# Patient Record
Sex: MALE | Race: WHITE | Employment: UNEMPLOYED | URBAN - METROPOLITAN AREA
[De-identification: names, ages, dates, MRNs, and addresses within clinical notes are randomized per-mention and may not be internally consistent; named-entity substitution may affect disease eponyms.]

---

## 2017-01-16 DIAGNOSIS — K22.70 BARRETT'S ESOPHAGUS WITHOUT DYSPLASIA: Primary | ICD-10-CM

## 2017-01-16 NOTE — TELEPHONE ENCOUNTER
Esomeprazole      Last Written Prescription Date: 12.7.15  Last Fill Quantity: 90,  # refills: 3   Last Office Visit with [facility], P or [facility] prescribing provider: 8.4.16    Deja Hood PharmD, [facility]  Pharmacist Manager   [facility]  [phone]

## 2017-01-17 RX ORDER — ESOMEPRAZOLE MAGNESIUM 40 MG/1
40 CAPSULE, DELAYED RELEASE ORAL
Qty: 90 CAPSULE | Refills: 2 | Status: SHIPPED | OUTPATIENT
Start: 2017-01-17 | End: 2017-05-02 | Stop reason: DRUGHIGH

## 2017-01-17 NOTE — TELEPHONE ENCOUNTER
Prescription approved per [facility] Refill Protocol.    Larissa Kraus, PharmD  [facility]  On behalf of [facility]

## 2017-08-09 ENCOUNTER — TELEPHONE (OUTPATIENT)
Dept: FAMILY MEDICINE | Facility: CLINIC | Age: 57
End: 2017-08-09

## 2017-08-09 NOTE — TELEPHONE ENCOUNTER
Note requires a quantity limit override. Max 120 per 365 days.    Please do not close this encounter until this has been addressed.  (prior auth approved/denied, prescriber refusal to complete prior auth or medication changed/discontinued)    Prior Authorization needed on: Esomeprazole 20mg CPDR   Drug NDC: [address]-99     Insurance: [employer] ([facility])  BIN: [id number]  Member ID: [id number]   PCN: (none)  Insurance phone #: [phone]    Pharmacy NPI: [id number]  Pharmacy Phone #: [phone]  Pharmacy Fax #: [phone]    Please let us know if the PA gets approved or denied or if medication is changed.     Deja Hood PharmD, [facility]  Pharmacist Manager   [facility]  [phone]    Joselyn Marvin  PharmD [facility] P4 Candidate 2018  [facility]  [phone]

## 2017-08-11 NOTE — TELEPHONE ENCOUNTER
Started PA over the phone with [employer]. Will take 4-5 callander days for PA determination.    Will postpone encounter for 5 days.    Nena Wright CMA ([facility])

## 2017-08-21 NOTE — TELEPHONE ENCOUNTER
PA approved.  Effective date: 8/14/2017 through 8/14/2018  PA reference #: [id number]  Pharmacy notified.    Nena Wright CMA ([facility])

## 2017-08-31 ENCOUNTER — TELEPHONE (OUTPATIENT)
Dept: GENERAL RADIOLOGY | Facility: CLINIC | Age: 57
End: 2017-08-31

## 2017-08-31 NOTE — TELEPHONE ENCOUNTER
[place].  He will be in [place] for a few weeks in September.  He says his back problems are escalating.  He has an appt with Dr. Tanner , but he wants her to know that if she orders a MRI before an injection or whatever she recommends, that he only has a few weeks here.   He would like her to call him to discuss situation.  [phone]

## 2017-09-01 ENCOUNTER — MYC MEDICAL ADVICE (OUTPATIENT)
Dept: FAMILY MEDICINE | Facility: CLINIC | Age: 57
End: 2017-09-01

## 2017-09-01 ENCOUNTER — MYC MEDICAL ADVICE (OUTPATIENT)
Dept: ORTHOPEDICS | Facility: CLINIC | Age: 57
End: 2017-09-01

## 2017-09-01 NOTE — TELEPHONE ENCOUNTER
Neither contact number is working.  I will try a [employer] message.    If patient calls back, please get a valid number.    Lauren Connelly, ATC

## 2017-09-01 NOTE — TELEPHONE ENCOUNTER
Please call patient.    Thanks for the information.  As it has been ~ 2 years since his last evaluation, it's difficult to determine what work up and treatment will be recommended until we see him for an evaluation.    Stacey Tanner MD

## 2017-09-22 ENCOUNTER — OFFICE VISIT (OUTPATIENT)
Dept: OPTOMETRY | Facility: CLINIC | Age: 57
End: 2017-09-22
Payer: COMMERCIAL

## 2017-09-22 DIAGNOSIS — H52.4 MYOPIA WITH PRESBYOPIA, BILATERAL: Primary | ICD-10-CM

## 2017-09-22 DIAGNOSIS — H57.819 BROW PTOSIS: ICD-10-CM

## 2017-09-22 DIAGNOSIS — H02.833 DERMATOCHALASIS OF BOTH EYELIDS: ICD-10-CM

## 2017-09-22 DIAGNOSIS — H02.836 DERMATOCHALASIS OF BOTH EYELIDS: ICD-10-CM

## 2017-09-22 DIAGNOSIS — H52.13 MYOPIA WITH PRESBYOPIA, BILATERAL: Primary | ICD-10-CM

## 2017-09-22 PROCEDURE — 92014 COMPRE OPH EXAM EST PT 1/>: CPT | Performed by: OPTOMETRIST

## 2017-09-22 PROCEDURE — 92015 DETERMINE REFRACTIVE STATE: CPT | Performed by: OPTOMETRIST

## 2017-09-22 ASSESSMENT — TONOMETRY
IOP_METHOD: APPLANATION
OS_IOP_MMHG: 12
OD_IOP_MMHG: 9

## 2017-09-22 ASSESSMENT — REFRACTION_MANIFEST
OS_SPHERE: -1.00
OS_ADD: +2.00
OD_ADD: +2.00
METHOD_AUTOREFRACTION: 1
OD_SPHERE: -0.75
OD_SPHERE: -0.75
OS_SPHERE: -0.75

## 2017-09-22 ASSESSMENT — VISUAL ACUITY
OS_SC: 20/60
OD_CC: 20/25
METHOD: SNELLEN - LINEAR
OD_CC+: -1
OS_CC: 20/20
OD_SC: 20/40
OS_SC: 20/40
OD_SC: 20/30

## 2017-09-22 ASSESSMENT — CONF VISUAL FIELD
OD_NORMAL: 1
METHOD: COUNTING FINGERS
OS_NORMAL: 1

## 2017-09-22 ASSESSMENT — REFRACTION_WEARINGRX
SPECS_TYPE: SVL
OD_SPHERE: -0.75
OS_SPHERE: -1.00

## 2017-09-22 ASSESSMENT — EXTERNAL EXAM - LEFT EYE: OS_EXAM: NORMAL

## 2017-09-22 ASSESSMENT — SLIT LAMP EXAM - LIDS
COMMENTS: 1+ DERMATOCHALASIS - UPPER LID
COMMENTS: 1+ DERMATOCHALASIS - UPPER LID

## 2017-09-22 ASSESSMENT — CUP TO DISC RATIO
OD_RATIO: 0.4
OS_RATIO: 0.4

## 2017-09-22 ASSESSMENT — EXTERNAL EXAM - RIGHT EYE: OD_EXAM: NORMAL

## 2017-09-22 NOTE — Clinical Note
Adan Mascorro Dr is coming to see you on 9/27 for possible BULB. He is a  and his eyelids have been bothering him for years. He also has sleep issues and I was wondering if he might have floppy eyelid syndrome? I also noticed very fine tremor or nystagmus which I could only appreciate during BIO. Thank you for seeing him. He mentioned that he would only be in town for the next few weeks and he seems anxious to have surgery soon if possible. Thank you! Brenda

## 2017-09-22 NOTE — MR AVS SNAPSHOT
After Visit Summary   9/22/2017    Adan Oliver    MRN: [id number]           Patient Information     Date Of Birth          1960        Visit Information        Provider Department      9/22/2017 2:40 PM Brenda Perez OD [facility]        Today's Diagnoses     Myopia with presbyopia, bilateral    -  1    Dermatochalasis of both eyelids        Brow ptosis          Care Instructions    Referral for eyelid evaluation with Dr. Saenz.    Prescription given for glasses.    Your eyes may be blurry at near and sensitive to light for several hours from the dilating drops.    Yearly eye exams recommended.    Thank you!          Follow-ups after your visit        Additional Services     OPHTHALMOLOGY ADULT REFERRAL       Your provider has referred you to:  [facility]: [facility] [phone]   [email or web address]      Please be aware that coverage of these services is subject to the terms and limitations of your health insurance plan.  Call member services at your health plan with any benefit or coverage questions.      Please bring the following to your appointment:  >>   Any x-rays, CTs or MRIs which have been performed.  Contact the facility where they were done to arrange for  prior to your scheduled appointment.  Any new CT, MRI or other procedures ordered by your specialist must be performed at a [facility] [place] facility or coordinated by your clinic's referral office.    >>   List of current medications   >>   This referral request   >>   Any documents/labs given to you for this referral                  Your next 10 appointments already scheduled     Sep 25, 2017  1:00 PM CDT   New Visit with Stacey Tanner MD   [facility] [place] ([facility] [place])    [address]  [place] [address]   [phone]            Sep 26, 2017 12:20 PM CDT    Office Visit with Charly Castillo MD   [facility]    [address] [address] [phone]           Bring a current list of meds and any records pertaining to this visit. For Physicals, please bring immunization records and any forms needing to be filled out. Please arrive 10 minutes early to complete paperwork.            Sep 27, 2017 11:40 AM CDT   Return Sleep Patient with Abilio Bojorquez MD   [facility] ([facility])    [address] [id number] [phone]            Sep 27, 2017  1:30 PM CDT   New Visit with [facility]   [facility]    [address] [place] [address] [phone]              Who to contact     If you have questions or need follow up information about today's clinic visit or your schedule please contact [facility] directly at [phone].  Normal or non-critical lab and imaging results will be communicated to you by MyChart, letter or phone within 4 business days after the clinic has received the results. If you do not hear from us within 7 days, please contact the clinic through [employer] or phone. If you have a critical or abnormal lab result, we will notify you by phone as soon as possible.  Submit refill requests through [employer] or call your pharmacy and they will forward the refill request to us. Please allow 3 business days for your refill to be completed.          Additional Information About Your Visit        MyChart Information     [employer] gives you secure access to your electronic health record. If you see a primary care provider, you can also send messages to your care team and make appointments. If you have questions, please call your primary care clinic.  If you do not have a primary care provider, please call [phone]  and they will assist you.        Care EveryWhere ID     This is your Care EveryWhere ID. This could be used by other organizations to access your [place] medical records  [id number]         Blood Pressure from Last 3 Encounters:   09/26/16 120/77   08/04/16 116/60   05/17/16 128/70    Weight from Last 3 Encounters:   09/26/16 90.3 kg (199 lb)   08/04/16 92.5 kg (204 lb)   05/17/16 95.3 kg (210 lb)              We Performed the Following     EYE EXAM (SIMPLE-NONBILLABLE)     OPHTHALMOLOGY ADULT REFERRAL     REFRACTION        Primary Care Provider Office Phone # Fax #    Charly Castillo [phone]       [address] [address]        Equal Access to Services     FORTINO SHAY : Hadii delio angela hadasho Soomaali, waaxda luqadaha, qaybta kaalmada adeegyada, lisa clarosin hayhalie marcus . So [facility] [phone].    ATENCIÓN: Si habla español, tiene a henderson disposición servicios gratuitos de asistencia lingüística. Llame al [phone].    We comply with applicable federal civil rights laws and [place] laws. We do not discriminate on the basis of race, color, national origin, age, disability sex, sexual orientation or gender identity.            Thank you!     Thank you for choosing [facility]  for your care. Our goal is always to provide you with excellent care. Hearing back from our patients is one way we can continue to improve our services. Please take a few minutes to complete the written survey that you may receive in the mail after your visit with us. Thank you!             Your Updated Medication List - Protect others around you: Learn how to safely use, store and throw away your medicines at www.disposemymeds.org.          This list is accurate as of: 9/22/17  4:35 PM.  Always use your most recent med list.                   Brand Name Dispense Instructions for use Diagnosis    esomeprazole 20 MG CR capsule    nexIUM    90 capsule    Take 1  capsule (20 mg) by mouth every morning (before breakfast) Take 30-60 minutes before eating.    Reid's esophagus without dysplasia       ibuprofen 200 MG capsule      Take 200 mg by mouth every 4 hours as needed for fever        rOPINIRole 0.25 MG tablet    REQUIP    90 tablet    Take 1 tablet (0.25 mg) by mouth At Bedtime Ok to increase to 2 tablets in 1 week if not improved    Restless legs syndrome (RLS)

## 2017-09-22 NOTE — PROGRESS NOTES
Chief Complaint   Patient presents with     COMPREHENSIVE EYE EXAM         Pt is frustrated with saggy skin around eyes.  Pt also thinks his eyes are not working well together.  Pt is finding he is spashing water on his eyes which helps make a little clearer    Last Eye Exam: 10/15/2014  Dilated Previously: Yes    What are you currently using to see?  glasses       Distance Vision Acuity: Noticed gradual change in both eyes    Near Vision Acuity: Not satisfied     Eye Comfort: dry - feel sticky - eyes feel heavy alot  Do you use eye drops? : No - Pt currently does not.  Occupation or Hobbies:  and     Jeannette Perera  [employer]            Medical, surgical and family histories reviewed and updated 9/22/2017.       OBJECTIVE: See Ophthalmology exam    ASSESSMENT:    ICD-10-CM    1. Myopia with presbyopia, bilateral H52.13 EYE EXAM (SIMPLE-NONBILLABLE)    H52.4 REFRACTION   2. Dermatochalasis of both eyelids H02.833 EYE EXAM (SIMPLE-NONBILLABLE)    H02.836 OPHTHALMOLOGY ADULT REFERRAL   3. Brow ptosis L90.8 EYE EXAM (SIMPLE-NONBILLABLE)     OPHTHALMOLOGY ADULT REFERRAL      PLAN:     Patient Instructions   Referral for eyelid evaluation with Dr. Saenz.    Prescription given for glasses.    Your eyes may be blurry at near and sensitive to light for several hours from the dilating drops.    Yearly eye exams recommended.    Thank you!

## 2017-09-22 NOTE — PATIENT INSTRUCTIONS
Referral for eyelid evaluation with Dr. Saenz.    Prescription given for glasses.    Your eyes may be blurry at near and sensitive to light for several hours from the dilating drops.    Yearly eye exams recommended.    Thank you!

## 2017-09-25 ENCOUNTER — OFFICE VISIT (OUTPATIENT)
Dept: ORTHOPEDICS | Facility: CLINIC | Age: 57
End: 2017-09-25
Payer: COMMERCIAL

## 2017-09-25 ENCOUNTER — PRE VISIT (OUTPATIENT)
Dept: OPHTHALMOLOGY | Facility: CLINIC | Age: 57
End: 2017-09-25

## 2017-09-25 VITALS
SYSTOLIC BLOOD PRESSURE: 133 MMHG | HEIGHT: 68 IN | WEIGHT: 197.3 LBS | HEART RATE: 73 BPM | DIASTOLIC BLOOD PRESSURE: 87 MMHG | BODY MASS INDEX: 29.9 KG/M2

## 2017-09-25 DIAGNOSIS — M54.16 LUMBAR RADICULOPATHY: Primary | ICD-10-CM

## 2017-09-25 PROCEDURE — 99214 OFFICE O/P EST MOD 30 MIN: CPT | Performed by: PEDIATRICS

## 2017-09-25 NOTE — MR AVS SNAPSHOT
After Visit Summary   2017    Adan Oliver    MRN: [id number]           Patient Information     Date Of Birth          1960        Visit Information        Provider Department      2017 1:00 PM Stacey Tanner MD [facility] [place]        Today's Diagnoses     Lumbar radiculopathy    -  1      Care Instructions    Plan:  - Today's Plan of Care:  MRI of the low back  Rehab: Physical Therapy: [facility] - [phone]    -We also discussed other future treatment options:  Referral to surgeon or injection    Advanced imaging is done by appointment in the imaging center.  Depending on your availability you can usually schedule within the next 1-2 days. Some insurance require a prior authorization to be completed which may delay the time until you are able to schedule your appointment.  Please call [facility] [phone] to schedule your MRI.     The clinic will call you with results, if you have not heard from the clinic within 3-4 days following your MRI please contact us at the number listed below.     If you have any further questions for your physician or physician s care team you can call [phone] and use option 3 to leave a voice message. Calls received during business hours will be returned same day.            Follow-ups after your visit        Additional Services     [facility] PT, HAND, AND CHIROPRACTIC REFERRAL       **This order will print in the [facility] Scheduling Office**    Physical Therapy, Hand Therapy and Chiropractic Care are available through:    *[facility]  *[facility]  *[facility]    Call one number to schedule at any of the above locations: [phone].    Your provider has referred you to: Physical Therapy at [facility] or [facility]    Indication/Reason for Referral: Low Back Pain  Onset of Illness:   Therapy Orders: Evaluate and Treat  Special Programs:  None  Special Request: None    Brodie Calloway      Additional Comments for the Therapist or Chiropractor:     Please be aware that coverage of these services is subject to the terms and limitations of your health insurance plan.  Call member services at your health plan with any benefit or coverage questions.      Please bring the following to your appointment:    *Your personal calendar for scheduling future appointments  *Comfortable clothing                  Your next 10 appointments already scheduled     Sep 26, 2017 12:20 PM CDT   Office Visit with Charly Castillo MD   [facility]    [address] [address] [phone]           Bring a current list of meds and any records pertaining to this visit. For Physicals, please bring immunization records and any forms needing to be filled out. Please arrive 10 minutes early to complete paperwork.            Sep 27, 2017 11:40 AM CDT   Return Sleep Patient with Abilio Bojorquez MD   [facility] ([facility])    [address] [id number] [phone]            Sep 27, 2017  1:30 PM CDT   New Visit with Dany Berrios MD   [facility]    [address] [place] [address] [phone]              Future tests that were ordered for you today     Open Future Orders        Priority Expected Expires Ordered    MR Lumbar Spine w/o Contrast Routine  9/25/2018 9/25/2017            Who to contact     If you have questions or need follow up information about today's clinic visit or your schedule please contact [facility] directly at [phone].  Normal or non-critical lab and imaging results will be communicated to you by MyChart, letter or phone within 4 business days after the clinic has received the results. If you do  "not hear from us within 7 days, please contact the clinic through [employer] or phone. If you have a critical or abnormal lab result, we will notify you by phone as soon as possible.  Submit refill requests through [employer] or call your pharmacy and they will forward the refill request to us. Please allow 3 business days for your refill to be completed.          Additional Information About Your Visit        [employer] Information     [employer] gives you secure access to your electronic health record. If you see a primary care provider, you can also send messages to your care team and make appointments. If you have questions, please call your primary care clinic.  If you do not have a primary care provider, please call [phone] and they will assist you.        Care EveryWhere ID     This is your Care EveryWhere ID. This could be used by other organizations to access your [place] medical records  [id number]        Your Vitals Were     Pulse Height BMI (Body Mass Index)             73 5' 7.5\" (1.715 m) 30.45 kg/m2          Blood Pressure from Last 3 Encounters:   09/25/17 133/87   09/26/16 120/77   08/04/16 116/60    Weight from Last 3 Encounters:   09/25/17 197 lb 4.8 oz (89.5 kg)   09/26/16 199 lb (90.3 kg)   08/04/16 204 lb (92.5 kg)              We Performed the Following     [facility] PT, HAND, AND CHIROPRACTIC REFERRAL        Primary Care Provider Office Phone # Fax #    Charly Arben Castillo [phone]       [facility] [facility] [address]        Equal Access to Services     FORTINO SHAY : Hadii aad ku hadasho Soomaali, waaxda luqadaha, qaybta kaalmada adeegyada, lisa carpio. So [facility] [phone].    ATENCIÓN: Si habla español, tiene a henderson disposición servicios gratuitos de asistencia lingüística. Llame al [phone].    We comply with applicable federal civil rights laws and [place] laws. We do not discriminate on the basis of race, color, national origin, " age, disability sex, sexual orientation or gender identity.            Thank you!     Thank you for choosing [facility]  for your care. Our goal is always to provide you with excellent care. Hearing back from our patients is one way we can continue to improve our services. Please take a few minutes to complete the written survey that you may receive in the mail after your visit with us. Thank you!             Your Updated Medication List - Protect others around you: Learn how to safely use, store and throw away your medicines at www.disposemymeds.org.          This list is accurate as of: 9/25/17  2:00 PM.  Always use your most recent med list.                   Brand Name Dispense Instructions for use Diagnosis    esomeprazole 20 MG CR capsule    nexIUM    90 capsule    Take 1 capsule (20 mg) by mouth every morning (before breakfast) Take 30-60 minutes before eating.    Reid's esophagus without dysplasia       ibuprofen 200 MG capsule      Take 200 mg by mouth every 4 hours as needed for fever        rOPINIRole 0.25 MG tablet    REQUIP    90 tablet    Take 1 tablet (0.25 mg) by mouth At Bedtime Ok to increase to 2 tablets in 1 week if not improved    Restless legs syndrome (RLS)

## 2017-09-25 NOTE — PATIENT INSTRUCTIONS
Plan:  - Today's Plan of Care:  MRI of the low back  Rehab: Physical Therapy: [facility] - [phone]    -We also discussed other future treatment options:  Referral to surgeon or injection    Advanced imaging is done by appointment in the imaging center.  Depending on your availability you can usually schedule within the next 1-2 days. Some insurance require a prior authorization to be completed which may delay the time until you are able to schedule your appointment.  Please call [facility] Central [phone] to schedule your MRI.     The clinic will call you with results, if you have not heard from the clinic within 3-4 days following your MRI please contact us at the number listed below.     If you have any further questions for your physician or physician s care team you can call [phone] and use option 3 to leave a voice message. Calls received during business hours will be returned same day.

## 2017-09-25 NOTE — PROGRESS NOTES
Sports Medicine Clinic Visit    PCP: Charly Castillo    Adan Oliver is a 57 year old male who is seen  in follow-up presenting with low back and bilateral leg pain, L>R    Injury: Patient reports on an off low back pain over the past few years, worsening recently over the last 3 months.  Was previously seen for similar symptoms in 2015/2016 by myself and Dr. Patricio.  At that time he discussed PT and injection, however, didn't complete either.  He states he aggravated it in July doing physical work including walking on rocks.  This pain is much worse.  Will have pain into legs mostly in the thighs, left leg currently worse with pain down to his toe.  Numbness on the top of his left toe.  No bowel or bladder symptoms.    Adan was asked to complete the Oswestry Low Back Disability Index and Brodie Start Back screening tool.  today in the office.  Disability score: 34%.     Location of Pain: bilateral legs, low back  Duration of Pain: 3 month(s) on chronic  Rating of Pain at worst: 10/10  Rating of Pain Currently: 3/10  Symptoms are better with: chiropractor, tumeric  Symptoms are worse with: being on feet, moving around doing work, stairs, inclines  Additional Features:   Positive: numbness and burning   Negative: swelling, bruising, popping, grinding, catching, locking, instability, paresthesias, weakness, pain in other joints and systemic symptoms  Other evaluation and/or treatments so far consists of: chiropractor-provided relief  Prior History of related problems: chronic    Social History:     Review of Systems  Skin: no bruising, no swelling  Musculoskeletal: as above  Neurologic: yes numbness, paresthesias  Remainder of review of systems is negative including constitutional, CV, pulmonary, GI, except as noted in HPI or medical history.    Patient's current problem list, past medical and surgical history, and family history were reviewed.    Patient Active Problem List   Diagnosis     Esophageal  "reflux     Hyperlipidemia with target LDL less than 130     Reid esophagus     Head ache     Memory changes     Insomnia, psychophysiological     Family history of Alzheimer's disease     Anxiety     Major depressive disorder, single episode, mild (H)     Lumbar degenerative disc disease     Protrusion of lumbar intervertebral disc     Lumbar spinal stenosis     Alcohol use     Past Medical History:   Diagnosis Date     History of shingles- 2012 10/1/2014     Nephrolithiasis-2009 10/1/2014     Past Surgical History:   Procedure Laterality Date     APPENDECTOMY  2000s     CARPAL TUNNEL RELEASE RT/LT  1980s     TONSILLECTOMY  1980s     Family History   Problem Relation Age of Onset     Hypertension Mother      DIABETES Brother      Glaucoma No family hx of      Macular Degeneration No family hx of      Coronary Artery Disease No family hx of      Colon Cancer No family hx of      Retinal detachment No family hx of        Objective  /87  Pulse 73  Ht 5' 7.5\" (1.715 m)  Wt 197 lb 4.8 oz (89.5 kg)  BMI 30.45 kg/m2    GENERAL APPEARANCE: healthy, alert and no distress   GAIT: NORMAL  SKIN: no suspicious lesions or rashes  HEENT: Sclera clear, anicteric  CV: good peripheral pulses  RESP: Breathing not labored  NEURO: Normal strength and tone, mentation intact and speech normal  PSYCH:  mentation appears normal and affect normal/bright    Low back exam:    Inspection:     no visible deformity in the low back       normal skin       normal vascular       normal lymphatic    Posture:      rounded shoulders and upper back       lumbar lordosis diminished    Tender:     midline       paraspinal muscles    Non Tender:     remainder of lumbar spine    ROM:      limited flexion due to pain       limited extension due to pain    Strength:     hip flexion 5/5 bilateral       knee extension 5/5 bilateral       ankle dorsiflexion 5/5 bilateral       ankle plantarflexion 5/5 bilateral       dorsiflexion of the great toe " 4/5 left       able to heel and toe walk    Reflexes:     patellar (L3, L4) symmetric normal       achilles tendons (S1) symmetric normal    Sensation:    grossly intact throughout lower extremities    Special tests:      straight leg raise left positive        straight leg raise right negative       neg (-) ROSEMARIE  bilateral       neg (-) FADIR  bilateral    Radiology  I visualized and reviewed these images with the patient  MRI LUMBAR SPINE WITHOUT CONTRAST 7/27/2015, 1:22 PM      HISTORY: Sciatica. Low back and leg pain.     TECHNIQUE:  Sagittal T1 and STIR. Sagittal T2 and axial dual-echo T2.      FINDINGS:  Five lumbar vertebrae are assumed. Some degree of disc  dehydration throughout. The patient is noted to have a somewhat  congenitally small lumbar spinal canal.     Findings by specific level:     L2-L3: No disc herniation or stenosis.     L3-L4: Minimal left ligamentum flavum thickening. Disc bulging.  Borderline central stenosis. Mild to mild-moderate bilateral foraminal  stenosis.     L4-L5: Mild ligamentum flavum thickening. Medium sized central and  right parasagittal disc protrusion. The larger component is right  parasagittal, with mass effect on the right L5 nerve root. There is  moderate central stenosis. Mild to mild-moderate bilateral foraminal  stenosis.     L5-S1: Disc bulging without central stenosis. Mild to mild-moderate  bilateral foraminal stenosis.     IMPRESSION  IMPRESSION:  1. Multilevel degenerative disc disease.  2. L3-L4: Borderline central stenosis.  3. L4-L5: Medium-sized right asymmetric disc protrusion with mass  effect on the L5 nerve root. Moderate central stenosis.       Assessment:  1. Lumbar radiculopathy      Lumbar radiculopathy with prior disc herniation.  Given worsening of symptoms and mild weakness, will update MRI.  We discussed the following treatment options: symptom treatment, activity modification/rest, rehab and referral. Following discussion, plan: will update  MRI and consider injection (will call with MRI results).  Recommended close follow up given weakness.  Patient is only in town 2 weeks so will plan to see prior to his departure.  If needed can help arrange follow up care in [place].    Plan:  - Today's Plan of Care:  MRI of the low back (we will call with results)  Rehab: Physical Therapy: [facility] - [phone]    -We also discussed other future treatment options:  Referral to surgeon or injection    Concerning signs and symptoms were reviewed.  The patient expressed understanding of this management plan and all questions were answered at this time.    Stacey Tanner MD CAQ  Primary Care Sports Medicine  [facility]

## 2017-09-25 NOTE — NURSING NOTE
"Chief Complaint   Patient presents with     Lumbar/SI       Initial /87  Pulse 73  Ht 5' 7.5\" (1.715 m)  Wt 197 lb 4.8 oz (89.5 kg)  BMI 30.45 kg/m2 Estimated body mass index is 30.45 kg/(m^2) as calculated from the following:    Height as of this encounter: 5' 7.5\" (1.715 m).    Weight as of this encounter: 197 lb 4.8 oz (89.5 kg).  Medication Reconciliation: complete    "

## 2017-09-25 NOTE — TELEPHONE ENCOUNTER
Consult Referred by Dr. Ana Wilkins Dewey      For the evaluation of   Chief Complaint   Patient presents with     Previsit     appt w/ Dr Berrios     Dermatochalasis Evaluation     referred by Dr Perez       When was your last eye exam w/ [facility]? 1 week(s)  Do you wear glasses? Yes Please bring them with you to your appointment.  Do you wear contacts? No  How many hours per day to you wear your lenses? not applicable  Please bring the boxes with you to your appointment.      HPI:  Location:   OU     Symptoms:   POSITIVE for: and lid problem droopy  Pertinent Negatives:   Negative for vision changes Severity:   moderate  Duration:   years    Timing:   gradual onset  Other:     POH:  1- Myopia  2- Dermatochalasis  3- Brow Ptosis      Do you use any eye drops? no  For what condition(s)?    Ocular Meds:         ROS:    Review Of Systems  Eyes: as above  Endocrine:  negative    Allergies:  No Known Allergies    Systemic Medications:   Current Outpatient Prescriptions   Medication     esomeprazole (NEXIUM) 20 MG CR capsule     rOPINIRole (REQUIP) 0.25 MG tablet     ibuprofen 200 MG capsule     No current facility-administered medications for this visit.        Family History   Problem Relation Age of Onset     Hypertension Mother      DIABETES Brother      Glaucoma No family hx of      Macular Degeneration No family hx of      Coronary Artery Disease No family hx of      Colon Cancer No family hx of      Retinal detachment No family hx of        Social History   Substance Use Topics     Smoking status: Never Smoker     Smokeless tobacco: Never Used     Alcohol use 8.4 - 12.6 oz/week     14 - 21 Standard drinks or equivalent per week      Comment: varies, a couple drinks a day       Saskia SIMMONS [facility]

## 2017-09-26 ENCOUNTER — OFFICE VISIT (OUTPATIENT)
Dept: FAMILY MEDICINE | Facility: CLINIC | Age: 57
End: 2017-09-26
Payer: COMMERCIAL

## 2017-09-26 VITALS
TEMPERATURE: 98.5 F | WEIGHT: 202 LBS | BODY MASS INDEX: 30.62 KG/M2 | HEIGHT: 68 IN | HEART RATE: 80 BPM | DIASTOLIC BLOOD PRESSURE: 92 MMHG | SYSTOLIC BLOOD PRESSURE: 134 MMHG

## 2017-09-26 DIAGNOSIS — Z23 NEED FOR PROPHYLACTIC VACCINATION AND INOCULATION AGAINST INFLUENZA: ICD-10-CM

## 2017-09-26 DIAGNOSIS — R19.7 DIARRHEA, UNSPECIFIED TYPE: Primary | ICD-10-CM

## 2017-09-26 PROBLEM — E66.09 NON MORBID OBESITY DUE TO EXCESS CALORIES: Chronic | Status: ACTIVE | Noted: 2017-09-26

## 2017-09-26 LAB
BASOPHILS # BLD AUTO: 0 10E9/L (ref 0–0.2)
BASOPHILS NFR BLD AUTO: 0.4 %
DIFFERENTIAL METHOD BLD: NORMAL
EOSINOPHIL # BLD AUTO: 0.2 10E9/L (ref 0–0.7)
EOSINOPHIL NFR BLD AUTO: 2.4 %
ERYTHROCYTE [DISTWIDTH] IN BLOOD BY AUTOMATED COUNT: 14 % (ref 10–15)
HCT VFR BLD AUTO: 44.5 % (ref 40–53)
HGB BLD-MCNC: 14.9 G/DL (ref 13.3–17.7)
LYMPHOCYTES # BLD AUTO: 2.4 10E9/L (ref 0.8–5.3)
LYMPHOCYTES NFR BLD AUTO: 25 %
MCH RBC QN AUTO: 31 PG (ref 26.5–33)
MCHC RBC AUTO-ENTMCNC: 33.5 G/DL (ref 31.5–36.5)
MCV RBC AUTO: 93 FL (ref 78–100)
MONOCYTES # BLD AUTO: 1.1 10E9/L (ref 0–1.3)
MONOCYTES NFR BLD AUTO: 11.1 %
NEUTROPHILS # BLD AUTO: 5.9 10E9/L (ref 1.6–8.3)
NEUTROPHILS NFR BLD AUTO: 61.1 %
PLATELET # BLD AUTO: 425 10E9/L (ref 150–450)
RBC # BLD AUTO: 4.8 10E12/L (ref 4.4–5.9)
WBC # BLD AUTO: 9.7 10E9/L (ref 4–11)

## 2017-09-26 PROCEDURE — 90686 IIV4 VACC NO PRSV 0.5 ML IM: CPT | Performed by: FAMILY MEDICINE

## 2017-09-26 PROCEDURE — 99213 OFFICE O/P EST LOW 20 MIN: CPT | Mod: 25 | Performed by: FAMILY MEDICINE

## 2017-09-26 PROCEDURE — 90471 IMMUNIZATION ADMIN: CPT | Performed by: FAMILY MEDICINE

## 2017-09-26 PROCEDURE — 80053 COMPREHEN METABOLIC PANEL: CPT | Performed by: FAMILY MEDICINE

## 2017-09-26 PROCEDURE — 36415 COLL VENOUS BLD VENIPUNCTURE: CPT | Performed by: FAMILY MEDICINE

## 2017-09-26 PROCEDURE — 85025 COMPLETE CBC W/AUTO DIFF WBC: CPT | Performed by: FAMILY MEDICINE

## 2017-09-26 NOTE — PROGRESS NOTES
Injectable Influenza Immunization Documentation    1.  Is the person to be vaccinated sick today?   No    2. Does the person to be vaccinated have an allergy to a component   of the vaccine?   No    3. Has the person to be vaccinated ever had a serious reaction   to influenza vaccine in the past?   No    4. Has the person to be vaccinated ever had Guillain-Barré syndrome?   No    Form completed by Stacey Lock, Certified Medical Assistant (AAMA)

## 2017-09-26 NOTE — PROGRESS NOTES
SUBJECTIVE:   Adan Oliver is a 57 year old male who presents to clinic today for the following health issues:      Diarrhea  Onset: ~6 months    Description:   Consistency of stool: loose and sticky, acrid smelling  Blood in stool: no   Number of loose stools in past 24 hours: 2-3    Progression of Symptoms:  same    Accompanying Signs & Symptoms:  Fever: no   Nausea or vomiting; no   Abdominal pain: no   Episodes of constipation: no   Weight loss: no     History:   Ill contacts: no   Recent use of antibiotics: no      Recent travels: YES- [place]          Recent medication-new or changes(Rx or OTC): no     Precipitating factors:   none    Alleviating factors:   none    Therapies Tried and outcome:  Diet changes; Outcome: no relief    He doesn't feel like he's having cathartic BM's. Often times notes urgency but no loss of bowel control. No melanic bowel movements. No close contacts with people with similar symptoms. He does note that he travels to [place] on a yearly interval.     Inquires about use of ibuprofen in the setting of Reid's esophagitis.He had an EGD in 2014 which showed Reid's esophagitis. Takes Nexium in the morning and then two ibuprofen. He has been taking ibuprofen regularly for several months now for pain management. Denies abdominal pain, vomiting, nausea, or upset stomach. Had tried limiting diary intake but it didn't make a difference.        Problem list and histories reviewed & adjusted, as indicated.  Additional history: as documented    Patient Active Problem List   Diagnosis     Esophageal reflux     Hyperlipidemia with target LDL less than 130     Reid esophagus     Head ache     Memory changes     Insomnia, psychophysiological     Family history of Alzheimer's disease     Anxiety     Major depressive disorder, single episode, mild (H)     Lumbar degenerative disc disease     Protrusion of lumbar intervertebral disc     Lumbar spinal stenosis     Alcohol use     Past  Surgical History:   Procedure Laterality Date     APPENDECTOMY  2000s     CARPAL TUNNEL RELEASE RT/LT  1980s     TONSILLECTOMY  1980s       Social History   Substance Use Topics     Smoking status: Never Smoker     Smokeless tobacco: Never Used     Alcohol use 8.4 - 12.6 oz/week     14 - 21 Standard drinks or equivalent per week      Comment: varies, a couple drinks a day     Family History   Problem Relation Age of Onset     Hypertension Mother      DIABETES Brother      Glaucoma No family hx of      Macular Degeneration No family hx of      Coronary Artery Disease No family hx of      Colon Cancer No family hx of      Retinal detachment No family hx of          Current Outpatient Prescriptions   Medication Sig Dispense Refill     esomeprazole (NEXIUM) 20 MG CR capsule Take 1 capsule (20 mg) by mouth every morning (before breakfast) Take 30-60 minutes before eating. 90 capsule 3     ibuprofen 200 MG capsule Take 200 mg by mouth every 4 hours as needed for fever       No Known Allergies  Recent Labs   Lab Test  08/04/16   1523  07/09/15   1557  11/24/14   1203  11/06/14   1522  10/01/14   1533  09/25/14   1305   A1C   --    --    --    --   5.1   --    LDL   --   131*   --   Cannot estimate LDL when triglyceride exceeds 400 mg/dL  193*   --   191*   HDL   --   47   --   37*   --   54   TRIG   --   284*   --   511*   --   314*   ALT  29   --   37   --    --    --    CR  1.15   --   1.20   --    --   1.16   GFRESTIMATED  65   --   63   --    --   65   GFRESTBLACK  79   --   76   --    --   79   POTASSIUM  4.5   --   4.4   --    --   4.0   TSH  0.52   --    --    --    --    --       BP Readings from Last 3 Encounters:   09/26/17 (!) 134/92   09/25/17 133/87   09/26/16 120/77    Wt Readings from Last 3 Encounters:   09/26/17 91.6 kg (202 lb)   09/25/17 89.5 kg (197 lb 4.8 oz)   09/26/16 90.3 kg (199 lb)                  Labs reviewed in EPIC          Reviewed and updated as needed this visit by clinical staffTobaccmaci   "Allergies  Med Hx  Surg Hx  Fam Hx  Soc Hx      Reviewed and updated as needed this visit by Provider         ROS:  Constitutional, HEENT, cardiovascular, pulmonary, GI, , musculoskeletal, neuro, skin, endocrine and psych systems are negative, except as otherwise noted.    This document serves as a record of the services and decisions personally performed and made by Adam Castillo MD. It was created on their behalf by Orlando Saldivar, a trained medical scribe. The creation of this document is based the provider's statements to the medical scribe.  Orlando Saldivar September 26, 2017 12:32 PM         OBJECTIVE:   BP (!) 134/92 (Cuff Size: Adult Large)  Pulse 80  Temp 98.5  F (36.9  C) (Oral)  Ht 1.715 m (5' 7.5\")  Wt 91.6 kg (202 lb)  BMI 31.17 kg/m2  Body mass index is 31.17 kg/(m^2).  GENERAL: healthy, alert and no distress  HENT: ear canals and TM's normal, nose and mouth without ulcers or lesions  NECK: no adenopathy, no asymmetry, masses, or scars and thyroid normal to palpation  RESP: lungs clear to auscultation - no rales, rhonchi or wheezes  CV: regular rate and rhythm, normal S1 S2, no S3 or S4, no murmur, click or rub  ABDOMEN: soft, nontender, no hepatosplenomegaly, no masses and bowel sounds normal  MS: no gross musculoskeletal defects noted, no edema -- DROM of low back  SKIN: no suspicious lesions or rashes  PSYCH: mentation appears normal, affect normal/bright    Diagnostic Test Results:  none     ASSESSMENT/PLAN:   (R19.7) Diarrhea, unspecified type  (primary encounter diagnosis)  Comment: chronic diarrhea/loose stools with unclear etiology. Narrative concerning for giardia vs other parasitic infection considering travel history.   Plan: Enteric Bacteria and Virus Panel by ANGELICA Stool,         Giardia antigen, Comprehensive metabolic panel,        Ova and Parasite Exam Routine, CBC with         platelets and differential        -follow up, pending results    (Z23) Need for prophylactic vaccination and " inoculation against influenza  Comment:   Plan: FLU VAC, SPLIT VIRUS IM > 3 YO (QUADRIVALENT)         [00173], Vaccine Administration, Initial         [95209]          Patient Instructions   Try taking metamucil 20-25 mg a day.         The information in this document, created by the medical scribe for me, accurately reflects the services I personally performed and the decisions made by me. I have reviewed and approved this document for accuracy prior to leaving the patient care area.    Charly Castillo MD, MD  [facility]

## 2017-09-26 NOTE — NURSING NOTE
"Chief Complaint   Patient presents with     Diarrhea     Fatigue     Flu Shot     done       Initial BP (!) 134/92 (Cuff Size: Adult Large)  Pulse 80  Temp 98.5  F (36.9  C) (Oral)  Ht 5' 7.5\" (1.715 m)  Wt 202 lb (91.6 kg)  BMI 31.17 kg/m2 Estimated body mass index is 31.17 kg/(m^2) as calculated from the following:    Height as of this encounter: 5' 7.5\" (1.715 m).    Weight as of this encounter: 202 lb (91.6 kg).  Medication Reconciliation: complete   Stacey Lock, Certified Medical Assistant (AAMA)     "

## 2017-09-26 NOTE — MR AVS SNAPSHOT
After Visit Summary   9/26/2017    Adan Oliver    MRN: [id number]           Patient Information     Date Of Birth          1960        Visit Information        Provider Department      9/26/2017 12:20 PM Charly Castillo MD [facility]        Today's Diagnoses     Diarrhea, unspecified type    -  1    Need for prophylactic vaccination and inoculation against influenza          Care Instructions    Try taking metamucil 20-25 mg a day.           Follow-ups after your visit        Your next 10 appointments already scheduled     Sep 27, 2017 11:40 AM CDT   Return Sleep Patient with Abilio Bojorquez MD   [facility] ([facility]    [address] [address] [id number] [phone]            Sep 27, 2017  1:30 PM CDT   New Visit with Dany Berrios MD   [facility]    [address] [address] [place] [id number] [phone]            Sep 27, 2017  7:15 PM CDT   MR LUMBAR SPINE W/O CONTRAST with BEMR1   [facility] ([facility])    [address] [facility] [id number] [phone]           Take your medicines as usual, unless your doctor tells you not to. Bring a list of your current medicines to your exam (including vitamins, minerals and over-the-counter drugs). Also bring the results of similar scans you may have had.  Please remove any body piercings and hair extensions before you arrive.  Follow your doctor s orders. If you do not, we may have to postpone your exam.  You will not have contrast for this exam. You do not need to do anything special to prepare.  The MRI machine uses a strong magnet. Please wear clothes without metal (snaps, zippers). A sweatsuit works well, or we may give you a hospital gown.   **IMPORTANT** THE INSTRUCTIONS BELOW ARE ONLY FOR THOSE PATIENTS WHO HAVE BEEN TOLD THEY WILL RECEIVE  SEDATION OR GENERAL ANESTHESIA DURING THEIR MRI PROCEDURE:  IF YOU WILL RECEIVE SEDATION (take medicine to help you relax during your exam):   You must get the medicine from your doctor before you arrive. Bring the medicine to the exam. Do not take it at home.   Arrive one hour early. Bring someone who can take you home after the test. Your medicine will make you sleepy. After the exam, you may not drive, take a bus or take a taxi by yourself.   No eating 8 hours before your exam. You may have clear liquids up until 4 hours before your exam. (Clear liquids include water, clear tea, black coffee and fruit juice without pulp.)  IF YOU WILL RECEIVE ANESTHESIA (be asleep for your exam):   Arrive 1 1/2 hours early. Bring someone who can take you home after the test. You may not drive, take a bus or take a taxi by yourself.   No eating 8 hours before your exam. You may have clear liquids up until 4 hours before your exam. (Clear liquids include water, clear tea, black coffee and fruit juice without pulp.)   You will spend four to five hours in the recovery room.  Please call the Imaging Department at your exam site with any questions.              Future tests that were ordered for you today     Open Future Orders        Priority Expected Expires Ordered    Enteric Bacteria and Virus Panel by ANGELICA Stool Routine  9/26/2018 9/26/2017    Giardia antigen Routine  9/26/2018 9/26/2017    Ova and Parasite Exam Routine Routine  9/26/2018 9/26/2017    MR Lumbar Spine w/o Contrast Routine  9/25/2018 9/25/2017            Who to contact     If you have questions or need follow up information about today's clinic visit or your schedule please contact [facility] directly at [phone].  Normal or non-critical lab and imaging results will be communicated to you by MyChart, letter or phone within 4 business days after the clinic has received the results. If you do not hear from us within 7 days, please contact the  "clinic through [facility] or phone. If you have a critical or abnormal lab result, we will notify you by phone as soon as possible.  Submit refill requests through SNAPP' or call your pharmacy and they will forward the refill request to us. Please allow 3 business days for your refill to be completed.          Additional Information About Your Visit        [facility] Information     SNAPP' gives you secure access to your electronic health record. If you see a primary care provider, you can also send messages to your care team and make appointments. If you have questions, please call your primary care clinic.  If you do not have a primary care provider, please call [phone] and they will assist you.        Care EveryWhere ID     This is your Care EveryWhere ID. This could be used by other organizations to access your [facility] medical records  [id number]        Your Vitals Were     Pulse Temperature Height BMI (Body Mass Index)          80 98.5  F (36.9  C) (Oral) 5' 7.5\" (1.715 m) 31.17 kg/m2         Blood Pressure from Last 3 Encounters:   09/26/17 (!) 134/92   09/25/17 133/87   09/26/16 120/77    Weight from Last 3 Encounters:   09/26/17 202 lb (91.6 kg)   09/25/17 197 lb 4.8 oz (89.5 kg)   09/26/16 199 lb (90.3 kg)              We Performed the Following     CBC with platelets and differential     Comprehensive metabolic panel     FLU VAC, SPLIT VIRUS IM > 3 YO (QUADRIVALENT) [97452]     Vaccine Administration, Initial [80700]        Primary Care Provider Office Phone # Fax #    Charly Arben Castillo [phone]       [address] [facility] [address]        Equal Access to Services     [facility] Center[facility] : Hadii delio hancock Sopatrizia, waaxda luqadaha, qaybta kaalmada faith, lisa marcus . So [facility] [phone].    ATENCIÓN: Si habla español, tiene a henderson disposición servicios gratuitos de asistencia lingüística. Llame al [phone].    We comply with " applicable federal civil rights laws and [place] laws. We do not discriminate on the basis of race, color, national origin, age, disability sex, sexual orientation or gender identity.            Thank you!     Thank you for choosing [facility]  for your care. Our goal is always to provide you with excellent care. Hearing back from our patients is one way we can continue to improve our services. Please take a few minutes to complete the written survey that you may receive in the mail after your visit with us. Thank you!             Your Updated Medication List - Protect others around you: Learn how to safely use, store and throw away your medicines at www.disposemymeds.org.          This list is accurate as of: 9/26/17 12:55 PM.  Always use your most recent med list.                   Brand Name Dispense Instructions for use Diagnosis    esomeprazole 20 MG CR capsule    nexIUM    90 capsule    Take 1 capsule (20 mg) by mouth every morning (before breakfast) Take 30-60 minutes before eating.    Reid's esophagus without dysplasia       ibuprofen 200 MG capsule      Take 200 mg by mouth every 4 hours as needed for fever

## 2017-09-27 ENCOUNTER — OFFICE VISIT (OUTPATIENT)
Dept: OPHTHALMOLOGY | Facility: CLINIC | Age: 57
End: 2017-09-27
Payer: COMMERCIAL

## 2017-09-27 ENCOUNTER — RADIANT APPOINTMENT (OUTPATIENT)
Dept: MRI IMAGING | Facility: CLINIC | Age: 57
End: 2017-09-27
Attending: PEDIATRICS
Payer: COMMERCIAL

## 2017-09-27 ENCOUNTER — OFFICE VISIT (OUTPATIENT)
Dept: SLEEP MEDICINE | Facility: CLINIC | Age: 57
End: 2017-09-27
Payer: COMMERCIAL

## 2017-09-27 VITALS
HEIGHT: 68 IN | BODY MASS INDEX: 30.4 KG/M2 | SYSTOLIC BLOOD PRESSURE: 132 MMHG | OXYGEN SATURATION: 98 % | WEIGHT: 200.6 LBS | DIASTOLIC BLOOD PRESSURE: 88 MMHG | HEART RATE: 84 BPM

## 2017-09-27 DIAGNOSIS — R06.00 DYSPNEA AND RESPIRATORY ABNORMALITY: ICD-10-CM

## 2017-09-27 DIAGNOSIS — R06.89 DYSPNEA AND RESPIRATORY ABNORMALITY: ICD-10-CM

## 2017-09-27 DIAGNOSIS — F51.04 INSOMNIA, PSYCHOPHYSIOLOGICAL: Primary | ICD-10-CM

## 2017-09-27 DIAGNOSIS — H02.833 DERMATOCHALASIS OF EYELIDS OF BOTH EYES: Primary | ICD-10-CM

## 2017-09-27 DIAGNOSIS — E66.09 NON MORBID OBESITY DUE TO EXCESS CALORIES: ICD-10-CM

## 2017-09-27 DIAGNOSIS — M54.16 LUMBAR RADICULOPATHY: ICD-10-CM

## 2017-09-27 DIAGNOSIS — G47.9 DISTURBANCE IN SLEEP BEHAVIOR: ICD-10-CM

## 2017-09-27 DIAGNOSIS — H02.403 ACQUIRED PTOSIS OF EYELID, BILATERAL: ICD-10-CM

## 2017-09-27 DIAGNOSIS — H57.819 BROW PTOSIS: ICD-10-CM

## 2017-09-27 DIAGNOSIS — R53.81 MALAISE AND FATIGUE: ICD-10-CM

## 2017-09-27 DIAGNOSIS — H02.836 DERMATOCHALASIS OF EYELIDS OF BOTH EYES: Primary | ICD-10-CM

## 2017-09-27 DIAGNOSIS — R53.83 MALAISE AND FATIGUE: ICD-10-CM

## 2017-09-27 LAB
ALBUMIN SERPL-MCNC: 3.8 G/DL (ref 3.4–5)
ALP SERPL-CCNC: 93 U/L (ref 40–150)
ALT SERPL W P-5'-P-CCNC: 42 U/L (ref 0–70)
ANION GAP SERPL CALCULATED.3IONS-SCNC: 10 MMOL/L (ref 3–14)
AST SERPL W P-5'-P-CCNC: 22 U/L (ref 0–45)
BILIRUB SERPL-MCNC: 0.6 MG/DL (ref 0.2–1.3)
BUN SERPL-MCNC: 11 MG/DL (ref 7–30)
CALCIUM SERPL-MCNC: 8.9 MG/DL (ref 8.5–10.1)
CHLORIDE SERPL-SCNC: 108 MMOL/L (ref 94–109)
CO2 SERPL-SCNC: 23 MMOL/L (ref 20–32)
CREAT SERPL-MCNC: 1.06 MG/DL (ref 0.66–1.25)
GFR SERPL CREATININE-BSD FRML MDRD: 72 ML/MIN/1.7M2
GLUCOSE SERPL-MCNC: 88 MG/DL (ref 70–99)
POTASSIUM SERPL-SCNC: 4.4 MMOL/L (ref 3.4–5.3)
PROT SERPL-MCNC: 7.1 G/DL (ref 6.8–8.8)
SODIUM SERPL-SCNC: 141 MMOL/L (ref 133–144)

## 2017-09-27 PROCEDURE — 99243 OFF/OP CNSLTJ NEW/EST LOW 30: CPT | Performed by: OPHTHALMOLOGY

## 2017-09-27 PROCEDURE — 92081 LIMITED VISUAL FIELD XM: CPT | Performed by: OPHTHALMOLOGY

## 2017-09-27 PROCEDURE — 99215 OFFICE O/P EST HI 40 MIN: CPT | Performed by: INTERNAL MEDICINE

## 2017-09-27 PROCEDURE — 72148 MRI LUMBAR SPINE W/O DYE: CPT | Mod: TC

## 2017-09-27 PROCEDURE — 92285 EXTERNAL OCULAR PHOTOGRAPHY: CPT | Performed by: OPHTHALMOLOGY

## 2017-09-27 RX ORDER — ZOLPIDEM TARTRATE 5 MG/1
TABLET ORAL
Qty: 1 TABLET | Refills: 0 | Status: SHIPPED | OUTPATIENT
Start: 2017-09-27 | End: 2018-08-24

## 2017-09-27 ASSESSMENT — EXTERNAL EXAM - RIGHT EYE: OD_EXAM: BROW PTOSIS, WITH FRONTALIS RELAXED, BROW IS BELOW SUPERIOR ORBITAL RIM AND LATERALLY INLINE WITH UPPER LASHES

## 2017-09-27 ASSESSMENT — SLIT LAMP EXAM - LIDS
COMMENTS: HEAVY DERMATOCHALASIS RESTING ON LASHES, TRUE PTOSIS
COMMENTS: HEAVY DERMATOCHALASIS RESTING ON LASHES, TRUE PTOSIS

## 2017-09-27 ASSESSMENT — VISUAL ACUITY
OS_CC: 20/20
METHOD: SNELLEN - LINEAR
CORRECTION_TYPE: GLASSES
OD_CC: 20/20

## 2017-09-27 ASSESSMENT — EXTERNAL EXAM - LEFT EYE: OS_EXAM: BROW PTOSIS, WITH FRONTALIS RELAXED, BROW IS BELOW SUPERIOR ORBITAL RIM AND LATERALLY INLINE WITH UPPER LASHES

## 2017-09-27 ASSESSMENT — MARGIN REFLEX DISTANCE
OS_MRD1: 1
OD_MRD1: 1

## 2017-09-27 NOTE — PATIENT INSTRUCTIONS
Read the book Say Good Night To Insomnia      Your BMI is Body mass index is 30.5 kg/(m^2).  Weight management is a personal decision.  If you are interested in exploring weight loss strategies, the following discussion covers the approaches that may be successful. Body mass index (BMI) is one way to tell whether you are at a healthy weight, overweight, or obese. It measures your weight in relation to your height.  A BMI of 18.5 to 24.9 is in the healthy range. A person with a BMI of 25 to 29.9 is considered overweight, and someone with a BMI of 30 or greater is considered obese. More than two-thirds of American adults are considered overweight or obese.  Being overweight or obese increases the risk for further weight gain. Excess weight may lead to heart disease and diabetes.  Creating and following plans for healthy eating and physical activity may help you improve your health.  Weight control is part of healthy lifestyle and includes exercise, emotional health, and healthy eating habits. Careful eating habits lifelong are the mainstay of weight control. Though there are significant health benefits from weight loss, long-term weight loss with diet alone may be very difficult to achieve- studies show long-term success with dietary management in less than 10% of people. Attaining a healthy weight may be especially difficult to achieve in those with severe obesity. In some cases, medications, devices and surgical management might be considered.  What can you do?  If you are overweight or obese and are interested in methods for weight loss, you should discuss this with your provider.     Consider reducing daily calorie intake by 500 calories.     Keep a food journal.     Avoiding skipping meals, consider cutting portions instead.    Diet combined with exercise helps maintain muscle while optimizing fat loss. Strength training is particularly important for building and maintaining muscle mass. Exercise helps reduce  stress, increase energy, and improves fitness. Increasing exercise without diet control, however, may not burn enough calories to loose weight.       Start walking three days a week 10-20 minutes at a time    Work towards walking thirty minutes five days a week     Eventually, increase the speed of your walking for 1-2 minutes at time    In addition, we recommend that you review healthy lifestyles and methods for weight loss available through the National Institutes of Health patient information sites:  http://win.niddk.nih.gov/publications/index.htm    And look into health and wellness programs that may be available through your health insurance provider, employer, local community center, or jordon club.    Weight management plan: Patient was referred to their PCP to discuss a diet and exercise plan.

## 2017-09-27 NOTE — NURSING NOTE
"Chief Complaint   Patient presents with     RECHECK     previous sleeping recommendations not helping       Initial /88  Pulse 84  Ht 1.727 m (5' 8\")  Wt 91 kg (200 lb 9.6 oz)  SpO2 98%  BMI 30.5 kg/m2 Estimated body mass index is 30.5 kg/(m^2) as calculated from the following:    Height as of this encounter: 1.727 m (5' 8\").    Weight as of this encounter: 91 kg (200 lb 9.6 oz).  Medication Reconciliation: complete   Neck Cir (cm): 43 cm (9/27/2017 11:30 AM)     ESS 3    Skye Ventura CMA      "

## 2017-09-27 NOTE — PROGRESS NOTES
Oculoplastic Clinic New Patient    Patient: Adan Oliver MRN# [id number]   YOB: 1960 Age: 57 year old   Date of Visit: Sep 27, 2017    CC: Droopy eyelids obstructing vision.  Chief Complaints and History of Present Illnesses   Patient presents with     Dermatochalasis Evaluation     Referred by Dr. Perez                 HPI:     Adan Oliver is a 57 year old male who has noted gradual onset of droopy eyelids over the past years. The droopy eyelid is interfering with activities of daily living including driving, and reading. The patient denies double vision, variability of the eyelid position. He does have some dry eye symptoms. He is a  and  and has found this to be make photography quite difficult. He uses his fingers to lift his eyelids when he is at a computer.     He is leaving for [place] next week to care for his mother who has Alzheimer's. He does not know when he will return, but is motivated to address this issue as well.     EXAM:     MRD1: 1 both eyes   Dermatochalasis with excess skin touching eyelashes after his lid is raised with phenylephrine  Brow ptosis with brow resting below superior orbital rim     VISUAL FIELD:  Right eye untaped:10 degrees Right eye taped:60 degrees  Left eye untaped:22 degrees Left eye taped:60 degrees    Assessment & Plan     Adan Oliver is a 57 year old male with the following diagnoses:   1. Dermatochalasis of eyelids of both eyes    2. Acquired ptosis of eyelid, bilateral    3. Brow ptosis       Both upper eyelid blepharoplasty , Bilateral ptosis repair MMCR 10 and Internal browpexy both.   Obtain prior auth [id number], [id number]    Due to significant brow ptosis, blepharoplasty alone would be unsuccesfull in addressing the lateral hooding which is obstructing vision. Blepharoplasty alone would result in sewing very thin eyelid skin to thick sub-brow skin, and even with that, fail to address lateral hooding which is obstructing  vision.  ANTICOAGULATION:  Ibuprofen for back issues, can hold prior to surgery          PHOTOS DEMONSTRATE:    Significant dermatochalasis with lids resting on eyelashes and obstructing visual axis  Myogenic blepharoptosis  Brow ptosis with thicker brow skin and hairs below the lateral superior orbital rim      Complete documentation of historical and exam elements from today's encounter can be found in the full encounter summary report (not reduplicated in this progress note). I personally obtained the chief complaint(s) and history of present illness.  I confirmed and edited as necessary the review of systems, past medical/surgical history, family history, social history, and examination findings as documented by others; and I examined the patient myself. I personally reviewed the relevant tests, images, and reports as documented above. I formulated and edited as necessary the assessment and plan and discussed the findings and management plan with the patient and family. - Dany Berrios MD      Today with Adan Oliver, I reviewed the indications, risks, benefits, and alternatives of the proposed surgical procedure including, but not limited to, failure obtain the desired result  and need for additional surgery, bleeding, infection, loss of vision, loss of the eye, and the remote possibility of permanent damage to any organ system or death with the use of anesthesia.  I provided multiple opportunities for the questions, answered all questions to the best of my ability, and confirmed that my answers and my discussion were understood.

## 2017-09-27 NOTE — MR AVS SNAPSHOT
After Visit Summary   9/27/2017    Adan Oliver    MRN: [id number]           Patient Information     Date Of Birth          1960        Visit Information        Provider Department      9/27/2017 1:30 PM Dany Berrios MD [facility]        Today's Diagnoses     Dermatochalasis of eyelids of both eyes    -  1    Acquired ptosis of eyelid, bilateral        Brow ptosis           Follow-ups after your visit        Your next 10 appointments already scheduled     Sep 27, 2017  7:15 PM CDT   MR LUMBAR SPINE W/O CONTRAST with BEMR1   [facility] ([facility])    [address] [facility] [address] [facility] [id number] [phone]           Take your medicines as usual, unless your doctor tells you not to. Bring a list of your current medicines to your exam (including vitamins, minerals and over-the-counter drugs). Also bring the results of similar scans you may have had.  Please remove any body piercings and hair extensions before you arrive.  Follow your doctor s orders. If you do not, we may have to postpone your exam.  You will not have contrast for this exam. You do not need to do anything special to prepare.  The MRI machine uses a strong magnet. Please wear clothes without metal (snaps, zippers). A sweatsuit works well, or we may give you a hospital gown.   **IMPORTANT** THE INSTRUCTIONS BELOW ARE ONLY FOR THOSE PATIENTS WHO HAVE BEEN TOLD THEY WILL RECEIVE SEDATION OR GENERAL ANESTHESIA DURING THEIR MRI PROCEDURE:  IF YOU WILL RECEIVE SEDATION (take medicine to help you relax during your exam):   You must get the medicine from your doctor before you arrive. Bring the medicine to the exam. Do not take it at home.   Arrive one hour early. Bring someone who can take you home after the test. Your medicine will make you sleepy. After the exam, you may not drive, take a bus or take a taxi by yourself.   No eating 8 hours before your exam. You may have clear  liquids up until 4 hours before your exam. (Clear liquids include water, clear tea, black coffee and fruit juice without pulp.)  IF YOU WILL RECEIVE ANESTHESIA (be asleep for your exam):   Arrive 1 1/2 hours early. Bring someone who can take you home after the test. You may not drive, take a bus or take a taxi by yourself.   No eating 8 hours before your exam. You may have clear liquids up until 4 hours before your exam. (Clear liquids include water, clear tea, black coffee and fruit juice without pulp.)   You will spend four to five hours in the recovery room.  Please call the Imaging Department at your exam site with any questions.              Future tests that were ordered for you today     Open Future Orders        Priority Expected Expires Ordered    Comprehensive Sleep Study Routine  3/26/2018 9/27/2017    Enteric Bacteria and Virus Panel by ANGELICA Stool Routine  9/26/2018 9/26/2017    Giardia antigen Routine  9/26/2018 9/26/2017    Ova and Parasite Exam Routine Routine  9/26/2018 9/26/2017            Who to contact     If you have questions or need follow up information about today's clinic visit or your schedule please contact [facility] directly at [phone].  Normal or non-critical lab and imaging results will be communicated to you by MyChart, letter or phone within 4 business days after the clinic has received the results. If you do not hear from us within 7 days, please contact the clinic through [employer] or phone. If you have a critical or abnormal lab result, we will notify you by phone as soon as possible.  Submit refill requests through [employer] or call your pharmacy and they will forward the refill request to us. Please allow 3 business days for your refill to be completed.          Additional Information About Your Visit        [employer] Information     [employer] gives you secure access to your electronic health record. If you see a primary care provider, you can also send messages to  your care team and make appointments. If you have questions, please call your primary care clinic.  If you do not have a primary care provider, please call [phone] and they will assist you.      [employer] is an electronic gateway that provides easy, online access to your medical records. With [employer], you can request a clinic appointment, read your test results, renew a prescription or communicate with your care team.     To access your existing account, please contact your [facility] or call [phone] for assistance.        Care EveryWhere ID     This is your Care EveryWhere ID. This could be used by other organizations to access your [facility] medical records  [id number]         Blood Pressure from Last 3 Encounters:   09/27/17 132/88   09/26/17 (!) 134/92   09/25/17 133/87    Weight from Last 3 Encounters:   09/27/17 91 kg (200 lb 9.6 oz)   09/26/17 91.6 kg (202 lb)   09/25/17 89.5 kg (197 lb 4.8 oz)              We Performed the Following     External Photos OU (both eyes)     Kinetic Ptosis OU     Geraldine-Operative Worksheet (Plastics)          Today's Medication Changes          These changes are accurate as of: 9/27/17  2:40 PM.  If you have any questions, ask your nurse or doctor.               Start taking these medicines.        Dose/Directions    zolpidem 5 MG tablet   Commonly known as:  AMBIEN   Used for:  Insomnia, psychophysiological, Non morbid obesity due to excess calories, Disturbance in sleep behavior, Dyspnea and respiratory abnormality, Malaise and fatigue   Started by:  Abilio Bojorquez MD        Take tablet by mouth 15 minutes prior to sleep, for Sleep Study   Quantity:  1 tablet   Refills:  0            Where to get your medicines      Some of these will need a paper prescription and others can be bought over the counter.  Ask your nurse if you have questions.     Bring a paper prescription for each of these medications     zolpidem 5 MG tablet                 Primary Care Provider Office Phone # Fax #    Charly Castillo [phone] [address] [facility] [address]        Equal Access to Services     FORTINO SHAY : Hadii aad ku hadricharmaci Sopatrizia, waaxda luqadaha, qaybta kaalmada faith, lisa olivares amrit carpio. So [facility] [phone].    ATENCIÓN: Si habla español, tiene a henderson disposición servicios gratuitos de asistencia lingüística. Llame al [phone].    We comply with applicable federal civil rights laws and [place] laws. We do not discriminate on the basis of race, color, national origin, age, disability sex, sexual orientation or gender identity.            Thank you!     Thank you for choosing [facility]  for your care. Our goal is always to provide you with excellent care. Hearing back from our patients is one way we can continue to improve our services. Please take a few minutes to complete the written survey that you may receive in the mail after your visit with us. Thank you!             Your Updated Medication List - Protect others around you: Learn how to safely use, store and throw away your medicines at www.disposemymeds.org.          This list is accurate as of: 9/27/17  2:40 PM.  Always use your most recent med list.                   Brand Name Dispense Instructions for use Diagnosis    esomeprazole 20 MG CR capsule    nexIUM    90 capsule    Take 1 capsule (20 mg) by mouth every morning (before breakfast) Take 30-60 minutes before eating.    Reid's esophagus without dysplasia       ibuprofen 200 MG capsule      Take 200 mg by mouth every 4 hours as needed for fever        zolpidem 5 MG tablet    AMBIEN    1 tablet    Take tablet by mouth 15 minutes prior to sleep, for Sleep Study    Insomnia, psychophysiological, Non morbid obesity due to excess calories, Disturbance in sleep behavior, Dyspnea and respiratory abnormality, Malaise and fatigue

## 2017-09-27 NOTE — NURSING NOTE
Patient presents with:  Dermatochalasis Evaluation: Referred by Dr. Perez      Referring Provider:  [place]  [address] [place] [address]    HPI    Affected eye(s):  Both   Location:  Upper   Symptoms:     Blurred vision   Decreased vision   Distorted vision   Difficulty watching television      Frequency:  Constant       Do you have eye pain now?:  No

## 2017-09-27 NOTE — PROGRESS NOTES
"Chief Complaint   Patient presents with     RECHECK     previous sleeping recommendations not helping     He has previously been seen in 2014 and had a negative sleep study.      Sleep study 7/6/2015 at the [facility] [place] for snoring insomnia, poor quality sleep. Light sleeper. Weight 198#.   -Sleep latency 16.5 minutes without Ambien. REM latency 189.5 minutes.  Sleep efficiency 64.4%. Total sleep time 266.5 minutes.  -Sleep architecture: Stage 1, 11.8% (5%), stage 2, 63.0% (45-55%), stage 3, 11.4% (15-20%), stage REM, 13.7% (20-25%).    -Supine AHI was 6.3 (9.5 minutes), REM AHI was 16.4, total AHI was 4.1, lowest oxygen saturation was 85.7%.  RDI 15.8.  Periodic Limb Movement Index 71.4/hour. The PLM arousal index was 10.4 per hour.       He was seen again in 9/2016 after his PCP put him on robinoprole. He tried requip 1 pill for periodic limb movement without change in symptoms. He had sleep onset> sleep maintenance difficulties, suspect primary problem psychophysiologic insomnia, colpicated by delayed sleep phase, and periodic limb movements. It was suggested:    1) Increasie robinoprole to 2 tablets and if needed 3 tablets to see if it has any effect- He does not recall if he did this. He may have tried 2 pills without effect  2) Read the book Say Good Night To Insomnia-  He did not do this.         He usually gets out of bed at 9 AM, bedtime is usually 12 AM-2AM. He has been getting up at regular times    Lately he states his difficulty falling asleep is doing better. He still perceives he 'does not sleep at all' and awakens frequently.     He is flying out to [place] next week  To take care of his mother for a few months    Total score - [place]: 3 (9/27/2017 11:00 AM)       Medical, social, surgical, and family histories reviewed.      OBJECTIVE:  /88  Pulse 84  Ht 1.727 m (5' 8\")  Wt 91 kg (200 lb 9.6 oz)  SpO2 98%  BMI 30.5 kg/m2  EXAM:  GENERAL APPEARANCE ADULT: Alert, " no acute distress    ASSESSMENT/PLAN:    Possible  sleep disordered breathing. Patient is strongly convinced he has obstructive sleep apnea. I believe there is a good chance he has sleep disordered breathing implicated in some of his symptoms.  Last sleep study was notable for supine and REM-related events, but total time in REM was less than normal and supine sleep was minimal. Recommend repeat Polysomnogram, AMbien if needed, needs later start (after 12 am)and end sleep times (9 AM) for REM sleep. May need to consider using 3% rules. Will have 2 week telephone follow-up.     Insomnia. I think psychophysiologic insomnia is still the primary problem. Clear evidence of hyperarousal surrounding sleep discussions. Read the book Say Good Night To Insomnia . Referral for cognitive behavioral training. We briefly discussed stimulus control.     Periodic limb movements, may be playing a role. Will defer on a trial of treatment for now.     I spent >45 minutes with patient. More than 1/2 this time spent counseling and coordinating care

## 2017-09-27 NOTE — LETTER
2017         RE:  :  MRN: Adan Oliver  1960  [id number]     Dear Dr. Perez,    Thank you for asking me to see your patient, Adan Oliver, for an oculoplastic   consultation. My assessment and plan are below. He is going to [place] next week to care for his mother who has Alzheimer's. He will call when he returns to schedule surgery.              HPI:     Adan Oliver is a 57 year old male who has noted gradual onset of droopy eyelids over the past years. The droopy eyelid is interfering with activities of daily living including driving, and reading. The patient denies double vision, variability of the eyelid position. He does have some dry eye symptoms. He is a  and  and has found this to be make photography quite difficult. He uses his fingers to lift his eyelids when he is at a computer.     He is leaving for [place] next week to care for his mother who has Alzheimer's. He does not know when he will return, but is motivated to address this issue as well.     EXAM:     MRD1: 1 both eyes   Dermatochalasis with excess skin touching eyelashes after his lid is raised with phenylephrine  Brow ptosis with brow resting below superior orbital rim     VISUAL FIELD:  Right eye untaped:10 degrees Right eye taped:60 degrees  Left eye untaped:22 degrees Left eye taped:60 degrees    Assessment & Plan     Adan Oliver is a 57 year old male with the following diagnoses:   1. Dermatochalasis of eyelids of both eyes    2. Acquired ptosis of eyelid, bilateral    3. Brow ptosis       Both upper eyelid blepharoplasty , Bilateral ptosis repair MMCR 10 and Internal browpexy both.   Obtain prior auth [id number], [id number]    Due to significant brow ptosis, blepharoplasty alone would be unsuccesfull in addressing the lateral hooding which is obstructing vision. Blepharoplasty alone would result in sewing very thin eyelid skin to thick sub-brow skin, and even with that, fail to address lateral hooding  which is obstructing vision.  ANTICOAGULATION:  Ibuprofen for back issues, can hold prior to surgery         Again, thank you for allowing me to participate in the care of your patient.      Sincerely,    Dany Berrios MD  Department of Ophthalmology and Visual Neurosciences  [facility]    [place]  [address]  [facility] [address]  VIA In Basket

## 2017-09-27 NOTE — MR AVS SNAPSHOT
After Visit Summary   9/27/2017    Adan Oliver    MRN: [id number]           Patient Information     Date Of Birth          1960        Visit Information        Provider Department      9/27/2017 11:40 AM Abilio Bojorquez MD [facility]        Today's Diagnoses     Insomnia, psychophysiological    -  1    Non morbid obesity due to excess calories        Disturbance in sleep behavior        Dyspnea and respiratory abnormality        Malaise and fatigue          Care Instructions    Read the book Say Good Night To Insomnia      Your BMI is Body mass index is 30.5 kg/(m^2).  Weight management is a personal decision.  If you are interested in exploring weight loss strategies, the following discussion covers the approaches that may be successful. Body mass index (BMI) is one way to tell whether you are at a healthy weight, overweight, or obese. It measures your weight in relation to your height.  A BMI of 18.5 to 24.9 is in the healthy range. A person with a BMI of 25 to 29.9 is considered overweight, and someone with a BMI of 30 or greater is considered obese. More than two-thirds of American adults are considered overweight or obese.  Being overweight or obese increases the risk for further weight gain. Excess weight may lead to heart disease and diabetes.  Creating and following plans for healthy eating and physical activity may help you improve your health.  Weight control is part of healthy lifestyle and includes exercise, emotional health, and healthy eating habits. Careful eating habits lifelong are the mainstay of weight control. Though there are significant health benefits from weight loss, long-term weight loss with diet alone may be very difficult to achieve- studies show long-term success with dietary management in less than 10% of people. Attaining a healthy weight may be especially difficult to achieve in those with severe obesity. In some cases, medications, devices and  surgical management might be considered.  What can you do?  If you are overweight or obese and are interested in methods for weight loss, you should discuss this with your provider.     Consider reducing daily calorie intake by 500 calories.     Keep a food journal.     Avoiding skipping meals, consider cutting portions instead.    Diet combined with exercise helps maintain muscle while optimizing fat loss. Strength training is particularly important for building and maintaining muscle mass. Exercise helps reduce stress, increase energy, and improves fitness. Increasing exercise without diet control, however, may not burn enough calories to loose weight.       Start walking three days a week 10-20 minutes at a time    Work towards walking thirty minutes five days a week     Eventually, increase the speed of your walking for 1-2 minutes at time    In addition, we recommend that you review healthy lifestyles and methods for weight loss available through the National Institutes of Health patient information sites:  http://win.niddk.nih.gov/publications/index.htm    And look into health and wellness programs that may be available through your health insurance provider, employer, local community center, or jordon club.    Weight management plan: Patient was referred to their PCP to discuss a diet and exercise plan.              Follow-ups after your visit        Additional Services     SLEEP PSYCHOLOGY REFERRAL       Referral Urgency: 24 - 48 hours    Dr. Shaun So is at the following clinics on the following days:  [facility] - [address] [place], [place]        Thursday and Friday (PLEASE CALL [phone] to schedule an appointment).  [facility] [address] [place]       Wednesdays   (PLEASE CALL [phone] to schedule an appointment).     Please be aware that coverage of these services is subject to the terms and limitations of your health insurance plan. Call member  services at your health plan with any benefit or coverage questions.     Please bring the following to your appointment:  >> List of current medications   >> This referral request   >> Any documents/labs given to you for this referral                  Follow-up notes from your care team     Return in about 2 weeks (around 10/11/2017) for telephone visit.      Your next 10 appointments already scheduled     Sep 27, 2017  1:30 PM CDT   New Visit with Dany Berrios MD   [facility] ([facility])    [id number] [place] [address] [phone]            Sep 27, 2017  7:15 PM CDT   MR LUMBAR SPINE W/O CONTRAST with BE1   [facility] ([facility])    [address] [facility] [address] [phone]           Take your medicines as usual, unless your doctor tells you not to. Bring a list of your current medicines to your exam (including vitamins, minerals and over-the-counter drugs). Also bring the results of similar scans you may have had.  Please remove any body piercings and hair extensions before you arrive.  Follow your doctor s orders. If you do not, we may have to postpone your exam.  You will not have contrast for this exam. You do not need to do anything special to prepare.  The MRI machine uses a strong magnet. Please wear clothes without metal (snaps, zippers). A sweatsuit works well, or we may give you a hospital gown.   **IMPORTANT** THE INSTRUCTIONS BELOW ARE ONLY FOR THOSE PATIENTS WHO HAVE BEEN TOLD THEY WILL RECEIVE SEDATION OR GENERAL ANESTHESIA DURING THEIR MRI PROCEDURE:  IF YOU WILL RECEIVE SEDATION (take medicine to help you relax during your exam):   You must get the medicine from your doctor before you arrive. Bring the medicine to the exam. Do not take it at home.   Arrive one hour early. Bring someone who can take you home after the test. Your medicine will make you sleepy. After the exam, you may not  drive, take a bus or take a taxi by yourself.   No eating 8 hours before your exam. You may have clear liquids up until 4 hours before your exam. (Clear liquids include water, clear tea, black coffee and fruit juice without pulp.)  IF YOU WILL RECEIVE ANESTHESIA (be asleep for your exam):   Arrive 1 1/2 hours early. Bring someone who can take you home after the test. You may not drive, take a bus or take a taxi by yourself.   No eating 8 hours before your exam. You may have clear liquids up until 4 hours before your exam. (Clear liquids include water, clear tea, black coffee and fruit juice without pulp.)   You will spend four to five hours in the recovery room.  Please call the Imaging Department at your exam site with any questions.              Future tests that were ordered for you today     Open Future Orders        Priority Expected Expires Ordered    Comprehensive Sleep Study Routine  3/26/2018 9/27/2017    Enteric Bacteria and Virus Panel by ANGELICA Stool Routine  9/26/2018 9/26/2017    Giardia antigen Routine  9/26/2018 9/26/2017    Ova and Parasite Exam Routine Routine  9/26/2018 9/26/2017            Who to contact     If you have questions or need follow up information about today's clinic visit or your schedule please contact [facility] directly at [phone].  Normal or non-critical lab and imaging results will be communicated to you by MyChart, letter or phone within 4 business days after the clinic has received the results. If you do not hear from us within 7 days, please contact the clinic through [employer] or phone. If you have a critical or abnormal lab result, we will notify you by phone as soon as possible.  Submit refill requests through [employer] or call your pharmacy and they will forward the refill request to us. Please allow 3 business days for your refill to be completed.          Additional Information About Your Visit        [employer] Information     [employer] gives you secure  "access to your electronic health record. If you see a primary care provider, you can also send messages to your care team and make appointments. If you have questions, please call your primary care clinic.  If you do not have a primary care provider, please call [phone] and they will assist you.        Care EveryWhere ID     This is your Care EveryWhere ID. This could be used by other organizations to access your [place] medical records  [id number]        Your Vitals Were     Pulse Height Pulse Oximetry BMI (Body Mass Index)          84 1.727 m (5' 8\") 98% 30.5 kg/m2         Blood Pressure from Last 3 Encounters:   09/27/17 132/88   09/26/17 (!) 134/92   09/25/17 133/87    Weight from Last 3 Encounters:   09/27/17 91 kg (200 lb 9.6 oz)   09/26/17 91.6 kg (202 lb)   09/25/17 89.5 kg (197 lb 4.8 oz)              We Performed the Following     SLEEP PSYCHOLOGY REFERRAL          Today's Medication Changes          These changes are accurate as of: 9/27/17 12:29 PM.  If you have any questions, ask your nurse or doctor.               Start taking these medicines.        Dose/Directions    zolpidem 5 MG tablet   Commonly known as:  AMBIEN   Used for:  Insomnia, psychophysiological, Non morbid obesity due to excess calories, Disturbance in sleep behavior, Dyspnea and respiratory abnormality, Malaise and fatigue   Started by:  Abilio Bojorquez MD        Take tablet by mouth 15 minutes prior to sleep, for Sleep Study   Quantity:  1 tablet   Refills:  0            Where to get your medicines      Some of these will need a paper prescription and others can be bought over the counter.  Ask your nurse if you have questions.     Bring a paper prescription for each of these medications     zolpidem 5 MG tablet                Primary Care Provider Office Phone # Fax #    Charly Castillo [phone]       [facility] [facility] [address]        Equal Access to Services     FORTINO SHAY AH: Hadii " delio Dunham, wasergda marloadaha, qaybta kaalmada faith, waxelva ra dixonkevsincere bautistaDixiehalie shirin. So [facility] [phone].    ATENCIÓN: Si habla español, tiene a henderson disposición servicios gratuitos de asistencia lingüística. Vijayame al [phone].    We comply with applicable federal civil rights laws and [place] laws. We do not discriminate on the basis of race, color, national origin, age, disability sex, sexual orientation or gender identity.            Thank you!     Thank you for choosing [facility]  for your care. Our goal is always to provide you with excellent care. Hearing back from our patients is one way we can continue to improve our services. Please take a few minutes to complete the written survey that you may receive in the mail after your visit with us. Thank you!             Your Updated Medication List - Protect others around you: Learn how to safely use, store and throw away your medicines at www.disposemymeds.org.          This list is accurate as of: 9/27/17 12:29 PM.  Always use your most recent med list.                   Brand Name Dispense Instructions for use Diagnosis    esomeprazole 20 MG CR capsule    nexIUM    90 capsule    Take 1 capsule (20 mg) by mouth every morning (before breakfast) Take 30-60 minutes before eating.    Reid's esophagus without dysplasia       ibuprofen 200 MG capsule      Take 200 mg by mouth every 4 hours as needed for fever        zolpidem 5 MG tablet    AMBIEN    1 tablet    Take tablet by mouth 15 minutes prior to sleep, for Sleep Study    Insomnia, psychophysiological, Non morbid obesity due to excess calories, Disturbance in sleep behavior, Dyspnea and respiratory abnormality, Malaise and fatigue

## 2017-09-28 ENCOUNTER — TELEPHONE (OUTPATIENT)
Dept: ORTHOPEDICS | Facility: CLINIC | Age: 57
End: 2017-09-28

## 2017-09-28 DIAGNOSIS — M54.16 LUMBAR RADICULOPATHY: Primary | ICD-10-CM

## 2017-09-29 DIAGNOSIS — R19.7 DIARRHEA, UNSPECIFIED TYPE: ICD-10-CM

## 2017-09-29 PROCEDURE — 87329 GIARDIA AG IA: CPT | Performed by: FAMILY MEDICINE

## 2017-09-29 PROCEDURE — 87209 SMEAR COMPLEX STAIN: CPT | Performed by: FAMILY MEDICINE

## 2017-09-29 PROCEDURE — 87177 OVA AND PARASITES SMEARS: CPT | Performed by: FAMILY MEDICINE

## 2017-09-29 PROCEDURE — 87506 IADNA-DNA/RNA PROBE TQ 6-11: CPT | Performed by: FAMILY MEDICINE

## 2017-09-29 NOTE — TELEPHONE ENCOUNTER
Please call patient with MRI results:    MRI LUMBAR SPINE WITHOUT CONTRAST September 27, 2017 7:32 PM      HISTORY: Several years of increasing low back and bilateral leg pain.     COMPARISON: An MRI on 7/25/2015.     TECHNIQUE: Sagittal T1, T2, and STIR, and transverse proton density  and T2-weighted images were obtained through the lumbar spine.     FINDINGS: Numbering of the levels is again based on what appear to be  five lumbar type vertebral bodies. Vertebral body alignment is normal.  No fracture is seen. No pars interarticularis defect is demonstrated.  No osseous lesion is seen. No abnormal marrow signal intensity is  identified. The conus medullaris terminates at the level of the L1  vertebral body. No intrathecal abnormality is seen. The adjacent soft  tissues are unremarkable.     Findings by specific level:     T12-L1: The disc and facet joints are normal. No stenosis is seen.     L1-L2: The disc and facet joints are normal. No stenosis is seen.     L2-L3: The disc and facet joints are normal. No stenosis is seen.     L3-L4: Again seen is mild disc dehydration. The disc height is  well-preserved. There is a mild disc bulge with no focal herniation  seen. There are mild degenerative changes in the facet joints. Again  seen is a mild degree of central canal stenosis and mild to moderate  bilateral neural foraminal stenosis. This level is unchanged.     L4-L5: There is disc dehydration. The disc height is well-preserved.  Previously there is a moderate size right central disc protrusion.  This has significantly increased in size and now represents a large  broad-based left central disc protrusion. There has been progression  of what is now severe central canal stenosis with compression of the  left L5 nerve root. Mild to moderate bilateral neural foraminal  stenosis is unchanged. There are mild degenerative changes in the  facet joints.     L5-S1: Again seen is disc dehydration and mild disc height loss.  There  is a mild disc bulge with no focal herniation seen. There are mild  degenerative changes in the facet joints. No central canal stenosis is  present. Again seen is mild to moderate bilateral neural foraminal  stenosis. This level is unchanged.         IMPRESSION:  1.  At L4-L5 there has been progression/development of a large left  central disc protrusion. There is now severe central canal stenosis  with compression of the left L5 nerve root. Mild to moderate bilateral  neural foraminal stenosis is unchanged.  2. Degenerative changes elsewhere with no other disc herniation seen.  There is mild to moderate foraminal stenosis bilaterally at L3-L4 and  L5-S1 with mild central canal stenosis at L3-L4.    In Summary:  - Progression of L4-L5 large disc protrusion with severe stenosis (narrowing) and compression of left L5 nerve root    I Recommend:  - We discussed trial of epidural steroid injection which is reasonable, however, would want close follow up post-injection to assess response and follow exam.  (Please order lumbar HARPAL if patient desires - may want to try CDI)  - Given large disc herniation that has worsened and patient's weakness on exam, I also recommend spine surgery consultation/office visit (please order)    If patient has questions I can try to reach him on Saturday or early next week.    Stacey Tanner MD

## 2017-09-29 NOTE — TELEPHONE ENCOUNTER
Discussed results with patient.     He has agreed to proceed with HARPAL and surgery consult.    Orders placed.

## 2017-09-30 ENCOUNTER — MYC MEDICAL ADVICE (OUTPATIENT)
Dept: ORTHOPEDICS | Facility: CLINIC | Age: 57
End: 2017-09-30

## 2017-09-30 NOTE — TELEPHONE ENCOUNTER
Called patient to review plan.  He was in agreement.  Will try to obtain Spine Surgery consult, otherwise will follow up with me on Thursday to review.    Stacey Tanner MD

## 2017-10-02 ENCOUNTER — THERAPY VISIT (OUTPATIENT)
Dept: SLEEP MEDICINE | Facility: CLINIC | Age: 57
End: 2017-10-02
Payer: COMMERCIAL

## 2017-10-02 DIAGNOSIS — E66.09 NON MORBID OBESITY DUE TO EXCESS CALORIES: ICD-10-CM

## 2017-10-02 DIAGNOSIS — G47.33 OSA (OBSTRUCTIVE SLEEP APNEA): Chronic | ICD-10-CM

## 2017-10-02 DIAGNOSIS — R53.81 MALAISE AND FATIGUE: ICD-10-CM

## 2017-10-02 DIAGNOSIS — R53.83 MALAISE AND FATIGUE: ICD-10-CM

## 2017-10-02 DIAGNOSIS — F51.04 INSOMNIA, PSYCHOPHYSIOLOGICAL: ICD-10-CM

## 2017-10-02 DIAGNOSIS — R06.00 DYSPNEA AND RESPIRATORY ABNORMALITY: ICD-10-CM

## 2017-10-02 DIAGNOSIS — R06.89 DYSPNEA AND RESPIRATORY ABNORMALITY: ICD-10-CM

## 2017-10-02 DIAGNOSIS — G47.9 DISTURBANCE IN SLEEP BEHAVIOR: ICD-10-CM

## 2017-10-02 LAB
G LAMBLIA AG STL QL IA: NORMAL
O+P STL MICRO: NORMAL
O+P STL MICRO: NORMAL
SPECIMEN SOURCE: NORMAL
SPECIMEN SOURCE: NORMAL

## 2017-10-02 PROCEDURE — 95810 POLYSOM 6/> YRS 4/> PARAM: CPT | Performed by: INTERNAL MEDICINE

## 2017-10-02 NOTE — MR AVS SNAPSHOT
After Visit Summary   10/2/2017    Adan Oliver    MRN: [id number]           Patient Information     Date Of Birth          1960        Visit Information        Provider Department      10/2/2017 8:00 PM [facility]        Today's Diagnoses     Insomnia, psychophysiological        Non morbid obesity due to excess calories        Disturbance in sleep behavior        Dyspnea and respiratory abnormality        Malaise and fatigue           Follow-ups after your visit        Who to contact     If you have questions or need follow up information about today's clinic visit or your schedule please contact [facility] directly at [phone].  Normal or non-critical lab and imaging results will be communicated to you by [employer], letter or phone within 4 business days after the clinic has received the results. If you do not hear from us within 7 days, please contact the clinic through [employer] or phone. If you have a critical or abnormal lab result, we will notify you by phone as soon as possible.  Submit refill requests through [employer] or call your pharmacy and they will forward the refill request to us. Please allow 3 business days for your refill to be completed.          Additional Information About Your Visit        MyChart Information     [email or web address] gives you secure access to your electronic health record. If you see a primary care provider, you can also send messages to your care team and make appointments. If you have questions, please call your primary care clinic.  If you do not have a primary care provider, please call [phone] and they will assist you.        Care EveryWhere ID     This is your Care EveryWhere ID. This could be used by other organizations to access your [place] medical records  [id number]         Blood Pressure from Last 3 Encounters:   09/27/17 132/88   09/26/17 (!) 134/92   09/25/17 133/87    Weight from Last 3 Encounters:    09/27/17 91 kg (200 lb 9.6 oz)   09/26/17 91.6 kg (202 lb)   09/25/17 89.5 kg (197 lb 4.8 oz)              We Performed the Following     Comprehensive Sleep Study        Primary Care Provider Office Phone # Fax #    Charly Castillo [phone]       [address] [facility] [address]        Equal Access to Services     FORTINO SHAY : Hadii aad ku hadasho Soomaali, waaxda luqadaha, qaybta kaalmada adeegyada, waxay idiin hayaan adeeg khkevsh laflo . So [facility] [phone].    ATENCIÓN: Si habla español, tiene a henderson disposición servicios gratuitos de asistencia lingüística. Llame al [phone].    We comply with applicable federal civil rights laws and [place] laws. We do not discriminate on the basis of race, color, national origin, age, disability, sex, sexual orientation, or gender identity.            Thank you!     Thank you for choosing [facility]  for your care. Our goal is always to provide you with excellent care. Hearing back from our patients is one way we can continue to improve our services. Please take a few minutes to complete the written survey that you may receive in the mail after your visit with us. Thank you!             Your Updated Medication List - Protect others around you: Learn how to safely use, store and throw away your medicines at www.disposemymeds.org.          This list is accurate as of: 10/2/17 11:59 PM.  Always use your most recent med list.                   Brand Name Dispense Instructions for use Diagnosis    esomeprazole 20 MG CR capsule    nexIUM    90 capsule    Take 1 capsule (20 mg) by mouth every morning (before breakfast) Take 30-60 minutes before eating.    Reid's esophagus without dysplasia       ibuprofen 200 MG capsule      Take 200 mg by mouth every 4 hours as needed for fever        zolpidem 5 MG tablet    AMBIEN    1 tablet    Take tablet by mouth 15 minutes prior to sleep, for Sleep Study    Insomnia,  psychophysiological, Non morbid obesity due to excess calories, Disturbance in sleep behavior, Dyspnea and respiratory abnormality, Malaise and fatigue

## 2017-10-03 NOTE — TELEPHONE ENCOUNTER
Spoke with patient.  He was calling about his injection.  He had his injection today.  Has had some relief.  Not interested in seeing a surgeon, but may discuss prior to leaving for a few months.      Would like  order info sent thru [employer].        Tara Ogden MS ATC

## 2017-10-04 ENCOUNTER — MYC MEDICAL ADVICE (OUTPATIENT)
Dept: FAMILY MEDICINE | Facility: CLINIC | Age: 57
End: 2017-10-04

## 2017-10-04 ENCOUNTER — TELEPHONE (OUTPATIENT)
Dept: SLEEP MEDICINE | Facility: CLINIC | Age: 57
End: 2017-10-04

## 2017-10-04 NOTE — TELEPHONE ENCOUNTER
I would recommend a scheduled telephone visit  Mychart communication will be possible but a poor venue for detailed communication- I will only be able to give the big picture

## 2017-10-04 NOTE — TELEPHONE ENCOUNTER
I called the patient to see if he wanted to get in with Dr So on 10/5/2017, he declined as he is leaving town on Sunday and has to much to do before he leaves.      Once he leaves town he will not have access to his current phone and didn't want to give another number.  He said that he is able to call you for the results if he is given a time to call that you would be available.  He would also be open to getting the results communicated to him via [employer].

## 2017-10-05 ENCOUNTER — TELEPHONE (OUTPATIENT)
Dept: ORTHOPEDICS | Facility: CLINIC | Age: 57
End: 2017-10-05

## 2017-10-05 NOTE — TELEPHONE ENCOUNTER
Patient called. He said that he went to [facility] for a back injection and he is now flared up and in more pain. He would like to talk to Stacey Tanner about what is going on. We can call him back at [phone]    Jolene BOWER (R)

## 2017-10-06 ENCOUNTER — MYC MEDICAL ADVICE (OUTPATIENT)
Dept: SLEEP MEDICINE | Facility: CLINIC | Age: 57
End: 2017-10-06

## 2017-10-06 PROBLEM — G47.33 OSA (OBSTRUCTIVE SLEEP APNEA): Chronic | Status: ACTIVE | Noted: 2017-10-06

## 2017-10-06 NOTE — TELEPHONE ENCOUNTER
Spoke with the patient who doesn't want to do the phone visit, he isn't sure if his insurance covers it and he can only do something his insurance will cover.  He would like to have the results sent to him via [employer] and if he feels he needs more information at that time he will call to schedule a telephone visit.  In the meantime he will check with his insurance on coverage for telephone visit.

## 2017-10-06 NOTE — PROCEDURES
" SLEEP STUDY INTERPRETATION  POLYSOMNOGRAPHY REPORT      Patient: Adan Oliver  YOB: 1960  Study Date: 10/2/2017  MRN: [id number]  Referring Provider: Charly Castillo  Ordering Provider: Abilio Bojorquez MD      Indications for Polysomnography: The patient is a 57 y old Male who is 5' 8\" and weighs 200.0 lbs.  His BMI is 30.7, [place] sleepiness scale 3 and neck size is 43. A diagnostic polysomnogram was performed to evaluate for possible  sleep disordered breathing.    Polysomnogram Data:  A full night polysomnogram recorded the standard physiologic parameters including EEG, EOG, EMG, ECG, nasal and oral airflow.  Respiratory parameters of chest and abdominal movements were recorded with respiratory inductance plethysmography.  Oxygen saturation was recorded by pulse oximetry.      Sleep Architecture:   The total recording time of the polysomnogram was 614.4 minutes.  The total sleep time was 475.0 minutes.  Sleep latency was increased at 28.5 minutes without the use of a sleep aid.  REM latency was 177.0 minutes.  Arousal index was 58.7 arousals per hour.  Sleep efficiency was decreased at 77.3%.  Wake after sleep onset was 109.0 minutes.  The patient spent 18.3% of total sleep time in Stage N1, 52.6% in Stage N2, 5.8% in Stages N3, and 23.3% in REM.  Time in REM supine was 0 minutes.    Respiration: Scored using 3% rules. It was difficult to discern between PLMS and hyponeas    Events - The polysomnogram revealed a presence of 12 obstructive, 21 central, and 8 mixed apneas resulting in an apnea index of 5.2 events per hour.  There were 181 hypopneas resulting in a hypopnea index of 22.9 events per hour.  The combined apnea/hypopnea index was 28.0 events per hour.  The REM AHI was 29.3 events per hour.  The supine AHI was 32.7 events per hour.  RDI was 28.0 events per hour.    Snoring - was reported as moderate to loud.    Respiratory rate and pattern - was notable for normal respiratory rate and " pattern.    Sustained Sleep Associated Hypoventilation - Transcutaneous carbon dioxide monitoring was not used    Sleep Associated Hypoxemia - (Greater than 5 minutes O2 sat below 89%) was not present.  Baseline oxygen saturation was 95.2%. Lowest oxygen saturation was 84.0%.  Time spent less than or equal to 88% was 0.3 minutes.  Time spent less than or equal to 89% was 1.2 minutes.  28 - 28.0     Movement Activity:     Periodic Limb Activity - There were 256 PLMs during the entire study. The PLM index was 32.3 movements per hour.  The PLM Arousal Index was 15.8 per hour.    REM EMG Activity - Excessive transient / sustained muscle activity was not present.    Nocturnal Behavior - Abnormal sleep related behaviors were not noted     Bruxism - None apparent.    Cardiac Summary:   The average pulse rate was 70.5 bpm.  The minimum pulse rate was 50.4 bpm while the maximum pulse rate was 112.4 bpm. The rhythm is normal sinus. Arrhythmias were not noted.        Assessment:     Moderate obstructive sleep apnea in the lateral position scored using 3% rules. It was difficult to discern between PLMS and hyponeas    Frequent periodic limb movements of sleep    Recommendations:    Based on the presence of mild/moderate obstructive sleep apnea, treatment could be empirically initiated with Auto-titrating PAP therapy with a range of 5 - 18 cmH2O. Recommend clinical follow up with sleep management team.    If symptoms persist could consider a trial of treatment for periodic limb movement disorder    Patient may be a candidate for dental appliance through referral to Sleep Dentistry for the treatment of obstructive sleep apnea and/or socially disruptive snoring.    Advise regarding the risks of drowsy driving.    Suggest optimizing sleep schedule and avoiding sleep deprivation.    Weight management (if BMI > 30).        _____________________________________   Abilio Bojorquez MD 10/6/17             Range(%) Time in range (min) Time in  range (%) Time in or below range (min) Time in or below range (%)   0.0 - 89.0 1.2 0.2% 1.2 0.2%   0.0 - 88.0 0.3 0.1% 0.3 0.1%      28.0 32.7 29.3

## 2017-10-06 NOTE — TELEPHONE ENCOUNTER
Left voicemail for patient. Requested that he call back with an update of symptoms and reminded him to continue with spine surgery consult.     Awaiting call back from patient.

## 2017-10-06 NOTE — TELEPHONE ENCOUNTER
Patient called back at 10:56. He said he would like for us to call him back. He missed our call. He wants to talk to us about his continued back pain.  [phone]    oJlene BarnesR)

## 2017-10-06 NOTE — TELEPHONE ENCOUNTER
Please call patient to discuss current symptoms.    Please advise that injection may take ~ 2 weeks to see full effect.  If concerned about reaction to injection (depending on symptoms) would recommend he call [facility] physicians to discuss.    Given his large disc herniation and weakness, I continue to recommend spine surgery consult.  This was ordered on Saturday, has he schedule this appointment?    If he would like to discuss further, please let me know when he's available today before 3:15pm and I will try to call during that time.    Stacey Tanner MD

## 2017-10-06 NOTE — TELEPHONE ENCOUNTER
Patient is very concerned of worsening back pain and requests to speak with DR. CHISHOLM directly.     He began noticing the symptoms Wednesday night after the injection on Tuesday afternoon. Patient complains of a new pain in his back; compared the pain he felt in his legs prior to the injection.     Patient has called [facility] and they were not able to answer his questions.     He refuses the surgical consultation at this time.

## 2017-10-06 NOTE — TELEPHONE ENCOUNTER
Attempted to call patient.  It was a very bad connection and the call dropped multiple times.  Finally connected.    He returned the call and states he felt great for 1-2 days after the injection and then back pain returned yesterday.  Doesn't have leg pain, only back pain.  Back pain is similar to previous, just worse.  - Asked about other signs/symptoms of allergic response (rash, fever, chills, dizziness, local reaction) and he denies these symptoms.  - Asked about concerning signs/symptoms including weakness, numbness, tingling, bowel or bladder problems and he denies these symptoms.    He states that [facility] mentioned an increase in pain could be normal post injection.  This is a possibility given vulnerability and possible overuse while steroid is working.    I discussed that it's hard to determine the cause of patient's pain over the phone without repeat exam.  I did mention that it could take up to 2 weeks to see full effect of steroid injection.    I discussed my recommendations again with the patient including physical therapy and surgical referral, especially given some weakness on exam.  He is also welcome to follow up in clinic with me for repeat exam. Patient stated that he is going out of town and refused.    I discussed concerning signs and symptoms and when to seek more immediate care.    Stacey Tanner MD

## 2017-10-13 ENCOUNTER — MYC MEDICAL ADVICE (OUTPATIENT)
Dept: ORTHOPEDICS | Facility: CLINIC | Age: 57
End: 2017-10-13

## 2017-10-16 NOTE — TELEPHONE ENCOUNTER
Please contact patient.    Given the large disc herniation, it could be irritating nerves on each side causing symptoms into both his legs.  It's hard to determine what to expect from his symptoms.  I would recommend physical therapy to help with muscular pain and mobility throughout his back.  They can help guide appropriate stretches and strengthening exercises.  I would continue to recommend spine surgery referral as well given the large nature of his disc herniation.    Stacey Tanner MD

## 2018-01-29 ENCOUNTER — FCC EXTENDED DOCUMENTATION (OUTPATIENT)
Dept: BEHAVIORAL HEALTH | Facility: CLINIC | Age: 58
End: 2018-01-29

## 2018-01-29 NOTE — PROGRESS NOTES
Discharge Summary  Single Session    Client Name: Adan Oliver MRN#: [id number] YOB: 1960      Intake / Discharge Date: 11/7/16  -  1/29/2018       DSM5 Diagnoses: (Sustained by DSM5 Criteria Listed Above)  DSM5 Diagnoses: (Sustained by DSM5 Criteria Listed Above)  Diagnoses: Adjustment Disorders  309.28 (F43.23) With mixed anxiety and depressed mood      Dysthymia v Recurrent MDD     R/o Generalized Anxiety Disorder     R/o Obsessive Compulsive Disorder     R/o Alcohol use disorder   Psychosocial & Contextual Factors: no job; living with father; mother with chronic health problems  WHODAS 2.0 (12 item)            This questionnaire asks about difficulties due to health conditions. Health conditions  include  disease or illnesses, other health problems that may be short or long lasting,  injuries, mental health or emotional problems, and problems with alcohol or drugs.                     Think back over the past 30 days and answer these questions, thinking about how much  difficulty you had doing the following activities. For each question, please Samish only  one response.    S1 Standing for long periods such as 30 minutes? None =         1   S2 Taking care of household responsibilities? None =         1   S3 Learning a new task, for example, learning how to get to a new place? None =         1   S4 How much of a problem do you have joining community activities (for example, festivals, [employer]ond[employer] or other activities) in the same way as anyone else can? Moderate =   3   S5 How much have you been emotionally affected by your health problems? Extreme / or cannot do = 5     In the past 30 days, how much difficulty did you have in:   S6 Concentrating on doing something for ten minutes? Moderate =   3   S7 Walking a long distance such as a kilometer (or equivalent)? None =         1   S8 Washing your whole body? None =         1   S9 Getting dressed? None =         1   S10 Dealing with  people you do not know? Mild =           2   S11 Maintaining a friendship? Moderate =   3   S12 Your day to day work? Mild =           2             Presenting Concern:  Feel like I am on autompilot      Reason for Discharge:  Client did not return      Disposition at Time of Last Encounter:   Comments:   Na       Risk Management:   Client denies a history of suicidal ideation, suicide attempts, self-injurious behavior, homicidal ideation, homicidal behavior and and other safety concerns  A safety and risk management plan has not been developed at this time, however client was given the after-hours number / 911 should there be a change in any of these risk factors.      Referred To:  PCP.  Client can return to [facility] at any point in the future if desired.         Naty Coker, LICSW   1/29/2018

## 2018-05-30 ENCOUNTER — TELEPHONE (OUTPATIENT)
Dept: FAMILY MEDICINE | Facility: CLINIC | Age: 58
End: 2018-05-30

## 2018-05-30 DIAGNOSIS — K22.70 BARRETT'S ESOPHAGUS WITHOUT DYSPLASIA: ICD-10-CM

## 2018-05-30 NOTE — TELEPHONE ENCOUNTER
"Requested Prescriptions   Pending Prescriptions Disp Refills     esomeprazole (NEXIUM) 20 MG CR capsule [Pharmacy Med Name: ESOMEPRAZOLE MAGNESIUM 20MG CPDR]  Last Written Prescription Date:  5/2/2017  Last Fill Quantity: 90 capsule,  # refills: 3   Last office visit: 9/26/2017 with prescribing provider:  FAREED Castillo   Future Office Visit:     90 capsule 3     Sig: TAKE ONE CAPSULE BY MOUTH EVERY MORNING BEFORE BREAKFAST --TAKE 30 TO 60 MINUTES BEFORE EATING    PPI Protocol Passed    5/30/2018 11:27 AM       Passed - Not on Clopidogrel (unless Pantoprazole ordered)       Passed - No diagnosis of osteoporosis on record       Passed - Recent (12 mo) or future (30 days) visit within the authorizing provider's specialty    Patient had office visit in the last 12 months or has a visit in the next 30 days with authorizing provider or within the authorizing provider's specialty.  See \"Patient Info\" tab in inbasket, or \"Choose Columns\" in Meds & Orders section of the refill encounter.           Passed - Patient is age 18 or older          "

## 2018-05-30 NOTE — LETTER
[facility]  [address] [place] [id number] [phone]          May 31, 2018    Adan Oliver                                                                                                                     [address] [facility] [address]            Dear Adan,    We have tried to contact you, but have not been able to connect with you.    We were calling to let you know that we received a refill request for a medication.    We were able to send in a supply to your pharmacy, but the provider noted that you will need to be seen for a follow up in order to continue the medication.    Please call us at [phone] if you have any questions or to schedule an appointment.    Thank you      Sincerely,       Charly Castillo MD

## 2018-05-30 NOTE — TELEPHONE ENCOUNTER
[place] given x1 today. Patient needs office visit, please schedule. Patient is due for yearly visit.    Thanks!  Shreyas Moralez RN

## 2018-05-31 NOTE — TELEPHONE ENCOUNTER
" says \"we're sorry, the person you are trying to call cannot receive calls at this time\"  Also, the mobile phone is a busy signal. Sending letter. Closing encounter.   "

## 2018-07-09 DIAGNOSIS — K22.70 BARRETT'S ESOPHAGUS WITHOUT DYSPLASIA: ICD-10-CM

## 2018-07-09 NOTE — TELEPHONE ENCOUNTER
"Requested Prescriptions   Pending Prescriptions Disp Refills     esomeprazole (NEXIUM) 20 MG CR capsule [Pharmacy Med Name: ESOMEPRAZOLE MAGNESIUM 20MG CPDR]  Last Written Prescription Date:  5/30/2018  Last Fill Quantity: 30 CAPSULE,  # refills: 0   Last office visit: 9/26/2017 with prescribing provider:  FAREED DAI   Future Office Visit:   Next 5 appointments (look out 90 days)     Aug 29, 2018  2:30 PM CDT   [facility] Visit with Dany Berrios MD   [facility]    [address] [place] [id number] [phone]                  30 capsule 0     Sig: TAKE 1 CAPSULE BY MOUTH 30-60 MINUTES PRIOR TO MORNING MEAL    PPI Protocol Passed    7/9/2018  1:47 PM       Passed - Not on Clopidogrel (unless Pantoprazole ordered)       Passed - No diagnosis of osteoporosis on record       Passed - Recent (12 mo) or future (30 days) visit within the authorizing provider's specialty    Patient had office visit in the last 12 months or has a visit in the next 30 days with authorizing provider or within the authorizing provider's specialty.  See \"Patient Info\" tab in inbasket, or \"Choose Columns\" in Meds & Orders section of the refill encounter.           Passed - Patient is age 18 or older          "

## 2018-07-11 NOTE — TELEPHONE ENCOUNTER
Prescription approved per [facility] Refill Protocol.  Fay Limon,Clinic Rn  [place]

## 2018-08-15 ENCOUNTER — TELEPHONE (OUTPATIENT)
Dept: FAMILY MEDICINE | Facility: CLINIC | Age: 58
End: 2018-08-15

## 2018-08-15 DIAGNOSIS — K22.70 BARRETT'S ESOPHAGUS WITHOUT DYSPLASIA: ICD-10-CM

## 2018-08-15 NOTE — TELEPHONE ENCOUNTER
"Requested Prescriptions   Pending Prescriptions Disp Refills     esomeprazole (NEXIUM) 20 MG CR capsule [Pharmacy Med Name: ESOMEPRAZOLE MAGNESIUM 20MG CPDR]  Last Written Prescription Date:  7/11/2018  Last Fill Quantity: 30 capsule,  # refills: 0   Last office visit: 9/26/2017 with prescribing provider:  FAREED Castillo   Future Office Visit:   Next 5 appointments (look out 90 days)     Aug 29, 2018  2:30 PM CDT   Return Visit with Dany Berrios MD   [facility]    [address] [place] [id number] [phone]                  30 capsule 0     Sig: TAKE 1 CAPSULE BY MOUTH 30-60 MINUTES PRIOR TO MORNING MEAL    PPI Protocol Passed    8/15/2018  1:01 PM       Passed - Not on Clopidogrel (unless Pantoprazole ordered)       Passed - No diagnosis of osteoporosis on record       Passed - Recent (12 mo) or future (30 days) visit within the authorizing provider's specialty    Patient had office visit in the last 12 months or has a visit in the next 30 days with authorizing provider or within the authorizing provider's specialty.  See \"Patient Info\" tab in inbasket, or \"Choose Columns\" in Meds & Orders section of the refill encounter.           Passed - Patient is age 18 or older          "

## 2018-08-24 ENCOUNTER — OFFICE VISIT (OUTPATIENT)
Dept: OPTOMETRY | Facility: CLINIC | Age: 58
End: 2018-08-24
Payer: COMMERCIAL

## 2018-08-24 DIAGNOSIS — H04.123 DRY EYES: Primary | ICD-10-CM

## 2018-08-24 DIAGNOSIS — H02.403 PTOSIS OF BOTH EYELIDS: ICD-10-CM

## 2018-08-24 PROCEDURE — 99212 OFFICE O/P EST SF 10 MIN: CPT | Performed by: OPTOMETRIST

## 2018-08-24 ASSESSMENT — VISUAL ACUITY
METHOD: SNELLEN - LINEAR
OD_CC+: -1
METHOD: SNELLEN - LINEAR
OS_SC+: +2
OD_SC: 20/20
OS_CC: 20/20
OD_CC: 20/20
OS_SC: 20-40
OD_CC: 20/20
OS_CC: 20/20

## 2018-08-24 ASSESSMENT — EXTERNAL EXAM - RIGHT EYE: OD_EXAM: BROW PTOSIS, WITH FRONTALIS RELAXED, BROW IS BELOW SUPERIOR ORBITAL RIM AND LATERALLY INLINE WITH UPPER LASHES

## 2018-08-24 ASSESSMENT — REFRACTION_WEARINGRX
SPECS_TYPE: SV DIST
OS_SPHERE: -1.00
OD_SPHERE: -0.75

## 2018-08-24 ASSESSMENT — EXTERNAL EXAM - LEFT EYE: OS_EXAM: BROW PTOSIS, WITH FRONTALIS RELAXED, BROW IS BELOW SUPERIOR ORBITAL RIM AND LATERALLY INLINE WITH UPPER LASHES

## 2018-08-24 NOTE — PROGRESS NOTES
Chief Complaint   Patient presents with     Eye Problem Both Eyes     dry eyes        Do you wear contact lenses? no    HPI    Affected eye(s):  Both   Symptoms:     Foreign body sensation   Dryness      Frequency:  Constant                  He wants a second opinion on how the eye lid surgery could help him.  Brenda Perez, OD     See Review Of Systems     HPI performed by Optometrist  scribed by Abbey Roth  Vision Services Supervisor    Medical, surgical and family histories reviewed and updated 8/24/2018.         OBJECTIVE: See Ophthalmology exam    ASSESSMENT:    ICD-10-CM    1. Dry eyes H04.123    2. 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    Take fish oil/ omega 3's - 2 capsules per day.    Return for eye exam after eyelid surgery.

## 2018-08-24 NOTE — TELEPHONE ENCOUNTER
Called patient and let him know that he is due for a physical. Patient said he understood and that he would call back and schedule his physical.    Pooja Johns

## 2018-08-24 NOTE — MR AVS SNAPSHOT
After Visit Summary   8/24/2018    Adan Oliver    MRN: [id number]           Patient Information     Date Of Birth          1960        Visit Information        Provider Department      8/24/2018 3:20 PM Brenda Perez OD [facility]        Today's Diagnoses     Dry eyes    -  1    Ptosis of both eyelids          Care Instructions    Use artifical tears 3 times a day in both eyes.    Take fish oil/ omega 3's - 2 capsules per day.    Return for eye exam after eyelid surgery.          Follow-ups after your visit        Your next 10 appointments already scheduled     Aug 28, 2018  2:00 PM CDT   Return Sleep Patient with Bryan Ford MD   [facility] ([facility])    [address] [address] [id number] [phone]            Aug 29, 2018  2:30 PM CDT   [facility] Visit with Dany [facility]rios MD   [facility]    [address] [place] [id number] [phone]              Who to contact     If you have questions or need follow up information about today's clinic visit or your schedule please contact [facility] directly at [phone].  Normal or non-critical lab and imaging results will be communicated to you by MyChart, letter or phone within 4 business days after the clinic has received the results. If you do not hear from us within 7 days, please contact the clinic through MyChart or phone. If you have a critical or abnormal lab result, we will notify you by phone as soon as possible.  Submit refill requests through [employer] or call your pharmacy and they will forward the refill request to us. Please allow 3 business days for your refill to be completed.          Additional Information About Your Visit        [employer] Information     [employer] gives you secure access to your electronic health record. If you see a  primary care provider, you can also send messages to your care team and make appointments. If you have questions, please call your primary care clinic.  If you do not have a primary care provider, please call [phone] and they will assist you.        Care EveryWhere ID     This is your Care EveryWhere ID. This could be used by other organizations to access your [place] medical records  [id number]         Blood Pressure from Last 3 Encounters:   09/27/17 132/88   09/26/17 (!) 134/92   09/25/17 133/87    Weight from Last 3 Encounters:   09/27/17 91 kg (200 lb 9.6 oz)   09/26/17 91.6 kg (202 lb)   09/25/17 89.5 kg (197 lb 4.8 oz)              Today, you had the following     No orders found for display       Primary Care Provider Office Phone # Fax #    Charly Castillo [phone]       [address] [facility] [address]        Equal Access to Services     FORTINO SHAY : Hadii aad ku hadasho Soomaali, waaxda luqadaha, qaybta kaalmada adeegyada, waxay idiin hayaan twineg elise marcus . So [facility] [phone].    ATENCIÓN: Si habla español, tiene a henderson disposición servicios gratuitos de asistencia lingüística. [facility] [phone].    We comply with applicable federal civil rights laws and [place] laws. We do not discriminate on the basis of race, color, national origin, age, disability, sex, sexual orientation, or gender identity.            Thank you!     Thank you for choosing [facility]  for your care. Our goal is always to provide you with excellent care. Hearing back from our patients is one way we can continue to improve our services. Please take a few minutes to complete the written survey that you may receive in the mail after your visit with us. Thank you!             Your Updated Medication List - Protect others around you: Learn how to safely use, store and throw away your medicines at www.disposemymeds.org.          This list is accurate as of  8/24/18  4:04 PM.  Always use your most recent med list.                   Brand Name Dispense Instructions for use Diagnosis    esomeprazole 20 MG CR capsule    nexIUM    30 capsule    TAKE 1 CAPSULE BY MOUTH 30-60 MINUTES PRIOR TO MORNING MEAL    Reid's esophagus without dysplasia       ibuprofen 200 MG capsule      Take 200 mg by mouth every 4 hours as needed for fever

## 2018-08-24 NOTE — PATIENT INSTRUCTIONS
Use artifical tears 3 times a day in both eyes.    Take fish oil/ omega 3's - 2 capsules per day.    Return for eye exam after eyelid surgery.

## 2018-08-24 NOTE — LETTER
8/24/2018         RE: Adan Oliver  [address] [facility] [address]        Dear Colleague,    Thank you for referring your patient, Adan Oliver, to the [facility]. Please see a copy of my visit note below.    Chief Complaint   Patient presents with     Eye Problem Both Eyes     dry eyes        Do you wear contact lenses? no    HPI    Affected eye(s):  Both   Symptoms:     Foreign body sensation   Dryness      Frequency:  Constant                  He wants a second opinion on how the eye lid surgery could help him.  Brenda Perez, NERISSA     See Review Of Systems     HPI performed by Optometrist  scribed by Abbey [facility]  Vision Services Supervisor    Medical, surgical and family histories reviewed and updated 8/24/2018.         OBJECTIVE: See Ophthalmology exam    ASSESSMENT:    ICD-10-CM    1. Dry eyes H04.123    2. 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    Take fish oil/ omega 3's - 2 capsules per day.    Return for eye exam after eyelid surgery.         Again, thank you for allowing me to participate in the care of your patient.        Sincerely,        Brenda Perez, OD

## 2018-08-27 ENCOUNTER — TELEPHONE (OUTPATIENT)
Dept: FAMILY MEDICINE | Facility: CLINIC | Age: 58
End: 2018-08-27

## 2018-08-27 DIAGNOSIS — K22.70 BARRETT'S ESOPHAGUS WITHOUT DYSPLASIA: Primary | Chronic | ICD-10-CM

## 2018-08-27 RX ORDER — ESOMEPRAZOLE MAGNESIUM 40 MG/1
40 CAPSULE, DELAYED RELEASE ORAL
Qty: 90 CAPSULE | Refills: 3 | Status: SHIPPED | OUTPATIENT
Start: 2018-08-27 | End: 2019-09-10

## 2018-08-27 NOTE — TELEPHONE ENCOUNTER
Reason for Call:  Medication or medication refill:    Do you use a [facility]?  Yes    Name of the pharmacy and phone number for the current request:  [facility]    Name of the medication requested: Esomeprazole  20 mg  Other request: Patient states that his insurance does not cover the 20 mg any more because of it being OTC.  He cannot afford buying OTC.  He is wondering if Dr Castillo will switch the dose to 40 mg.  Patient is out of the medication and has scheduled an appointment for 9/24/18 with Dr Castillo.  Can we leave a detailed message on this number? YES    Phone number patient can be reached at: Home number on file [phone] (home)    Best Time:  Any  Call taken on 8/27/2018 at 12:26 PM by Buffy Azul

## 2018-08-27 NOTE — TELEPHONE ENCOUNTER
Orders Placed This Encounter     esomeprazole (NEXIUM) 40 MG CR capsule     Sig: Take 1 capsule (40 mg) by mouth every morning (before breakfast) Take 30-60 minutes before eating.     Dispense:  90 capsule     Refill:  3     Notify patient.

## 2018-08-27 NOTE — TELEPHONE ENCOUNTER
Prior Authorization Retail Medication Request    Medication/Dose: esomeprazole - qty limit - max 120 for 365 days.  ICD code (if different than what is on RX):    Previously Tried and Failed:    Rationale:      Insurance Name:  [facility]  Insurance ID:  [id number]      Pharmacy Information (if different than what is on RX)  Name:  [facility]  Phone:  [phone]    Deja Hood PharmD, [facility]  Pharmacist Manager   [facility]  [phone]

## 2018-08-28 ENCOUNTER — OFFICE VISIT (OUTPATIENT)
Dept: SLEEP MEDICINE | Facility: CLINIC | Age: 58
End: 2018-08-28
Payer: COMMERCIAL

## 2018-08-28 VITALS
HEIGHT: 68 IN | DIASTOLIC BLOOD PRESSURE: 79 MMHG | BODY MASS INDEX: 30.74 KG/M2 | HEART RATE: 82 BPM | WEIGHT: 202.8 LBS | SYSTOLIC BLOOD PRESSURE: 122 MMHG

## 2018-08-28 DIAGNOSIS — G47.33 OSA (OBSTRUCTIVE SLEEP APNEA): ICD-10-CM

## 2018-08-28 DIAGNOSIS — E66.09 NON MORBID OBESITY DUE TO EXCESS CALORIES: ICD-10-CM

## 2018-08-28 DIAGNOSIS — F51.04 INSOMNIA, PSYCHOPHYSIOLOGICAL: ICD-10-CM

## 2018-08-28 DIAGNOSIS — G47.61 PLMD (PERIODIC LIMB MOVEMENT DISORDER): Primary | ICD-10-CM

## 2018-08-28 PROCEDURE — 99214 OFFICE O/P EST MOD 30 MIN: CPT | Performed by: INTERNAL MEDICINE

## 2018-08-28 NOTE — MR AVS SNAPSHOT
After Visit Summary   8/28/2018    Adan Oliver    MRN: [id number]           Patient Information     Date Of Birth          1960        Visit Information        Provider Department      8/28/2018 2:00 PM Bryan Ford MD [facility]        Today's Diagnoses     PLMD (periodic limb movement disorder)    -  1    LUCIAN (obstructive sleep apnea)        Insomnia, psychophysiological        Non morbid obesity due to excess calories          Care Instructions      Your BMI is Body mass index is 30.84 kg/(m^2).  Weight management is a personal decision.  If you are interested in exploring weight loss strategies, the following discussion covers the approaches that may be successful. Body mass index (BMI) is one way to tell whether you are at a healthy weight, overweight, or obese. It measures your weight in relation to your height.  A BMI of 18.5 to 24.9 is in the healthy range. A person with a BMI of 25 to 29.9 is considered overweight, and someone with a BMI of 30 or greater is considered obese. More than two-thirds of American adults are considered overweight or obese.  Being overweight or obese increases the risk for further weight gain. Excess weight may lead to heart disease and diabetes.  Creating and following plans for healthy eating and physical activity may help you improve your health.  Weight control is part of healthy lifestyle and includes exercise, emotional health, and healthy eating habits. Careful eating habits lifelong are the mainstay of weight control. Though there are significant health benefits from weight loss, long-term weight loss with diet alone may be very difficult to achieve- studies show long-term success with dietary management in less than 10% of people. Attaining a healthy weight may be especially difficult to achieve in those with severe obesity. In some cases, medications, devices and surgical management might be considered.  What can you  do?  If you are overweight or obese and are interested in methods for weight loss, you should discuss this with your provider.     Consider reducing daily calorie intake by 500 calories.     Keep a food journal.     Avoiding skipping meals, consider cutting portions instead.    Diet combined with exercise helps maintain muscle while optimizing fat loss. Strength training is particularly important for building and maintaining muscle mass. Exercise helps reduce stress, increase energy, and improves fitness. Increasing exercise without diet control, however, may not burn enough calories to loose weight.       Start walking three days a week 10-20 minutes at a time    Work towards walking thirty minutes five days a week     Eventually, increase the speed of your walking for 1-2 minutes at time    In addition, we recommend that you review healthy lifestyles and methods for weight loss available through the National Institutes of Health patient information sites:  http://win.niddk.nih.gov/publications/index.htm    And look into health and wellness programs that may be available through your health insurance provider, employer, local community center, or jordon club.    Weight management plan: Patient was referred to their PCP to discuss a diet and exercise plan.                  Follow-ups after your visit        Follow-up notes from your care team     Return in about 6 weeks (around 10/9/2018) for PAP Compliance Check.      Your next 10 appointments already scheduled     Aug 29, 2018  2:30 PM CDT   [facility] Visit with Dany [facility]rios MD   [facility]    [address] [place] [id number] [phone]            Sep 24, 2018  1:20 PM CDT   SHORT with Charly Castillo MD   [facility]    [address] [place] [id number] [phone]              Who to contact     If you have questions or  "need follow up information about today's clinic visit or your schedule please contact [facility] directly at [phone].  Normal or non-critical lab and imaging results will be communicated to you by MyChart, letter or phone within 4 business days after the clinic has received the results. If you do not hear from us within 7 days, please contact the clinic through Optaroshart or phone. If you have a critical or abnormal lab result, we will notify you by phone as soon as possible.  Submit refill requests through App[employer]ault or call your pharmacy and they will forward the refill request to us. Please allow 3 business days for your refill to be completed.          Additional Information About Your Visit        OptarosharSnapvine Information     AppVault gives you secure access to your electronic health record. If you see a primary care provider, you can also send messages to your care team and make appointments. If you have questions, please call your primary care clinic.  If you do not have a primary care provider, please call [phone] and they will assist you.        Care EveryWhere ID     This is your Care EveryWhere ID. This could be used by other organizations to access your [place] medical records  [id number]        Your Vitals Were     Pulse Height BMI (Body Mass Index)             82 1.727 m (5' 8\") 30.84 kg/m2          Blood Pressure from Last 3 Encounters:   08/28/18 122/79   09/27/17 132/88   09/26/17 (!) 134/92    Weight from Last 3 Encounters:   08/28/18 92 kg (202 lb 12.8 oz)   09/27/17 91 kg (200 lb 9.6 oz)   09/26/17 91.6 kg (202 lb)              We Performed the Following     Comprehensive DME        Primary Care Provider Office Phone # Fax #    Charly Castillo [phone] [address] [facility] [address]        Equal Access to Services     FORTINO SHAY : Quinn Dunham, leandro burton, qaybta bhanu cuevas, lisa wilson " elise marcus ah. So [facility] [phone].    ATENCIÓN: Si kyala sissy, tiene a henderson disposición servicios gratuitos de asistencia lingüística. Hannah al [phone].    We comply with applicable federal civil rights laws and [place] laws. We do not discriminate on the basis of race, color, national origin, age, disability, sex, sexual orientation, or gender identity.            Thank you!     Thank you for choosing [facility]  for your care. Our goal is always to provide you with excellent care. Hearing back from our patients is one way we can continue to improve our services. Please take a few minutes to complete the written survey that you may receive in the mail after your visit with us. Thank you!             Your Updated Medication List - Protect others around you: Learn how to safely use, store and throw away your medicines at www.disposemymeds.org.          This list is accurate as of 8/28/18  2:36 PM.  Always use your most recent med list.                   Brand Name Dispense Instructions for use Diagnosis    * esomeprazole 20 MG CR capsule    nexIUM    30 capsule    TAKE 1 CAPSULE BY MOUTH 30-60 MINUTES PRIOR TO MORNING MEAL    Reid's esophagus without dysplasia       * esomeprazole 40 MG CR capsule    nexIUM    90 capsule    Take 1 capsule (40 mg) by mouth every morning (before breakfast) Take 30-60 minutes before eating.    Reid's esophagus without dysplasia       ibuprofen 200 MG capsule      Take 200 mg by mouth every 4 hours as needed for fever        * Notice:  This list has 2 medication(s) that are the same as other medications prescribed for you. Read the directions carefully, and ask your doctor or other care provider to review them with you.

## 2018-08-28 NOTE — PROGRESS NOTES
Sleep Follow-Up Visit:    Chief Complaint   Patient presents with     Sleep Problem     wants to discuss getting a CPAP       Adan Oliver comes in today for follow-up of their moderate sleep apnea, managed with CPAP.     He has previously been seen in 2014 and had a negative sleep study.      Sleep study 7/6/2015 at the [facility] for snoring insomnia, poor quality sleep. Light sleeper. Weight 198#.   -Sleep latency 16.5 minutes without Ambien. REM latency 189.5 minutes.  Sleep efficiency 64.4%. Total sleep time 266.5 minutes.  -Sleep architecture: Stage 1, 11.8% (5%), stage 2, 63.0% (45-55%), stage 3, 11.4% (15-20%), stage REM, 13.7% (20-25%).    -Supine AHI was 6.3 (9.5 minutes), REM AHI was 16.4, total AHI was 4.1, lowest oxygen saturation was 85.7%.  RDI 15.8.  Periodic Limb Movement Index 71.4/hour. The PLM arousal index was 10.4 per hour.       He was seen again in 9/2016 after his PCP put him on robinoprole. He tried requip 1 pill for periodic limb movement without change in symptoms.     Self-described night-owl.  Into bed around 1 - 2 AM and tries to sleep in until 9 AM although often wakes up earlier (around 8 AM).      PLMD noted previously and thinks he may have been on Requip    Past medical/surgical history, family history, social history, medications and allergies were reviewed.      Problem List:  Patient Active Problem List    Diagnosis Date Noted     LUCIAN (obstructive sleep apnea)- 'moderate' (AHI 6) 10/06/2017     Priority: Medium     Study Date: 10/2/2017- (200.0 lbs) Scored using 3% rules. It was difficult to discern between PLMS and hyponeas. Apnea/hypopnea index was 28.0 events per hour.  The REM AHI was 29.3 events per hour.  The supine AHI was 32.7 events per hour.  RDI was 28.0 events per hour. Lowest oxygen saturation was 84.0%.  Time spent less than or equal to 88% was 0.3 minutes. PLM index was 32.3 movements per hour.  The PLM Arousal Index was 15.8 per  "hour.       Alcohol use 09/26/2016     Priority: Medium     Lumbar degenerative disc disease 05/17/2016     Priority: Medium     Protrusion of lumbar intervertebral disc 05/17/2016     Priority: Medium     Lumbar spinal stenosis 05/17/2016     Priority: Medium     Anxiety 12/07/2015     Priority: Medium     Major depressive disorder, single episode, mild (H) 12/07/2015     Priority: Medium     Family history of Alzheimer's disease 10/01/2015     Priority: Medium     Insomnia, psychophysiological 08/12/2015     Priority: Medium     Head ache 11/24/2014     Priority: Medium     Memory changes 11/24/2014     Priority: Medium     Reid esophagus 10/30/2014     Priority: Medium     Esophageal reflux 10/01/2014     Priority: Medium     Hyperlipidemia with target LDL less than 130 10/01/2014     Priority: Medium     Non morbid obesity due to excess calories 09/26/2017     Priority: Low     BP (P) 122/79  Pulse (P) 82  Ht (P) 1.727 m (5' 8\")  Wt (P) 92 kg (202 lb 12.8 oz)  BMI (P) 30.84 kg/m2    Impression/Plan:  1. LUCIAN (obstructive sleep apnea)  Moderate Sleep apnea.   I reviewed the diagnosis of obstructive sleep apnea with patient.  We discussed health consequences of untreated sleep apnea and benefits of treatment.  He is interested in starting treatment of LUCIAN with PAP therapy.  Reviewed importance of nightly therapy for effective treatment of LUCIAN, and he voiced understanding and agreement.  We also reviewed importance of using PAP during the entire sleep duration to achieve maximal benefits, but reviewed that a minimum of 4 hours per night was required.  He is confident that he can achieve these goals and is willing to start therapy as soon as possible.    He will be seen back approximately 1 - 3 months after starting PAP therapy to ensure compliance and review treatment outcomes.  However, if he develops any difficulties with treatment he is instructed to contact us or [employer] immediately to troubleshoot " problems.       2. Non morbid obesity due to excess calories  We discussed the link between obesity, sleep apnea, and health outcomes.  Patient was encouraged to decrease caloric intake and increase activity levels to try to move towards a normal weight.  He was encouraged to discuss further strategies with his primary care provider.     3. PLMD (periodic limb movement disorder)  I would like to see if treatment of sleep apnea improves symptoms. IF not, or if marginally improved but still not sleeping well, we could try Mirapex for PLMD.     Adan Oliver will follow up in about 2 month(s).     Twenty-five minutes spent with patient, all of which were spent face-to-face counseling, consulting, coordinating plan of care.      CC:  Charly Castillo,

## 2018-08-28 NOTE — PATIENT INSTRUCTIONS

## 2018-08-28 NOTE — NURSING NOTE
"Chief Complaint   Patient presents with     Sleep Problem     wants to discuss getting a CPAP       Initial /79  Pulse 82  Ht 1.727 m (5' 8\")  Wt 92 kg (202 lb 12.8 oz)  BMI 30.84 kg/m2 Estimated body mass index is 30.84 kg/(m^2) as calculated from the following:    Height as of this encounter: 1.727 m (5' 8\").    Weight as of this encounter: 92 kg (202 lb 12.8 oz).    Medication Reconciliation: complete      "

## 2018-08-29 ENCOUNTER — OFFICE VISIT (OUTPATIENT)
Dept: OPHTHALMOLOGY | Facility: CLINIC | Age: 58
End: 2018-08-29
Payer: COMMERCIAL

## 2018-08-29 DIAGNOSIS — H02.836 DERMATOCHALASIS OF EYELIDS OF BOTH EYES: Primary | ICD-10-CM

## 2018-08-29 DIAGNOSIS — H02.833 DERMATOCHALASIS OF EYELIDS OF BOTH EYES: Primary | ICD-10-CM

## 2018-08-29 DIAGNOSIS — H02.403 ACQUIRED PTOSIS OF EYELID, BILATERAL: ICD-10-CM

## 2018-08-29 DIAGNOSIS — H57.819 BROW PTOSIS: ICD-10-CM

## 2018-08-29 PROCEDURE — 99213 OFFICE O/P EST LOW 20 MIN: CPT | Performed by: OPHTHALMOLOGY

## 2018-08-29 ASSESSMENT — CUP TO DISC RATIO
OS_RATIO: 0.3
OD_RATIO: 0.3

## 2018-08-29 ASSESSMENT — VISUAL ACUITY
CORRECTION_TYPE: GLASSES
METHOD: SNELLEN - LINEAR
OS_CC: 20/20
OD_CC+: -2
OS_CC+: -1
OD_CC: 20/20

## 2018-08-29 ASSESSMENT — EXTERNAL EXAM - RIGHT EYE: OD_EXAM: BROW PTOSIS, WITH FRONTALIS RELAXED, BROW IS BELOW SUPERIOR ORBITAL RIM AND LATERALLY INLINE WITH UPPER LASHES

## 2018-08-29 ASSESSMENT — CONF VISUAL FIELD
OD_SUPERIOR_NASAL_RESTRICTION: 3
OS_SUPERIOR_TEMPORAL_RESTRICTION: 3
OD_SUPERIOR_TEMPORAL_RESTRICTION: 3
OS_SUPERIOR_NASAL_RESTRICTION: 3

## 2018-08-29 ASSESSMENT — EXTERNAL EXAM - LEFT EYE: OS_EXAM: BROW PTOSIS, WITH FRONTALIS RELAXED, BROW IS BELOW SUPERIOR ORBITAL RIM AND LATERALLY INLINE WITH UPPER LASHES

## 2018-08-29 NOTE — TELEPHONE ENCOUNTER
Central Prior Authorization Team   Phone: [phone]    PA Initiation    Medication: esomeprazole - qty limit - max 120 for 365 days.  Insurance Company: [employer] [place] - Phone [phone] Fax [phone]  Pharmacy Filling the Rx: [facility] - [place], [place] - [address] [place] [address] JULIANO.  Filling Pharmacy Phone: [phone]  Filling Pharmacy Fax: [phone]  Start Date: 8/29/2018

## 2018-08-29 NOTE — MR AVS SNAPSHOT
After Visit Summary   8/29/2018    Adan Oliver    MRN: [id number]           Patient Information     Date Of Birth          1960        Visit Information        Provider Department      8/29/2018 2:30 PM Dany Berrios MD [facility]        Today's Diagnoses     Dermatochalasis of eyelids of both eyes    -  1    Acquired ptosis of eyelid, bilateral        Brow ptosis           Follow-ups after your visit        Your next 10 appointments already scheduled     Aug 31, 2018  3:00 PM CDT   PAP SETUP with DME SCHEDULE   [facility]    [address] [place] [id number]   [phone]            Sep 24, 2018  1:20 PM CDT   SHORT with Charly Castillo MD   [facility]    [address] [place] [id number] [phone]              Who to contact     If you have questions or need follow up information about today's clinic visit or your schedule please contact [facility] directly at [phone].  Normal or non-critical lab and imaging results will be communicated to you by MyChart, letter or phone within 4 business days after the clinic has received the results. If you do not hear from us within 7 days, please contact the clinic through Eka Systemshart or phone. If you have a critical or abnormal lab result, we will notify you by phone as soon as possible.  Submit refill requests through [employer] or call your pharmacy and they will forward the refill request to us. Please allow 3 business days for your refill to be completed.          Additional Information About Your Visit        MyChart Information     [employer] gives you secure access to your electronic health record. If you see a primary care provider, you can also send messages to your care team and make appointments. If you have questions, please call your primary  [facility] clinic.  If you do not have a primary care provider, please call [phone] and they will assist you.      TransEngen is an electronic gateway that provides easy, online access to your medical records. With TransEngen, you can request a clinic appointment, read your test results, renew a prescription or communicate with your care team.     To access your existing account, please contact your [facility] or call [phone] for assistance.        Care EveryWhere ID     This is your Care EveryWhere ID. This could be used by other organizations to access your [place] medical records  [id number]         Blood Pressure from Last 3 Encounters:   08/28/18 122/79   09/27/17 132/88   09/26/17 (!) 134/92    Weight from Last 3 Encounters:   08/28/18 92 kg (202 lb 12.8 oz)   09/27/17 91 kg (200 lb 9.6 oz)   09/26/17 91.6 kg (202 lb)              Today, you had the following     No orders found for display       Primary Care Provider Office Phone # Fax #    Charly Castillo [phone]       [address] [facility] [address]        Equal Access to Services     JORGE SHAY : Hadii aad ku hadasho Soomaali, waaxda luqadaha, qaybta kaalmada adeegyada, lisa sullivann steve carpio. So [facility] [phone].    ATENCIÓN: Si habla español, tiene a henderson disposición servicios gratuitos de asistencia lingüística. [facility] [phone].    We comply with applicable federal civil rights laws and [place] laws. We do not discriminate on the basis of race, color, national origin, age, disability, sex, sexual orientation, or gender identity.            Thank you!     Thank you for choosing [facility]  for your care. Our goal is always to provide you with excellent care. Hearing back from our patients is one way we can continue to improve our services. Please take a few minutes to complete the written survey that you may receive in the mail after your  visit with us. Thank you!             Your Updated Medication List - Protect others around you: Learn how to safely use, store and throw away your medicines at www.disposemymeds.org.          This list is accurate as of 8/29/18  3:05 PM.  Always use your most recent med list.                   Brand Name Dispense Instructions for use Diagnosis    * esomeprazole 20 MG CR capsule    nexIUM    30 capsule    TAKE 1 CAPSULE BY MOUTH 30-60 MINUTES PRIOR TO MORNING MEAL    Reid's esophagus without dysplasia       * esomeprazole 40 MG CR capsule    nexIUM    90 capsule    Take 1 capsule (40 mg) by mouth every morning (before breakfast) Take 30-60 minutes before eating.    Reid's esophagus without dysplasia       ibuprofen 200 MG capsule      Take 200 mg by mouth every 4 hours as needed for fever        * Notice:  This list has 2 medication(s) that are the same as other medications prescribed for you. Read the directions carefully, and ask your doctor or other care provider to review them with you.

## 2018-08-29 NOTE — PROGRESS NOTES
CC: F/u dermatochalasis and ptosis    HPI:   Please see full consult note from 9/27/2017  In summary, he was referred to me for heavy upper eyelid interfering with reading and driving. This has interfered with his reading,driving and photography. At that visit, ptosis visual fields were obtained and this showed restricted visual fields, which improved dramatically with manual elevation. He had to delay surgical repair because he was going to [place] to care for an ailing mother with Alzheimers. His situation has not changed much but given it has been 11 months since our last visit, he is returning to discuss the procedure.     Has had some new issues with dry eyes, art tears help some, but has episodes of blurry vision.       Assessment & Plan     Adan Oliver is a 58 year old male with the following diagnoses:   1. Dermatochalasis of eyelids of both eyes    2. Acquired ptosis of eyelid, bilateral    3. Brow ptosis       BULB, KU MUSCLE CONJUNCTIVAL RESECTION  10 both eyes, IBP OU     Attending Physician Attestation:  Complete documentation of historical and exam elements from today's encounter can be found in the full encounter summary report (not reduplicated in this progress note).  I personally obtained the chief complaint(s) and history of present illness.  I confirmed and edited as necessary the review of systems, past medical/surgical history, family history, social history, and examination findings as documented by others; and I examined the patient myself.  I personally reviewed the relevant tests, images, and reports as documented above.  I formulated and edited as necessary the assessment and plan and discussed the findings and management plan with the patient and family. - Dany Berrios MD

## 2018-08-29 NOTE — NURSING NOTE
Patient presents with:  Consult For: upper eyelid drooping      Referring Provider:  No referring provider defined for this encounter.    HPI    Informant(s):  pt   Affected eye(s):  Both   Location:  Upper   Symptoms:     Decreased vision            Comments:  Patient states he is constantly holding his eyelids open to see  Patient would like to learn more about eyelid surgery what to expect, and recovery time  Patient is concerned that his eyes are not working together and notes a decrease in vision.  Patient notes a decrease in Distance vision.  Patient is using art tears prn               Aicha Benedict, COA

## 2018-08-30 NOTE — TELEPHONE ENCOUNTER
Prior Authorization Approval    Authorization Effective Date: 8/27/2018  Authorization Expiration Date: 8/27/2019  Medication: esomeprazole-APPROVED  Approved Dose/Quantity:    Reference #: [id number]   Insurance Company: [employer] [place] - Phone [phone] Fax [phone]  Expected CoPay:       CoPay Card Available:      Foundation Assistance Needed:    Which Pharmacy is filling the prescription (Not needed for infusion/clinic administered): [facility] - [place], [place] - [address] [place] [address].  Pharmacy Notified: Yes  Patient Notified: Yes

## 2018-08-31 ENCOUNTER — DOCUMENTATION ONLY (OUTPATIENT)
Dept: SLEEP MEDICINE | Facility: CLINIC | Age: 58
End: 2018-08-31
Payer: COMMERCIAL

## 2018-08-31 DIAGNOSIS — F51.04 INSOMNIA, PSYCHOPHYSIOLOGICAL: ICD-10-CM

## 2018-08-31 DIAGNOSIS — E66.09 NON MORBID OBESITY DUE TO EXCESS CALORIES: ICD-10-CM

## 2018-08-31 DIAGNOSIS — G47.33 OSA (OBSTRUCTIVE SLEEP APNEA): ICD-10-CM

## 2018-08-31 NOTE — PROGRESS NOTES
[facility]  Patient Adan Olievr was set up at [facility] on August 31, 2018. Patient received a Resmed AirSense 10 Auto. Pressures were set at 6-15 cm H2O.   Patient s ramp is 5 cm H2O for Off and FLEX/EPR is EPR, 2.  Patient received a Dontrell Respironics Mask name: [employer] Frame  Nasal mask Size Large, heated tubing and heated humidifier.  Patient is enrolled in the STM Program and does need to meet compliance. Patient was informed to schedule follow up appointment with Dr. Ford.    Briana Momin

## 2018-09-04 ENCOUNTER — DOCUMENTATION ONLY (OUTPATIENT)
Dept: SLEEP MEDICINE | Facility: CLINIC | Age: 58
End: 2018-09-04

## 2018-09-04 DIAGNOSIS — E66.09 NON MORBID OBESITY DUE TO EXCESS CALORIES: ICD-10-CM

## 2018-09-04 DIAGNOSIS — F51.04 INSOMNIA, PSYCHOPHYSIOLOGICAL: ICD-10-CM

## 2018-09-04 DIAGNOSIS — G47.33 OSA (OBSTRUCTIVE SLEEP APNEA): ICD-10-CM

## 2018-09-04 NOTE — PROGRESS NOTES
3 DAY STM VISIT    Diagnostic AHI: 28.0 PSG    Patient contacted for 3 day STM visit  Subjective measures:  Pt is leaving on trip tomorrow and will not be bringing device with him. Pt is having issues with nasal congestion and excess work to breathe out against pressure.  He only had a few minutes to talk and will call back later to discuss his issues when he returns from trip.    Device type: Auto-CPAP  PAP settings from order::  CPAP min 6 cm  H20       CPAP max 15 cm  H20        Mask type:    Nasal Mask     Device settings from machine      Min CPAP 5.0            Max CPAP 15.0             Assessment: Nightly usage, most nights under four hours.  Action plan: Pt to have f/u 14 day STM visit.  Patient has a follow up visit scheduled:   no, but is required by insurance for compliance.

## 2018-09-17 ENCOUNTER — DOCUMENTATION ONLY (OUTPATIENT)
Dept: SLEEP MEDICINE | Facility: CLINIC | Age: 58
End: 2018-09-17

## 2018-09-17 DIAGNOSIS — G47.33 OSA (OBSTRUCTIVE SLEEP APNEA): ICD-10-CM

## 2018-09-17 DIAGNOSIS — F51.04 INSOMNIA, PSYCHOPHYSIOLOGICAL: ICD-10-CM

## 2018-09-17 DIAGNOSIS — E66.09 NON MORBID OBESITY DUE TO EXCESS CALORIES: ICD-10-CM

## 2018-09-17 NOTE — PROGRESS NOTES
14 DAY STM VISIT    Diagnostic AHI: 28.0 PSG    Subjective measures:   Pt states that he was out of town for a week and didn't take it with him.  He was having trouble falling asleep with it when he did use it.  He's going to try using it again tonight.  If he continues to have issues with the mask, he will call.    Assessment: Pt not meeting objective benchmarks for compliance  Patient failing following subjective benchmarks: mask discomfort   Action plan: pt to have 30 day STM visit.    Device type: Auto-CPAP  PAP settings: CPAP min 5.0 cm  H20     CPAP max 15.0 cm  H20    95th% pressure 9.3 cm   Mask type:  Nasal Mask  Objective measures: 14 day rolling measures      Compliance  0 %      Leak  29.6 lpm  last  upload      AHI 6.9   last  upload      Average number of minutes 45     Average hours of usage 0.7          Objective measure goal  Compliance   Goal >70%  Leak   Goal < 24 lpm  AHI  Goal < 5  Usage  Goal >240

## 2018-09-24 ENCOUNTER — OFFICE VISIT (OUTPATIENT)
Dept: FAMILY MEDICINE | Facility: CLINIC | Age: 58
End: 2018-09-24
Payer: COMMERCIAL

## 2018-09-24 VITALS
BODY MASS INDEX: 30.89 KG/M2 | WEIGHT: 203.8 LBS | HEIGHT: 68 IN | HEART RATE: 76 BPM | TEMPERATURE: 98.4 F | DIASTOLIC BLOOD PRESSURE: 80 MMHG | SYSTOLIC BLOOD PRESSURE: 116 MMHG

## 2018-09-24 DIAGNOSIS — Z01.818 PREOP GENERAL PHYSICAL EXAM: Primary | ICD-10-CM

## 2018-09-24 DIAGNOSIS — H02.539 LID LAG: ICD-10-CM

## 2018-09-24 PROCEDURE — 99214 OFFICE O/P EST MOD 30 MIN: CPT | Performed by: FAMILY MEDICINE

## 2018-09-24 NOTE — MR AVS SNAPSHOT
After Visit Summary   9/24/2018    Adan Oliver    MRN: [id number]           Patient Information     Date Of Birth          1960        Visit Information        Provider Department      9/24/2018 1:20 PM Charly Castillo MD [facility]        Today's Diagnoses     Preop general physical exam    -  1    Lid lag          Care Instructions      Before Your Surgery      Call your surgeon if there is any change in your health. This includes signs of a cold or flu (such as a sore throat, runny nose, cough, rash or fever).    Do not smoke, drink alcohol or take over the counter medicine (unless your surgeon or primary care doctor tells you to) for the 24 hours before and after surgery.    If you take prescribed drugs: Follow your doctor s orders about which medicines to take and which to stop until after surgery.    Eating and drinking prior to surgery: follow the instructions from your surgeon    Take a shower or bath the night before surgery. Use the soap your surgeon gave you to gently clean your skin. If you do not have soap from your surgeon, use your regular soap. Do not shave or scrub the surgery site.  Wear clean pajamas and have clean sheets on your bed.     *Avoid Naproxen or ibuprofen. OK to take Tylenol.           Follow-ups after your visit        Your next 10 appointments already scheduled     Oct 03, 2018   Procedure with Dany Berrios MD   [facility] (--)    [address] [place]  [facility] [address] [phone]              Who to contact     If you have questions or need follow up information about today's clinic visit or your schedule please contact [facility] directly at [phone].  Normal or non-critical lab and imaging results will be communicated to you by MyChart, letter or phone within 4 business days after the clinic has received the results. If you do not hear from us within 7 days, please contact the  "clinic through [employer] or phone. If you have a critical or abnormal lab result, we will notify you by phone as soon as possible.  Submit refill requests through [employer] or call your pharmacy and they will forward the refill request to us. Please allow 3 business days for your refill to be completed.          Additional Information About Your Visit        [employer] Information     [employer] gives you secure access to your electronic health record. If you see a primary care provider, you can also send messages to your care team and make appointments. If you have questions, please call your primary care clinic.  If you do not have a primary care provider, please call [phone] and they will assist you.        Care EveryWhere ID     This is your Care EveryWhere ID. This could be used by other organizations to access your [facility] medical records  [id number]        Your Vitals Were     Pulse Temperature Height BMI (Body Mass Index)          76 98.4  F (36.9  C) (Oral) 5' 8\" (1.727 m) 30.99 kg/m2         Blood Pressure from Last 3 Encounters:   09/24/18 116/80   08/28/18 122/79   09/27/17 132/88    Weight from Last 3 Encounters:   09/24/18 203 lb 12.8 oz (92.4 kg)   08/28/18 202 lb 12.8 oz (92 kg)   09/27/17 200 lb 9.6 oz (91 kg)              Today, you had the following     No orders found for display         Today's Medication Changes          These changes are accurate as of 9/24/18  5:43 PM.  If you have any questions, ask your nurse or doctor.               These medicines have changed or have updated prescriptions.        Dose/Directions    esomeprazole 40 MG CR capsule   Commonly known as:  nexIUM   This may have changed:  Another medication with the same name was removed. Continue taking this medication, and follow the directions you see here.   Used for:  Reid's esophagus without dysplasia   Changed by:  Charly Castillo MD        Dose:  40 mg   Take 1 capsule (40 mg) by mouth every morning (before " breakfast) Take 30-60 minutes before eating.   Quantity:  90 capsule   Refills:  3                Primary Care Provider Office Phone # Fax #    Charly Castillo [phone]       [address] [facility] [address]        Equal Access to Services     FORTINO SHAY : Hadii delio angela hadasho Soomaali, waaxda luqadaha, qaybta kaalmada adeegyada, waxelva knapp hayafianubia dixonkevsincere carpio. So [facility] [phone].    ATENCIÓN: Si habla español, tiene a henderson disposición servicios gratuitos de asistencia lingüística. Llame al [phone].    We comply with applicable federal civil rights laws and [place] laws. We do not discriminate on the basis of race, color, national origin, age, disability, sex, sexual orientation, or gender identity.            Thank you!     Thank you for choosing [facility]  for your care. Our goal is always to provide you with excellent care. Hearing back from our patients is one way we can continue to improve our services. Please take a few minutes to complete the written survey that you may receive in the mail after your visit with us. Thank you!             Your Updated Medication List - Protect others around you: Learn how to safely use, store and throw away your medicines at www.disposemymeds.org.          This list is accurate as of 9/24/18  5:43 PM.  Always use your most recent med list.                   Brand Name Dispense Instructions for use Diagnosis    esomeprazole 40 MG CR capsule    nexIUM    90 capsule    Take 1 capsule (40 mg) by mouth every morning (before breakfast) Take 30-60 minutes before eating.    Reid's esophagus without dysplasia       ibuprofen 200 MG capsule      Take 200 mg by mouth every 4 hours as needed for fever        TYLENOL PO      Take 500 mg by mouth

## 2018-09-24 NOTE — PROGRESS NOTES
[address] [id number] [phone]  Dept: [phone]    PRE-OP EVALUATION:  Today's date: 2018    Adan Oliver (: 1960) presents for pre-operative evaluation assessment as requested by Dr. Berrios.  He requires evaluation and anesthesia risk assessment prior to undergoing surgery/procedure for treatment of Ptosis .    Fax number for surgical facility:    Primary Physician: Charly Castillo  Type of Anesthesia Anticipated: Local with MAC    Patient has a Health Care Directive or Living Will:  NO    Preop Questions 2018   Who is doing your surgery? Dr. Berrios   What are you having done? Blepharoplasty Bilateral, repair ptosis brow bilateral, repair ptosis bilateral   Date of Surgery/Procedure: October 3, 2018   Facility or Hospital where procedure/surgery will be performed: [place] [facility]   1.  Do you have a history of Heart attack, stroke, stent, coronary bypass surgery, or other heart surgery? No   2.  Do you ever have any pain or discomfort in your chest? No   3.  Do you have a history of  Heart Failure? No   4.   Are you troubled by shortness of breath when:  walking on a level surface, or up a slight hill, or at night? UNKNOWN - over long distances   5.  Do you currently have a cold, bronchitis or other respiratory infection? No   6.  Do you have a cough, shortness of breath, or wheezing? UNKNOWN - associated with exercise   7.  Do you sometimes get pains in the calves of your legs when you walk? YES - history of lumbar radiculopathy.   8. Do you or anyone in your family have previous history of blood clots? UNKNOWN - history of mother being given blood thinners.    9.  Do you or does anyone in your family have a serious bleeding problem such as prolonged bleeding following surgeries or cuts? No   10. Have you ever had problems with anemia or been told to take iron pills? No   11. Have you had any abnormal blood  loss such as black, tarry or bloody stools? No   12. Have you ever had a blood transfusion? YES - no acute concerns   13. Have you or any of your relatives ever had problems with anesthesia? UNKNOWN - Patient is unsure   14. Do you have sleep apnea, excessive snoring or daytime drowsiness? YES - patient is working with sleep specialists to aid with sleeping. Will not interfere with surgery.   15. Do you have any prosthetic heart valves? No   16. Do you have prosthetic joints? No         HPI:     HPI related to upcoming procedure: Ptosis. Surgery will correct symptoms associated with ptosis.         Nostril pain. Denies bleeding.     Beck pain. Improvement in symptoms. Top of feet experience mild numbess and lateral calf pain.     Problems breathing. Potentially LUCIAN    MEDICAL HISTORY:        Past Medical History:   Diagnosis Date     History of shingles- 2012 2012     Nephrolithiasis-2009 2009     Past Surgical History:   Procedure Laterality Date     APPENDECTOMY  2000s     CARPAL TUNNEL RELEASE RT/LT  1980s     TONSILLECTOMY  1980s     Current Outpatient Prescriptions   Medication Sig Dispense Refill     Acetaminophen (TYLENOL PO) Take 500 mg by mouth       esomeprazole (NEXIUM) 40 MG CR capsule Take 1 capsule (40 mg) by mouth every morning (before breakfast) Take 30-60 minutes before eating. 90 capsule 3     ibuprofen 200 MG capsule Take 200 mg by mouth every 4 hours as needed for fever       OTC products: no recent use of OTC ASA, NSAIDS or Steroids    No Known Allergies   Latex Allergy: NO    Social History   Substance Use Topics     Smoking status: Never Smoker     Smokeless tobacco: Never Used     Alcohol use 8.4 - 12.6 oz/week     14 - 21 Standard drinks or equivalent per week      Comment: varies, a couple drinks a day     History   Drug Use No       This document serves as a record of the services and decisions personally performed and made by Adam Castillo MD. It was created on their behalf by Ishmael  "Omi a trained medical scribe. The creation of this document is based the provider's statements to the medical scribe.  Ishmael Braga September 24, 2018 2:28 PM    REVIEW OF SYSTEMS:   CONSTITUTIONAL: NEGATIVE for fever, chills, change in weight  INTEGUMENTARY/SKIN: NEGATIVE for worrisome rashes, moles or lesions  EYES: NEGATIVE for vision changes or irritation  ENT/MOUTH: NEGATIVE for ear, mouth and throat problems  RESP: NEGATIVE for significant cough or SOB  BREAST: NEGATIVE for masses, tenderness or discharge  CV: NEGATIVE for chest pain, palpitations or peripheral edema  GI: NEGATIVE for nausea, abdominal pain, heartburn, or change in bowel habits  : NEGATIVE for frequency, dysuria, or hematuria  MUSCULOSKELETAL: NEGATIVE for significant arthralgias or myalgia  NEURO: NEGATIVE for weakness, dizziness or paresthesias  ENDOCRINE: NEGATIVE for temperature intolerance, skin/hair changes  HEME: NEGATIVE for bleeding problems  PSYCHIATRIC: NEGATIVE for changes in mood or affect    This document serves as a record of the services and decisions personally performed and made by Adam Castillo MD. It was created on their behalf by Ishmael Braga, a trained medical scribe. The creation of this document is based the provider's statements to the medical scribe.  Ishmael Braga September 24, 2018 2:06 PM      EXAM:   /80 (BP Location: Right arm, Patient Position: Chair, Cuff Size: Adult Large)  Pulse 76  Temp 98.4  F (36.9  C) (Oral)  Ht 5' 8\" (1.727 m)  Wt 203 lb 12.8 oz (92.4 kg)  BMI 30.99 kg/m2  GENERAL APPEARANCE: healthy, alert and no distress  HENT: ear canals and TM's normal and nose and mouth without ulcers or lesions  RESP: lungs clear to auscultation - no rales, rhonchi or wheezes  CV: regular rate and rhythm, normal S1 S2, no S3 or S4 and no murmur, click or rub   ABDOMEN: soft, nontender, no HSM or masses and bowel sounds normal  NEURO: Normal strength and tone, sensory exam grossly normal, mentation " intact and speech normal    DIAGNOSTICS:   No labs or EKG required for low risk surgery (cataract, skin procedure, breast biopsy, etc)    Recent Labs   Lab Test  09/26/17   1257  08/04/16   1523   10/01/14   1533   HGB  14.9  15.8   --    --    PLT  425  406   --    --    NA  141  136   < >   --    POTASSIUM  4.4  4.5   < >   --    CR  1.06  1.15   < >   --    A1C   --    --    --   5.1    < > = values in this interval not displayed.        IMPRESSION:   Reason for surgery/procedure: ptosis  Diagnosis/reason for consult: pre-op ptosis    The proposed surgical procedure is considered LOW risk.    REVISED CARDIAC RISK INDEX  The patient has the following serious cardiovascular risks for perioperative complications such as (MI, PE, VFib and 3  AV Block):  No serious cardiac risks  INTERPRETATION: 0 risks: Class I (very low risk - 0.4% complication rate)    The patient has the following additional risks for perioperative complications:  No identified additional risks      ICD-10-CM    1. Preop general physical exam Z01.818    2. Lid lag H02.539        RECOMMENDATIONS:       Obstructive Sleep Apnea (or suspected sleep apnea)  Patient is working with sleep specialists on potential of having LUCIAN. Has own CPAP. Will not interfere with surgery.      --Patient is to take all scheduled medications on the day of surgery EXCEPT for modifications listed below.    Avoid NSAID medication    APPROVAL GIVEN to proceed with proposed procedure, without further diagnostic evaluation       Signed Electronically by: Charly Castillo MD, MD    Copy of this evaluation report is provided to requesting physician.    Pam Preop Guidelines    Revised Cardiac Risk Index    The information in this document, created by the medical scribe for me, accurately reflects the services I personally performed and the decisions made by me. I have reviewed and approved this document for accuracy prior to leaving the patient care area.

## 2018-09-24 NOTE — PATIENT INSTRUCTIONS
Before Your Surgery      Call your surgeon if there is any change in your health. This includes signs of a cold or flu (such as a sore throat, runny nose, cough, rash or fever).    Do not smoke, drink alcohol or take over the counter medicine (unless your surgeon or primary care doctor tells you to) for the 24 hours before and after surgery.    If you take prescribed drugs: Follow your doctor s orders about which medicines to take and which to stop until after surgery.    Eating and drinking prior to surgery: follow the instructions from your surgeon    Take a shower or bath the night before surgery. Use the soap your surgeon gave you to gently clean your skin. If you do not have soap from your surgeon, use your regular soap. Do not shave or scrub the surgery site.  Wear clean pajamas and have clean sheets on your bed.     *Avoid Naproxen or ibuprofen. OK to take Tylenol.

## 2018-10-01 ENCOUNTER — DOCUMENTATION ONLY (OUTPATIENT)
Dept: SLEEP MEDICINE | Facility: CLINIC | Age: 58
End: 2018-10-01

## 2018-10-01 DIAGNOSIS — E66.09 NON MORBID OBESITY DUE TO EXCESS CALORIES: ICD-10-CM

## 2018-10-01 DIAGNOSIS — G47.33 OSA (OBSTRUCTIVE SLEEP APNEA): ICD-10-CM

## 2018-10-01 DIAGNOSIS — F51.04 INSOMNIA, PSYCHOPHYSIOLOGICAL: ICD-10-CM

## 2018-10-01 NOTE — PROGRESS NOTES
30 DAY STM VISIT    Diagnostic AHI: 28.0 PSG    Subjective measures:   Pt states that he's struggling with it. He has tried two different masks.  He doesn't think that he's going to be able to use it. He scheduled an appointment with Skye to discuss alternative options.     Assessment: Pt not meeting objective benchmarks for compliance  Patient failing following subjective benchmarks: pressure issues and mask discomfort    Action plan: Patient has a follow up visit with DAGOBERTO Araujo on 10/15/18.   Device type: Auto-CPAP  PAP settings: CPAP min 5.0 cm  H20     CPAP max 15.0 cm  H20    95th% pressure 11.8 cm  H20   Mask type:  Nasal Mask  Objective measures: 14 day rolling measures      Compliance  0 %      Leak  13.2 lpm  last  upload      AHI 5.55   last  upload      Average number of minutes 11      Objective measure goal  Compliance   Goal >70%  Leak   Goal < 24 lpm  AHI  Goal < 5  Usage  Goal >240

## 2018-10-02 ENCOUNTER — ANESTHESIA EVENT (OUTPATIENT)
Dept: SURGERY | Facility: AMBULATORY SURGERY CENTER | Age: 58
End: 2018-10-02

## 2018-10-03 ENCOUNTER — SURGERY (OUTPATIENT)
Age: 58
End: 2018-10-03
Payer: COMMERCIAL

## 2018-10-03 ENCOUNTER — ANESTHESIA (OUTPATIENT)
Dept: SURGERY | Facility: AMBULATORY SURGERY CENTER | Age: 58
End: 2018-10-03
Payer: COMMERCIAL

## 2018-10-03 ENCOUNTER — HOSPITAL ENCOUNTER (OUTPATIENT)
Facility: AMBULATORY SURGERY CENTER | Age: 58
Discharge: HOME | End: 2018-10-03
Attending: OPHTHALMOLOGY | Admitting: OPHTHALMOLOGY
Payer: COMMERCIAL

## 2018-10-03 VITALS
RESPIRATION RATE: 16 BRPM | DIASTOLIC BLOOD PRESSURE: 96 MMHG | OXYGEN SATURATION: 97 % | TEMPERATURE: 97.7 F | SYSTOLIC BLOOD PRESSURE: 134 MMHG

## 2018-10-03 DIAGNOSIS — Z98.890 POSTOPERATIVE EYE STATE: Primary | ICD-10-CM

## 2018-10-03 PROCEDURE — 67908 REPAIR EYELID DEFECT: CPT | Mod: 50

## 2018-10-03 PROCEDURE — 15823 BLEPHARP UPR EYELID XCSV SKN: CPT | Mod: 50 | Performed by: OPHTHALMOLOGY

## 2018-10-03 PROCEDURE — G8907 PT DOC NO EVENTS ON DISCHARG: HCPCS

## 2018-10-03 PROCEDURE — 67900 REPAIR BROW DEFECT: CPT | Mod: 50

## 2018-10-03 PROCEDURE — G8918 PT W/O PREOP ORDER IV AB PRO: HCPCS

## 2018-10-03 PROCEDURE — 67900 REPAIR BROW DEFECT: CPT | Mod: 50 | Performed by: OPHTHALMOLOGY

## 2018-10-03 RX ORDER — GABAPENTIN 300 MG/1
300 CAPSULE ORAL ONCE
Status: COMPLETED | OUTPATIENT
Start: 2018-10-03 | End: 2018-10-03

## 2018-10-03 RX ORDER — HYDROMORPHONE HYDROCHLORIDE 1 MG/ML
.3-.5 INJECTION, SOLUTION INTRAMUSCULAR; INTRAVENOUS; SUBCUTANEOUS EVERY 10 MIN PRN
Status: DISCONTINUED | OUTPATIENT
Start: 2018-10-03 | End: 2018-10-04 | Stop reason: HOSPADM

## 2018-10-03 RX ORDER — SODIUM CHLORIDE, SODIUM LACTATE, POTASSIUM CHLORIDE, CALCIUM CHLORIDE 600; 310; 30; 20 MG/100ML; MG/100ML; MG/100ML; MG/100ML
INJECTION, SOLUTION INTRAVENOUS CONTINUOUS
Status: DISCONTINUED | OUTPATIENT
Start: 2018-10-03 | End: 2018-10-04 | Stop reason: HOSPADM

## 2018-10-03 RX ORDER — ERYTHROMYCIN 5 MG/G
OINTMENT OPHTHALMIC
Qty: 3.5 G | Refills: 0 | Status: SHIPPED | OUTPATIENT
Start: 2018-10-03 | End: 2018-10-15

## 2018-10-03 RX ORDER — ONDANSETRON 4 MG/1
4 TABLET, ORALLY DISINTEGRATING ORAL EVERY 30 MIN PRN
Status: DISCONTINUED | OUTPATIENT
Start: 2018-10-03 | End: 2018-10-04 | Stop reason: HOSPADM

## 2018-10-03 RX ORDER — LIDOCAINE 40 MG/G
CREAM TOPICAL
Status: DISCONTINUED | OUTPATIENT
Start: 2018-10-03 | End: 2018-10-04 | Stop reason: HOSPADM

## 2018-10-03 RX ORDER — NEOMYCIN SULFATE, POLYMYXIN B SULFATE AND DEXAMETHASONE 3.5; 10000; 1 MG/ML; [USP'U]/ML; MG/ML
SUSPENSION/ DROPS OPHTHALMIC
Qty: 1 BOTTLE | Refills: 0 | Status: SHIPPED | OUTPATIENT
Start: 2018-10-03 | End: 2018-10-15

## 2018-10-03 RX ORDER — HYDROCODONE BITARTRATE AND ACETAMINOPHEN 5; 325 MG/1; MG/1
1 TABLET ORAL
Status: DISCONTINUED | OUTPATIENT
Start: 2018-10-03 | End: 2018-10-04 | Stop reason: HOSPADM

## 2018-10-03 RX ORDER — PROPOFOL 10 MG/ML
INJECTION, EMULSION INTRAVENOUS CONTINUOUS PRN
Status: DISCONTINUED | OUTPATIENT
Start: 2018-10-03 | End: 2018-10-03

## 2018-10-03 RX ORDER — TETRACAINE HYDROCHLORIDE 5 MG/ML
SOLUTION OPHTHALMIC PRN
Status: DISCONTINUED | OUTPATIENT
Start: 2018-10-03 | End: 2018-10-03 | Stop reason: HOSPADM

## 2018-10-03 RX ORDER — NALOXONE HYDROCHLORIDE 0.4 MG/ML
.1-.4 INJECTION, SOLUTION INTRAMUSCULAR; INTRAVENOUS; SUBCUTANEOUS
Status: DISCONTINUED | OUTPATIENT
Start: 2018-10-03 | End: 2018-10-04 | Stop reason: HOSPADM

## 2018-10-03 RX ORDER — FENTANYL CITRATE 50 UG/ML
INJECTION, SOLUTION INTRAMUSCULAR; INTRAVENOUS PRN
Status: DISCONTINUED | OUTPATIENT
Start: 2018-10-03 | End: 2018-10-03

## 2018-10-03 RX ORDER — PROPOFOL 10 MG/ML
INJECTION, EMULSION INTRAVENOUS PRN
Status: DISCONTINUED | OUTPATIENT
Start: 2018-10-03 | End: 2018-10-03

## 2018-10-03 RX ORDER — ACETAMINOPHEN 325 MG/1
975 TABLET ORAL ONCE
Status: COMPLETED | OUTPATIENT
Start: 2018-10-03 | End: 2018-10-03

## 2018-10-03 RX ORDER — ONDANSETRON 2 MG/ML
INJECTION INTRAMUSCULAR; INTRAVENOUS PRN
Status: DISCONTINUED | OUTPATIENT
Start: 2018-10-03 | End: 2018-10-03

## 2018-10-03 RX ORDER — FENTANYL CITRATE 50 UG/ML
25-50 INJECTION, SOLUTION INTRAMUSCULAR; INTRAVENOUS EVERY 5 MIN PRN
Status: DISCONTINUED | OUTPATIENT
Start: 2018-10-03 | End: 2018-10-04 | Stop reason: HOSPADM

## 2018-10-03 RX ORDER — LIDOCAINE HYDROCHLORIDE 20 MG/ML
INJECTION, SOLUTION INFILTRATION; PERINEURAL PRN
Status: DISCONTINUED | OUTPATIENT
Start: 2018-10-03 | End: 2018-10-03

## 2018-10-03 RX ORDER — ONDANSETRON 2 MG/ML
4 INJECTION INTRAMUSCULAR; INTRAVENOUS EVERY 30 MIN PRN
Status: DISCONTINUED | OUTPATIENT
Start: 2018-10-03 | End: 2018-10-04 | Stop reason: HOSPADM

## 2018-10-03 RX ADMIN — LIDOCAINE HYDROCHLORIDE 40 MG: 20 INJECTION, SOLUTION INFILTRATION; PERINEURAL at 13:24

## 2018-10-03 RX ADMIN — GABAPENTIN 300 MG: 300 CAPSULE ORAL at 13:01

## 2018-10-03 RX ADMIN — ONDANSETRON 4 MG: 2 INJECTION INTRAMUSCULAR; INTRAVENOUS at 14:02

## 2018-10-03 RX ADMIN — PROPOFOL 50 MCG/KG/MIN: 10 INJECTION, EMULSION INTRAVENOUS at 13:24

## 2018-10-03 RX ADMIN — SODIUM CHLORIDE, SODIUM LACTATE, POTASSIUM CHLORIDE, CALCIUM CHLORIDE: 600; 310; 30; 20 INJECTION, SOLUTION INTRAVENOUS at 13:01

## 2018-10-03 RX ADMIN — PROPOFOL 50 MG: 10 INJECTION, EMULSION INTRAVENOUS at 13:24

## 2018-10-03 RX ADMIN — SODIUM CHLORIDE, SODIUM LACTATE, POTASSIUM CHLORIDE, CALCIUM CHLORIDE: 600; 310; 30; 20 INJECTION, SOLUTION INTRAVENOUS at 13:21

## 2018-10-03 RX ADMIN — ACETAMINOPHEN 975 MG: 325 TABLET ORAL at 13:01

## 2018-10-03 RX ADMIN — FENTANYL CITRATE 50 MCG: 50 INJECTION, SOLUTION INTRAMUSCULAR; INTRAVENOUS at 13:23

## 2018-10-03 RX ADMIN — TETRACAINE HYDROCHLORIDE 1 DROP: 5 SOLUTION OPHTHALMIC at 13:38

## 2018-10-03 RX ADMIN — PROPOFOL 50 MG: 10 INJECTION, EMULSION INTRAVENOUS at 13:26

## 2018-10-03 RX ADMIN — TETRACAINE HYDROCHLORIDE 2 DROP: 5 SOLUTION OPHTHALMIC at 13:30

## 2018-10-03 RX ADMIN — Medication 6 ML: at 13:30

## 2018-10-03 NOTE — ANESTHESIA POSTPROCEDURE EVALUATION
Patient: Adan Oliver    Procedure(s):   - Wound Class: I-Clean   - Wound Class: I-Clean   - Wound Class: I-Clean    Diagnosis:Bilateral dermatochalasis, brow ptosis,   Diagnosis Additional Information: No value filed.    Anesthesia Type:  MAC    Note:  Anesthesia Post Evaluation    Patient location during evaluation: PACU  Patient participation: Able to fully participate in evaluation  Level of consciousness: awake  Pain management: adequate  Airway patency: patent  Cardiovascular status: acceptable  Respiratory status: acceptable  Hydration status: balanced  PONV: none     Anesthetic complications: None          Last vitals:  Vitals:    10/03/18 1419 10/03/18 1430 10/03/18 1445   BP: 110/68 131/86 (!) 134/96   Resp: 16 16 16   Temp: 36.5  C (97.7  F)     SpO2: 95% 94% 97%         Electronically Signed By: Tarik Figueroa MD  October 3, 2018  3:58 PM

## 2018-10-03 NOTE — ANESTHESIA PREPROCEDURE EVALUATION
Anesthesia Evaluation     .             ROS/MED HX    ENT/Pulmonary:  - neg pulmonary ROS   (+)sleep apnea, , . .    Neurologic:  - neg neurologic ROS     Cardiovascular:  - neg cardiovascular ROS   (+) Dyslipidemia, ----. : . . . :. .       METS/Exercise Tolerance:     Hematologic:  - neg hematologic  ROS       Musculoskeletal:  - neg musculoskeletal ROS       GI/Hepatic:  - neg GI/hepatic ROS   (+) GERD       Renal/Genitourinary:  - ROS Renal section negative       Endo:  - neg endo ROS   (+) Obesity, .      Psychiatric:  - neg psychiatric ROS       Infectious Disease:  - neg infectious disease ROS       Malignancy:      - no malignancy   Other:    - neg other ROS                 Physical Exam  Normal systems: cardiovascular, pulmonary and dental    Airway   Mallampati: II  TM distance: >3 FB  Neck ROM: full    Dental     Cardiovascular       Pulmonary                     Anesthesia Plan      History & Physical Review  History and physical reviewed and following examination; no interval change.    ASA Status:  2 .    NPO Status:  > 8 hours    Plan for MAC with Intravenous induction. Maintenance will be TIVA.  Reason for MAC:  Procedure to face, neck, head or breast  PONV prophylaxis:  Ondansetron (or other 5HT-3) and Dexamethasone or Solumedrol       Postoperative Care  Postoperative pain management:  IV analgesics, Oral pain medications and Multi-modal analgesia.      Consents  Anesthetic plan, risks, benefits and alternatives discussed with:  Patient..                          .

## 2018-10-03 NOTE — IP AVS SNAPSHOT
[facility]    [address] [place] [address]    Phone:  [phone]                                       After Visit Summary   10/3/2018    Adan Oliver    MRN: [id number]           After Visit Summary Signature Page     I have received my discharge instructions, and my questions have been answered. I have discussed any challenges I see with this plan with the nurse or doctor.    ..........................................................................................................................................  Patient/Patient Representative Signature      ..........................................................................................................................................  Patient Representative Print Name and Relationship to Patient    ..................................................               ................................................  Date                                   Time    ..........................................................................................................................................  Reviewed by Signature/Title    ...................................................              ..............................................  Date                                               Time          22EPIC Rev 08/18

## 2018-10-03 NOTE — ANESTHESIA CARE TRANSFER NOTE
Patient: Adan Oliver    Procedure(s):   - Wound Class: I-Clean   - Wound Class: I-Clean   - Wound Class: I-Clean    Diagnosis: Bilateral dermatochalasis, brow ptosis,   Diagnosis Additional Information: No value filed.    Anesthesia Type:   MAC     Note:    Patient transferred to:Phase II  Handoff Report: Identifed the Patient, Identified the Reponsible Provider, Reviewed the pertinent medical history, Discussed the surgical course, Reviewed Intra-OP anesthesia mangement and issues during anesthesia, Set expectations for post-procedure period and Allowed opportunity for questions and acknowledgement of understanding      Vitals: (Last set prior to Anesthesia Care Transfer)    CRNA VITALS  10/3/2018 1344 - 10/3/2018 1418      10/3/2018             Pulse: 95    SpO2: 94 %                Electronically Signed By: RASHEL aCrter CRNA  October 3, 2018  2:18 PM

## 2018-10-03 NOTE — IP AVS SNAPSHOT
MRN:[id number]                      After Visit Summary   10/3/2018    Adan Oliver    MRN: [id number]           Thank you!     Thank you for choosing [facility] for your care. Our goal is always to provide you with excellent care. Hearing back from our patients is one way we can continue to improve our services. Please take a few minutes to complete the written survey that you may receive in the mail after you visit with us. Thank you!        Patient Information     Date Of Birth          1960        About your hospital stay     You were admitted on:  October 3, 2018 You [place] the[place]  [facility]    You were discharged on:  October 3, 2018       Who to Call     For medical emergencies, please call 911.  For non-urgent questions about your medical care, please call your primary care provider or clinic, [phone]  For questions related to your surgery, please call your surgery clinic        Attending Provider     Provider Specialty    Dany Berrios MD Ophthalmology       Primary Care Provider Office Phone # Fax #    Charly Castillo [phone]      After Care Instructions     Discharge Medication Instructions       Do NOT take aspirin or medications containing NSAIDS for 72 hours after procedure.            Ice to affected area       Apply cold pack for 15 minutes on, 15 minutes off, for 48 hours while awake.                  Your next 10 appointments already scheduled     Oct 15, 2018  2:00 PM CDT   Return Sleep Patient with DAGOBERTO Mireles   [facility] ([facility])    [address] [place] [address]   [phone]            Oct 15, 2018  2:30 PM CDT   DME RETURN with VINEET SUAREZ   [facility] ([facility])    [address] [place] [address]   [phone]            Oct 17, 2018  1:15 PM  CDT   Return Visit with Dany Berrios MD   [facility] ([facility])    [address] [address] [place] [address] [phone]              Further instructions from your care team       Post-operative Instructions  Ophthalmic Plastic and Reconstructive Surgery    Dany Berrois M.D.     All instructions apply to the operated eye(s) or eyelid(s).    Wound care and personal care  ? Apply ice compresses 15 minutes on 15 minutes off while awake for 2 days, then switch to warm water compresses 4 times a day until seen by your physician. For warm packs you can place a cup of dry uncooked rice in a clean cotton sock. Then place sock in microwave 30 seconds to one minute. Next place the warm sock into a plastic bag and wrap the bag with clean warm wet washcloth and place over operated eye.    ? You may shower or wash your hair the day after surgery. Do not bathe or go swimming for 1 week to prevent contamination of your wounds.  ? Do not apply make-up to the eyes or eyelids for 2 weeks after surgery.  ? Expect some swelling, bruising, black eye (even into the lower eyelids and cheeks). Also expect serum caking, crusting and discharge from the eye and/or incisions. A small amount of surface bleeding is normal for the first 48 hours.  ? Your eye(s) and eyelid(s) may be painful and tender. This is normal after surgery.      Contact information and follow-up  ? Return to the Eye Clinic for a follow-up appointment with your physician as  scheduled. If no appointment has been scheduled:   - [facility] eye clinic: [phone] for an appointment with Dr. Berrios within 1 to 2 weeks from your date of surgery.   -  [facility] eye clinic: [phone] for an appointment with Dr. Berrios within 1 to 2 weeks from your date of surgery.     ? For severe pain, bleeding, or loss of vision, call the [facility] at [phone] or [facility]  [facility] at [phone].     After hours or on weekends and holidays, call [phone] and ask to speak with the ophthalmologist on call.    An on call person can be reached after hours for concerns. The on call doctor should not call in medication refill requests after hours or on weekends, so please plan accordingly. An effort has been made to provide adequate pain medications following every surgery, and refills will not be provided in most instances. Narcotic pain medications cannot be called in.     Activity restrictions and driving  ? Avoid heavy lifting, bending, exercise or strenuous activity for 1 week after surgery.  You may resume other activities and return to work as tolerated.  ? You may not resume driving until have you stopped using narcotic pain medications (such as Norco, Percocet, Tylenol #3).    Medications  ? Restart all your regular home medications and eye drops. If you take Plavix or  Aspirin on a regular basis, wait for 72 hours after your surgery before restarting these in order to decrease the risk of bleeding complications.  ? Avoid aspirin and aspirin-like medications (Motrin, Aleve, Ibuprofen, Jessica-  [place] etc) for 72 hours to reduce the risk of bleeding. You may take Tylenol  (acetaminophen) for pain.  ? In addition to your home medications, take the following post-operative medications as prescribed by your physician.    ? Apply antibiotic ointment to all sutures three times a day, and into the operated eye(s) at night.  ? Instill eye drops 3 times a day for 10 days.     [facility]  Same-Day Surgery   Adult Discharge Orders & Instructions   For 24 hours after surgery  1. Get plenty of rest.  A responsible adult must stay with you for at least 24 hours after you leave the hospital.   2. Do not drive or use heavy equipment.  If you have weakness or tingling, don't drive or use heavy equipment until this feeling goes away.  3. Do not drink  alcohol.  4. Avoid strenuous or risky activities.  Ask for help when climbing stairs.   5. You may feel lightheaded.  IF so, sit for a few minutes before standing.  Have someone help you get up.   6. If you have nausea (feel sick to your stomach): Drink only clear liquids such as apple juice, ginger ale, broth or 7-Up.  Rest may also help.  Be sure to drink enough fluids.  Move to a regular diet as you feel able.  7. You may have a slight fever. Call the doctor if your fever is over 100 F (37.7 C) (taken under the tongue) or lasts longer than 24 hours.  8. You may have a dry mouth, a sore throat, muscle aches or trouble sleeping.  These should go away after 24 hours.  9. Do not make important or legal decisions.   Call your doctor for any of the followin.  Signs of infection (fever, growing tenderness at the surgery site, a large amount of drainage or bleeding, severe pain, foul-smelling drainage, redness, swelling).    2. It has been over 8 to 10 hours since surgery and you are still not able to urinate (pass water).    3.  Headache for over 24 hours.        To contact Dr Berrios call:  [phone] - Day  [phone] - After Hours, ask for the Opthomologist on call.    Tylenol was given at 1:00 PM      Pending Results     No orders found from 10/1/2018 to 10/4/2018.            Admission Information     Date & Time Provider Department Dept. Phone    10/3/2018 Dany Berrios MD [facility] [phone]      Your Vitals Were     Blood Pressure Temperature Respirations Pulse Oximetry          132/78 97.7  F (36.5  C) (Temporal) 16 95%        MyChart Information     [employer] gives you secure access to your electronic health record. If you see a primary care provider, you can also send messages to your care team and make appointments. If you have questions, please call your primary care clinic.  If you do not have a primary care provider, please call [phone] and they will assist  "you.        Care EveryWhere ID     This is your Care EveryWhere ID. This could be used by other organizations to access your [facility] medical records  [id number]        Equal Access to Services     FORTINO SHAY : Hadii aad ku hadtalia Peresali, leandro marloalexandra, hillary kaarielle cuevas, lisa patenubia [place] [facility]anda [facility] [phone].    ATENCIÓN: Si habla español, tiene a henderson disposición servicios gratuitos de asistencia lingüística. Llame al [phone].    We comply with applicable federal civil rights laws and [place] laws. We do not discriminate on the basis of race, color, national origin, age, disability, sex, sexual orientation, or gender identity.               Review of your medicines      START taking        Dose / Directions    erythromycin ophthalmic ointment   Commonly known as:  ROMYCIN   Used for:  Postoperative eye state        Apply small amount to incision sites three times daily and 1/2\" strip into both eyes at bedtime   Quantity:  3.5 g   Refills:  0       neomycin-polymyxin-dexamethasone 3.5-07872-1.1 Susp ophthalmic susp   Commonly known as:  MAXITROL   Used for:  Postoperative eye state        One drop to operated eye(s) three times daily for 10 days.   Quantity:  1 Bottle   Refills:  0         CONTINUE these medicines which have NOT CHANGED        Dose / Directions    esomeprazole 40 MG CR capsule   Commonly known as:  nexIUM   Used for:  Reid's esophagus without dysplasia        Dose:  40 mg   Take 1 capsule (40 mg) by mouth every morning (before breakfast) Take 30-60 minutes before eating.   Quantity:  90 capsule   Refills:  3       ibuprofen 200 MG capsule        Dose:  200 mg   Take 200 mg by mouth every 4 hours as needed for fever   Refills:  0       TYLENOL PO        Dose:  500 mg   Take 500 mg by mouth   Refills:  0            Where to get your medicines      These medications were sent to [facility] - [place], [place] - [address], [address]  [address] [address], [address], [facility] [address]     Phone:  [phone]     erythromycin ophthalmic ointment    neomycin-polymyxin-dexamethasone 3.5-27336-8.1 Susp ophthalmic susp                Protect others around you: Learn how to safely use, store and throw away your medicines at www.disposemymeds.org.             Medication List: This is a list of all your medications and when to take them. Check marks below indicate your daily home schedule. Keep this list as a reference.      Medications           Morning Afternoon Evening Bedtime As Needed    erythromycin ophthalmic ointment   Commonly known as:  ROMYCIN   Apply small amount to incision sites three times daily and 1/2\" strip into both eyes at bedtime                                esomeprazole 40 MG CR capsule   Commonly known as:  nexIUM   Take 1 capsule (40 mg) by mouth every morning (before breakfast) Take 30-60 minutes before eating.                                ibuprofen 200 MG capsule   Take 200 mg by mouth every 4 hours as needed for fever                                neomycin-polymyxin-dexamethasone 3.5-91238-2.1 Susp ophthalmic susp   Commonly known as:  MAXITROL   One drop to operated eye(s) three times daily for 10 days.                                TYLENOL PO   Take 500 mg by mouth   Last time this was given:  975 mg on 10/3/2018  1:01 PM                                  "

## 2018-10-03 NOTE — OP NOTE
PREOPERATIVE DIAGNOSES:   1. Bilateral upper eyelid dermatochalasis.   2. Bilateral brow ptosis.   3. Bilateral upper eyelid ptosis.    POSTOPERATIVE DIAGNOSES:   1. Bilateral upper eyelid dermatochalasis.   2. Bilateral brow ptosis.   3. Bilateral upper eyelid ptosis.    PROCEDURE PERFORMED:   1. Bilateral upper blepharoplasty.   2. Bilateral browpexy.   3. Bilateral upper eyelid ptosis repair by Muellers muscle conjunctival resection.    ANESTHESIA: Monitored with local infiltration of a 50/50 mixture of 2% lidocaine with epinephrine and 0.5% Marcaine.     SURGEON: Dany Berrios MD    ASSISTANT: None    COMPLICATIONS: None.     ESTIMATED BLOOD LOSS: Less than 5 mL.     HISTORY AND INDICATIONS: Adan Oliver presented with upper lid drooping interfering with superior visual field and activities of daily living due to dermatochalasis and brow ptosis. After the risks, benefits and alternatives to the proposed procedure were explained, informed consent was obtained.     DESCRIPTION OF PROCEDURE: Adan Oliver  was brought to the operating room and placed supine on the operating table. IV sedation was given. The upper lid crease and excess upper eyelid skin was marked with a marking pen and infiltrated with local anesthetic. The face was prepped and draped in the typical sterile ophthalmic fashion. The area of the brow to be elevated was marked with a marking pen in the inferior brow hairs.  Attention was directed to the right side. Skin was incised following marked lines. Skin flap was excised from the upper eyelid with high temperature cautery. Hemostasis was obtained. A row of high temperature cautery was placed at the inferior wound through the orbicularis to create a lid crease.   Attention was directed to the brow. The orbicularis was opened laterally and dissection was carried out in a preseptal plane up to the superolateral orbital rim, and extended over the brow fat pad for several mm. Multiple 5-0  chromic gut sutures were used to secure the superior edge of orbicularis to the arcus marginalis to support the lateral brow and provide elevation and anterior projection.  A 4-0 silk suture was placed through the lid margin. The eyelid was everted over a Desmarres retractor. A 6-0 silk suture was threaded 1/2 the desired distance from the superior tarsus. The suture was used to elevate the conjunctiva and Muellers muscle.  The Putterman clamp was placed over the elevated tissue.  A 6-0 plain gut suture was threaded 1 mm below the clamp in a horizonal serpentine fashion from lateral to medial then medial to lateral.  The clamped tissues were excised with the #15 blade.  The suture was externalized and tied in a permanent fashion.  The 4-0 silk suture was removed.  The skin was closed with running 6-0 plain gut suture. Attention was directed to the other side where the same procedures were performed. Erythromycin ophthalmic ointment was applied to the incisions. The patient tolerated the procedure well. Adan Oliver  left the operating room in stable condition.   Dany Berrios MD

## 2018-10-03 NOTE — DISCHARGE INSTRUCTIONS
Post-operative Instructions  Ophthalmic Plastic and Reconstructive Surgery    Dany Berrios M.D.     All instructions apply to the operated eye(s) or eyelid(s).    Wound care and personal care  ? Apply ice compresses 15 minutes on 15 minutes off while awake for 2 days, then switch to warm water compresses 4 times a day until seen by your physician. For warm packs you can place a cup of dry uncooked rice in a clean cotton sock. Then place sock in microwave 30 seconds to one minute. Next place the warm sock into a plastic bag and wrap the bag with clean warm wet washcloth and place over operated eye.    ? You may shower or wash your hair the day after surgery. Do not bathe or go swimming for 1 week to prevent contamination of your wounds.  ? Do not apply make-up to the eyes or eyelids for 2 weeks after surgery.  ? Expect some swelling, bruising, black eye (even into the lower eyelids and cheeks). Also expect serum caking, crusting and discharge from the eye and/or incisions. A small amount of surface bleeding is normal for the first 48 hours.  ? Your eye(s) and eyelid(s) may be painful and tender. This is normal after surgery.      Contact information and follow-up  ? Return to the Eye Clinic for a follow-up appointment with your physician as  scheduled. If no appointment has been scheduled:   - [facility] eye clinic: [phone] for an appointment with Dr. Berrios within 1 to 2 weeks from your date of surgery.   -  [facility] eye clinic: [phone] for an appointment with Dr. Berrios within 1 to 2 weeks from your date of surgery.     ? For severe pain, bleeding, or loss of vision, call the [facility] at [phone] or [facility] at [phone].     After hours or on weekends and holidays, call [phone] and ask to speak with the ophthalmologist on call.    An on call person can be reached after hours for concerns. The on call doctor  should not call in medication refill requests after hours or on weekends, so please plan accordingly. An effort has been made to provide adequate pain medications following every surgery, and refills will not be provided in most instances. Narcotic pain medications cannot be called in.     Activity restrictions and driving  ? Avoid heavy lifting, bending, exercise or strenuous activity for 1 week after surgery.  You may resume other activities and return to work as tolerated.  ? You may not resume driving until have you stopped using narcotic pain medications (such as Norco, Percocet, Tylenol #3).    Medications  ? Restart all your regular home medications and eye drops. If you take Plavix or  Aspirin on a regular basis, wait for 72 hours after your surgery before restarting these in order to decrease the risk of bleeding complications.  ? Avoid aspirin and aspirin-like medications (Motrin, Aleve, Ibuprofen, Jessica-  [place] etc) for 72 hours to reduce the risk of bleeding. You may take Tylenol  (acetaminophen) for pain.  ? In addition to your home medications, take the following post-operative medications as prescribed by your physician.    ? Apply antibiotic ointment to all sutures three times a day, and into the operated eye(s) at night.  ? Instill eye drops 3 times a day for 10 days.     [facility]  Same-Day Surgery   Adult Discharge Orders & Instructions   For 24 hours after surgery  1. Get plenty of rest.  A responsible adult must stay with you for at least 24 hours after you leave the hospital.   2. Do not drive or use heavy equipment.  If you have weakness or tingling, don't drive or use heavy equipment until this feeling goes away.  3. Do not drink alcohol.  4. Avoid strenuous or risky activities.  Ask for help when climbing stairs.   5. You may feel lightheaded.  IF so, sit for a few minutes before standing.  Have someone help you get up.   6. If you have nausea (feel sick to your  stomach): Drink only clear liquids such as apple juice, ginger ale, broth or 7-Up.  Rest may also help.  Be sure to drink enough fluids.  Move to a regular diet as you feel able.  7. You may have a slight fever. Call the doctor if your fever is over 100 F (37.7 C) (taken under the tongue) or lasts longer than 24 hours.  8. You may have a dry mouth, a sore throat, muscle aches or trouble sleeping.  These should go away after 24 hours.  9. Do not make important or legal decisions.   Call your doctor for any of the followin.  Signs of infection (fever, growing tenderness at the surgery site, a large amount of drainage or bleeding, severe pain, foul-smelling drainage, redness, swelling).    2. It has been over 8 to 10 hours since surgery and you are still not able to urinate (pass water).    3.  Headache for over 24 hours.        To contact Dr Berrios call:  [phone] - Day  [phone] - After Hours, ask for the Opthomologist on call.    Tylenol was given at 1:00 PM

## 2018-10-04 ENCOUNTER — OFFICE VISIT (OUTPATIENT)
Dept: OPHTHALMOLOGY | Facility: CLINIC | Age: 58
End: 2018-10-04
Payer: COMMERCIAL

## 2018-10-04 ENCOUNTER — TELEPHONE (OUTPATIENT)
Dept: OPHTHALMOLOGY | Facility: CLINIC | Age: 58
End: 2018-10-04

## 2018-10-04 DIAGNOSIS — H02.403 ACQUIRED PTOSIS OF EYELID, BILATERAL: ICD-10-CM

## 2018-10-04 DIAGNOSIS — H02.834 DERMATOCHALASIS OF BOTH UPPER EYELIDS: Primary | ICD-10-CM

## 2018-10-04 DIAGNOSIS — H02.831 DERMATOCHALASIS OF BOTH UPPER EYELIDS: Primary | ICD-10-CM

## 2018-10-04 DIAGNOSIS — H57.819 BROW PTOSIS: ICD-10-CM

## 2018-10-04 PROCEDURE — 99024 POSTOP FOLLOW-UP VISIT: CPT | Performed by: OPHTHALMOLOGY

## 2018-10-04 NOTE — PROGRESS NOTES
Postoperative day 1. Left blepharoplasty incision small lateral dehiscence.    This was repaired in clinic chair with 1% lido with epi, prepped in standard fashion, and repaired with interrupted 6-0 plain gut sutures.     He tolerated it well     Attending Physician Attestation:  Complete documentation of historical and exam elements from today's encounter can be found in the full encounter summary report (not reduplicated in this progress note).  I personally obtained the chief complaint(s) and history of present illness.  I confirmed and edited as necessary the review of systems, past medical/surgical history, family history, social history, and examination findings as documented by others; and I examined the patient myself.  I personally reviewed the relevant tests, images, and reports as documented above.  I formulated and edited as necessary the assessment and plan and discussed the findings and management plan with the patient and family. I personally reviewed the ophthalmic test(s) associated with this encounter, agree with the interpretation(s) as documented by the resident/fellow, and have edited the corresponding report(s) as necessary. I was present for the key portions of the procedure and immediately available for the remainder. - Dany Berrios MD

## 2018-10-04 NOTE — TELEPHONE ENCOUNTER
Phone call received from pt stating his blepharoplasty incision external sutures have opened up bilaterally. No bleeding noted, lower sutures intact.  Will contact Dr Berrios and advise.  Stacey Roth RN

## 2018-10-10 ENCOUNTER — OFFICE VISIT (OUTPATIENT)
Dept: OPHTHALMOLOGY | Facility: CLINIC | Age: 58
End: 2018-10-10
Payer: COMMERCIAL

## 2018-10-10 DIAGNOSIS — H02.834 DERMATOCHALASIS OF BOTH UPPER EYELIDS: Primary | ICD-10-CM

## 2018-10-10 DIAGNOSIS — H02.831 DERMATOCHALASIS OF BOTH UPPER EYELIDS: Primary | ICD-10-CM

## 2018-10-10 DIAGNOSIS — H02.403 ACQUIRED PTOSIS OF EYELID, BILATERAL: ICD-10-CM

## 2018-10-10 PROCEDURE — 99024 POSTOP FOLLOW-UP VISIT: CPT | Performed by: OPHTHALMOLOGY

## 2018-10-10 RX ORDER — APRACLONIDINE HYDROCHLORIDE 5 MG/ML
2 SOLUTION/ DROPS OPHTHALMIC 2 TIMES DAILY PRN
Qty: 10 ML | Refills: 3 | Status: SHIPPED | OUTPATIENT
Start: 2018-10-10 | End: 2018-10-15

## 2018-10-10 ASSESSMENT — VISUAL ACUITY
OS_CC: 20/20
METHOD: SNELLEN - LINEAR
OD_CC: 20/20
CORRECTION_TYPE: GLASSES

## 2018-10-10 NOTE — NURSING NOTE
Patient presents with:  Post Op (Ophthalmology) Both Eyes: 1 wk s/p blepharoplasty, 1 wk s/p suture repair left eye, sutures came out again on Friday, he also notices that the right side is down more than the left. he is leaving for the winter      Referring Provider:  No referring provider defined for this encounter.    HPI    Affected eye(s):  Both   Symptoms:     Puffy eyes      Duration:  1 week   Frequency:  Constant, Daily       Do you have eye pain now?:  No      Comments:     Post Op (Ophthalmology) Both Eyes: 1 wk s/p blepharoplasty, 1 wk s/p suture repair left eye, sutures came out again on Friday, he also notices that the right side is down more than the left. he is leaving for the winter                 Saskia PUCKETT. OSC

## 2018-10-10 NOTE — MR AVS SNAPSHOT
After Visit Summary   10/10/2018    Adan Oliver    MRN: [id number]           Patient Information     Date Of Birth          1960        Visit Information        Provider Department      10/10/2018 2:15 PM Dany Berrios MD [facility]        Today's Diagnoses     Dermatochalasis of both upper eyelids    -  1    Acquired ptosis of eyelid, bilateral           Follow-ups after your visit        Your next 10 appointments already scheduled     Oct 15, 2018  2:00 PM CDT   Return Sleep Patient with DAGOBERTO Mireles   [facility] ([facility])    [address] [address]   [phone]            Oct 15, 2018  2:30 PM CDT   DME RETURN with VINEET SUAREZ   [facility] ([facility])    [address] [facility] [address]  [facility] [address]   [phone]            Oct 17, 2018  1:15 PM CDT   Return Visit with Dany Berrios MD   [facility] ([facility])    [address] [place] [id number] [phone]              Who to contact     If you have questions or need follow up information about today's clinic visit or your schedule please contact [facility] directly at [phone].  Normal or non-critical lab and imaging results will be communicated to you by MyChart, letter or phone within 4 business days after the clinic has received the results. If you do not hear from us within 7 days, please contact the clinic through MyChart or phone. If you have a critical or abnormal lab result, we will notify you by phone as soon as possible.  Submit refill requests through [employer] or call your pharmacy and they will forward the refill request to us. Please allow 3 business days for your refill to be completed.          Additional Information About Your Visit        MyChart Information     [facility]  gives you secure access to your electronic health record. If you see a primary care provider, you can also send messages to your care team and make appointments. If you have questions, please call your primary care clinic.  If you do not have a primary care provider, please call [phone] and they will assist you.      [employer] is an electronic gateway that provides easy, online access to your medical records. With [employer], you can request a clinic appointment, read your test results, renew a prescription or communicate with your care team.     To access your existing account, please contact your [facility] or call [phone] for assistance.        Care EveryWhere ID     This is your Care EveryWhere ID. This could be used by other organizations to access your [facility] medical records  [id number]         Blood Pressure from Last 3 Encounters:   10/03/18 (!) 134/96   09/24/18 116/80   08/28/18 122/79    Weight from Last 3 Encounters:   09/24/18 92.4 kg (203 lb 12.8 oz)   08/28/18 92 kg (202 lb 12.8 oz)   09/27/17 91 kg (200 lb 9.6 oz)              Today, you had the following     No orders found for display         Today's Medication Changes          These changes are accurate as of 10/10/18  3:05 PM.  If you have any questions, ask your nurse or doctor.               Start taking these medicines.        Dose/Directions    apraclonidine 0.5 % ophthalmic solution   Commonly known as:  IOPIDINE   Used for:  Acquired ptosis of eyelid, bilateral   Started by:  Dany Berrios MD        Dose:  2 drop   Place 2 drops into the right eye 2 times daily as needed (ptosis)   Quantity:  10 mL   Refills:  3            Where to get your medicines      These medications were sent to [facility] - [place], [place] - [address]  [address] [facility] [address]     Phone:  [phone]     apraclonidine 0.5 % ophthalmic solution                Primary Care  "Provider Office Phone # Fax #    Charly Castillo [phone] [address] [facility] [address]        Equal Access to Services     FORTINO SHAY : Hadii aad ku hadricharmaci Dunham, wasergda luqsaleemha, qaybta kaalmada faith, lisa sullivannubia dixonkevsincere carpio. So [facility] [phone].    ATENCIÓN: Si habla español, tiene a henderson disposición servicios gratuitos de asistencia lingüística. Llame al [phone].    We comply with applicable federal civil rights laws and [place] laws. We do not discriminate on the basis of race, color, national origin, age, disability, sex, sexual orientation, or gender identity.            Thank you!     Thank you for choosing [facility]  for your care. Our goal is always to provide you with excellent care. Hearing back from our patients is one way we can continue to improve our services. Please take a few minutes to complete the written survey that you may receive in the mail after your visit with us. Thank you!             Your Updated Medication List - Protect others around you: Learn how to safely use, store and throw away your medicines at www.disposemymeds.org.          This list is accurate as of 10/10/18  3:05 PM.  Always use your most recent med list.                   Brand Name Dispense Instructions for use Diagnosis    apraclonidine 0.5 % ophthalmic solution    IOPIDINE    10 mL    Place 2 drops into the right eye 2 times daily as needed (ptosis)    Acquired ptosis of eyelid, bilateral       erythromycin ophthalmic ointment    ROMYCIN    3.5 g    Apply small amount to incision sites three times daily and 1/2\" strip into both eyes at bedtime    Postoperative eye state       esomeprazole 40 MG CR capsule    nexIUM    90 capsule    Take 1 capsule (40 mg) by mouth every morning (before breakfast) Take 30-60 minutes before eating.    Reid's esophagus without dysplasia       ibuprofen 200 MG capsule      Take 200 mg by " mouth every 4 hours as needed for fever        neomycin-polymyxin-dexamethasone [id number] Susp ophthalmic susp    MAXITROL    1 Bottle    One drop to operated eye(s) three times daily for 10 days.    Postoperative eye state       TYLENOL PO      Take 500 mg by mouth

## 2018-10-10 NOTE — PROGRESS NOTES
Adan Oliver is status post both upper eyelid blepharoplasty  and both mmcr ptosis repair.  Incision(s) healing well.  The lid(s) is in excellent position on the left, right is slightly low though swollen.   Will try apraclon gtts      I have recommended:  Radha  Warm soaks    May need revision Right upper lid ptosis in several months, though may improve.    Attending Physician Attestation:  Complete documentation of historical and exam elements from today's encounter can be found in the full encounter summary report (not reduplicated in this progress note).  I personally obtained the chief complaint(s) and history of present illness.  I confirmed and edited as necessary the review of systems, past medical/surgical history, family history, social history, and examination findings as documented by others; and I examined the patient myself.  I personally reviewed the relevant tests, images, and reports as documented above.  I formulated and edited as necessary the assessment and plan and discussed the findings and management plan with the patient and family. - Dany Berrios MD

## 2018-10-11 ENCOUNTER — TELEPHONE (OUTPATIENT)
Dept: OPHTHALMOLOGY | Facility: CLINIC | Age: 58
End: 2018-10-11

## 2018-10-11 NOTE — TELEPHONE ENCOUNTER
Called Adan and spoke to him regarding options. 1 option that Dr Saenz said he could use was a visine product that has tetrahydrolizine in it. (he did say it did not work as well as the med he prescribed yesterday) he would need to use this sparingly.  2nd option is using [employer]rx as there is a coupon available that would bring the cost to ~ $30 at 3 different locations around [facility]. We can change the Rx to which ever location he would choose.    Adan stated he is currently using refresh and that he will think about the options and call back to the clinic to let us know what he would like to do, if anything.  [place] PALM. COA. OSC

## 2018-10-11 NOTE — TELEPHONE ENCOUNTER
[facility] Call Center    Phone Message    May a detailed message be left on voicemail: yes    Reason for Call: Other: medication not covered, would like a call today if possible -  apraclonidine (IOPIDINE) 0.5 % ophthalmic solution        Action Taken: Message routed to:  Adult Clinics: Eye p [id number]

## 2018-10-15 ENCOUNTER — DOCUMENTATION ONLY (OUTPATIENT)
Dept: SLEEP MEDICINE | Facility: CLINIC | Age: 58
End: 2018-10-15
Payer: COMMERCIAL

## 2018-10-15 ENCOUNTER — OFFICE VISIT (OUTPATIENT)
Dept: SLEEP MEDICINE | Facility: CLINIC | Age: 58
End: 2018-10-15
Payer: COMMERCIAL

## 2018-10-15 VITALS
BODY MASS INDEX: 31.22 KG/M2 | SYSTOLIC BLOOD PRESSURE: 119 MMHG | HEART RATE: 82 BPM | HEIGHT: 68 IN | OXYGEN SATURATION: 97 % | WEIGHT: 206 LBS | DIASTOLIC BLOOD PRESSURE: 73 MMHG

## 2018-10-15 DIAGNOSIS — G47.33 OSA (OBSTRUCTIVE SLEEP APNEA): Primary | ICD-10-CM

## 2018-10-15 DIAGNOSIS — F51.04 INSOMNIA, PSYCHOPHYSIOLOGICAL: ICD-10-CM

## 2018-10-15 DIAGNOSIS — G47.33 OSA (OBSTRUCTIVE SLEEP APNEA): ICD-10-CM

## 2018-10-15 DIAGNOSIS — E66.09 NON MORBID OBESITY DUE TO EXCESS CALORIES: ICD-10-CM

## 2018-10-15 DIAGNOSIS — G47.61 PERIODIC LIMB MOVEMENT DISORDER (PLMD): ICD-10-CM

## 2018-10-15 PROCEDURE — 99214 OFFICE O/P EST MOD 30 MIN: CPT | Mod: 24 | Performed by: PHYSICIAN ASSISTANT

## 2018-10-15 RX ORDER — GABAPENTIN 100 MG/1
100 CAPSULE ORAL AT BEDTIME
Qty: 90 CAPSULE | Refills: 1 | Status: SHIPPED | OUTPATIENT
Start: 2018-10-15 | End: 2020-10-26

## 2018-10-15 NOTE — PROGRESS NOTES
Obstructive Sleep Apnea - PAP Follow-Up Visit:    Chief Complaint   Patient presents with     CPAP Follow Up         He has previously been seen in 2014 and had a negative sleep study.      Sleep study 7/6/2015 at the [facility] for snoring insomnia, poor quality sleep. Light sleeper. Weight 198#.   -Sleep latency 16.5 minutes without Ambien. REM latency 189.5 minutes.  Sleep efficiency 64.4%. Total sleep time 266.5 minutes.  -Sleep architecture: Stage 1, 11.8% (5%), stage 2, 63.0% (45-55%), stage 3, 11.4% (15-20%), stage REM, 13.7% (20-25%).    -Supine AHI was 6.3 (9.5 minutes), REM AHI was 16.4, total AHI was 4.1, lowest oxygen saturation was 85.7%.  RDI 15.8.  Periodic Limb Movement Index 71.4/hour. The PLM arousal index was 10.4 per hour.       He was seen again in 9/2016 after his PCP put him on robinoprole. He tried requip 1 pill for periodic limb movement without change in symptoms.    Today:  He states that he does not want use CPAP. He can't see himself tied to that.     Bedtime is typically 0100. Usually it takes about 60 minutes to fall asleep with the mask on. His mind is over active.  Wake time is typically 0900.  Patient is using PAP therapy 0 hours per night. The patient is usually getting 5.5 hours of sleep per night.    He does not feel rested in the morning.    Total score - Lennox: 3 (10/15/2018  2:00 PM)    KIM Total Score: 15    ResMed   Auto-PAP 5.0 - 15.0 cmH2O 30 day usage data:    0% of days with > 4 hours of use. 25/30 days with no use.   Average use 15 minutes per day.   95%ile Leak 23.04 L/min.   CPAP 95% pressure 10.3 cm.   AHI 6.36 events per hour.     Past medical/surgical history, family history, social history, medications and allergies were reviewed.      Problem List:  Patient Active Problem List    Diagnosis Date Noted     LUCIAN (obstructive sleep apnea)- 'moderate' (AHI 6) 10/06/2017     Priority: Medium     Study Date: 10/2/2017- (200.0 lbs) Scored  "using 3% rules. It was difficult to discern between PLMS and hyponeas. Apnea/hypopnea index was 28.0 events per hour.  The REM AHI was 29.3 events per hour.  The supine AHI was 32.7 events per hour.  RDI was 28.0 events per hour. Lowest oxygen saturation was 84.0%.  Time spent less than or equal to 88% was 0.3 minutes. PLM index was 32.3 movements per hour.  The PLM Arousal Index was 15.8 per hour.       Alcohol use 09/26/2016     Priority: Medium     Lumbar degenerative disc disease 05/17/2016     Priority: Medium     Protrusion of lumbar intervertebral disc 05/17/2016     Priority: Medium     Lumbar spinal stenosis 05/17/2016     Priority: Medium     Anxiety 12/07/2015     Priority: Medium     Major depressive disorder, single episode, mild (H) 12/07/2015     Priority: Medium     Family history of Alzheimer's disease 10/01/2015     Priority: Medium     Insomnia, psychophysiological 08/12/2015     Priority: Medium     Head ache 11/24/2014     Priority: Medium     Memory changes 11/24/2014     Priority: Medium     Reid esophagus 10/30/2014     Priority: Medium     Esophageal reflux 10/01/2014     Priority: Medium     Hyperlipidemia with target LDL less than 130 10/01/2014     Priority: Medium     Non morbid obesity due to excess calories 09/26/2017     Priority: Low        /73  Pulse 82  Ht 1.727 m (5' 8\")  Wt 93.4 kg (206 lb)  SpO2 97%  BMI 31.32 kg/m2    Impression/Plan:    1. Moderate Sleep apnea-  Not tolerating PAP well. Daytime symptoms are not improved.   Return CPAP.   Referral for construction of mandibular advancement device.     2. Psychophysiologic insomnia-  He is not interested in CBT-I at this time.   Discussed sleep hygiene, stimulus control and sleep restriction.    3. Periodic limp movement of sleep-  Previously used ropinirole, no benefit.   Ferritin was 158 in 2015.  Trial of Gabapentin 100-300 mg, an hour before bedtime.   There is a mutual understanding of the goals and risks of " this therapy. The patient is in agreement. He is educated on dosage regimen and possible side effects.    Adan Oliver will follow up in about 3-6 month(s).     Twenty-five minutes spent with patient, all of which were spent face-to-face counseling, consulting, coordinating plan of care regarding LUCIAN, insomnia and PLMD.      Skye Ledbetter PA-C

## 2018-10-15 NOTE — PATIENT INSTRUCTIONS

## 2018-10-15 NOTE — MR AVS SNAPSHOT
After Visit Summary   10/15/2018    Adan Oliver    MRN: [id number]           Patient Information     Date Of Birth          1960        Visit Information        Provider Department      10/15/2018 2:00 PM Skye Ledbetter PA [facility]        Today's Diagnoses     LUCIAN (obstructive sleep apnea)    -  1    Insomnia, psychophysiological        Non morbid obesity due to excess calories        Periodic limb movement disorder (PLMD)          Care Instructions      Your BMI is Body mass index is 31.32 kg/(m^2).  Weight management is a personal decision.  If you are interested in exploring weight loss strategies, the following discussion covers the approaches that may be successful. Body mass index (BMI) is one way to tell whether you are at a healthy weight, overweight, or obese. It measures your weight in relation to your height.  A BMI of 18.5 to 24.9 is in the healthy range. A person with a BMI of 25 to 29.9 is considered overweight, and someone with a BMI of 30 or greater is considered obese. More than two-thirds of American adults are considered overweight or obese.  Being overweight or obese increases the risk for further weight gain. Excess weight may lead to heart disease and diabetes.  Creating and following plans for healthy eating and physical activity may help you improve your health.  Weight control is part of healthy lifestyle and includes exercise, emotional health, and healthy eating habits. Careful eating habits lifelong are the mainstay of weight control. Though there are significant health benefits from weight loss, long-term weight loss with diet alone may be very difficult to achieve- studies show long-term success with dietary management in less than 10% of people. Attaining a healthy weight may be especially difficult to achieve in those with severe obesity. In some cases, medications, devices and surgical management might be considered.  What can you do?  If you  are overweight or obese and are interested in methods for weight loss, you should discuss this with your provider.     Consider reducing daily calorie intake by 500 calories.     Keep a food journal.     Avoiding skipping meals, consider cutting portions instead.    Diet combined with exercise helps maintain muscle while optimizing fat loss. Strength training is particularly important for building and maintaining muscle mass. Exercise helps reduce stress, increase energy, and improves fitness. Increasing exercise without diet control, however, may not burn enough calories to loose weight.       Start walking three days a week 10-20 minutes at a time    Work towards walking thirty minutes five days a week     Eventually, increase the speed of your walking for 1-2 minutes at time    In addition, we recommend that you review healthy lifestyles and methods for weight loss available through the National Institutes of Health patient information sites:  http://win.niddk.nih.gov/publications/index.htm    And look into health and wellness programs that may be available through your health insurance provider, employer, local community center, or jordon club.    Weight management plan: Patient was referred to their PCP to discuss a diet and exercise plan.              Follow-ups after your visit        Additional Services     SLEEP DENTAL REFERRAL       Dental appliance resources recommended by [facility]     Below is a list of dental appliance resources recommended by [facility]   If you wish to choose your own dental sleep dentist, you may identify a provider close to you: http://[email or web address]    [facility]   Sleep Medicine [facility]   Xavi Arellano DDS, MS   [employer] [address] [address]   Appointments: [phone]  Fax: [phone]   Email: [email or web address]  [facility]   Dental and Oral Surgery Clinic   WILLIE Martinez DDS   [address]   [place] [address]   Appointments: [phone]   Website: [email or web address]    Snoring and Sleep Apnea   [facility] Center   KOURTNEY Foster DDS  [address], [address]   [place] [address]   Appointments: [phone]   Fax: [phone]   Email: [email or web address]  Website: [employer]     [place] [facility]   Office 1   Edward Kelley DDS - [DME Medicare]  [address], [address]   [place] [address]  Appointments: [phone]  Fax: [phone]  Website: [employer]    [place] [facility]   Office 2  Edward Kelley DDS - [DME Medicare]  [address]  [place] [address]  Appointments: [phone]  Fax: [phone]  Website: [employer]    [facility]  Ambrocio Doyle DDS  [address] [place] [address]  Appointments: [phone]    [facility]   Bala Johnson DDS   [employer]   [address], [address]   [place] [address]   Appointments: [phone]   Fax: [phone]   Website: [employer]     [facility]   [place] Office   Tone Lopez DDS, MS - [DME Medicare]  [facility]   [address]   [place] [address]   Appointments: [phone]   Fax: [phone]   Website: [employer]     [facility]  Devon Montgomery DMD, MSD - [DME Medicare]  [address]  [place] [address]  Appointments [phone]  Fax: [phone]  Website: [email or web address]    [facility]   [place] Office   Karla Sanchez DDS, PhD, [address]   [place] [address]   Appointments: [phone]   Fax: [phone]   Website: [email or web address]     [facility]   [place] [facility]   Karla Sanchez DDS, PhD, MS   [facility]   [address], [address]   [place] [address]   Appointments: [phone]   Fax: [phone]   Website: [email or web address]     [facility]  Marya Salinas DDS  [facility]  [address] [place] [address]  Appointments: [phone]   Fax: [phone]    If you wish to choose your own dental sleep dentist, you may identify a provider close to you: [email or web address]      AHI: 28 PSG DATE:      Return to clinic as needed  for Home Sleep Test to confirm adequacy of Treatment.    Other information regarding this referral: Mandibular repositioning appliance for LUCIAN. If your insurance asks for a CPT code, it is .    Please be aware that coverage of these services is subject to the terms and limitations of your health insurance plan.  Call member services at your health plan with any benefit or coverage questions.      Please bring the following to your appointment:    >>   List of current medications   >>   This referral request   >>   Any documents/labs given to you for this referral                  Your next 10 appointments already scheduled     Oct 17, 2018  1:15 PM CDT   [facility]    [address] [place] [address] [phone]              Who to contact     If you have questions or need follow up information about today's clinic visit or your schedule please contact [facility] directly at [phone].  Normal or non-critical lab and imaging results will be communicated to you by MyChart, letter or phone within 4 business days after the clinic has received the results. If you do not hear from us within 7 days, please contact the clinic through  "MyChart or phone. If you have a critical or abnormal lab result, we will notify you by phone as soon as possible.  Submit refill requests through "[employer]" or call your pharmacy and they will forward the refill request to us. Please allow 3 business days for your refill to be completed.          Additional Information About Your Visit        Bestowed[employer] Information     "[employer]" gives you secure access to your electronic health record. If you see a primary care provider, you can also send messages to your care team and make appointments. If you have questions, please call your primary care clinic.  If you do not have a primary care provider, please call [phone] and they will assist you.        Care EveryWhere ID     This is your Care EveryWhere ID. This could be used by other organizations to access your [facility] medical records  [id number]        Your Vitals Were     Pulse Height Pulse Oximetry BMI (Body Mass Index)          82 1.727 m (5' 8\") 97% 31.32 kg/m2         Blood Pressure from Last 3 Encounters:   10/15/18 119/73   10/03/18 (!) 134/96   09/24/18 116/80    Weight from Last 3 Encounters:   10/15/18 93.4 kg (206 lb)   09/24/18 92.4 kg (203 lb 12.8 oz)   08/28/18 92 kg (202 lb 12.8 oz)              We Performed the Following     SLEEP DENTAL REFERRAL          Today's Medication Changes          These changes are accurate as of 10/15/18  2:47 PM.  If you have any questions, ask your nurse or doctor.               Start taking these medicines.        Dose/Directions    gabapentin 100 MG capsule   Commonly known as:  NEURONTIN   Started by:  Skye Ledbetter PA        Dose:  100 mg   Take 1 capsule (100 mg) by mouth At Bedtime   Quantity:  90 capsule   Refills:  1            Where to get your medicines      These medications were sent to [facility] - [place], [place] - [address]  [address] [facility] [address]     Phone:  [phone]     gabapentin 100 MG capsule       "          Primary Care Provider Office Phone # Fax #    Charly Castillo [phone] [address] [address]        Equal Access to Services     FORTINO SHAY : Hadii aad ku hadricharmaci Sopopali, waaxda luqadaha, qaybta kaalmada faith, lisa sullivannubia murciaann destinykevsincere carpio. So [facility] [phone].    ATENCIÓN: Si habla español, tiene a henderson disposición servicios gratuitos de asistencia lingüística. Llame al [phone].    We comply with applicable federal civil rights laws and [place] laws. We do not discriminate on the basis of race, color, national origin, age, disability, sex, sexual orientation, or gender identity.            Thank you!     Thank you for choosing [facility]  for your care. Our goal is always to provide you with excellent care. Hearing back from our patients is one way we can continue to improve our services. Please take a few minutes to complete the written survey that you may receive in the mail after your visit with us. Thank you!             Your Updated Medication List - Protect others around you: Learn how to safely use, store and throw away your medicines at www.disposemymeds.org.          This list is accurate as of 10/15/18  2:47 PM.  Always use your most recent med list.                   Brand Name Dispense Instructions for use Diagnosis    esomeprazole 40 MG CR capsule    nexIUM    90 capsule    Take 1 capsule (40 mg) by mouth every morning (before breakfast) Take 30-60 minutes before eating.    Reid's esophagus without dysplasia       gabapentin 100 MG capsule    NEURONTIN    90 capsule    Take 1 capsule (100 mg) by mouth At Bedtime        ibuprofen 200 MG capsule      Take 200 mg by mouth every 4 hours as needed for fever

## 2018-10-15 NOTE — NURSING NOTE
"Chief Complaint   Patient presents with     CPAP Follow Up       Initial There were no vitals taken for this visit. Estimated body mass index is 30.99 kg/(m^2) as calculated from the following:    Height as of 9/24/18: 1.727 m (5' 8\").    Weight as of 9/24/18: 92.4 kg (203 lb 12.8 oz).    Medication Reconciliation: complete    "

## 2018-10-15 NOTE — Clinical Note
Benedict Ford, PT returned his CPAP machine today after meeting with Skye Ledbetter.  Please follow up with him to see what other forms of therapy might be appropriate for him.  Thanks,  Keke COLON

## 2019-07-03 ENCOUNTER — MYC MEDICAL ADVICE (OUTPATIENT)
Dept: FAMILY MEDICINE | Facility: CLINIC | Age: 59
End: 2019-07-03

## 2019-07-03 DIAGNOSIS — K58.9 IRRITABLE BOWEL SYNDROME WITHOUT DIARRHEA: ICD-10-CM

## 2019-07-03 DIAGNOSIS — R19.8 TENESMUS: Primary | ICD-10-CM

## 2019-07-03 NOTE — TELEPHONE ENCOUNTER
Routing [employer] message to PCP.  Patient is asking for a referral to a gastroenterologist, as he always feels the need to have a BM.  He reports discussing this with you in the past.      Darron Baxter RN

## 2019-07-08 NOTE — TELEPHONE ENCOUNTER
Orders Placed This Encounter     GASTROENTEROLOGY ADULT REF CONSULT ONLY     Referral Priority:   Routine     Number of Visits Requested:   1     : [facility] [phone]    My chart message sent    Adam Castillo MD

## 2019-07-25 NOTE — TELEPHONE ENCOUNTER
RECORDS RECEIVED FROM: Internal    DATE RECEIVED: 9/17/19   NOTES STATUS DETAILS   OFFICE NOTE from referring provider Internal Referral from Jonathan LAGUERRE 7/8/19   OFFICE NOTE from other specialist N/A    DISCHARGE SUMMARY from hospital N/A    OPERATIVE REPORT N/A    MEDICATION LIST Internal         ENDOSCOPY  Received 11/26/14 & 10/09/14   COLONOSCOPY Received 10/09/14   ERCP N/A    EUS N/A    STOOL TESTING N/A    PERTINENT LABS Internal     PATHOLOGY REPORTS (RELATED) N/A    IMAGING (CT, MRI, EGD) Internal US Abd 7/31/15     REFERRAL INFORMATION    Date referral was placed: 9/17/19   Date all records received: N/A    Date records were scanned into EPIC: N/A    Date records were sent to Provider to review: N/A    Date and recommendation received from provider:  LETTER SENT  SCHEDULE APPOINTMENT   Date patient was contacted to schedule: 7/24/19

## 2019-09-10 ENCOUNTER — OFFICE VISIT (OUTPATIENT)
Dept: OPTOMETRY | Facility: CLINIC | Age: 59
End: 2019-09-10
Payer: COMMERCIAL

## 2019-09-10 ENCOUNTER — OFFICE VISIT (OUTPATIENT)
Dept: GASTROENTEROLOGY | Facility: CLINIC | Age: 59
End: 2019-09-10
Payer: COMMERCIAL

## 2019-09-10 VITALS
SYSTOLIC BLOOD PRESSURE: 133 MMHG | HEIGHT: 68 IN | WEIGHT: 198.7 LBS | BODY MASS INDEX: 30.11 KG/M2 | DIASTOLIC BLOOD PRESSURE: 85 MMHG | OXYGEN SATURATION: 98 % | HEART RATE: 68 BPM

## 2019-09-10 DIAGNOSIS — R19.8 TENESMUS: ICD-10-CM

## 2019-09-10 DIAGNOSIS — K22.70 BARRETT'S ESOPHAGUS WITHOUT DYSPLASIA: Primary | Chronic | ICD-10-CM

## 2019-09-10 DIAGNOSIS — H52.13 MYOPIA OF BOTH EYES: ICD-10-CM

## 2019-09-10 DIAGNOSIS — H04.123 DRY EYES: ICD-10-CM

## 2019-09-10 DIAGNOSIS — K22.70 BARRETT'S ESOPHAGUS WITHOUT DYSPLASIA: Chronic | ICD-10-CM

## 2019-09-10 DIAGNOSIS — Z98.890 HISTORY OF BLEPHAROPLASTY: ICD-10-CM

## 2019-09-10 DIAGNOSIS — Z01.01 ENCOUNTER FOR EXAMINATION OF EYES AND VISION WITH ABNORMAL FINDINGS: Primary | ICD-10-CM

## 2019-09-10 DIAGNOSIS — H25.13 NUCLEAR AGE-RELATED CATARACT, BOTH EYES: ICD-10-CM

## 2019-09-10 DIAGNOSIS — H52.4 PRESBYOPIA: ICD-10-CM

## 2019-09-10 PROCEDURE — 99204 OFFICE O/P NEW MOD 45 MIN: CPT | Performed by: INTERNAL MEDICINE

## 2019-09-10 PROCEDURE — 92014 COMPRE OPH EXAM EST PT 1/>: CPT | Performed by: OPTOMETRIST

## 2019-09-10 PROCEDURE — 92015 DETERMINE REFRACTIVE STATE: CPT | Performed by: OPTOMETRIST

## 2019-09-10 RX ORDER — MINERAL OIL, PETROLATUM 425; 573 MG/G; MG/G
OINTMENT OPHTHALMIC
Qty: 1 TUBE | Refills: 11 | Status: SHIPPED | OUTPATIENT
Start: 2019-09-10 | End: 2020-11-04

## 2019-09-10 RX ORDER — LACTOBACIL 2/BIFIDO 1/S.THERMO 450B CELL
1 PACKET (EA) ORAL DAILY
Qty: 30 EACH | Refills: 11 | Status: SHIPPED | OUTPATIENT
Start: 2019-09-10 | End: 2020-09-09

## 2019-09-10 ASSESSMENT — CONF VISUAL FIELD
OD_NORMAL: 1
OS_NORMAL: 1

## 2019-09-10 ASSESSMENT — REFRACTION_MANIFEST
OS_CYLINDER: SPHERE
OD_CYLINDER: SPHERE
OS_ADD: +2.25
OS_SPHERE: -0.50
OD_ADD: +2.25
OD_SPHERE: -0.50

## 2019-09-10 ASSESSMENT — REFRACTION_WEARINGRX
OS_CYLINDER: SPHERE
OD_CYLINDER: SPHERE
SPECS_TYPE: SVL
OD_SPHERE: -0.75
OS_SPHERE: -1.00

## 2019-09-10 ASSESSMENT — TONOMETRY
IOP_METHOD: APPLANATION
OS_IOP_MMHG: 16
OD_IOP_MMHG: 16

## 2019-09-10 ASSESSMENT — VISUAL ACUITY
OS_SC: 20/40
CORRECTION_TYPE: GLASSES
OD_CC: 20/200
OD_SC+: -1
OD_CC+: -2
OS_CC: 20/25
OD_CC: 20/20
OS_SC: 20/80
METHOD: SNELLEN - LINEAR
OS_CC: 20/200
OD_SC: 20/60 -2
OD_SC: 20/20

## 2019-09-10 ASSESSMENT — CUP TO DISC RATIO
OD_RATIO: 0.4
OS_RATIO: 0.4

## 2019-09-10 ASSESSMENT — EXTERNAL EXAM - LEFT EYE: OS_EXAM: NORMAL

## 2019-09-10 ASSESSMENT — MIFFLIN-ST. JEOR: SCORE: 1685.8

## 2019-09-10 ASSESSMENT — EXTERNAL EXAM - RIGHT EYE: OD_EXAM: NORMAL

## 2019-09-10 NOTE — NURSING NOTE
"Adan Oliver's goals for this visit include:   Chief Complaint   Patient presents with     Consult     feelings of not completely emptying bowels, diarrhea        He requests these members of his care team be copied on today's visit information: yes    PCP: Charly Castillo    Referring Provider:  No referring provider defined for this encounter.    /85   Pulse 68   Ht 1.727 m (5' 8\")   Wt 90.1 kg (198 lb 11.2 oz)   SpO2 98%   BMI 30.21 kg/m      Do you need any medication refills at today's visit? No    Haroon Cabrera CMA      "

## 2019-09-10 NOTE — PATIENT INSTRUCTIONS
Continue use of Refresh artificial tears: 1 drop up to 4x daily in each eye for dry eye symptoms.   Begin use of lubricating ointment in each eye at bedtime.     History of blepharoplasty both eyes (Fall 2018). Has follow-up scheduled with Dr. Berrios tomorrow 9/11/19.     Glasses Rx provided today- optional to fill.   Discussed different types of bifocal options: 1) No-line (progressive): gradually changes from distance vision in the top of the lens to near (reading) in the bottom of the lens; inbetween those viewing areas is an intermediate/computer zone. 2) Lined bifocal: reading segment in the bottom of the lens; no intermediate/computer power in the lens    Okay to use over the counter reading glasses. Recommend +1.75 or +2.00 power.     You have the start of mild cataracts.  You may notice some blurred vision or glare with night driving.  It is important that you wear good sunglasses to protect your eyes from the ultraviolet light from the sun.       The effects of the dilating drops last for 4- 6 hours.  You will be more sensitive to light and vision will be blurry up close.  Mydriatic sunglasses were given if needed.    Return in 1 year for comprehensive eye exam, or sooner if needed.     Jair Garcia O.D.  [address] [place]  [address]    [phone]

## 2019-09-10 NOTE — LETTER
9/10/2019         RE: Adan Oliver  [address]  [place] [address]        Dear Colleague,    Thank you for referring your patient, Adan Oliver, to the [facility]. Please see a copy of my visit note below.    Chief Complaint   Patient presents with     Annual Eye Exam      Accompanied by self  Last Eye Exam: 1 year ago  Dilated Previously: Yes    What are you currently using to see?  Glasses-1 year old, readers +1.75       Distance Vision Acuity: Satisfied with vision    Near Vision Acuity: Not satisfied     Eye Comfort: dry and itchy  Do you use eye drops? : Yes: blink 2 times a day  Occupation or Hobbies:     Dahlia Ramirez, Optometric Tech          Medical, surgical and family histories reviewed and updated 9/10/2019.       OBJECTIVE: See Ophthalmology exam    ASSESSMENT:    ICD-10-CM    1. Encounter for examination of eyes and vision with abnormal findings Z01.01    2. History of blepharoplasty - Both Eyes Z98.890    3. Dry eyes H04.123    4. Nuclear age-related cataract, both eyes H25.13    5. Myopia of both eyes H52.13    6. Presbyopia H52.4       PLAN:     Patient Instructions   Continue use of Refresh artificial tears: 1 drop up to 4x daily in each eye for dry eye symptoms.   Begin use of lubricating ointment in each eye at bedtime.     History of blepharoplasty both eyes (Fall 2018). Has follow-up scheduled with Dr. Berrios tomorrow 9/11/19.     Glasses Rx provided today- optional to fill.   Discussed different types of bifocal options: 1) No-line (progressive): gradually changes from distance vision in the top of the lens to near (reading) in the bottom of the lens; inbetween those viewing areas is an intermediate/computer zone. 2) Lined bifocal: reading segment in the bottom of the lens; no intermediate/computer power in the lens    Okay to use over the counter reading glasses. Recommend +1.75 or +2.00 power.     You have the start of mild  cataracts.  You may notice some blurred vision or glare with night driving.  It is important that you wear good sunglasses to protect your eyes from the ultraviolet light from the sun.       The effects of the dilating drops last for 4- 6 hours.  You will be more sensitive to light and vision will be blurry up close.  Mydriatic sunglasses were given if needed.    Return in 1 year for comprehensive eye exam, or sooner if needed.     Jair Garcia O.D.  [address] [facility] [place]  [address]    [phone]           Again, thank you for allowing me to participate in the care of your patient.        Sincerely,        Jair Garcia, OD

## 2019-09-10 NOTE — TELEPHONE ENCOUNTER
"Requested Prescriptions   Pending Prescriptions Disp Refills     esomeprazole (NEXIUM) 40 MG DR capsule [Pharmacy Med Name: ESOMEPRAZOLE MAGNESIUM 40MG CPDR]  Last Written Prescription Date:  8/27/2018  Last Fill Quantity: 90 capsule,  # refills: 3   Last office visit: 9/24/2018 with prescribing provider:  FAREED Castillo   Future Office Visit:   Next 5 appointments (look out 90 days)    Sep 11, 2019  1:30 PM CDT  [facility] D[facility] [address] [place] [address]  [phone]   Sep 12, 2019  1:20 PM CDT  Return Visit with Stacey Tanner MD  [facility] [place] ([facility]) [address] [place] [address] [address]  [place] [address]  [phone]   Sep 24, 2019  1:30 PM CDT  Return Visit with Nael Sesay [facility] ([address] [address] [phone]          90 capsule 3     Sig: TAKE ONE CAPSULE BY MOUTH EVERY MORNING BEFORE BREAKFAST. TAKE 30-60 MINUTES BEFORE EATING.       PPI Protocol Passed - 9/10/2019  3:22 PM        Passed - Not on Clopidogrel (unless Pantoprazole ordered)        Passed - No diagnosis of osteoporosis on record        Passed - Recent (12 mo) or future (30 days) visit within the authorizing provider's specialty     Patient had office visit in the last 12 months or has a visit in the next 30 days with authorizing provider or within the authorizing provider's specialty.  See \"Patient Info\" tab in inbasket, or \"Choose Columns\" in Meds & Orders section of the refill encounter.              Passed - Medication is active on med list        Passed - Patient is age 18 or older          "

## 2019-09-10 NOTE — PATIENT INSTRUCTIONS
Start citrucel 1 scoop per day    Obtain labs    Submit stool sample    Schedule EGD and colonoscopy

## 2019-09-10 NOTE — PROGRESS NOTES
GASTROENTEROLOGY NEW PATIENT CLINIC VISIT    CC/REFERRING MD:    Charly Castillo    REASON FOR CONSULTATION:   No ref. provider found for   Chief Complaint   Patient presents with     Consult     feelings of not completely emptying bowels, diarrhea        HISTORY OF PRESENT ILLNESS:    Adan Oliver is 59 year old male who presents for evaluation of tenesmus.  He notes that he has had feelings of frequent need to have a bowel movement over the past few years.  He does get feelings of fecal urgency, need to have a bowel movement and he feels that he has incomplete evacuation.  This happens 2-3 times per day.  He describes his bowel habits as soft to loose stools which do not have blood in them.  He feels that his symptoms are somewhat worsening.  He did undergo colonoscopy for screening purposes less than 2014 which noted diverticulosis but no active inflammation of the colon.  He does not find any significant changes with specific foods.  No recent travel.  He does note that he had stool studies checked with his primary care but this did not reveal any evidence of infection previously.  In addition to the change in bowel habits, he does note that he has long-standing reflux.  He has been on Nexium which he reports he is taken since he was in his 20s.  He reports that he was previously diagnosed with Reid esophagus in 2014 but has not had any follow-up since then.  He does not have any dysphagia.  There is no history of esophageal cancer.    PROBLEM LIST  Patient Active Problem List    Diagnosis Date Noted     LUCIAN (obstructive sleep apnea)- 'moderate' (AHI 6) 10/06/2017     Priority: Medium     Study Date: 10/2/2017- (200.0 lbs) Scored using 3% rules. It was difficult to discern between PLMS and hyponeas. Apnea/hypopnea index was 28.0 events per hour.  The REM AHI was 29.3 events per hour.  The supine AHI was 32.7 events per hour.  RDI was 28.0 events per hour. Lowest oxygen saturation was 84.0%.   Time spent less than or equal to 88% was 0.3 minutes. PLM index was 32.3 movements per hour.  The PLM Arousal Index was 15.8 per hour.       Alcohol use 09/26/2016     Priority: Medium     Lumbar degenerative disc disease 05/17/2016     Priority: Medium     Protrusion of lumbar intervertebral disc 05/17/2016     Priority: Medium     Lumbar spinal stenosis 05/17/2016     Priority: Medium     Anxiety 12/07/2015     Priority: Medium     Major depressive disorder, single episode, mild (H) 12/07/2015     Priority: Medium     Family history of Alzheimer's disease 10/01/2015     Priority: Medium     Insomnia, psychophysiological 08/12/2015     Priority: Medium     Head ache 11/24/2014     Priority: Medium     Memory changes 11/24/2014     Priority: Medium     Reid esophagus 10/30/2014     Priority: Medium     Esophageal reflux 10/01/2014     Priority: Medium     Hyperlipidemia with target LDL less than 130 10/01/2014     Priority: Medium     Non morbid obesity due to excess calories 09/26/2017     Priority: Low       PERTINENT PAST MEDICAL HISTORY:  (I personally reviewed this history with the patient at today's visit)   Past Medical History:   Diagnosis Date     History of shingles- 2012 2012     Nephrolithiasis-2009 2009         PREVIOUS SURGERIES: (I personally reviewed this history with the patient at today's visit)   Past Surgical History:   Procedure Laterality Date     APPENDECTOMY  2000s     BLEPHAROPLASTY BILATERAL Bilateral 10/3/2018    Procedure: BLEPHAROPLASTY BILATERAL;;  Surgeon: Dany Berrios MD;  Location: [facility]     CARPAL TUNNEL RELEASE RT/LT  1980s     REPAIR PTOSIS BILATERAL Bilateral 10/3/2018    Procedure: REPAIR PTOSIS BILATERAL;;  Surgeon: Dany Berrios MD;  Location: [facility]     REPAIR PTOSIS BROW BILATERAL Bilateral 10/3/2018    Procedure: REPAIR PTOSIS BROW BILATERAL;;  Surgeon: Dany Berrios MD;  Location: [facility]     TONSILLECTOMY  1980s         ALLERGIES:   No Known  Allergies    PERTINENT MEDICATIONS:    Current Outpatient Medications:      Dietary Management Product (VSL#3) PACK, Take 1 packet by mouth daily, Disp: 30 each, Rfl: 11     esomeprazole (NEXIUM) 40 MG CR capsule, Take 1 capsule (40 mg) by mouth every morning (before breakfast) Take 30-60 minutes before eating., Disp: 90 capsule, Rfl: 3     ibuprofen 200 MG capsule, Take 200 mg by mouth every 4 hours as needed for fever, Disp: , Rfl:      methylcellulose (CITRUCEL) powder, Take 0.3 g (1.05 teaspoonful) by mouth daily, Disp: 27 g, Rfl: 3     White Petrolatum-Mineral Oil (REFRESH P.M.) OINT, Apply small amount to inside of lower eyelid at bedtime in each eye. Blink to spread over ocular surface., Disp: 1 Tube, Rfl: 11     gabapentin (NEURONTIN) 100 MG capsule, Take 1 capsule (100 mg) by mouth At Bedtime (Patient not taking: Reported on 9/10/2019), Disp: 90 capsule, Rfl: 1    SOCIAL HISTORY:  Social History     Socioeconomic History     Marital status: Single     Spouse name: Not on file     Number of children: Not on file     Years of education: Not on file     Highest education level: Not on file   Occupational History     Occupation: /advertising design   Social Needs     Financial resource strain: Not on file     Food insecurity:     Worry: Not on file     Inability: Not on file     Transportation needs:     Medical: Not on file     Non-medical: Not on file   Tobacco Use     Smoking status: Never Smoker     Smokeless tobacco: Never Used   Substance and Sexual Activity     Alcohol use: Yes     Alcohol/week: 8.4 - 12.6 oz     Types: 14 - 21 Standard drinks or equivalent per week     Comment: varies, a couple drinks a day     Drug use: No     Sexual activity: Not on file   Lifestyle     Physical activity:     Days per week: Not on file     Minutes per session: Not on file     Stress: Not on file   Relationships     Social connections:     Talks on phone: Not on file     Gets together: Not on file      "Attends [employer] service: Not on file     Active member of club or organization: Not on file     Attends meetings of clubs or organizations: Not on file     Relationship status: Not on file     Intimate partner violence:     Fear of current or ex partner: Not on file     Emotionally abused: Not on file     Physically abused: Not on file     Forced sexual activity: Not on file   Other Topics Concern     Parent/sibling w/ CABG, MI or angioplasty before 65F 55M? No   Social History Narrative     Not on file       FAMILY HISTORY: (I personally reviewed this history with the patient at today's visit)  Family History   Problem Relation Age of Onset     Hypertension Mother      Diabetes Brother      Glaucoma No family hx of      Macular Degeneration No family hx of      Coronary Artery Disease No family hx of      Colon Cancer No family hx of      Retinal detachment No family hx of         ROS:    No fevers or chills  No weight loss  No blurry vision, double vision or change in vision  No sore throat  No lymphadenopathy  No headache, paraesthesias, or weakness in a limb  No shortness of breath or wheezing  No chest pain or pressure  No arthralgias or myalgias  No rashes or skin changes  No odynophagia or dysphagia  No BRBPR, hematochezia, melena  No dysuria, frequency or urgency  No hot/cold intolerance or polyria  No anxiety or depression  PHYSICAL EXAMINATION:  Constitutional: aaox3, cooperative, pleasant, not dyspneic/diaphoretic, no acute distress  Vitals reviewed: /85   Pulse 68   Ht 1.727 m (5' 8\")   Wt 90.1 kg (198 lb 11.2 oz)   SpO2 98%   BMI 30.21 kg/m    Wt:   Wt Readings from Last 2 Encounters:   09/10/19 90.1 kg (198 lb 11.2 oz)   10/15/18 93.4 kg (206 lb)      Eyes: Sclera anicteric/injected  Ears/nose/mouth/throat: Normal oropharynx without ulcers or exudate, mucus membranes moist, hearing intact  Neck: supple, thyroid normal size  CV: No edema, RRR  Respiratory: Unlabored breathing, CTAB  Lymph: " No submandibular, supraclavicular or inguinal lymphadenopathy  Abd: Nondistended, no masses, +bs, no hepatosplenomegaly, nontender, no peritoneal signs  Skin: warm, perfused, no jaundice  Psych: Normal affect  MSK: Normal gait      PERTINENT STUDIES: (I personally reviewed these laboratory studies today)  Most recent CBC:   Recent Labs   Lab Test 09/26/17  1257 08/04/16  1523   WBC 9.7 10.1   HGB 14.9 15.8   HCT 44.5 46.6    406     Most recent hepatic panel:  Recent Labs   Lab Test 09/26/17  1257 08/04/16  1523   ALT 42 29   AST 22 12     Most recent creatinine:  Recent Labs   Lab Test 09/26/17  1257 08/04/16  1523   CR 1.06 1.15     ASSESSMENT/PLAN:    Adan Oliver is a 59 year old male who presents for evaluation of tenesmus, incomplete evacuation and history of long segment Reid esophagus.    Tenesmus and fecal urgency/incomplete evacuation: He has chronic symptoms of tenesmus along with incomplete evacuation as noted.  He does not have any history of inflammatory bowel disease.  There is no weight loss and no noted blood in the stool.  We will plan to get labs today to check inflammatory markers, celiac screen and get stool test to rule out infection or inflammation.  I do think that a flexible sigmoidoscopy is indicated in order to rule out proctitis or other reasons for the symptoms.  I have recommended that he initiate fiber therapy with Citrucel 1 scoop daily.    Long segment Reid esophagus: He does have long segment Reid esophagus diagnosed by endoscopy in 2014.  There were no dysplastic changes at that point.  He is on PPI therapy.  I did recommend that he update an upper endoscopy at this point in order to do Reid's surveillance and do protocol biopsies.  He does not wish to schedule the endoscopies today but reports that he will do so before he leaves town in the next month.  I do recommend that he continue to take PPI therapy daily given that this has been shown to reduce  progression of Reid esophagus.  We did discuss the risks of chronic PPI use and in addition discussed this concern of dementia which has been inconsistently shown in the literature.  For him, I did recommend that the benefit of PPI therapy outweighs any theoretical risk for him.      Reid's esophagus without dysplasia  Tenesmus  Orders Placed This Encounter   Procedures     Calprotectin Feces     Standing Status:   Future     Standing Expiration Date:   9/9/2020     CRP inflammation     Standing Status:   Future     Standing Expiration Date:   9/9/2020     Comprehensive metabolic panel     Standing Status:   Future     Standing Expiration Date:   9/9/2020     CBC with platelets differential     Last Lab Result: Hemoglobin (g/dL)       Date                     Value                 09/26/2017               14.9             ----------     Standing Status:   Future     Standing Expiration Date:   9/9/2020     IgA     Standing Status:   Future     Standing Expiration Date:   9/9/2020     Tissue transglutaminase kiah IgA and IgG     Standing Status:   Future     Standing Expiration Date:   9/9/2020     Clostridium difficile toxin B PCR     Standing Status:   Future     Standing Expiration Date:   10/10/2019     Ova and Parasite Exam Routine     Standing Status:   Future     Standing Expiration Date:   9/9/2020         RTC 3 months    Thank you for this consultation.  It was a pleasure to participate in the care of this patient; please contact us with any further questions.     This note was created with voice recognition software, and while reviewed for accuracy, typos may remain.     Filiberto Banuelos MD  Adjunct  of Medicine  Division of Gastroenterology, Hepatology and Nutrition  [facility]  [phone]

## 2019-09-10 NOTE — PROGRESS NOTES
Chief Complaint   Patient presents with     Annual Eye Exam      Accompanied by self  Last Eye Exam: 1 year ago  Dilated Previously: Yes    What are you currently using to see?  Glasses-1 year old, readers +1.75       Distance Vision Acuity: Satisfied with vision    Near Vision Acuity: Not satisfied     Eye Comfort: dry and itchy  Do you use eye drops? : Yes: blink 2 times a day  Occupation or Hobbies:     Dahlia Ramirez, Optometric Tech          Medical, surgical and family histories reviewed and updated 9/10/2019.       OBJECTIVE: See Ophthalmology exam    ASSESSMENT:    ICD-10-CM    1. Encounter for examination of eyes and vision with abnormal findings Z01.01    2. History of blepharoplasty - Both Eyes Z98.890    3. Dry eyes H04.123    4. Nuclear age-related cataract, both eyes H25.13    5. Myopia of both eyes H52.13    6. Presbyopia H52.4       PLAN:     Patient Instructions   Continue use of Refresh artificial tears: 1 drop up to 4x daily in each eye for dry eye symptoms.   Begin use of lubricating ointment in each eye at bedtime.     History of blepharoplasty both eyes (Fall 2018). Has follow-up scheduled with Dr. Berrios tomorrow 9/11/19.     Glasses Rx provided today- optional to fill.   Discussed different types of bifocal options: 1) No-line (progressive): gradually changes from distance vision in the top of the lens to near (reading) in the bottom of the lens; inbetween those viewing areas is an intermediate/computer zone. 2) Lined bifocal: reading segment in the bottom of the lens; no intermediate/computer power in the lens    Okay to use over the counter reading glasses. Recommend +1.75 or +2.00 power.     You have the start of mild cataracts.  You may notice some blurred vision or glare with night driving.  It is important that you wear good sunglasses to protect your eyes from the ultraviolet light from the sun.       The effects of the dilating drops last for 4- 6 hours.  You will  be more sensitive to light and vision will be blurry up close.  Mydriatic sunglasses were given if needed.    Return in 1 year for comprehensive eye exam, or sooner if needed.     Jair Garcia O.D.  [address]  [place]  [address]    [phone]

## 2019-09-11 ENCOUNTER — TELEPHONE (OUTPATIENT)
Dept: GASTROENTEROLOGY | Facility: CLINIC | Age: 59
End: 2019-09-11

## 2019-09-11 ENCOUNTER — OFFICE VISIT (OUTPATIENT)
Dept: OPHTHALMOLOGY | Facility: CLINIC | Age: 59
End: 2019-09-11
Payer: COMMERCIAL

## 2019-09-11 DIAGNOSIS — H02.831 DERMATOCHALASIS OF BOTH UPPER EYELIDS: Primary | ICD-10-CM

## 2019-09-11 DIAGNOSIS — H02.403 INVOLUTIONAL PTOSIS, ACQUIRED, BILATERAL: ICD-10-CM

## 2019-09-11 DIAGNOSIS — H04.123 DRY EYE SYNDROME OF BOTH EYES: ICD-10-CM

## 2019-09-11 DIAGNOSIS — H02.834 DERMATOCHALASIS OF BOTH UPPER EYELIDS: Primary | ICD-10-CM

## 2019-09-11 PROCEDURE — 99213 OFFICE O/P EST LOW 20 MIN: CPT | Performed by: OPHTHALMOLOGY

## 2019-09-11 ASSESSMENT — VISUAL ACUITY
METHOD: SNELLEN - LINEAR
OS_SC+: -1
OD_SC: 20/20
OS_SC: 20/20

## 2019-09-11 ASSESSMENT — EXTERNAL EXAM - LEFT EYE: OS_EXAM: NORMAL

## 2019-09-11 NOTE — ORAL ONC MGMT
The VSL #3 probiotic pack is not covered by patient's insurance.  It is also not possible to order right now.  Is there an alternative product patient could purchase over the counter?    Prior Authorization Retail Medication Request    Medication/Dose: VSL#3 DS Pack  ICD code (if different than what is on RX):    Previously Tried and Failed:    Rationale:     Insurance Name:  [facility]  Insurance ID:  [id number]      Pharmacy Information (if different than what is on RX)  Name:  [facility]  Phone:  [phone]    Deja Hood PharmD, [facility]  Pharmacist Manager   [facility]  [phone]            .

## 2019-09-11 NOTE — PROGRESS NOTES
Chief Complaint(s) and History of Present Illness(es)     Follow Up     Laterality: both eyes    Timing: When showering.     Associated symptoms: dryness    Treatments tried: eye drops (Pt noticing Burning when in the shower.  Pt   using refresh 1-3 x day.  Was giving oint for night time.  Has not started   yet.   )    Pain scale: 0/10    Comments: Follow up after Bilateral Bleph 10/3/18-  Had to leave shortly   after surgery and wants to be sure everything is OK.        Also change in manifest refraction.     Assessment & Plan     Adan Oliver is a 59 year old male with the following diagnoses:   1. Dermatochalasis of both upper eyelids    2. Involutional ptosis, acquired, bilateral    3. Dry eye syndrome of both eyes       Doing well, good symmetry. Nice improvement in mrd1 and peripheral vision.   Some dry eye and change ins manifest refraction (per patient). May be due to loss of pinhole effect from low MRD1.   Received artificial tear ointment to use at bedtime yesterday, and continue over the counter artificial tears.  Warm compresses  F/u with me as needed          Attending Physician Attestation:  Complete documentation of historical and exam elements from today's encounter can be found in the full encounter summary report (not reduplicated in this progress note).  I personally obtained the chief complaint(s) and history of present illness.  I confirmed and edited as necessary the review of systems, past medical/surgical history, family history, social history, and examination findings as documented by others; and I examined the patient myself.  I personally reviewed the relevant tests, images, and reports as documented above.  I formulated and edited as necessary the assessment and plan and discussed the findings and management plan with the patient and family. - Dany Berrios MD

## 2019-09-11 NOTE — NURSING NOTE
Patient presents with:  Follow Up: Follow up after Bilateral Bleph 10/3/18-  Had to leave shortly after surgery and wants to be sure everything is OK.       Lab Results   Component Value Date    A1C 5.1 10/01/2014       Referring Provider:  No referring provider defined for this encounter.        Janee Pearson COT

## 2019-09-12 ENCOUNTER — OFFICE VISIT (OUTPATIENT)
Dept: ORTHOPEDICS | Facility: CLINIC | Age: 59
End: 2019-09-12
Payer: COMMERCIAL

## 2019-09-12 ENCOUNTER — TELEPHONE (OUTPATIENT)
Dept: FAMILY MEDICINE | Facility: CLINIC | Age: 59
End: 2019-09-12

## 2019-09-12 ENCOUNTER — ANCILLARY PROCEDURE (OUTPATIENT)
Dept: GENERAL RADIOLOGY | Facility: CLINIC | Age: 59
End: 2019-09-12
Attending: PEDIATRICS
Payer: COMMERCIAL

## 2019-09-12 VITALS
SYSTOLIC BLOOD PRESSURE: 139 MMHG | HEIGHT: 68 IN | WEIGHT: 198 LBS | DIASTOLIC BLOOD PRESSURE: 85 MMHG | BODY MASS INDEX: 30.01 KG/M2

## 2019-09-12 DIAGNOSIS — M79.641 CHRONIC HAND PAIN, RIGHT: ICD-10-CM

## 2019-09-12 DIAGNOSIS — M77.11 RIGHT LATERAL EPICONDYLITIS: ICD-10-CM

## 2019-09-12 DIAGNOSIS — M54.16 LUMBAR RADICULOPATHY: ICD-10-CM

## 2019-09-12 DIAGNOSIS — M48.061 SPINAL STENOSIS OF LUMBAR REGION, UNSPECIFIED WHETHER NEUROGENIC CLAUDICATION PRESENT: Primary | ICD-10-CM

## 2019-09-12 DIAGNOSIS — G89.29 CHRONIC HAND PAIN, RIGHT: ICD-10-CM

## 2019-09-12 PROCEDURE — 73130 X-RAY EXAM OF HAND: CPT | Mod: RT

## 2019-09-12 PROCEDURE — 99214 OFFICE O/P EST MOD 30 MIN: CPT | Performed by: PEDIATRICS

## 2019-09-12 PROCEDURE — 72100 X-RAY EXAM L-S SPINE 2/3 VWS: CPT

## 2019-09-12 RX ORDER — ESOMEPRAZOLE MAGNESIUM 40 MG/1
CAPSULE, DELAYED RELEASE ORAL
Qty: 90 CAPSULE | Refills: 3 | Status: SHIPPED | OUTPATIENT
Start: 2019-09-12 | End: 2020-10-06

## 2019-09-12 ASSESSMENT — MIFFLIN-ST. JEOR: SCORE: 1682.62

## 2019-09-12 NOTE — PATIENT INSTRUCTIONS
Plan:  - Today's Plan of Care:  Referral to a Spine Surgeon  Rehab: Occupational Therapy: [facility] - [phone]    -We also discussed other future treatment options:  Repeat MRI or Labs    Follow Up: 4-6 weeks    If you have any further questions for your physician or physician s care team you can call [phone] and use option 3 to leave a voice message. Calls received during business hours will be returned same day.

## 2019-09-12 NOTE — PROGRESS NOTES
Sports Medicine Clinic Visit - Interim History September 12, 2019    PCP: Charly Castillo    Adan Oliver is a 59 year old male who is seen in f/u up for    Spinal stenosis of lumbar region, unspecified whether neurogenic claudication present  Would also like to discuss right hand pain.    Since last visit on 9/25/17 patient has reports of continued lower back pain that radiates into his bilateral legs. He states he has a burning sensation in his feet and calves. He has not consulted with a surgeon. He states he is doing a HEP but has noted no improvement. He states he has no benefit with steroid injections in the past.    He reports right hand pain over the MCP joint of the right middle finger. He reports he injured his hand 8-9 years ago and the pain has not really gone away. He reports pain increases certain movements of his hand and finger. He reports shooting pain into his forearm/elbow at times.  - PCP has gotten prior inflammatory labs in the past.    Symptoms are better with: Nothing  Symptoms are worse with: bending over, squatting and lifting  Additional Features:   Positive: weakness   Negative: swelling, bruising, popping, grinding, catching, locking, instability, paresthesias and numbness    Social History: , self employed    Review of Systems  Skin: no bruising, mild hand swelling  Musculoskeletal: as above  Neurologic: no numbness, paresthesias  Remainder of review of systems is negative including constitutional, CV, pulmonary, GI, except as noted in HPI or medical history.    Patient's current problem list, past medical and surgical history, and family history were reviewed.    Patient Active Problem List   Diagnosis     Esophageal reflux     Hyperlipidemia with target LDL less than 130     Reid esophagus     Head ache     Memory changes     Insomnia, psychophysiological     Family history of Alzheimer's disease     Anxiety     Major depressive disorder, single episode, mild  "(H)     Lumbar degenerative disc disease     Protrusion of lumbar intervertebral disc     Lumbar spinal stenosis     Alcohol use     Non morbid obesity due to excess calories     LUCIAN (obstructive sleep apnea)- 'moderate' (AHI 6)     Past Medical History:   Diagnosis Date     History of shingles- 2012 2012     Nephrolithiasis-2009 2009     Past Surgical History:   Procedure Laterality Date     APPENDECTOMY  2000s     BLEPHAROPLASTY BILATERAL Bilateral 10/3/2018    Procedure: BLEPHAROPLASTY BILATERAL;;  Surgeon: Dany Berrios MD;  Location: [facility]     CARPAL TUNNEL RELEASE RT/LT  1980s     REPAIR PTOSIS BILATERAL Bilateral 10/3/2018    Procedure: REPAIR PTOSIS BILATERAL;;  Surgeon: Dany Berrios MD;  Location: [facility]     REPAIR PTOSIS BROW BILATERAL Bilateral 10/3/2018    Procedure: REPAIR PTOSIS BROW BILATERAL;;  Surgeon: Dany Berrios MD;  Location: [facility]     TONSILLECTOMY  1980s     Family History   Problem Relation Age of Onset     Hypertension Mother      Diabetes Brother      Glaucoma No family hx of      Macular Degeneration No family hx of      Coronary Artery Disease No family hx of      Colon Cancer No family hx of      Retinal detachment No family hx of        Objective  /85   Ht 1.727 m (5' 8\")   Wt 89.8 kg (198 lb)   BMI 30.11 kg/m      GENERAL APPEARANCE: healthy, alert and no distress   GAIT: NORMAL  SKIN: no suspicious lesions or rashes  HEENT: Sclera clear, anicteric  CV: good peripheral pulses  RESP: Breathing not labored  NEURO: Normal strength and tone, mentation intact and speech normal  PSYCH:  mentation appears normal and affect normal/bright     Low back exam:  Inspection:     no visible deformity in the low back       normal skin       normal vascular       normal lymphatic     Posture:      rounded shoulders and upper back       lumbar lordosis diminished     Tender:     midline       paraspinal muscles     Non Tender:     remainder of lumbar spine     ROM:      limited " flexion due to pain       limited extension due to pain     Strength:     hip flexion 5/5 bilateral       knee extension 5/5 bilateral       ankle dorsiflexion 5/5 bilateral       ankle plantarflexion 5/5 bilateral       dorsiflexion of the great toe 4/5 left       able to heel and toe walk     Reflexes:     patellar (L3, L4) symmetric normal       achilles tendons (S1) symmetric normal     Sensation:    grossly intact throughout lower extremities     Special tests:      straight leg raise left positive        straight leg raise right negative       neg (-) ROSEMARIE  bilateral       neg (-) FADIR  bilateral    Bilateral Elbow exam:    Inspection:     no ecchymosis       no edema or effusion    Tender:     lateral epicondyle right    Non-Tender:      remainder of the elbow bilaterally    ROM:      full with flexion, extension, forearm supination and pronation bilateral    Strength:     flexion 5/5 bilateral       extension 5/5 bilateral       forearm supination 5/5 bilateral       forearm pronation 5/5 bilateral       pain with resisted wrist extension and supination right    Special Tests:      tennis elbow test (resisted extension of third digit) positive (+) right    Sensation:     intact throughout hand       intact throughout forearm    Bilateral Wrist and Hand exam  Inspection:       Swelling: mild at right hand MCP joint    Tender:       Right hand MCP joint    Non Tender:       Remainder of the Wrist and Hand bilateral    ROM:       Full and symmetric active and passive range of motion of the forearm, wrist and digits bilateral    Strength:       5/5 strength in the muscles of the hand, wrist and forearm bilateral    Neurovascular:       2+ radial pulses bilaterally with brisk capillary refill and      normal sensation to light touch in the radial, median and ulnar nerve distributions    Radiology  I ordered, visualized and reviewed these images with the patient  Xr Hand Right G/e 3 Views  Result Date:  9/12/2019  RIGHT HAND THREE OR MORE VIEWS  9/12/2019 1:50 PM HISTORY: Chronic hand pain, right. COMPARISON: Three views of the right third finger 8/4/2016.   IMPRESSION: No acute bony abnormality. Mild degenerative change at the proximal and distal interphalangeal joints of the third finger and the first metacarpophalangeal and interphalangeal joints. MYRIAM LOUIE MD    Xr Lumbar Spine 2/3 Views  Result Date: 9/12/2019  LUMBAR SPINE TWO TO THREE VIEWS September 12, 2019 1:50 PM HISTORY: Lumbar radiculopathy. COMPARISON: Two-view lumbar spine 7/16/2015.   IMPRESSION: There is continued normal alignment of the lumbar vertebrae. Vertebral body heights of the lumbar spine remain normal. There is no evidence for fracture of the lumbar spine. Facet arthropathy at the L4-L5 and L5-S1 levels again noted. Degenerative endplate spurring at the L3-L4 level again noted. ANSELMO STEARNS MD    I visualized and reviewed these images with the patient  MRI LUMBAR SPINE WITHOUT CONTRAST September 27, 2017 7:32 PM   HISTORY: Several years of increasing low back and bilateral leg pain.  COMPARISON: An MRI on 7/25/2015.  TECHNIQUE: Sagittal T1, T2, and STIR, and transverse proton density  and T2-weighted images were obtained through the lumbar spine.  FINDINGS: Numbering of the levels is again based on what appear to be  five lumbar type vertebral bodies. Vertebral body alignment is normal.  No fracture is seen. No pars interarticularis defect is demonstrated.  No osseous lesion is seen. No abnormal marrow signal intensity is  identified. The conus medullaris terminates at the level of the L1  vertebral body. No intrathecal abnormality is seen. The adjacent soft  tissues are unremarkable.  Findings by specific level:  T12-L1: The disc and facet joints are normal. No stenosis is seen.  L1-L2: The disc and facet joints are normal. No stenosis is seen.  L2-L3: The disc and facet joints are normal. No stenosis is seen.  L3-L4: Again seen  is mild disc dehydration. The disc height is  well-preserved. There is a mild disc bulge with no focal herniation  seen. There are mild degenerative changes in the facet joints. Again  seen is a mild degree of central canal stenosis and mild to moderate  bilateral neural foraminal stenosis. This level is unchanged.  L4-L5: There is disc dehydration. The disc height is well-preserved.  Previously there is a moderate size right central disc protrusion.  This has significantly increased in size and now represents a large  broad-based left central disc protrusion. There has been progression  of what is now severe central canal stenosis with compression of the  left L5 nerve root. Mild to moderate bilateral neural foraminal  stenosis is unchanged. There are mild degenerative changes in the  facet joints.  L5-S1: Again seen is disc dehydration and mild disc height loss. There  is a mild disc bulge with no focal herniation seen. There are mild  degenerative changes in the facet joints. No central canal stenosis is  present. Again seen is mild to moderate bilateral neural foraminal  stenosis. This level is unchanged.                                                        IMPRESSION:  1.  At L4-L5 there has been progression/development of a large left  central disc protrusion. There is now severe central canal stenosis  with compression of the left L5 nerve root. Mild to moderate bilateral  neural foraminal stenosis is unchanged.  2. Degenerative changes elsewhere with no other disc herniation seen.  There is mild to moderate foraminal stenosis bilaterally at L3-L4 and  L5-S1 with mild central canal stenosis at L3-L4.    Labs:  Component      Latest Ref Rng & Units 7/9/2015   Rheumatoid Factor      <20 IU/mL <20   CRP Inflammation      0.0 - 8.0 mg/L <2.9       Assessment:  1. Spinal stenosis of lumbar region, unspecified whether neurogenic claudication present    2. Chronic hand pain, right    3. Right lateral epicondylitis       1) Lumbar stenosis - severe.  We discussed the following treatment options: symptom treatment, activity modification/rest, imaging, rehab and referral. Following discussion, plan: given lack of improvement with other treatments, patient will consult with spine surgery.    2) MCP joint and likely lateral epicondylitis.  Possibly related to old injury and degenerative changes, inflammatory etiology less likely given prior unremarkable labs.  Recommend OT referral, could consider further work up pending clinical course.    Plan:  - Today's Plan of Care:  Referral to a Spine Surgeon  Rehab: Occupational Therapy: [facility] - [phone]    -We also discussed other future treatment options:  Repeat MRI or Labs    Follow Up: 4-6 weeks    Concerning signs and symptoms were reviewed.  The patient expressed understanding of this management plan and all questions were answered at this time.    Stacey Tanner MD CAQ  Primary Care Sports Medicine  [facility]

## 2019-09-12 NOTE — LETTER
9/12/2019         RE: Adan Oliver  [address]  [place] [address]        Dear Colleague,    Thank you for referring your patient, Adan Oliver, to the [facility]. Please see a copy of my visit note below.    Sports Medicine Clinic Visit - Interim History September 12, 2019    PCP: Charly Castillo    Adan Oliver is a 59 year old male who is seen in f/u up for    Spinal stenosis of lumbar region, unspecified whether neurogenic claudication present  Would also like to discuss right hand pain.    Since last visit on 9/25/17 patient has reports of continued lower back pain that radiates into his bilateral legs. He states he has a burning sensation in his feet and calves. He has not consulted with a surgeon. He states he is doing a HEP but has noted no improvement. He states he has no benefit with steroid injections in the past.    He reports right hand pain over the MCP joint of the right middle finger. He reports he injured his hand 8-9 years ago and the pain has not really gone away. He reports pain increases certain movements of his hand and finger. He reports shooting pain into his forearm/elbow at times.  - PCP has gotten prior inflammatory labs in the past.    Symptoms are better with: Nothing  Symptoms are worse with: bending over, squatting and lifting  Additional Features:   Positive: weakness   Negative: swelling, bruising, popping, grinding, catching, locking, instability, paresthesias and numbness    Social History: , self employed    Review of Systems  Skin: no bruising, mild hand swelling  Musculoskeletal: as above  Neurologic: no numbness, paresthesias  Remainder of review of systems is negative including constitutional, CV, pulmonary, GI, except as noted in HPI or medical history.    Patient's current problem list, past medical and surgical history, and family history were reviewed.    Patient Active Problem List  "  Diagnosis     Esophageal reflux     Hyperlipidemia with target LDL less than 130     Reid esophagus     Head ache     Memory changes     Insomnia, psychophysiological     Family history of Alzheimer's disease     Anxiety     Major depressive disorder, single episode, mild (H)     Lumbar degenerative disc disease     Protrusion of lumbar intervertebral disc     Lumbar spinal stenosis     Alcohol use     Non morbid obesity due to excess calories     LUCIAN (obstructive sleep apnea)- 'moderate' (AHI 6)     Past Medical History:   Diagnosis Date     History of shingles- 2012 2012     Nephrolithiasis-2009 2009     Past Surgical History:   Procedure Laterality Date     APPENDECTOMY  2000s     BLEPHAROPLASTY BILATERAL Bilateral 10/3/2018    Procedure: BLEPHAROPLASTY BILATERAL;;  Surgeon: Dany Berrios MD;  Location: [facility]     CARPAL TUNNEL RELEASE RT/LT  1980s     REPAIR PTOSIS BILATERAL Bilateral 10/3/2018    Procedure: REPAIR PTOSIS BILATERAL;;  Surgeon: Dany Berrios MD;  [facility]     REPAIR PTOSIS BROW BILATERAL Bilateral 10/3/2018    Procedure: REPAIR PTOSIS BROW BILATERAL;;  Surgeon: Dany Berrios MD;  Location: [facility]     TONSILLECTOMY  1980s     Family History   Problem Relation Age of Onset     Hypertension Mother      Diabetes Brother      Glaucoma No family hx of      Macular Degeneration No family hx of      Coronary Artery Disease No family hx of      Colon Cancer No family hx of      Retinal detachment No family hx of        Objective  /85   Ht 1.727 m (5' 8\")   Wt 89.8 kg (198 lb)   BMI 30.11 kg/m       GENERAL APPEARANCE: healthy, alert and no distress   GAIT: NORMAL  SKIN: no suspicious lesions or rashes  HEENT: Sclera clear, anicteric  CV: good peripheral pulses  RESP: Breathing not labored  NEURO: Normal strength and tone, mentation intact and speech normal  PSYCH:  mentation appears normal and affect normal/bright     Low back exam:  Inspection:     no visible deformity " in the low back       normal skin       normal vascular       normal lymphatic     Posture:      rounded shoulders and upper back       lumbar lordosis diminished     Tender:     midline       paraspinal muscles     Non Tender:     remainder of lumbar spine     ROM:      limited flexion due to pain       limited extension due to pain     Strength:     hip flexion 5/5 bilateral       knee extension 5/5 bilateral       ankle dorsiflexion 5/5 bilateral       ankle plantarflexion 5/5 bilateral       dorsiflexion of the great toe 4/5 left       able to heel and toe walk     Reflexes:     patellar (L3, L4) symmetric normal       achilles tendons (S1) symmetric normal     Sensation:    grossly intact throughout lower extremities     Special tests:      straight leg raise left positive        straight leg raise right negative       neg (-) ROSEMARIE  bilateral       neg (-) FADIR  bilateral    Bilateral Elbow exam:    Inspection:     no ecchymosis       no edema or effusion    Tender:     lateral epicondyle right    Non-Tender:      remainder of the elbow bilaterally    ROM:      full with flexion, extension, forearm supination and pronation bilateral    Strength:     flexion 5/5 bilateral       extension 5/5 bilateral       forearm supination 5/5 bilateral       forearm pronation 5/5 bilateral       pain with resisted wrist extension and supination right    Special Tests:      tennis elbow test (resisted extension of third digit) positive (+) right    Sensation:     intact throughout hand       intact throughout forearm    Bilateral Wrist and Hand exam  Inspection:       Swelling: mild at right hand MCP joint    Tender:       Right hand MCP joint    Non Tender:       Remainder of the Wrist and Hand bilateral    ROM:       Full and symmetric active and passive range of motion of the forearm, wrist and digits bilateral    Strength:       5/5 strength in the muscles of the hand, wrist and forearm bilateral    Neurovascular:        2+ radial pulses bilaterally with brisk capillary refill and      normal sensation to light touch in the radial, median and ulnar nerve distributions    Radiology  I ordered, visualized and reviewed these images with the patient  Xr Hand Right G/e 3 Views  Result Date: 9/12/2019  RIGHT HAND THREE OR MORE VIEWS  9/12/2019 1:50 PM HISTORY: Chronic hand pain, right. COMPARISON: Three views of the right third finger 8/4/2016.   IMPRESSION: No acute bony abnormality. Mild degenerative change at the proximal and distal interphalangeal joints of the third finger and the first metacarpophalangeal and interphalangeal joints. MYRIAM LOUIE MD    Xr Lumbar Spine 2/3 Views  Result Date: 9/12/2019  LUMBAR SPINE TWO TO THREE VIEWS September 12, 2019 1:50 PM HISTORY: Lumbar radiculopathy. COMPARISON: Two-view lumbar spine 7/16/2015.   IMPRESSION: There is continued normal alignment of the lumbar vertebrae. Vertebral body heights of the lumbar spine remain normal. There is no evidence for fracture of the lumbar spine. Facet arthropathy at the L4-L5 and L5-S1 levels again noted. Degenerative endplate spurring at the L3-L4 level again noted. ANSELMO STEARNS MD    I visualized and reviewed these images with the patient  MRI LUMBAR SPINE WITHOUT CONTRAST September 27, 2017 7:32 PM   HISTORY: Several years of increasing low back and bilateral leg pain.  COMPARISON: An MRI on 7/25/2015.  TECHNIQUE: Sagittal T1, T2, and STIR, and transverse proton density  and T2-weighted images were obtained through the lumbar spine.  FINDINGS: Numbering of the levels is again based on what appear to be  five lumbar type vertebral bodies. Vertebral body alignment is normal.  No fracture is seen. No pars interarticularis defect is demonstrated.  No osseous lesion is seen. No abnormal marrow signal intensity is  identified. The conus medullaris terminates at the level of the L1  vertebral body. No intrathecal abnormality is seen. The adjacent  soft  tissues are unremarkable.  Findings by specific level:  T12-L1: The disc and facet joints are normal. No stenosis is seen.  L1-L2: The disc and facet joints are normal. No stenosis is seen.  L2-L3: The disc and facet joints are normal. No stenosis is seen.  L3-L4: Again seen is mild disc dehydration. The disc height is  well-preserved. There is a mild disc bulge with no focal herniation  seen. There are mild degenerative changes in the facet joints. Again  seen is a mild degree of central canal stenosis and mild to moderate  bilateral neural foraminal stenosis. This level is unchanged.  L4-L5: There is disc dehydration. The disc height is well-preserved.  Previously there is a moderate size right central disc protrusion.  This has significantly increased in size and now represents a large  broad-based left central disc protrusion. There has been progression  of what is now severe central canal stenosis with compression of the  left L5 nerve root. Mild to moderate bilateral neural foraminal  stenosis is unchanged. There are mild degenerative changes in the  facet joints.  L5-S1: Again seen is disc dehydration and mild disc height loss. There  is a mild disc bulge with no focal herniation seen. There are mild  degenerative changes in the facet joints. No central canal stenosis is  present. Again seen is mild to moderate bilateral neural foraminal  stenosis. This level is unchanged.                                                        IMPRESSION:  1.  At L4-L5 there has been progression/development of a large left  central disc protrusion. There is now severe central canal stenosis  with compression of the left L5 nerve root. Mild to moderate bilateral  neural foraminal stenosis is unchanged.  2. Degenerative changes elsewhere with no other disc herniation seen.  There is mild to moderate foraminal stenosis bilaterally at L3-L4 and  L5-S1 with mild central canal stenosis at L3-L4.    Labs:  Component       Latest Ref Rng & Units 7/9/2015   Rheumatoid Factor      <20 IU/mL <20   CRP Inflammation      0.0 - 8.0 mg/L <2.9       Assessment:  1. Spinal stenosis of lumbar region, unspecified whether neurogenic claudication present    2. Chronic hand pain, right    3. Right lateral epicondylitis      1) Lumbar stenosis - severe.  We discussed the following treatment options: symptom treatment, activity modification/rest, imaging, rehab and referral. Following discussion, plan: given lack of improvement with other treatments, patient will consult with spine surgery.    2) MCP joint and likely lateral epicondylitis.  Possibly related to old injury and degenerative changes, inflammatory etiology less likely given prior unremarkable labs.  Recommend OT referral, could consider further work up pending clinical course.    Plan:  - Today's Plan of Care:  Referral to a Spine Surgeon  Rehab: Occupational Therapy: [facility] - [phone]    -We also discussed other future treatment options:  Repeat MRI or Labs    Follow Up: 4-6 weeks    Concerning signs and symptoms were reviewed.  The patient expressed understanding of this management plan and all questions were answered at this time.    Stacey Tanner MD CAQ  Primary Care Sports Medicine  [facility]    Again, thank you for allowing me to participate in the care of your patient.        Sincerely,        Stacey Tanner MD

## 2019-09-12 NOTE — TELEPHONE ENCOUNTER
Prescription approved per [facility] Refill Protocol.  Darron Baxter RN

## 2019-09-12 NOTE — TELEPHONE ENCOUNTER
Prior Authorization Retail Medication Request    Medication/Dose: Esomeprazole - max of 120 in 365 days  ICD code (if different than what is on RX):    Previously Tried and Failed:    Rationale:      Insurance Name:  [employer] [facility]  Insurance ID:  [id number]      Pharmacy Information (if different than what is on RX)  Name:  [facility]  Phone:  [phone]    Deja Hood PharmD, [facility]  Pharmacist Manager   [facility]  [phone]

## 2019-09-13 ENCOUNTER — TELEPHONE (OUTPATIENT)
Dept: GASTROENTEROLOGY | Facility: CLINIC | Age: 59
End: 2019-09-13

## 2019-09-13 NOTE — TELEPHONE ENCOUNTER
Sent a message to Dr. Banuelos for advice.    Monse Mcclure RN  Gastroenterology Care Coordinator  [place], [place]

## 2019-09-13 NOTE — TELEPHONE ENCOUNTER
[facility] Call Center    Phone Message    May a detailed message be left on voicemail: yes    Reason for Call: Patient called and requested to reschedule procedure originally scheduled 10/03. Writer was unable schedule as order advises to schedule with Dr. Banuelos. Please submit new order as patient would like to see a GI provider with sooner avail appointment.      Action Taken: Message routed to:  Adult Clinics: Gastroenterology (GI) p [id number]

## 2019-09-16 NOTE — TELEPHONE ENCOUNTER
Central Prior Authorization Team  Phone: [phone]    PA Initiation    Medication: VSL#3 DS Pack  Insurance Company: [employer] - Phone [phone] Fax [phone]  Pharmacy Filling the Rx: [facility] - [place], [place] - [address] [place], [place] [address]  Filling Pharmacy Phone: [phone]  Filling Pharmacy Fax:    Start Date: 9/16/2019

## 2019-09-17 ENCOUNTER — PRE VISIT (OUTPATIENT)
Dept: GASTROENTEROLOGY | Facility: CLINIC | Age: 59
End: 2019-09-17

## 2019-09-17 NOTE — TELEPHONE ENCOUNTER
PRIOR AUTHORIZATION DENIED    Medication: VSL#3 DS Pack- DENIED     Denial Date: 9/17/2019    Denial Rational: Patient must have a history of trial & failure to the formulary alternative(s) or have a contraindication or intolerance to the formulary alternatives:  Bismuth Subsalicylate Susp        Appeal Information: If provider would like to appeal please provide a letter of medical necessity.

## 2019-09-18 ENCOUNTER — OFFICE VISIT (OUTPATIENT)
Dept: BEHAVIORAL HEALTH | Facility: CLINIC | Age: 59
End: 2019-09-18
Payer: COMMERCIAL

## 2019-09-18 DIAGNOSIS — F32.0 MAJOR DEPRESSIVE DISORDER, SINGLE EPISODE, MILD (H): Primary | ICD-10-CM

## 2019-09-18 PROCEDURE — 90791 PSYCH DIAGNOSTIC EVALUATION: CPT | Performed by: SOCIAL WORKER

## 2019-09-18 ASSESSMENT — COLUMBIA-SUICIDE SEVERITY RATING SCALE - C-SSRS
ATTEMPT LIFETIME: NO
4. HAVE YOU HAD THESE THOUGHTS AND HAD SOME INTENTION OF ACTING ON THEM?: NO
5. HAVE YOU STARTED TO WORK OUT OR WORKED OUT THE DETAILS OF HOW TO KILL YOURSELF? DO YOU INTEND TO CARRY OUT THIS PLAN?: NO
3. HAVE YOU BEEN THINKING ABOUT HOW YOU MIGHT KILL YOURSELF?: NO
2. HAVE YOU ACTUALLY HAD ANY THOUGHTS OF KILLING YOURSELF?: NO
5. HAVE YOU STARTED TO WORK OUT OR WORKED OUT THE DETAILS OF HOW TO KILL YOURSELF? DO YOU INTEND TO CARRY OUT THIS PLAN?: NO
ATTEMPT PAST THREE MONTHS: NO
1. IN THE PAST MONTH, HAVE YOU WISHED YOU WERE DEAD OR WISHED YOU COULD GO TO SLEEP AND NOT WAKE UP?: NO
4. HAVE YOU HAD THESE THOUGHTS AND HAD SOME INTENTION OF ACTING ON THEM?: NO
1. IN THE PAST MONTH, HAVE YOU WISHED YOU WERE DEAD OR WISHED YOU COULD GO TO SLEEP AND NOT WAKE UP?: NO
2. HAVE YOU ACTUALLY HAD ANY THOUGHTS OF KILLING YOURSELF LIFETIME?: NO

## 2019-09-18 NOTE — PROGRESS NOTES
"                                                                                                                                                                        Adult Intake Structured Interview  Standard Diagnostic Assessment      CLIENT'S NAME: Adan Oliver  MRN:   [id number]  :   1960  ACCT. NUMBER: [id number]  DATE OF SERVICE: 19  VIDEO VISIT: No    Identifying Information:  Client is a 59 year old, , single male. Client was referred for counseling by self. Client is currently unemployed. Client attended the session alone.       Client's Statement of Presenting Concern:  Client reports the reason for seeking therapy at this time as \"feel like I've gotten depressed.\"  Client stated that his symptoms have resulted in the following functional impairments: self-care      History of Presenting Concern:  Taken from  in 2017:    Had problems with anxiety and depression since client was little.  Always felt different.  Gotr into weed as a young teen but got out of that at age 15.  Saw a therapist at age 14 in the context of parents divorce.  No subsequent treatment.  Last felt well 2 years ago.  Things have been \"progressively worth.\"  Reports \"my brain feels off, don;t really feel much, think I have lost my emotions along the way.\"  Sleep is an issue at this point.  Has tried antidepressant and sleep medicines, nothing has worked.  Had a sleep study, but no apnea was found.  Worries a lot, feels on edge and has anxiety.  Some OCD tendencies, lock  which started as an adult.  Somewhat of a a \"germophobe\", has to wash hands after petting a dog. No history of sabine or panic.           Update 2019:    Feels like he has been depressed for the past year.  Can't really find much tamy in life.  Has not had a relationship for the past several years.  Wonders if he is wasting his life and thinks that he has become stagnant in work and life.         Social History:  Client reported he " "grew up in [place], [place].  Client is the 3rd of 5 sibs.  Childhood was \"great mother, father was around but not close.\"  School was \"i was kind of a mess into drugs, graduate high school.\"  Did a year of [employer].  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  Now works as a  and .  Staying with his father now.  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               Never .  No kids.          Client identified some stable and meaningful social connections. Client reported that he has not been involved with the legal system.  Client's highest education level was some college. Client did not identify any learning problems. There are no ethnic, cultural or Mu-ism factors that may be relevant for therapy. Client identified his preferred language to be English. Client reported he does not need the assistance of an  or other support involved in therapy. Modifications will not be used to assist communication in therapy. Client did not serve in the .      Client reports family history includes DIABETES in his brother; Hypertension in his mother. There is no history of Glaucoma, Macular Degeneration, Coronary Artery Disease, or Colon Cancer.     Mental Health History:  Client reported no family history of mental health issues.  Client previously received the following mental health diagnosis: Depression.  Client has received the following mental health services in the past: counseling and medication(s) from physician / PCP.  Hospitalizations: None.  Client is not currently receiving any mental health services.        Chemical Health History:  Client reported no family history of chemical health issues. Client has not received chemical dependency treatment in the past. Client is not currently receiving any chemical dependency treatment. Client reports no problems as a result of " their drinking / drug use.        Client Reports:  Client reports using alcohol 3 times per day and has 1 beers at a time. Client first started drinking at age unknown.  Client denies using tobacco.  Client denies using marijuana.  Client reports using caffeine 5 times per day and drinks 1 at a time. Client started using caffeine at age unknown.  Client denies using street drugs.  Client denies the non-medical use of prescription or over the counter drugs.     CAGE: C     Patient felt they ought to CUT down on your drinking (or drug use).  A     Patient felt ANNOYED by people criticizing their drinking (or drug use).  G     Patient felt bad or GUILTY about their drinking (or drug use).   Based on the negative Cage-Aid score and clinical interview there  are indications of drug or alcohol abuse. Recommendation for substance abuse disorder evaluation with a substance use professional was given. Therapist did not recommend client to reduce use or abstain from alcohol or substance use. Therapist did not recommend structured treatment and or community support (AA, 12 step group, etc.). -.     Discussed the general effects of drugs and alcohol on health and well-being. Therapist gave client printed information about the effects of chemical use on his health and well being.        Significant Losses / Trauma / Abuse / Neglect Issues:  There are indications or report of significant loss, trauma, abuse or neglect issues related to: bad breakup 10 years ago; mother has alzheimers.     Issues of possible neglect are not present  Medical Issues:  Client has had a physical exam to rule out medical causes for current symptoms. Date of last physical exam was within the past year. Client was encouraged to follow up with PCP if symptoms were to develop. The client has a [facility] Primary Care Provider, who is named Charly Castillo.. The client reports not having a psychiatrist. Client reports the following current medical  concerns: per EMR. The client reports the presence of chronic or episodic pain in the form of leg pain. The pain level is moderate and has a frequency of daily.. There are not significant nutritional concerns.     Client reports current meds as:   Current Outpatient Medications   Medication Sig     Dietary Management Product (VSL#3) PACK Take 1 packet by mouth daily     esomeprazole (NEXIUM) 40 MG DR capsule TAKE ONE CAPSULE BY MOUTH EVERY MORNING BEFORE BREAKFAST. TAKE 30-60 MINUTES BEFORE EATING.     gabapentin (NEURONTIN) 100 MG capsule Take 1 capsule (100 mg) by mouth At Bedtime (Patient not taking: Reported on 9/10/2019)     ibuprofen 200 MG capsule Take 200 mg by mouth every 4 hours as needed for fever     methylcellulose (CITRUCEL) powder Take 0.3 g (1.05 teaspoonful) by mouth daily (Patient not taking: Reported on 9/12/2019)     White Petrolatum-Mineral Oil (REFRESH P.M.) OINT Apply small amount to inside of lower eyelid at bedtime in each eye. Blink to spread over ocular surface. (Patient not taking: Reported on 9/12/2019)     No current facility-administered medications for this visit.        Client Allergies:  No Known Allergies  no known allergies to medications    Medical History:  Past Medical History:   Diagnosis Date     History of shingles- 2012 2012     Nephrolithiasis-2009 2009         Medication Adherence:  N/A - Client does not have prescribed psychiatric medications.    Client was provided recommendation to follow-up with prescribing physician.    Mental Status Assessment:  Appearance:   Appropriate   Eye Contact:   Good   Psychomotor Behavior: Normal   Attitude:   Cooperative   Orientation:   All  Speech   Rate / Production: Normal    Volume:  Normal   Mood:    Normal  Affect:    Appropriate   Thought Content:  Clear   Thought Form:  Coherent  Logical   Insight:    Good       Review of Symptoms:  Depression: Sleep Interest Energy Concentration Hopeless Helpless Ruminations  Michela:  No  symptoms  Psychosis: No symptoms  Anxiety: Worries  Panic:  No symptoms  Post Traumatic Stress Disorder: No symptoms  Obsessive Compulsive Disorder: Checking  Eating Disorder: No symptoms  Oppositional Defiant Disorder: No symptoms  ADD / ADHD: No symptoms  Conduct Disorder: No symptoms      Safety Assessment:    History of Safety Concerns:   See [place]      Current Safety Concerns:  Client denies current suicidal ideation.    Client denies current homicidal ideation and behaviors.  Client denies current self-injurious ideation and behaviors.    Client denies current concerns for personal safety.    Client reports the following protective factors: forward/future oriented thinking, dedication to family/friends, safe and stable environment, effectively controls impulses, secure attachment, structured day, uses community crisis resources, effective problem-solving skills, committment to well-being, sense of meaning, positive social skills, healthy fear of risky behaviors or pain, strong sense of self-worth/esteem, sense of personal control or determination and access to a variety of clinical interventions    Client reports there are no firearms in the house.     Plan for Safety and Risk Management:  Recommended that patient call 911 or go to the local ED should there be a change in any of these risk factors.    Client's Strengths and Limitations:  Client identified the following strengths or resources that will help him succeed in counseling: intelligence. Client identified the following supports: family. Things that may interfere with the clients success in counseling include:unknown.           Diagnostic Criteria:  A. The development of emotional or behavioral symptoms in response to an identifiable stressor(s) occurring within 3 months of the onset of the stressor(s)  B. These symptoms or behaviors are clinically significant, as evidenced by one or both of the following:  C. The stress-related disturbance does not  meet criteria for another disorder & is not not an exacerbation of another mental disorder  D. The symptoms do not represent normal bereavement  E. Once the stressor or its consequences have terminated, the symptoms do not persist for more than an additional 6 months       * Adjustment Disorder with Mixed Anxiety and Depressed Mood: The predominant manifestation is a combination of depression and anxiety        Functional Status:  Client's symptoms have caused and are causing reduced functional status in the following areas: Social / Relational - -        DSM5 Diagnoses: (Sustained by DSM5 Criteria Listed Above)  Diagnoses:  Adjustment Disorders  309.28 (F43.23) With mixed anxiety and depressed mood                                    Dysthymia v Recurrent MDD                                   R/o Generalized Anxiety Disorder                                   R/o Obsessive Compulsive Disorder                                   R/o Alcohol use disorder   Psychosocial & Contextual Factors: no job; living with father; mother with chronic health problems  WHODAS 2.0 (12 item)                This questionnaire asks about difficulties due to health conditions. Health conditions         include              disease or illnesses, other health problems that may be short or long lasting,          injuries, mental health or emotional problems, and problems with alcohol or drugs.                          Think back over the past 30 days and answer these questions, thinking about how much    difficulty you had doing the following activities. For each question, please [place] only         one response.     S1 Standing for long periods such as 30 minutes? None =         1   S2 Taking care of household responsibilities? None =         1   S3 Learning a new task, for example, learning how to get to a new place? None =         1   S4 How much of a problem do you have joining community activities (for example, festivals, Methodist or  other activities) in the same way as anyone else can? Moderate =   3   S5 How much have you been emotionally affected by your health problems? Extreme / or cannot do = 5           In the past 30 days, how much difficulty did you have in:   S6 Concentrating on doing something for ten minutes? Moderate =   3   S7 Walking a long distance such as a kilometer (or equivalent)? None =         1   S8 Washing your whole body? None =         1   S9 Getting dressed? None =         1   S10 Dealing with people you do not know? Mild =           2   S11 Maintaining a friendship? Moderate =   3   S12 Your day to day work? Mild =           2      H1 Overall, in the past 30 days, how many days were these difficulties present? Record number of days 5   H2 In the past 30 days, for how many days were you totally unable to carry out your usual activities or work because of any health condition? Record number of days  2   H3 In the past 30 days, not counting the days that you were totally unable, for how many days did you cut back or reduce your usual activities or work because of any health condition? Record number of days 5      Attendance Agreement:  Client has signed Attendance Agreement:Yes        Preliminary Treatment Plan:  The client reports no currently identified Jain, ethnic or cultural issues relevant to therapy.      services are not indicated.     Modifications to assist communication are not indicated.     The concerns identified by the client will be addressed in therapy.     Initial Treatment will focus on: Depressed Mood - -.     As a preliminary treatment goal, client will develop more effective coping skills to manage depressive symptoms.     The focus of initial interventions will be to teach CBT skills.     The client is receiving treatment / structured support from the following professional(s) / service and treatment. Collaboration will be initiated with: primary care physician.     The following  referral(s) was/were discussed but client declines follow up at this time. cd eval.     A Release of Information is not needed at this time.     Report to child / adult protection services was NA.     Client will have access to their [facility]' medical record.      Naty Coker, LICSW  September 18, 2019

## 2019-09-18 NOTE — TELEPHONE ENCOUNTER
Central Prior Authorization Team   Phone: [phone]    [place] Initiation    Medication: Esomeprazole - max of 120 in 365 days  Insurance Company: [employer] [place] - Phone [phone] Fax [phone]  Pharmacy Filling the Rx: [facility] - [place], [place] - [address] [place] [address] JULIANO.  Filling Pharmacy Phone: [phone]  Filling Pharmacy Fax: [phone]  Start Date: 9/18/2019

## 2019-09-19 NOTE — TELEPHONE ENCOUNTER
Spoke to pt regarding Vivi's advice. Pt requested a my chart message to be sent.   Please see my chart message.    Monse Mcclure RN  Gastroenterology Care Coordinator  [facility] [place]

## 2019-09-19 NOTE — TELEPHONE ENCOUNTER
Prior Authorization Approval    Authorization Effective Date: 6/18/2019  Authorization Expiration Date: 9/18/2020  Medication: Esomeprazole-APPROVED  Approved Dose/Quantity:    Reference #: [id number]   Insurance Company: [employer] [place] - Phone [phone] Fax [phone]  Expected CoPay:       CoPay Card Available:      Foundation Assistance Needed:    Which Pharmacy is filling the prescription (Not needed for infusion/clinic administered): [facility] [place] - [place], [place] - [address] [place] [address]  Pharmacy Notified: Yes  Patient Notified: Yes  **Instructed pharmacy to notify patient when script is ready to /ship.**

## 2019-09-19 NOTE — TELEPHONE ENCOUNTER
Patient scheduled for tomorrow at 2:00pm.    Monse Mcclure RN  Gastroenterology Care Coordinator  [place], [place]

## 2019-09-20 ENCOUNTER — HOSPITAL ENCOUNTER (OUTPATIENT)
Facility: AMBULATORY SURGERY CENTER | Age: 59
Discharge: HOME | End: 2019-09-20
Attending: INTERNAL MEDICINE | Admitting: INTERNAL MEDICINE
Payer: COMMERCIAL

## 2019-09-20 ENCOUNTER — SURGERY (OUTPATIENT)
Age: 59
End: 2019-09-20
Payer: COMMERCIAL

## 2019-09-20 VITALS
TEMPERATURE: 97.6 F | SYSTOLIC BLOOD PRESSURE: 110 MMHG | OXYGEN SATURATION: 97 % | DIASTOLIC BLOOD PRESSURE: 83 MMHG | RESPIRATION RATE: 16 BRPM | HEART RATE: 85 BPM

## 2019-09-20 LAB
FLEXIBLE SIGMOIDOSCOPY: NORMAL
UPPER GI ENDOSCOPY: NORMAL

## 2019-09-20 PROCEDURE — 43239 EGD BIOPSY SINGLE/MULTIPLE: CPT | Performed by: INTERNAL MEDICINE

## 2019-09-20 PROCEDURE — 45331 SIGMOIDOSCOPY AND BIOPSY: CPT

## 2019-09-20 PROCEDURE — 45331 SIGMOIDOSCOPY AND BIOPSY: CPT | Mod: 51 | Performed by: INTERNAL MEDICINE

## 2019-09-20 PROCEDURE — 43239 EGD BIOPSY SINGLE/MULTIPLE: CPT

## 2019-09-20 PROCEDURE — G0500 MOD SEDAT ENDO SERVICE >5YRS: HCPCS | Performed by: INTERNAL MEDICINE

## 2019-09-20 PROCEDURE — G8907 PT DOC NO EVENTS ON DISCHARG: HCPCS

## 2019-09-20 PROCEDURE — G8918 PT W/O PREOP ORDER IV AB PRO: HCPCS

## 2019-09-20 PROCEDURE — 88305 TISSUE EXAM BY PATHOLOGIST: CPT | Performed by: INTERNAL MEDICINE

## 2019-09-20 RX ORDER — ONDANSETRON 4 MG/1
4 TABLET, ORALLY DISINTEGRATING ORAL EVERY 6 HOURS PRN
Status: DISCONTINUED | OUTPATIENT
Start: 2019-09-20 | End: 2019-09-21 | Stop reason: HOSPADM

## 2019-09-20 RX ORDER — FLUMAZENIL 0.1 MG/ML
0.2 INJECTION, SOLUTION INTRAVENOUS
Status: DISCONTINUED | OUTPATIENT
Start: 2019-09-20 | End: 2019-09-21 | Stop reason: HOSPADM

## 2019-09-20 RX ORDER — ONDANSETRON 2 MG/ML
4 INJECTION INTRAMUSCULAR; INTRAVENOUS EVERY 6 HOURS PRN
Status: DISCONTINUED | OUTPATIENT
Start: 2019-09-20 | End: 2019-09-21 | Stop reason: HOSPADM

## 2019-09-20 RX ORDER — ONDANSETRON 2 MG/ML
4 INJECTION INTRAMUSCULAR; INTRAVENOUS
Status: DISCONTINUED | OUTPATIENT
Start: 2019-09-20 | End: 2019-09-21 | Stop reason: HOSPADM

## 2019-09-20 RX ORDER — NALOXONE HYDROCHLORIDE 0.4 MG/ML
.1-.4 INJECTION, SOLUTION INTRAMUSCULAR; INTRAVENOUS; SUBCUTANEOUS
Status: DISCONTINUED | OUTPATIENT
Start: 2019-09-20 | End: 2019-09-21 | Stop reason: HOSPADM

## 2019-09-20 RX ORDER — LIDOCAINE 40 MG/G
CREAM TOPICAL
Status: DISCONTINUED | OUTPATIENT
Start: 2019-09-20 | End: 2019-09-21 | Stop reason: HOSPADM

## 2019-09-20 RX ORDER — FENTANYL CITRATE 50 UG/ML
INJECTION, SOLUTION INTRAMUSCULAR; INTRAVENOUS PRN
Status: DISCONTINUED | OUTPATIENT
Start: 2019-09-20 | End: 2019-09-20 | Stop reason: HOSPADM

## 2019-09-20 RX ADMIN — FENTANYL CITRATE 50 MCG: 50 INJECTION, SOLUTION INTRAMUSCULAR; INTRAVENOUS at 14:40

## 2019-09-20 RX ADMIN — FENTANYL CITRATE 25 MCG: 50 INJECTION, SOLUTION INTRAMUSCULAR; INTRAVENOUS at 14:59

## 2019-09-20 RX ADMIN — FENTANYL CITRATE 50 MCG: 50 INJECTION, SOLUTION INTRAMUSCULAR; INTRAVENOUS at 14:42

## 2019-09-24 LAB — COPATH REPORT: NORMAL

## 2019-09-26 ENCOUNTER — TELEPHONE (OUTPATIENT)
Dept: FAMILY MEDICINE | Facility: CLINIC | Age: 59
End: 2019-09-26

## 2019-09-26 DIAGNOSIS — R23.9 SKIN CHANGE: Primary | ICD-10-CM

## 2019-09-26 DIAGNOSIS — R19.8 TENESMUS: ICD-10-CM

## 2019-09-26 DIAGNOSIS — K22.70 BARRETT'S ESOPHAGUS WITHOUT DYSPLASIA: Chronic | ICD-10-CM

## 2019-09-26 LAB
BASOPHILS # BLD AUTO: 0.1 10E9/L (ref 0–0.2)
BASOPHILS NFR BLD AUTO: 1 %
CRP SERPL-MCNC: <2.9 MG/L (ref 0–8)
DIFFERENTIAL METHOD BLD: NORMAL
EOSINOPHIL # BLD AUTO: 0.2 10E9/L (ref 0–0.7)
EOSINOPHIL NFR BLD AUTO: 2.5 %
ERYTHROCYTE [DISTWIDTH] IN BLOOD BY AUTOMATED COUNT: 13.8 % (ref 10–15)
HCT VFR BLD AUTO: 50 % (ref 40–53)
HGB BLD-MCNC: 16.6 G/DL (ref 13.3–17.7)
LYMPHOCYTES # BLD AUTO: 2.4 10E9/L (ref 0.8–5.3)
LYMPHOCYTES NFR BLD AUTO: 26.3 %
MCH RBC QN AUTO: 30.8 PG (ref 26.5–33)
MCHC RBC AUTO-ENTMCNC: 33.2 G/DL (ref 31.5–36.5)
MCV RBC AUTO: 93 FL (ref 78–100)
MONOCYTES # BLD AUTO: 1.2 10E9/L (ref 0–1.3)
MONOCYTES NFR BLD AUTO: 12.6 %
NEUTROPHILS # BLD AUTO: 5.3 10E9/L (ref 1.6–8.3)
NEUTROPHILS NFR BLD AUTO: 57.6 %
PLATELET # BLD AUTO: 422 10E9/L (ref 150–450)
RBC # BLD AUTO: 5.39 10E12/L (ref 4.4–5.9)
WBC # BLD AUTO: 9.3 10E9/L (ref 4–11)

## 2019-09-26 PROCEDURE — 36415 COLL VENOUS BLD VENIPUNCTURE: CPT | Performed by: INTERNAL MEDICINE

## 2019-09-26 PROCEDURE — 83516 IMMUNOASSAY NONANTIBODY: CPT | Performed by: INTERNAL MEDICINE

## 2019-09-26 PROCEDURE — 85025 COMPLETE CBC W/AUTO DIFF WBC: CPT | Performed by: INTERNAL MEDICINE

## 2019-09-26 PROCEDURE — 80053 COMPREHEN METABOLIC PANEL: CPT | Performed by: INTERNAL MEDICINE

## 2019-09-26 PROCEDURE — 86140 C-REACTIVE PROTEIN: CPT | Performed by: INTERNAL MEDICINE

## 2019-09-26 PROCEDURE — 82784 ASSAY IGA/IGD/IGG/IGM EACH: CPT | Performed by: INTERNAL MEDICINE

## 2019-09-26 NOTE — TELEPHONE ENCOUNTER
"Will forward to covering providers for review. Patient is requesting to see derm for \"sun damage\" on his neck.    Dermatology referral pended (wasn't sure of a diagnosis to associate - please verify).    Noted patient is also due for an annual visit. Please schedule when calling patient back.    Juan Turcios RN      "

## 2019-09-26 NOTE — TELEPHONE ENCOUNTER
Spoke with patient and he prefers to go to Derm for an area on his neck that is discolored. Not a new issue but he is thinking he may need a biopsy. Also needs full skin check. Offered to schedule physical - patient states he will call back to schedule with Dr. Castillo.    Juan Turcios RN

## 2019-09-26 NOTE — TELEPHONE ENCOUNTER
Reviewing request while PCP Dr. Castillo is out of the office.    It appears that patient was last seen 1 year ago and I would encourage him to schedule his annual physical.  I did sign off on the referral to [facility] dermatology for him, but we also can check his skin in the office as well.    Jo Walters, DNP, APRN, CNP

## 2019-09-26 NOTE — TELEPHONE ENCOUNTER
Reason for Call: Request for an order or referral:    Order or referral being requested: Referral    Date needed: as soon as possible    Has the patient been seen by the PCP for this problem? YES    Additional comments: Patient is requesting a referral for [facility] in [facility].  It is for the sun damage to his skin on the neck.    Phone number Patient can be reached at:  Home number on file [phone] (home)    Best Time:  Any     Can we leave a detailed message on this number?  YES    Call taken on 9/26/2019 at 12:39 PM by Buffy Azul

## 2019-09-27 LAB
ALBUMIN SERPL-MCNC: 4.2 G/DL (ref 3.4–5)
ALP SERPL-CCNC: 93 U/L (ref 40–150)
ALT SERPL W P-5'-P-CCNC: 27 U/L (ref 0–70)
ANION GAP SERPL CALCULATED.3IONS-SCNC: 9 MMOL/L (ref 3–14)
AST SERPL W P-5'-P-CCNC: 13 U/L (ref 0–45)
BILIRUB SERPL-MCNC: 0.6 MG/DL (ref 0.2–1.3)
BUN SERPL-MCNC: 15 MG/DL (ref 7–30)
CALCIUM SERPL-MCNC: 8.7 MG/DL (ref 8.5–10.1)
CHLORIDE SERPL-SCNC: 106 MMOL/L (ref 94–109)
CO2 SERPL-SCNC: 23 MMOL/L (ref 20–32)
CREAT SERPL-MCNC: 1.15 MG/DL (ref 0.66–1.25)
GFR SERPL CREATININE-BSD FRML MDRD: 68 ML/MIN/{1.73_M2}
GLUCOSE SERPL-MCNC: 87 MG/DL (ref 70–99)
IGA SERPL-MCNC: 478 MG/DL (ref 70–380)
POTASSIUM SERPL-SCNC: 4.1 MMOL/L (ref 3.4–5.3)
PROT SERPL-MCNC: 8 G/DL (ref 6.8–8.8)
SODIUM SERPL-SCNC: 138 MMOL/L (ref 133–144)
TTG IGA SER-ACNC: 2 U/ML
TTG IGG SER-ACNC: <1 U/ML

## 2019-09-28 ENCOUNTER — HEALTH MAINTENANCE LETTER (OUTPATIENT)
Age: 59
End: 2019-09-28

## 2019-10-01 DIAGNOSIS — R19.8 TENESMUS: ICD-10-CM

## 2019-10-01 DIAGNOSIS — K22.70 BARRETT'S ESOPHAGUS WITHOUT DYSPLASIA: Chronic | ICD-10-CM

## 2019-10-01 LAB
C DIFF TOX B STL QL: NEGATIVE
SPECIMEN SOURCE: NORMAL

## 2019-10-01 PROCEDURE — 87177 OVA AND PARASITES SMEARS: CPT | Performed by: INTERNAL MEDICINE

## 2019-10-01 PROCEDURE — 87209 SMEAR COMPLEX STAIN: CPT | Performed by: INTERNAL MEDICINE

## 2019-10-01 PROCEDURE — 87493 C DIFF AMPLIFIED PROBE: CPT | Performed by: INTERNAL MEDICINE

## 2019-10-01 PROCEDURE — 83993 ASSAY FOR CALPROTECTIN FECAL: CPT | Performed by: INTERNAL MEDICINE

## 2019-10-02 LAB
CALPROTECTIN STL-MCNT: 57.3 MG/KG (ref 0–49.9)
O+P STL MICRO: NORMAL
O+P STL MICRO: NORMAL
SPECIMEN SOURCE: NORMAL

## 2020-07-22 ENCOUNTER — TELEPHONE (OUTPATIENT)
Dept: FAMILY MEDICINE | Facility: CLINIC | Age: 60
End: 2020-07-22

## 2020-07-22 DIAGNOSIS — Z20.822 EXPOSURE TO 2019 NOVEL CORONAVIRUS: Primary | ICD-10-CM

## 2020-07-27 DIAGNOSIS — Z20.822 EXPOSURE TO 2019 NOVEL CORONAVIRUS: ICD-10-CM

## 2020-07-27 PROCEDURE — U0003 INFECTIOUS AGENT DETECTION BY NUCLEIC ACID (DNA OR RNA); SEVERE ACUTE RESPIRATORY SYNDROME CORONAVIRUS 2 (SARS-COV-2) (CORONAVIRUS DISEASE [COVID-19]), AMPLIFIED PROBE TECHNIQUE, MAKING USE OF HIGH THROUGHPUT TECHNOLOGIES AS DESCRIBED BY CMS-2020-01-R: HCPCS | Performed by: FAMILY MEDICINE

## 2020-07-28 LAB
SARS-COV-2 RNA SPEC QL NAA+PROBE: NOT DETECTED
SPECIMEN SOURCE: NORMAL

## 2020-09-28 ENCOUNTER — ANCILLARY PROCEDURE (OUTPATIENT)
Dept: GENERAL RADIOLOGY | Facility: CLINIC | Age: 60
End: 2020-09-28
Attending: PEDIATRICS
Payer: COMMERCIAL

## 2020-09-28 ENCOUNTER — OFFICE VISIT (OUTPATIENT)
Dept: ORTHOPEDICS | Facility: CLINIC | Age: 60
End: 2020-09-28
Payer: COMMERCIAL

## 2020-09-28 VITALS
DIASTOLIC BLOOD PRESSURE: 82 MMHG | SYSTOLIC BLOOD PRESSURE: 138 MMHG | WEIGHT: 206 LBS | HEIGHT: 68 IN | BODY MASS INDEX: 31.22 KG/M2

## 2020-09-28 DIAGNOSIS — M25.511 ACUTE PAIN OF RIGHT SHOULDER: ICD-10-CM

## 2020-09-28 DIAGNOSIS — M79.671 ACUTE FOOT PAIN, RIGHT: ICD-10-CM

## 2020-09-28 DIAGNOSIS — M19.011 ARTHRITIS OF RIGHT ACROMIOCLAVICULAR JOINT: ICD-10-CM

## 2020-09-28 DIAGNOSIS — M75.101 ROTATOR CUFF SYNDROME OF RIGHT SHOULDER: Primary | ICD-10-CM

## 2020-09-28 DIAGNOSIS — M79.674 GREAT TOE PAIN, RIGHT: ICD-10-CM

## 2020-09-28 DIAGNOSIS — M19.079 ARTHRITIS OF GREAT TOE AT METATARSOPHALANGEAL JOINT: ICD-10-CM

## 2020-09-28 PROCEDURE — 99214 OFFICE O/P EST MOD 30 MIN: CPT | Performed by: PEDIATRICS

## 2020-09-28 PROCEDURE — 73630 X-RAY EXAM OF FOOT: CPT | Mod: RT

## 2020-09-28 PROCEDURE — 73030 X-RAY EXAM OF SHOULDER: CPT | Mod: RT

## 2020-09-28 ASSESSMENT — MIFFLIN-ST. JEOR: SCORE: 1713.91

## 2020-09-28 NOTE — RESULT ENCOUNTER NOTE
These results were discussed during office visit.    Stacey Tanner MD, CAQ  Primary Care Sports Medicine  [facility]

## 2020-09-28 NOTE — PROGRESS NOTES
Sports Medicine Clinic Visit    PCP: Charly Castillo    Adan Oliver is a 60 year old male who is seen as a self referral presenting with right foot pain and right shoulder.    Injury:  1) Toed: He reports pain in the right great toe and medial foot for 2 weeks. His pain increases with walking and standing. He has used OTC arch supports but this has not changed the pain.     2) Shoulder:  He reports right shoulder pain and crepitis for one month. His pain increases with reaching behind his back and overhead.    Also noted varied muscle cramps, sister with rheumatoid arthritis    Location of Pain: right foot and shoulder  Duration of Pain: 1 day(s)  Rating of Pain at worst: 8/10  Rating of Pain Currently: 1/10  Symptoms are better with: Rest and Elevation  Symptoms are worse with: overhead motions, walking and standing  Additional Features:   Positive: popping, grinding and weakness   Negative: swelling, bruising, catching, locking, instability, paresthesias and numbness  Other evaluation and/or treatments so far consists of: Nothing  Prior History of related problems: Chronic muscle cramps and lower back pain    Social History: grocery shopping for elderly.    Review of Systems  Skin: no bruising, no swelling  Musculoskeletal: as above  Neurologic: no numbness, paresthesias  Remainder of review of systems is negative including constitutional, CV, pulmonary, GI, except as noted in HPI or medical history.    Patient's current problem list, past medical and surgical history, and family history were reviewed.    Patient Active Problem List   Diagnosis     Esophageal reflux     Hyperlipidemia with target LDL less than 130     Reid esophagus     Head ache     Memory changes     Insomnia, psychophysiological     Family history of Alzheimer's disease     Anxiety     Major depressive disorder, single episode, mild (H)     Lumbar degenerative disc disease     Protrusion of lumbar intervertebral disc     Lumbar  "spinal stenosis     Alcohol use     Non morbid obesity due to excess calories     LUCIAN (obstructive sleep apnea)- 'moderate' (AHI 6)     Past Medical History:   Diagnosis Date     History of shingles- 2012 2012     Nephrolithiasis-2009 2009     Sleep apnea     pt denies sleep apnea but states he didnt' tolerate cpap     Past Surgical History:   Procedure Laterality Date     APPENDECTOMY  2000s     BLEPHAROPLASTY BILATERAL Bilateral 10/3/2018    Procedure: BLEPHAROPLASTY BILATERAL;;  Surgeon: Dany Berrios MD;  [facility]     CARPAL TUNNEL RELEASE RT/LT  1980s     COMBINED ESOPHAGOSCOPY, GASTROSCOPY, DUODENOSCOPY (EGD) WITH CO2 INSUFFLATION N/A 9/20/2019    Procedure: ESOPHAGOGASTRODUODENOSCOPY, WITH CO2 INSUFFLATION;  Surgeon: Filiberto Banuelos MD;  [facility]     ESOPHAGOSCOPY, GASTROSCOPY, DUODENOSCOPY (EGD), COMBINED N/A 9/20/2019    Procedure: Esophagogastroduodenoscopy, With Biopsy;  Surgeon: Filiberto Banuelos MD;  [facility]     REPAIR PTOSIS BILATERAL Bilateral 10/3/2018    Procedure: REPAIR PTOSIS BILATERAL;;  Surgeon: Dany Berrios MD;  [facility]     REPAIR PTOSIS BROW BILATERAL Bilateral 10/3/2018    Procedure: REPAIR PTOSIS BROW BILATERAL;;  Surgeon: Dany Berrios MD;  [facility]     SIGMOIDOSCOPY FLEXIBLE N/A 9/20/2019    Procedure: SIGMOIDOSCOPY, FLEXIBLE;  Surgeon: Filiberto Banuelos MD;  [facility]     TONSILLECTOMY  1980s     Family History   Problem Relation Age of Onset     Hypertension Mother      Diabetes Brother      Glaucoma No family hx of      Macular Degeneration No family hx of      Coronary Artery Disease No family hx of      Colon Cancer No family hx of      Retinal detachment No family hx of          Objective  /82   Ht 1.727 m (5' 8\")   Wt 93.4 kg (206 lb)   BMI 31.32 kg/m      GENERAL APPEARANCE: healthy, alert and no distress   GAIT: NORMAL  SKIN: no suspicious lesions or rashes  HEENT: Sclera clear, " anicteric  CV: good peripheral pulses  RESP: Breathing not labored  NEURO: Normal strength and tone, mentation intact and speech normal  PSYCH:  mentation appears normal and affect normal/bright    Right Foot and Ankle Exam:  Inspection:       no visible ecchymosis       no visible edema or effusion    Foot inspection:       no deformity noted    Tender:       Great toe MTP joint and into extensor tendon    Non-Tender:       remainder of ankle and foot right    ROM:        Full active and passive ROM, ankle dorsiflexion, plantarflexion, inversion, eversion, great toe dorsiflexion, remainder of toes, midfoot and subtalar right  - some pain with great toe dorsiflexion and plantarflexion    Strength:       ankle dorsiflexion 5/5 right       plantarflexion 5/5 right       inversion 5/5 right       eversion 5/5 right       great toe dorsiflexion 5/5 right       great toe plantarflexion 5/5 right    Gait:       normal    Neurovascular:       2+ peripheral pulses bilaterally and brisk capillary refill       sensation grossly intact    Bilateral Shoulder exam    Inspection and Posture:       normal    Skin:        no visible deformities    Tender:        subacromial space right    Non Tender:       remainder of shoulder bilateral    ROM:        Full active and passive ROM with flexion, extension, abduction, internal and external rotation right       asymmetric scapular motion    Painful motions:       end range flexion and elevation right    Strength:        abduction 5/5 bilateral       flexion 5/5 bilateral       internal rotation 5/5 bilateral       external rotation 5/5 bilateral    Impingement testing:       neg (-) Neer right       neg (-) Elena right       positive (+) O'jonn right    Sensation:        normal sensation over shoulder and upper extremity     Radiology  I ordered, visualized and reviewed these images with the patient  Xr Foot Right G/e 3 Views  Result Date: 9/28/2020  Examination:  XR FOOT RT G/E 3  VW Date:  9/28/2020 1:53 PM Clinical Information: Acute foot pain, right Additional Information: none Comparison: none   Impression: Right foot negative for fracture, erosion, or bone lesion. Moderate joint space narrowing at the first MTP joint with moderate hypertrophic spurring, particularly on the dorsal aspect of the metatarsal head. Normal joint alignment is maintained. Normal joint alignment and spacing in the right foot elsewhere. No significant soft tissue abnormality in the right foot. HAMILTON MARACNO MD    Xr Shoulder Right G/e 3 Views  Result Date: 9/28/2020  Examination:  XR SHOULDER RT G/E 3 VW Date:  9/28/2020 1:53 PM Clinical Information: Acute pain of right shoulder Additional Information: none Comparison: none   Impression: 1.  Negative for acute fracture or joint malalignment. 2.  Mild degree of hypertrophic bone formation at the greater trochanter, may be sequela of old fracture versus chronic rotator cuff tendinopathy. 3.  Small subacromial enthesophyte, may predispose to subacromial impingement. 4.  Mild arthrosis at the AC joint, which is normally aligned. 5.  Normal glenohumeral joint spacing and alignment. HAMILTON MARCANO MD    Assessment:  1. Rotator cuff syndrome of right shoulder    2. Great toe pain, right    3. 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 Arthritis of right acromioclavicular joint      We discussed these other possible diagnosis: Toe Tendonitis    Plan:  - Today's Plan of Care:  1) Toe:  - Consider inserts or Dynaflex  - Referral for US guided injection  - Continue with relative rest and activity modification, Ice, Compression, and Elevation.  Can apply ice 10-15 minutes 3-4 times per day as needed.  - Consider physical therapy or walking boot    2) Shoulder:  - Home Exercise Program  - Consider formal physical therapy or MRI    3) Muscle Cramps:  - Rheumatology referral, can also follow up with PCP    Follow Up: 6 - 8 weeks and as  needed    Concerning signs and symptoms were reviewed.  The patient expressed understanding of this management plan and all questions were answered at this time.    Stacey Tanner MD CAQ  Primary Care Sports Medicine  [facility]

## 2020-09-28 NOTE — RESULT ENCOUNTER NOTE
These results were discussed during office visit.    Stacey Tanner MD, CAQ  Primary Care Sports Medicine  [facility]

## 2020-09-28 NOTE — PATIENT INSTRUCTIONS
We discussed these other possible diagnosis: Toe Tendonitis    Plan:  - Today's Plan of Care:  1) Toe:  - Consider inserts or Dynaflex  - Referral for US guided injection  - Continue with relative rest and activity modification, Ice, Compression, and Elevation.  Can apply ice 10-15 minutes 3-4 times per day as needed.  - Consider physical therapy or walking boot    2) Shoulder:  - Home Exercise Program  - Consider formal physical therapy or MRI  - Rheumatology referral    Follow Up: 6 - 8 weeks and as needed    If you have any further questions for your physician or physician s care team you can call [phone] and use option 3 to leave a voice message. Calls received during business hours will be returned same day.

## 2020-09-28 NOTE — LETTER
9/28/2020         RE: Adan Oliver  [address]  [place] [address]        Dear Colleague,    Thank you for referring your patient, Adan Oliver, to the [facility]. Please see a copy of my visit note below.    Sports Medicine Clinic Visit    PCP: Charly Castillo    Adan Oliver is a 60 year old male who is seen as a self referral presenting with right foot pain and right shoulder.    Injury:  1) Toed: He reports pain in the right great toe and medial foot for 2 weeks. His pain increases with walking and standing. He has used OTC arch supports but this has not changed the pain.     2) Shoulder:  He reports right shoulder pain and crepitis for one month. His pain increases with reaching behind his back and overhead.    Also noted varied muscle cramps, sister with rheumatoid arthritis    Location of Pain: right foot and shoulder  Duration of Pain: 1 day(s)  Rating of Pain at worst: 8/10  Rating of Pain Currently: 1/10  Symptoms are better with: Rest and Elevation  Symptoms are worse with: overhead motions, walking and standing  Additional Features:   Positive: popping, grinding and weakness   Negative: swelling, bruising, catching, locking, instability, paresthesias and numbness  Other evaluation and/or treatments so far consists of: Nothing  Prior History of related problems: Chronic muscle cramps and lower back pain    Social History: grocery shopping for elderly.    Review of Systems  Skin: no bruising, no swelling  Musculoskeletal: as above  Neurologic: no numbness, paresthesias  Remainder of review of systems is negative including constitutional, CV, pulmonary, GI, except as noted in HPI or medical history.    Patient's current problem list, past medical and surgical history, and family history were reviewed.    Patient Active Problem List   Diagnosis     Esophageal reflux     Hyperlipidemia with target LDL less than 130     Reid esophagus      Head ache     Memory changes     Insomnia, psychophysiological     Family history of Alzheimer's disease     Anxiety     Major depressive disorder, single episode, mild (H)     Lumbar degenerative disc disease     Protrusion of lumbar intervertebral disc     Lumbar spinal stenosis     Alcohol use     Non morbid obesity due to excess calories     LUCIAN (obstructive sleep apnea)- 'moderate' (AHI 6)     Past Medical History:   Diagnosis Date     History of shingles- 2012 2012     Nephrolithiasis-2009 2009     Sleep apnea     pt denies sleep apnea but states he didnt' tolerate cpap     Past Surgical History:   Procedure Laterality Date     APPENDECTOMY  2000s     BLEPHAROPLASTY BILATERAL Bilateral 10/3/2018    Procedure: BLEPHAROPLASTY BILATERAL;;  Surgeon: Dany Berrios MD;  [facility]     CARPAL TUNNEL RELEASE RT/LT  1980s     COMBINED ESOPHAGOSCOPY, GASTROSCOPY, DUODENOSCOPY (EGD) WITH CO2 INSUFFLATION N/A 9/20/2019    Procedure: ESOPHAGOGASTRODUODENOSCOPY, WITH CO2 INSUFFLATION;  Surgeon: Filiberto Banuelos MD;  [facility]     ESOPHAGOSCOPY, GASTROSCOPY, DUODENOSCOPY (EGD), COMBINED N/A 9/20/2019    Procedure: Esophagogastroduodenoscopy, With Biopsy;  Surgeon: Filiberto Banuelos MD;  [facility]     REPAIR PTOSIS BILATERAL Bilateral 10/3/2018    Procedure: REPAIR PTOSIS BILATERAL;;  Surgeon: Dany Berrios MD;  [facility]     REPAIR PTOSIS BROW BILATERAL Bilateral 10/3/2018    Procedure: REPAIR PTOSIS BROW BILATERAL;;  Surgeon: Dany Berrios MD;  [facility]     SIGMOIDOSCOPY FLEXIBLE N/A 9/20/2019    Procedure: SIGMOIDOSCOPY, FLEXIBLE;  Surgeon: Filiberto Banuelos MD;  [facility]     TONSILLECTOMY  1980s     Family History   Problem Relation Age of Onset     Hypertension Mother      Diabetes Brother      Glaucoma No family hx of      Macular Degeneration No family hx of      Coronary Artery Disease No family hx of      Colon Cancer No family hx of   "    Retinal detachment No family hx of          Objective  /82   Ht 1.727 m (5' 8\")   Wt 93.4 kg (206 lb)   BMI 31.32 kg/m      GENERAL APPEARANCE: healthy, alert and no distress   GAIT: NORMAL  SKIN: no suspicious lesions or rashes  HEENT: Sclera clear, anicteric  CV: good peripheral pulses  RESP: Breathing not labored  NEURO: Normal strength and tone, mentation intact and speech normal  PSYCH:  mentation appears normal and affect normal/bright    Right Foot and Ankle Exam:  Inspection:       no visible ecchymosis       no visible edema or effusion    Foot inspection:       no deformity noted    Tender:       Great toe MTP joint and into extensor tendon    Non-Tender:       remainder of ankle and foot right    ROM:        Full active and passive ROM, ankle dorsiflexion, plantarflexion, inversion, eversion, great toe dorsiflexion, remainder of toes, midfoot and subtalar right  - some pain with great toe dorsiflexion and plantarflexion    Strength:       ankle dorsiflexion 5/5 right       plantarflexion 5/5 right       inversion 5/5 right       eversion 5/5 right       great toe dorsiflexion 5/5 right       great toe plantarflexion 5/5 right    Gait:       normal    Neurovascular:       2+ peripheral pulses bilaterally and brisk capillary refill       sensation grossly intact    Bilateral Shoulder exam    Inspection and Posture:       normal    Skin:        no visible deformities    Tender:        subacromial space right    Non Tender:       remainder of shoulder bilateral    ROM:        Full active and passive ROM with flexion, extension, abduction, internal and external rotation right       asymmetric scapular motion    Painful motions:       end range flexion and elevation right    Strength:        abduction 5/5 bilateral       flexion 5/5 bilateral       internal rotation 5/5 bilateral       external rotation 5/5 bilateral    Impingement testing:       neg (-) Neer right       neg (-) Elena right       " positive (+) O'jonn right    Sensation:        normal sensation over shoulder and upper extremity     Radiology  I ordered, visualized and reviewed these images with the patient  Xr Foot Right G/e 3 Views  Result Date: 9/28/2020  Examination:  XR FOOT RT G/E 3 VW Date:  9/28/2020 1:53 PM Clinical Information: Acute foot pain, right Additional Information: none Comparison: none   Impression: Right foot negative for fracture, erosion, or bone lesion. Moderate joint space narrowing at the first MTP joint with moderate hypertrophic spurring, particularly on the dorsal aspect of the metatarsal head. Normal joint alignment is maintained. Normal joint alignment and spacing in the right foot elsewhere. No significant soft tissue abnormality in the right foot. HAMILTON MARCANO MD    Xr Shoulder Right G/e 3 Views  Result Date: 9/28/2020  Examination:  XR SHOULDER RT G/E 3 VW Date:  9/28/2020 1:53 PM Clinical Information: Acute pain of right shoulder Additional Information: none Comparison: none   Impression: 1.  Negative for acute fracture or joint malalignment. 2.  Mild degree of hypertrophic bone formation at the greater trochanter, may be sequela of old fracture versus chronic rotator cuff tendinopathy. 3.  Small subacromial enthesophyte, may predispose to subacromial impingement. 4.  Mild arthrosis at the AC joint, which is normally aligned. 5.  Normal glenohumeral joint spacing and alignment. HAMILTON MARCANO MD    Assessment:  1. Rotator cuff syndrome of right shoulder    2. Great toe pain, right    3. 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 Arthritis of right acromioclavicular joint      We discussed these other possible diagnosis: Toe Tendonitis    Plan:  - Today's Plan of Care:  1) Toe:  - Consider inserts or Dynaflex  - Referral for US guided injection  - Continue with relative rest and activity modification, Ice, Compression, and Elevation.  Can apply ice 10-15 minutes 3-4 times per day  as needed.  - Consider physical therapy or walking boot    2) Shoulder:  - Home Exercise Program  - Consider formal physical therapy or MRI    3) Muscle Cramps:  - Rheumatology referral, can also follow up with PCP    Follow Up: 6 - 8 weeks and as needed    Concerning signs and symptoms were reviewed.  The patient expressed understanding of this management plan and all questions were answered at this time.    Stacey Tanner MD [facility]  Primary Care Sports Medicine [facility]      Again, thank you for allowing me to participate in the care of your patient.        Sincerely,        Stacey Tanner MD

## 2020-10-01 ENCOUNTER — VIRTUAL VISIT (OUTPATIENT)
Dept: FAMILY MEDICINE | Facility: CLINIC | Age: 60
End: 2020-10-01
Payer: COMMERCIAL

## 2020-10-01 DIAGNOSIS — E78.5 HYPERLIPIDEMIA LDL GOAL <130: ICD-10-CM

## 2020-10-01 DIAGNOSIS — Z13.1 SCREENING FOR DIABETES MELLITUS: ICD-10-CM

## 2020-10-01 DIAGNOSIS — R47.89 WORD FINDING DIFFICULTY: ICD-10-CM

## 2020-10-01 DIAGNOSIS — Z12.5 SPECIAL SCREENING FOR MALIGNANT NEOPLASM OF PROSTATE: ICD-10-CM

## 2020-10-01 PROCEDURE — 99213 OFFICE O/P EST LOW 20 MIN: CPT | Mod: 95 | Performed by: FAMILY MEDICINE

## 2020-10-01 ASSESSMENT — PATIENT HEALTH QUESTIONNAIRE - PHQ9: SUM OF ALL RESPONSES TO PHQ QUESTIONS 1-9: 3

## 2020-10-01 NOTE — PROGRESS NOTES
"Adan Oliver is a 60 year old male who is being evaluated via a billable telephone visit.      The patient has been notified of following:     \"This telephone visit will be conducted via a call between you and your physician/provider. We have found that certain health care needs can be provided without the need for a physical exam.  This service lets us provide the care you need with a short phone conversation.  If a prescription is necessary we can send it directly to your pharmacy.  If lab work is needed we can place an order for that and you can then stop by our lab to have the test done at a later time.    Telephone visits are billed at different rates depending on your insurance coverage. During this emergency period, for some insurers they may be billed the same as an in-person visit.  Please reach out to your insurance provider with any questions.    If during the course of the call the physician/provider feels a telephone visit is not appropriate, you will not be charged for this service.\"    Patient has given verbal consent for Telephone visit?  Yes    What phone number would you like to be contacted at? [phone]    How would you like to obtain your AVS? Fredyhart    Subjective     Adan Oliver is a 60 year old male who presents via phone visit today for the following health issues:    HPI     Patient would like to discuss getting back on  Cholesterol medication.     Would also like to discuss possibly for stress due to being tired a lot.    Patient was out with family and was having some trouble finding the right words.     Would like recommendation for new PCP        Reviewed his concern for difficulty finding words. Occurred a few months ago. Resolved quickly. He had been drinking alcohol at the time. No other neuro symptoms    He has not been eating well during COVID. Concerned his cholesterol has risen    Recent blood pressure 138/82             Review of Systems   Constitutional, HEENT, " "cardiovascular, pulmonary, gi and gu systems are negative, except as otherwise noted.       Objective          Vitals:  No vitals were obtained today due to virtual visit.    healthy, alert and no distress  PSYCH: Alert and oriented times 3; coherent speech, normal   rate and volume, able to articulate logical thoughts, able   to abstract reason, no tangential thoughts, no hallucinations   or delusions  His affect is normal  RESP: No cough, no audible wheezing, able to talk in full sentences  Remainder of exam unable to be completed due to telephone visits    Future labs orderd        Assessment/Plan:    Assessment & Plan     Adan was seen today for recheck medication.    Diagnoses and all orders for this visit:    Hyperlipidemia LDL goal <130  -     Lipid panel reflex to direct LDL Fasting; Future    Special screening for malignant neoplasm of prostate  -     Prostate spec antigen screen; Future    Screening for diabetes mellitus  -     Basic metabolic panel  (Ca, Cl, CO2, Creat, Gluc, K, Na, BUN); Future    Word finding difficulty       presently stable but reviewed cocnerns for possible CVA if symptoms return. We will set up an in clinic evaluation and physical exam  He will get fasting labs before the appointment  BMI:   Estimated body mass index is 31.32 kg/m  as calculated from the following:    Height as of 9/28/20: 1.727 m (5' 8\").    Weight as of 9/28/20: 93.4 kg (206 lb).            Patient Instructions   Come in Monday for lab testing    We will call to assist with setting up an appt in the next few weeks.    Adam Castillo MD        Return in about 3 weeks (around 10/22/2020) for Physical Exam.    Charly Castillo MD, MD  [facility]      14  minutes with chart review, lab review and discussion with patient                  "

## 2020-10-01 NOTE — PATIENT INSTRUCTIONS
Come in Monday for lab testing    We will call to assist with setting up an appt in the next few weeks.    Adam Castillo MD

## 2020-10-02 ENCOUNTER — TELEPHONE (OUTPATIENT)
Dept: FAMILY MEDICINE | Facility: CLINIC | Age: 60
End: 2020-10-02

## 2020-10-02 NOTE — TELEPHONE ENCOUNTER
Left voicemail asking patient to call back and schedule appointment.    Thank you,  Tatianna SALINAS  [facility]  Team Alryn Coordinator

## 2020-10-05 DIAGNOSIS — K22.70 BARRETT'S ESOPHAGUS WITHOUT DYSPLASIA: Chronic | ICD-10-CM

## 2020-10-05 DIAGNOSIS — Z12.5 SPECIAL SCREENING FOR MALIGNANT NEOPLASM OF PROSTATE: ICD-10-CM

## 2020-10-05 DIAGNOSIS — Z13.1 SCREENING FOR DIABETES MELLITUS: ICD-10-CM

## 2020-10-05 DIAGNOSIS — E78.5 HYPERLIPIDEMIA LDL GOAL <130: ICD-10-CM

## 2020-10-05 PROCEDURE — 80061 LIPID PANEL: CPT | Performed by: FAMILY MEDICINE

## 2020-10-05 PROCEDURE — 36415 COLL VENOUS BLD VENIPUNCTURE: CPT | Performed by: FAMILY MEDICINE

## 2020-10-05 PROCEDURE — 80048 BASIC METABOLIC PNL TOTAL CA: CPT | Performed by: FAMILY MEDICINE

## 2020-10-05 PROCEDURE — G0103 PSA SCREENING: HCPCS | Performed by: FAMILY MEDICINE

## 2020-10-05 NOTE — TELEPHONE ENCOUNTER
Patient scheduled.    Thank you,  Tatianna SALINAS  [facility]  Team Arlyn Coordinator

## 2020-10-06 ENCOUNTER — TELEPHONE (OUTPATIENT)
Dept: FAMILY MEDICINE | Facility: CLINIC | Age: 60
End: 2020-10-06

## 2020-10-06 LAB
ANION GAP SERPL CALCULATED.3IONS-SCNC: 6 MMOL/L (ref 3–14)
BUN SERPL-MCNC: 14 MG/DL (ref 7–30)
CALCIUM SERPL-MCNC: 9 MG/DL (ref 8.5–10.1)
CHLORIDE SERPL-SCNC: 110 MMOL/L (ref 94–109)
CHOLEST SERPL-MCNC: 264 MG/DL
CO2 SERPL-SCNC: 21 MMOL/L (ref 20–32)
CREAT SERPL-MCNC: 1.14 MG/DL (ref 0.66–1.25)
GFR SERPL CREATININE-BSD FRML MDRD: 69 ML/MIN/{1.73_M2}
GLUCOSE SERPL-MCNC: 91 MG/DL (ref 70–99)
HDLC SERPL-MCNC: 47 MG/DL
LDLC SERPL CALC-MCNC: 180 MG/DL
NONHDLC SERPL-MCNC: 217 MG/DL
POTASSIUM SERPL-SCNC: 4.3 MMOL/L (ref 3.4–5.3)
PSA SERPL-ACNC: 0.89 UG/L (ref 0–4)
SODIUM SERPL-SCNC: 137 MMOL/L (ref 133–144)
TRIGL SERPL-MCNC: 186 MG/DL

## 2020-10-06 RX ORDER — ESOMEPRAZOLE MAGNESIUM 40 MG/1
CAPSULE, DELAYED RELEASE ORAL
Qty: 90 CAPSULE | Refills: 1 | Status: SHIPPED | OUTPATIENT
Start: 2020-10-06 | End: 2021-05-03

## 2020-10-06 NOTE — TELEPHONE ENCOUNTER
Central Prior Authorization Team   Phone: [phone]    PA Initiation    Medication: esomeprazole magnesium 40mg  Insurance Company:    Pharmacy Filling the Rx: [place], [place] - [address] [place] [address] [place]  Filling Pharmacy Phone: [phone]  Filling Pharmacy Fax: [phone]  Start Date: 10/6/2020

## 2020-10-06 NOTE — TELEPHONE ENCOUNTER
Prior Authorization Retail Medication Request    Medication/Dose: esomeprazole magnesium 40mg  ICD code (if different than what is on RX):    Previously Tried and Failed:    Rationale:      Insurance Name:  MALIK WOODRUFF  Insurance ID:  [id number]      Pharmacy Information (if different than what is on RX)  Name:    Phone:      Thank You  Janel Oglesby CPhT  [facility] [place]

## 2020-10-07 NOTE — TELEPHONE ENCOUNTER
Note this is for a qty limit override - patient exceeded max of 120 in 365.    Deja Hood PharmD, [facility]  Pharmacist Manager   [facility]  [phone]

## 2020-10-07 NOTE — TELEPHONE ENCOUNTER
Prior Authorization Approval    Authorization Effective Date: 9/19/2020  Authorization Expiration Date: 10/7/2021  Medication: esomeprazole magnesium 40mg-APPROVED  Approved Dose/Quantity:    Reference #:     Insurance Company:    Expected CoPay:       CoPay Card Available:      Foundation Assistance Needed:    Which Pharmacy is filling the prescription (Not needed for infusion/clinic administered): [facility] [place] [address].  Pharmacy Notified: Yes  Patient Notified: Yes  **Instructed pharmacy to notify patient when script is ready to /ship.**

## 2020-10-26 ENCOUNTER — OFFICE VISIT (OUTPATIENT)
Dept: FAMILY MEDICINE | Facility: CLINIC | Age: 60
End: 2020-10-26
Payer: COMMERCIAL

## 2020-10-26 VITALS
WEIGHT: 206 LBS | TEMPERATURE: 98.1 F | HEIGHT: 68 IN | OXYGEN SATURATION: 98 % | DIASTOLIC BLOOD PRESSURE: 80 MMHG | HEART RATE: 72 BPM | BODY MASS INDEX: 31.22 KG/M2 | SYSTOLIC BLOOD PRESSURE: 124 MMHG

## 2020-10-26 DIAGNOSIS — F10.10 ALCOHOL ABUSE: ICD-10-CM

## 2020-10-26 DIAGNOSIS — R06.09 DOE (DYSPNEA ON EXERTION): Primary | ICD-10-CM

## 2020-10-26 DIAGNOSIS — H53.9 VISION CHANGES: ICD-10-CM

## 2020-10-26 DIAGNOSIS — Z20.822 EXPOSURE TO COVID-19 VIRUS: ICD-10-CM

## 2020-10-26 DIAGNOSIS — E78.5 HYPERLIPIDEMIA LDL GOAL <130: ICD-10-CM

## 2020-10-26 PROCEDURE — 99214 OFFICE O/P EST MOD 30 MIN: CPT | Performed by: FAMILY MEDICINE

## 2020-10-26 PROCEDURE — U0003 INFECTIOUS AGENT DETECTION BY NUCLEIC ACID (DNA OR RNA); SEVERE ACUTE RESPIRATORY SYNDROME CORONAVIRUS 2 (SARS-COV-2) (CORONAVIRUS DISEASE [COVID-19]), AMPLIFIED PROBE TECHNIQUE, MAKING USE OF HIGH THROUGHPUT TECHNOLOGIES AS DESCRIBED BY CMS-2020-01-R: HCPCS | Performed by: FAMILY MEDICINE

## 2020-10-26 RX ORDER — ATORVASTATIN CALCIUM 20 MG/1
20 TABLET, FILM COATED ORAL DAILY
Qty: 90 TABLET | Refills: 3 | Status: SHIPPED | OUTPATIENT
Start: 2020-10-26 | End: 2021-01-18 | Stop reason: ALTCHOICE

## 2020-10-26 ASSESSMENT — ENCOUNTER SYMPTOMS
EYE PAIN: 0
DIZZINESS: 0
CHILLS: 0
COUGH: 0
FEVER: 0
DIARRHEA: 0
FREQUENCY: 1
NERVOUS/ANXIOUS: 1
HEMATOCHEZIA: 0
ABDOMINAL PAIN: 0
CONSTIPATION: 0
HEMATURIA: 0

## 2020-10-26 ASSESSMENT — MIFFLIN-ST. JEOR: SCORE: 1713.91

## 2020-10-26 ASSESSMENT — PAIN SCALES - GENERAL: PAINLEVEL: NO PAIN (0)

## 2020-10-26 NOTE — PROGRESS NOTES
"Subjective     Adan Oliver is a 60 year old male who presents to clinic today for the following health issues:    HPI        Patient has a chest pain that he gets sometimes, has not happened for a few months, tends to happen with exercise.    Has a fluttering and tunnel vision in left eye mostly and sometimes in right eye.    Has has trouble getting a full breath mostly at night.    Gets a cramp at times and when it finally goes away, it seems to move to another spot.     Would like to get back on his cholesterol medication    Wondering about getting a stress test            Review of Systems   CONSTITUTIONAL: NEGATIVE for fever, chills, change in weight  EYES: no vision changes at this time but notes intermittent let peripheral visual lights. He had an eye exam last week and it was normal.   ENT/MOUTH: NEGATIVE for ear, mouth and throat problems  RESP:POSITIVE for dyspnea on exertion and NEGATIVE for cough-non productive and cough-productive  He stated his mother who is in a memory care unit has COVID. He last saw her 10 days before her diagnosis and did not have any close or direct contact. She is asymptomatic   CV: POSITIVE for lower chest wall epigastric pain and NEGATIVE for cyanosis, diaphoresis, irregular heart beat, lower extremity edema, orthopnea and syncope or near-syncope  MUSCULOSKELETAL: lower chest wall pain  NEURO: NEGATIVE for weakness, dizziness or paresthesias he did have an episode of difficulty finding words 2-3 months ago.  This last only a few seconds. He was drinking johnathan the time. No further episodes  PSYCHIATRIC: hx of depression noted in 2015. He states this is no concern at this time.       Objective    /80 (BP Location: Right arm, Patient Position: Sitting, Cuff Size: Adult Large)   Pulse 72   Temp 98.1  F (36.7  C) (Oral)   Ht 1.727 m (5' 8\")   Wt 93.4 kg (206 lb)   SpO2 98%   BMI 31.32 kg/m    Body mass index is 31.32 kg/m .  Physical Exam   GENERAL: alert and no " "distress  NECK: no adenopathy, no asymmetry, masses, or scars and thyroid normal to palpation  RESP: lungs clear to auscultation - no rales, rhonchi or wheezes  CV: regular rate and rhythm, normal S1 S2, no S3 or S4, no murmur, click or rub, no peripheral edema and peripheral pulses strong  ABDOMEN: soft, nontender, no hepatosplenomegaly, no masses and bowel sounds normal  MS: no gross musculoskeletal defects noted, no edema            Assessment & Plan     Adan was seen today for physical.    Diagnoses and all orders for this visit:    SRIVASTAVA (dyspnea on exertion)  -     Cardiopulmonary Stress Test - Adult; Future    Hyperlipidemia LDL goal <130  -     atorvastatin (LIPITOR) 20 MG tablet; Take 1 tablet (20 mg) by mouth daily    Vision changes  -     US Carotid Bilateral; Future    Alcohol abuse  -     Cancel: Hepatic panel (Albumin, ALT, AST, Bili, Alk Phos, TP)  -     Cancel: CBC with platelets and differential  -     **ALT FUTURE anytime; Future  -     **CBC with platelets FUTURE anytime; Future    Exposure to COVID-19 virus  -     Asymptomatic COVID-19 Virus (Coronavirus) by PCR       vague symptoms. Visual changes possible migraine variant with recent normal eye exam.  After review we will do stress test and carotid US. Distant exposure to COVID.  assymptomatic today. He will come back for  Lab testng  BMI:   Estimated body mass index is 31.32 kg/m  as calculated from the following:    Height as of this encounter: 1.727 m (5' 8\").    Weight as of this encounter: 93.4 kg (206 lb).                Return if symptoms worsen or fail to improve.    Charly Castillo MD, MD  [facility]    "

## 2020-10-26 NOTE — PATIENT INSTRUCTIONS
Preventive Health Recommendations  Male Ages 50 - 64    Yearly exam:             See your health care provider every year in order to  o   Review health changes.   o   Discuss preventive care.    o   Review your medicines if your doctor has prescribed any.     Have a cholesterol test every 5 years, or more frequently if you are at risk for high cholesterol/heart disease.     Have a diabetes test (fasting glucose) every three years. If you are at risk for diabetes, you should have this test more often.     Have a colonoscopy at age 50, or have a yearly FIT test (stool test). These exams will check for colon cancer.      Talk with your health care provider about whether or not a prostate cancer screening test (PSA) is right for you.    You should be tested each year for STDs (sexually transmitted diseases), if you re at risk.     Shots: Get a flu shot each year. Get a tetanus shot every 10 years.     Nutrition:    Eat at least 5 servings of fruits and vegetables daily.     Eat whole-grain bread, whole-wheat pasta and brown rice instead of white grains and rice.     Get adequate Calcium and Vitamin D.     Lifestyle    Exercise for at least 150 minutes a week (30 minutes a day, 5 days a week). This will help you control your weight and prevent disease.     Limit alcohol to one drink per day.     No smoking.     Wear sunscreen to prevent skin cancer.     See your dentist every six months for an exam and cleaning.     See your eye doctor every 1 to 2 years.    Scheduling Appointments  Set up stress test and carotid [facility]. All locations.  Call: [phone]    .    Orders Placed This Encounter     US Carotid Bilateral     Standing Status:   Future     Standing Expiration Date:   10/26/2021     Order Specific Question:   Priority     Answer:   Routine     Hepatic panel (Albumin, ALT, AST, Bili, Alk Phos, TP)     CBC with platelets and differential     Last Lab Result: Hemoglobin (g/dL)        Date                     Value                 09/26/2019               16.6             ----------     Asymptomatic COVID-19 Virus (Coronavirus) by PCR     Order Specific Question:   Reason?     Answer:   No Procedure     Order Specific Question:   Asymptomatic or Symptomatic:     Answer:   Asymptomatic     Order Specific Question:   Symptomatic for COVID-19 as defined by CDC?     Answer:   No     Order Specific Question:   Employed in healthcare setting?     Answer:   No     Order Specific Question:   Resident in a congregate care/living setting?     Answer:   No     Order Specific Question:   First COVID-19 test?     Answer:   No     atorvastatin (LIPITOR) 20 MG tablet     Sig: Take 1 tablet (20 mg) by mouth daily     Dispense:  90 tablet     Refill:  3     We are waiting to see where the [facility] providers are going to end up,       Dr Xaiv Gold, Dr Prince Greenwood or Dr Johnathon Diaz. They may be at our site or the [facility]. We will know on the next few weeks.

## 2020-10-27 LAB
SARS-COV-2 RNA SPEC QL NAA+PROBE: NOT DETECTED
SPECIMEN SOURCE: NORMAL

## 2020-10-29 DIAGNOSIS — F10.10 ALCOHOL ABUSE: ICD-10-CM

## 2020-10-29 LAB
ERYTHROCYTE [DISTWIDTH] IN BLOOD BY AUTOMATED COUNT: 13.8 % (ref 10–15)
HCT VFR BLD AUTO: 44.6 % (ref 40–53)
HGB BLD-MCNC: 14.9 G/DL (ref 13.3–17.7)
MCH RBC QN AUTO: 30.9 PG (ref 26.5–33)
MCHC RBC AUTO-ENTMCNC: 33.4 G/DL (ref 31.5–36.5)
MCV RBC AUTO: 93 FL (ref 78–100)
PLATELET # BLD AUTO: 435 10E9/L (ref 150–450)
RBC # BLD AUTO: 4.82 10E12/L (ref 4.4–5.9)
WBC # BLD AUTO: 8.6 10E9/L (ref 4–11)

## 2020-10-29 PROCEDURE — 36415 COLL VENOUS BLD VENIPUNCTURE: CPT | Performed by: FAMILY MEDICINE

## 2020-10-29 PROCEDURE — 85027 COMPLETE CBC AUTOMATED: CPT | Performed by: FAMILY MEDICINE

## 2020-10-29 PROCEDURE — 84460 ALANINE AMINO (ALT) (SGPT): CPT | Performed by: FAMILY MEDICINE

## 2020-10-30 LAB — ALT SERPL W P-5'-P-CCNC: 26 U/L (ref 0–70)

## 2020-11-04 ENCOUNTER — OFFICE VISIT (OUTPATIENT)
Dept: ORTHOPEDICS | Facility: CLINIC | Age: 60
End: 2020-11-04
Payer: COMMERCIAL

## 2020-11-04 VITALS
WEIGHT: 206 LBS | BODY MASS INDEX: 31.22 KG/M2 | SYSTOLIC BLOOD PRESSURE: 128 MMHG | HEIGHT: 68 IN | DIASTOLIC BLOOD PRESSURE: 84 MMHG

## 2020-11-04 DIAGNOSIS — M79.674 GREAT TOE PAIN, RIGHT: ICD-10-CM

## 2020-11-04 DIAGNOSIS — M19.079 ARTHRITIS OF GREAT TOE AT METATARSOPHALANGEAL JOINT: ICD-10-CM

## 2020-11-04 PROCEDURE — 20604 DRAIN/INJ JOINT/BURSA W/US: CPT | Mod: RT | Performed by: FAMILY MEDICINE

## 2020-11-04 RX ORDER — TRIAMCINOLONE ACETONIDE 40 MG/ML
40 INJECTION, SUSPENSION INTRA-ARTICULAR; INTRAMUSCULAR
Status: DISCONTINUED | OUTPATIENT
Start: 2020-11-04 | End: 2021-06-02

## 2020-11-04 RX ORDER — ROPIVACAINE HYDROCHLORIDE 5 MG/ML
1 INJECTION, SOLUTION EPIDURAL; INFILTRATION; PERINEURAL
Status: DISCONTINUED | OUTPATIENT
Start: 2020-11-04 | End: 2021-06-02

## 2020-11-04 RX ADMIN — ROPIVACAINE HYDROCHLORIDE 1 ML: 5 INJECTION, SOLUTION EPIDURAL; INFILTRATION; PERINEURAL at 11:50

## 2020-11-04 RX ADMIN — TRIAMCINOLONE ACETONIDE 40 MG: 40 INJECTION, SUSPENSION INTRA-ARTICULAR; INTRAMUSCULAR at 11:50

## 2020-11-04 ASSESSMENT — MIFFLIN-ST. JEOR: SCORE: 1713.91

## 2020-11-04 NOTE — PROGRESS NOTES
Adan Oliver  :  1960  DOS: 2020  MRN: [id number]    Sports Medicine Clinic Procedure    Ultrasound Guided Right Great Toe MTP Joint Injection    Clinical History: Gradual onset of right great toe, MTP joint pain over the past ~ 6+ weeks with prolonged walking & standing.      Diagnosis:   1. Great toe pain, right    2. 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      Referring Physician: Stacey Tanner MD  Small Joint Injection/Arthrocentesis: R great MTP    Date/Time: 2020 11:50 AM  Performed by: Danny Oleary DO  Authorized by: Danny Oleary DO   Indications:  Pain   Indications comment:  Osteoarthritis  Needle Size:  25 G  Guidance: ultrasound     Approach:  Dorsal  Location:  Great toe    Site:  R great MTP                    Medications:  40 mg triamcinolone 40 MG/ML; 1 mL ropivacaine 5 MG/ML        Outcome:  Tolerated well, no immediate complications  Procedure discussed: discussed risks, benefits, and alternatives    Consent Given by:  Patient  Timeout: timeout called immediately prior to procedure    Prep: patient was prepped and draped in usual sterile fashion            Impression:  Successful Right Great Toe MTP joint injection.    Plan:  Follow up with Dr Tannre as previously discussed.  Dynaflex insert available, if desired by patient.  Expectations and goals of CSI reviewed  Often 2-3 days for steroid effect, and can take up to two weeks for maximum effect  We discussed modified progressive pain-free activity as tolerated  Do not overuse in first two weeks if feeling better due to concern for vulnerability while steroid is working  Supportive care reviewed  All questions were answered today  Contact us with additional questions or concerns  Signs and sx of concern reviewed        Danny Oleary DO, CAQ  Primary Care Sports Medicine  [facility]

## 2020-11-04 NOTE — LETTER
2020         RE: Adan Oliver  [address]  [place] [address]        Dear Colleague,    Thank you for referring your patient, Adan Oliver, to the [facility]. Please see a copy of my visit note below.    Adan Oliver  :  1960  DOS: 2020  MRN: [id number]    Sports Medicine Clinic Procedure    Ultrasound Guided Right Great Toe MTP Joint Injection    Clinical History: Gradual onset of right great toe, MTP joint pain over the past ~ 6+ weeks with prolonged walking & standing.      Diagnosis:   1. Great toe pain, right    2. 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      Referring Physician: Stacey Tanner MD  Small Joint Injection/Arthrocentesis: R great MTP    Date/Time: 2020 11:50 AM  Performed by: Danny Oleary DO  Authorized by: Danny Oleary DO   Indications:  Pain   Indications comment:  Osteoarthritis  Needle Size:  25 G  Guidance: ultrasound     Approach:  Dorsal  Location:  Great toe    Site:  R great MTP                    Medications:  40 mg triamcinolone 40 MG/ML; 1 mL ropivacaine 5 MG/ML        Outcome:  Tolerated well, no immediate complications  Procedure discussed: discussed risks, benefits, and alternatives    Consent Given by:  Patient  Timeout: timeout called immediately prior to procedure    Prep: patient was prepped and draped in usual sterile fashion            Impression:  Successful Right Great Toe MTP joint injection.    Plan:  Follow up with Dr Tanner as previously discussed.  Dynaflex insert available, if desired by patient.  Expectations and goals of CSI reviewed  Often 2-3 days for steroid effect, and can take up to two weeks for maximum effect  We discussed modified progressive pain-free activity as tolerated  Do not overuse in first two weeks if feeling better due to concern for vulnerability while steroid is working  Supportive care reviewed  All questions  were answered today  Contact us with additional questions or concerns  Signs and sx of concern reviewed        Danny Oleary DO, CAQ  Primary Care Sports Medicine  [facility]       Again, thank you for allowing me to participate in the care of your patient.        Sincerely,        Danny Oleary DO

## 2020-11-10 ENCOUNTER — ANCILLARY PROCEDURE (OUTPATIENT)
Dept: ULTRASOUND IMAGING | Facility: CLINIC | Age: 60
End: 2020-11-10
Attending: FAMILY MEDICINE
Payer: COMMERCIAL

## 2020-11-10 DIAGNOSIS — H53.9 VISION CHANGES: ICD-10-CM

## 2020-11-16 DIAGNOSIS — R06.09 DOE (DYSPNEA ON EXERTION): Primary | ICD-10-CM

## 2020-11-17 ENCOUNTER — TELEPHONE (OUTPATIENT)
Dept: FAMILY MEDICINE | Facility: CLINIC | Age: 60
End: 2020-11-17

## 2020-11-17 NOTE — TELEPHONE ENCOUNTER
Routing to ordering Provider.    Patient having stress echo done tomorrow, is he ok to take his prescribed medications, Lipitor, Nexium, and IBU?    Patient already called to ask imagine and they deferred back to PCP.      Dana Randhawa RN

## 2020-11-17 NOTE — TELEPHONE ENCOUNTER
Left detailed VM for patient stating he can take his medications as scheduled, per below.    Christine Kelly RN

## 2020-11-17 NOTE — TELEPHONE ENCOUNTER
Reason for Call:  Other call back    Detailed comments: Patient has Echo Stress test tomorrow and would like to know what medication he can and can't take before test. Patient states that if he doesn't  phone then please leave a detailed VM. Please call back to advise.    Phone Number Patient can be reached at: Home number on file [phone] (home)    Best Time: Any    Can we leave a detailed message on this number? YES    Call taken on 11/17/2020 at 11:16 AM by Lazara Hill

## 2020-11-18 ENCOUNTER — ANCILLARY PROCEDURE (OUTPATIENT)
Dept: CARDIOLOGY | Facility: CLINIC | Age: 60
End: 2020-11-18
Attending: FAMILY MEDICINE
Payer: COMMERCIAL

## 2020-11-18 DIAGNOSIS — R06.09 DOE (DYSPNEA ON EXERTION): ICD-10-CM

## 2020-11-18 PROCEDURE — 93325 DOPPLER ECHO COLOR FLOW MAPG: CPT | Mod: 26 | Performed by: INTERNAL MEDICINE

## 2020-11-18 PROCEDURE — 93321 DOPPLER ECHO F-UP/LMTD STD: CPT | Mod: TC | Performed by: INTERNAL MEDICINE

## 2020-11-18 PROCEDURE — 93321 DOPPLER ECHO F-UP/LMTD STD: CPT | Mod: 26 | Performed by: INTERNAL MEDICINE

## 2020-11-18 PROCEDURE — 93325 DOPPLER ECHO COLOR FLOW MAPG: CPT | Mod: TC | Performed by: INTERNAL MEDICINE

## 2020-11-18 PROCEDURE — 93018 CV STRESS TEST I&R ONLY: CPT | Performed by: INTERNAL MEDICINE

## 2020-11-18 PROCEDURE — 99207 PR STATISTIC IV PUSH SINGLE INITIAL SUBSTANCE: CPT | Performed by: INTERNAL MEDICINE

## 2020-11-18 PROCEDURE — 93350 STRESS TTE ONLY: CPT | Mod: 26 | Performed by: INTERNAL MEDICINE

## 2020-11-18 PROCEDURE — 93017 CV STRESS TEST TRACING ONLY: CPT | Performed by: INTERNAL MEDICINE

## 2020-11-18 PROCEDURE — 93352 ADMIN ECG CONTRAST AGENT: CPT | Performed by: INTERNAL MEDICINE

## 2020-11-18 PROCEDURE — 93350 STRESS TTE ONLY: CPT | Mod: TC | Performed by: INTERNAL MEDICINE

## 2020-11-18 PROCEDURE — 93016 CV STRESS TEST SUPVJ ONLY: CPT | Performed by: INTERNAL MEDICINE

## 2020-11-18 RX ADMIN — Medication 3 ML: at 13:30

## 2021-01-10 ENCOUNTER — HEALTH MAINTENANCE LETTER (OUTPATIENT)
Age: 61
End: 2021-01-10

## 2021-01-11 ENCOUNTER — OFFICE VISIT (OUTPATIENT)
Dept: FAMILY MEDICINE | Facility: CLINIC | Age: 61
End: 2021-01-11
Payer: COMMERCIAL

## 2021-01-11 VITALS
BODY MASS INDEX: 33.82 KG/M2 | HEART RATE: 69 BPM | TEMPERATURE: 97.9 F | SYSTOLIC BLOOD PRESSURE: 107 MMHG | DIASTOLIC BLOOD PRESSURE: 69 MMHG | HEIGHT: 65 IN | WEIGHT: 203 LBS | OXYGEN SATURATION: 98 %

## 2021-01-11 DIAGNOSIS — E78.5 HYPERLIPIDEMIA WITH TARGET LDL LESS THAN 130: Primary | Chronic | ICD-10-CM

## 2021-01-11 DIAGNOSIS — E66.09 NON MORBID OBESITY DUE TO EXCESS CALORIES: Chronic | ICD-10-CM

## 2021-01-11 DIAGNOSIS — K22.70 BARRETT'S ESOPHAGUS WITHOUT DYSPLASIA: Chronic | ICD-10-CM

## 2021-01-11 DIAGNOSIS — M51.369 LUMBAR DEGENERATIVE DISC DISEASE: ICD-10-CM

## 2021-01-11 DIAGNOSIS — G47.33 OSA (OBSTRUCTIVE SLEEP APNEA): Chronic | ICD-10-CM

## 2021-01-11 DIAGNOSIS — R25.2 MUSCLE CRAMPS: ICD-10-CM

## 2021-01-11 PROCEDURE — 99214 OFFICE O/P EST MOD 30 MIN: CPT | Performed by: FAMILY MEDICINE

## 2021-01-11 ASSESSMENT — MIFFLIN-ST. JEOR: SCORE: 1655.17

## 2021-01-11 NOTE — PROGRESS NOTES
"  Assessment & Plan       ICD-10-CM    1. Hyperlipidemia with target LDL less than 130  E78.5    2. Muscle cramps  R25.2    3. LUCIAN (obstructive sleep apnea)- 'moderate' (AHI 6)  G47.33    4. Reid's esophagus without dysplasia  K22.70    5. Lumbar degenerative disc disease  M51.36    6. Non morbid obesity due to excess calories  E66.09      We discussed the above items in some detail  He will continue on the atorvastatin for now, but he may call or email me for a change to rosuvastatin if he feels like he is still having side effects as he finishes the prescription  I recommended he stay well-hydrated and do some general stretching and fitness exercises for the muscle cramps  We talked a good deal about the importance of weight loss and he will work on that  I suggested trying the Mediterranean diet  Continue same Nexium  Continue ibuprofen sparingly as needed for the back pain  Plan a repeat colonoscopy in October 2024 (based on his normal colonoscopy in October 2014 as well as a more recent flexible sigmoidoscopy in September 2019)       BMI:   Estimated body mass index is 33.62 kg/m  as calculated from the following:    Height as of this encounter: 1.655 m (5' 5.16\").    Weight as of this encounter: 92.1 kg (203 lb).   Weight management plan: Discussed healthy diet and exercise guidelines        Return in about 3 months (around 4/11/2021) for Physical Exam, Lab Work, Routine Visit.    Johnathon Diaz MD  [facility] HEIDI Arellano is a 60 year old who presents to clinic today for the following health issues     HPI       New Patient/Transfer of Care -- has a list of items he would like to discuss.    [facility], but Dr. Castillo is now retired.  The patient brought in a list of different items to discuss with various questions that he had.  He generally takes 3 over-the-counter ibuprofen tablets each morning for low back pain.  He will " "sometimes take 1 or 2 later in the day.  That seems to alleviate his back discomfort fairly well.  He remains on Nexium for a history of Reid's esophagus and GERD.  He sees GI for that.  He wondered what his next colonoscopy should be  He has been on atorvastatin for a couple of months or so and feels like he has had some fatigue and achiness on the medicine.  He wonders if there might be an alternative.  He did started taking it in the evening and that seems to be helpful, however.  He had questions about different diets that he could follow to lose weight.  He drinks 2 alcoholic drinks per day on average.  He has lost weight in the past on the Atkins diet, but then gained it back.    Patient Active Problem List   Diagnosis     Esophageal reflux     Hyperlipidemia with target LDL less than 130     Reid esophagus     Head ache     Memory changes     Insomnia, psychophysiological     Family history of Alzheimer's disease     Anxiety     Lumbar degenerative disc disease     Protrusion of lumbar intervertebral disc     Lumbar spinal stenosis     Alcohol use     Non morbid obesity due to excess calories     LUCIAN (obstructive sleep apnea)- 'moderate' (AHI 6)     Current Outpatient Medications   Medication     atorvastatin (LIPITOR) 20 MG tablet     esomeprazole (NEXIUM) 40 MG DR capsule     ibuprofen 200 MG capsule     Current Facility-Administered Medications   Medication     ropivacaine (NAROPIN) injection 1 mL     triamcinolone (KENALOG-40) injection 40 mg           Review of Systems   Constitutional, HEENT, cardiovascular, pulmonary, gi and gu systems are negative, except as otherwise noted.      Objective    /69 (BP Location: Left arm, Patient Position: Sitting, Cuff Size: Adult Large)   Pulse 69   Temp 97.9  F (36.6  C) (Oral)   Ht 1.655 m (5' 5.16\")   Wt 92.1 kg (203 lb)   SpO2 98%   BMI 33.62 kg/m    Body mass index is 33.62 kg/m .  Physical Exam   GENERAL: alert, no distress and over " weight    Previous lab results and old records were reviewed.

## 2021-01-18 ENCOUNTER — MYC MEDICAL ADVICE (OUTPATIENT)
Dept: FAMILY MEDICINE | Facility: CLINIC | Age: 61
End: 2021-01-18

## 2021-01-18 DIAGNOSIS — E78.5 HYPERLIPIDEMIA WITH TARGET LDL LESS THAN 130: Primary | Chronic | ICD-10-CM

## 2021-01-18 RX ORDER — ROSUVASTATIN CALCIUM 10 MG/1
10 TABLET, COATED ORAL DAILY
Qty: 90 TABLET | Refills: 2 | Status: SHIPPED | OUTPATIENT
Start: 2021-01-18 | End: 2021-11-05

## 2021-04-06 ENCOUNTER — NURSE TRIAGE (OUTPATIENT)
Dept: NURSING | Facility: CLINIC | Age: 61
End: 2021-04-06

## 2021-04-06 ENCOUNTER — NURSE TRIAGE (OUTPATIENT)
Dept: FAMILY MEDICINE | Facility: CLINIC | Age: 61
End: 2021-04-06

## 2021-04-06 NOTE — TELEPHONE ENCOUNTER
"Called patient who stated that he has been having intermittent shortness of breath with exertion. He notes that at rest, he does not experience shortness of breath. This has been going on for about a week now. He states that he has a strenuous job and works 5-6 hours/day and believes that at work he is more strained, therefore more short of breath. He denies any dizziness, chest pain, etc. He was advised per protocol to be seen in office today, however there are no openings this evening. He was advised to be seen in urgent care today. Provided him with urgent care location and requested that it is sent via [employer]. Will send this via [employer] as well.    Additional Information    Negative: Breathing stopped and hasn't returned    Negative: Choking on something    Negative: SEVERE difficulty breathing (e.g., struggling for each breath, speaks in single words, pulse > 120)    Negative: Bluish (or gray) lips or face    Negative: Difficult to awaken or acting confused (e.g., disoriented, slurred speech)    Negative: Passed out (i.e., fainted, collapsed and was not responding)    Negative: Wheezing started suddenly after medicine, an allergic food, or bee sting    Negative: Stridor    Negative: Slow, shallow and weak breathing    Negative: Sounds like a life-threatening emergency to the triager    Negative: MODERATE difficulty breathing (e.g., speaks in phrases, SOB even at rest, pulse 100-120) of new onset or worse than normal    Negative: Wheezing can be heard across the room    Negative: Drooling or spitting out saliva (because can't swallow)    Negative: Any history of prior \"blood clot\" in leg or lungs    Negative: Recent illness requiring prolonged bedrest (i.e., immobilization)    Negative: Hip or leg fracture in past 2 months (e.g., or had cast on leg or ankle)    Negative: Major surgery in the past month    Negative: Recent long-distance travel with prolonged time in car, bus, plane, or train (i.e., within past 2 " "weeks; 6 or  more hours duration)    Negative: Extra heart beats OR irregular heart beating   (i.e., \"palpitations\")    Negative: Fever > 103 F (39.4 C)    Negative: Fever > 101 F (38.3 C) and over 60 years of age    Negative: Fever > 100.0 F (37.8 C) and bedridden (e.g., nursing home patient, stroke, chronic illness, recovering from surgery)    Negative: Fever > 100.0 F (37.8 C) and diabetes mellitus or weak immune system (e.g., HIV positive, cancer chemo, splenectomy, organ transplant, chronic steroids)    Negative: Periods where breathing stops and then resumes normally and bedridden (e.g., nursing home patient, CVA)    Negative: Pregnant or postpartum (from 0 to 6 weeks after delivery)    Negative: Patient sounds very sick or weak to the triager    MILD difficulty breathing (e.g., minimal/no SOB at rest, SOB with walking, pulse < 100) of new onset or worse than normal    Answer Assessment - Initial Assessment Questions  1. RESPIRATORY STATUS: \"Describe your breathing?\" (e.g., wheezing, shortness of breath, unable to speak, severe coughing)       Intermittent shortness of breath- occurs when moving around it is hard to catch a breath, fine at rest  2. ONSET: \"When did this breathing problem begin?\"       1 week ago  3. PATTERN \"Does the difficult breathing come and go, or has it been constant since it started?\"       It is intermittent  4. SEVERITY: \"How bad is your breathing?\" (e.g., mild, moderate, severe)     - MILD: No SOB at rest, mild SOB with walking, speaks normally in sentences, can lay down, no retractions, pulse < 100.     - MODERATE: SOB at rest, SOB with minimal exertion and prefers to sit, cannot lie down flat, speaks in phrases, mild retractions, audible wheezing, pulse 100-120.     - SEVERE: Very SOB at rest, speaks in single words, struggling to breathe, sitting hunched forward, retractions, pulse > 120       Mild  5. RECURRENT SYMPTOM: \"Have you had difficulty breathing before?\" If so, ask: " "\"When was the last time?\" and \"What happened that time?\"       No, however it has been mentioned to Dr. Diaz before  6. CARDIAC HISTORY: \"Do you have any history of heart disease?\" (e.g., heart attack, angina, bypass surgery, angioplasty)       None  7. LUNG HISTORY: \"Do you have any history of lung disease?\"  (e.g., pulmonary embolus, asthma, emphysema)      None  8. CAUSE: \"What do you think is causing the breathing problem?\"       COVID-19 vaccine?  9. OTHER SYMPTOMS: \"Do you have any other symptoms? (e.g., dizziness, runny nose, cough, chest pain, fever)      None  10. PREGNANCY: \"Is there any chance you are pregnant?\" \"When was your last menstrual period?\"        N/A  11. TRAVEL: \"Have you traveled out of the country in the last month?\" (e.g., travel history, exposures)        No    Protocols used: BREATHING DIFFICULTY-A-OH    RIKA MaciasN ALVINA  [facility], [place]    "

## 2021-04-06 NOTE — TELEPHONE ENCOUNTER
"Patient calling - says he has been having trouble with \"labored breathing\" with activity over the last week.  When this occurs he feelsl heaviness in chest.   Says his energy has been low too over the past week.  This occurred yesterday and did not go away until he left work and went home to rest.  Says the shortness of breath and difficulty getting full breath has been occurring off and on for last 2 years.   Has been more often in the last week.  Says he recently lost 20+ pounds in about 6 weeks with low carb/Atkins diet.    No chest heaviness now.    Is fully vaccinated for COVID19.    Triaged to disposition of Go to ED Now.  Patient declines.  Says he has a call in to his PCP about getting an appointment this week.    Justina Diallo RN  Triage Nurse Advisor    COVID 19 Nurse Triage Plan/Patient Instructions    Please be aware that novel coronavirus (COVID-19) may be circulating in the community. If you develop symptoms such as fever, cough, or SOB or if you have concerns about the presence of another infection including coronavirus (COVID-19), please contact your health care provider or visit [email or web address].     Disposition/Instructions    ED Visit recommended. Follow protocol based instructions.     Bring Your Own Device:  Please also bring your smart device(s) (smart phones, tablets, laptops) and their charging cables for your personal use and to communicate with your care team during your visit.    Thank you for taking steps to prevent the spread of this virus.  o Limit your contact with others.  o Wear a simple mask to cover your cough.  o Wash your hands well and often.    Resources    [facility]: About COVID-19: [email or web address]    CDC: What to Do If You're Sick: www.cdc.gov/coronavirus/2019-ncov/about/steps-when-sick.html    CDC: Ending Home Isolation: www.cdc.gov/coronavirus/2019-ncov/hcp/disposition-in-home-patients.html     CDC: Caring for Someone: " "www.cdc.gov/coronavirus/2019-ncov/if-you-are-sick/care-for-someone.html     [facility]: Interim Guidance for Hospital Discharge to Home: www.health.[facility].[place]/diseases/coronavirus/hcp/hospdischarge.pdf    [facility] clinical trials (COVID-19 research studies): [email or web address].[facility]/n-clinical-trials     Below are the COVID-19 hotlines at the [place] Department of Health ([facility]). Interpreters are available.   o For health questions: Call 435-399-9349 or 1-264.179.6849 (7 a.m. to 7 p.m.)  o For questions about schools and childcare: Call 496-729-5858 or 1-293.900.9700 (7 a.m. to 7 p.m.)       Reason for Disposition    [1] Intermittent  chest pain or \"angina\" AND [2] increasing in severity or frequency  (Exception: pains lasting a few seconds)    Difficulty breathing    Additional Information    Negative: Severe difficulty breathing (e.g., struggling for each breath, speaks in single words)    Negative: Difficult to awaken or acting confused (e.g., disoriented, slurred speech)    Negative: Shock suspected (e.g., cold/pale/clammy skin, too weak to stand, low BP, rapid pulse)    Negative: [1] Chest pain lasts > 5 minutes AND [2] history of heart disease  (i.e., heart attack, bypass surgery, angina, angioplasty, CHF; not just a heart murmur)    Negative: [1] Chest pain lasts > 5 minutes AND [2] described as crushing, pressure-like, or heavy    Negative: [1] Chest pain lasts > 5 minutes AND [2] age > 50    Negative: [1] Chest pain lasts > 5 minutes AND [2] age > 30 AND [3] at least one cardiac risk factor (i.e., hypertension, diabetes, obesity, smoker or strong family history of heart disease)    Negative: [1] Chest pain lasts > 5 minutes AND [2] not relieved with nitroglycerin    Negative: Passed out (i.e., lost consciousness, collapsed and was not responding)    Negative: Heart beating < 50 beats per minute OR > 140 beats per minute    Negative: Visible sweat on face or sweat dripping down face    Negative: " Sounds like a life-threatening emergency to the triager    Negative: Followed a chest injury    Protocols used: CHEST PAIN-A-AH

## 2021-04-06 NOTE — TELEPHONE ENCOUNTER
Reason for call:  Symptom   Symptom or request: breathing issues off and on  duration (how long have symptoms been present): a week   Have you been treated for this before? yes    Additional comments: did not want it red flagged sent to green line wants an appt with primary this week if possible    Phone number to reach patient:  Cell number on file:    Telephone Information:   Mobile [phone]       Best Time:  any    Can we leave a detailed message on this number?  YES    Travel screening: Not Applicable

## 2021-04-07 NOTE — TELEPHONE ENCOUNTER
Patient called back and was informed of provider's recommendations.    Patient states that he does not feel that his symptoms are serious enough to warrant an ED or Urgent care visit.    Assisted patient to schedule an office visit on 4/8/21 at 11am as recommended by PCP.

## 2021-04-07 NOTE — TELEPHONE ENCOUNTER
Left message on answering machine for patient to call back to the nurse at [phone].    Skye Contreras RN  [facility]

## 2021-04-07 NOTE — TELEPHONE ENCOUNTER
"If patient has not been seen yet in an urgent care or other setting, I could see him tomorrow in one of my \"virtual\" slots if he would like.  Alternatively, he could be seen by someone else today if they have an opening.  "

## 2021-04-08 ENCOUNTER — ANCILLARY PROCEDURE (OUTPATIENT)
Dept: GENERAL RADIOLOGY | Facility: CLINIC | Age: 61
End: 2021-04-08
Attending: FAMILY MEDICINE
Payer: COMMERCIAL

## 2021-04-08 ENCOUNTER — OFFICE VISIT (OUTPATIENT)
Dept: FAMILY MEDICINE | Facility: CLINIC | Age: 61
End: 2021-04-08
Payer: COMMERCIAL

## 2021-04-08 VITALS
SYSTOLIC BLOOD PRESSURE: 105 MMHG | TEMPERATURE: 97.9 F | WEIGHT: 182 LBS | DIASTOLIC BLOOD PRESSURE: 68 MMHG | OXYGEN SATURATION: 99 % | HEART RATE: 79 BPM | BODY MASS INDEX: 30.14 KG/M2

## 2021-04-08 DIAGNOSIS — R19.7 DIARRHEA, UNSPECIFIED TYPE: ICD-10-CM

## 2021-04-08 DIAGNOSIS — Z11.4 SCREENING FOR HIV (HUMAN IMMUNODEFICIENCY VIRUS): ICD-10-CM

## 2021-04-08 DIAGNOSIS — R06.09 DOE (DYSPNEA ON EXERTION): Primary | ICD-10-CM

## 2021-04-08 LAB
ALBUMIN SERPL-MCNC: 4.3 G/DL (ref 3.4–5)
ALP SERPL-CCNC: 93 U/L (ref 40–150)
ALT SERPL W P-5'-P-CCNC: 32 U/L (ref 0–70)
ANION GAP SERPL CALCULATED.3IONS-SCNC: 7 MMOL/L (ref 3–14)
AST SERPL W P-5'-P-CCNC: 15 U/L (ref 0–45)
BILIRUB SERPL-MCNC: 0.4 MG/DL (ref 0.2–1.3)
BUN SERPL-MCNC: 21 MG/DL (ref 7–30)
CALCIUM SERPL-MCNC: 9.2 MG/DL (ref 8.5–10.1)
CHLORIDE SERPL-SCNC: 104 MMOL/L (ref 94–109)
CK SERPL-CCNC: 93 U/L (ref 30–300)
CO2 SERPL-SCNC: 23 MMOL/L (ref 20–32)
CREAT SERPL-MCNC: 1.12 MG/DL (ref 0.66–1.25)
ERYTHROCYTE [DISTWIDTH] IN BLOOD BY AUTOMATED COUNT: 13.9 % (ref 10–15)
GFR SERPL CREATININE-BSD FRML MDRD: 70 ML/MIN/{1.73_M2}
GLUCOSE SERPL-MCNC: 85 MG/DL (ref 70–99)
HCT VFR BLD AUTO: 42.9 % (ref 40–53)
HGB BLD-MCNC: 14.6 G/DL (ref 13.3–17.7)
MCH RBC QN AUTO: 31.6 PG (ref 26.5–33)
MCHC RBC AUTO-ENTMCNC: 34 G/DL (ref 31.5–36.5)
MCV RBC AUTO: 93 FL (ref 78–100)
PLATELET # BLD AUTO: 434 10E9/L (ref 150–450)
POTASSIUM SERPL-SCNC: 4.2 MMOL/L (ref 3.4–5.3)
PROT SERPL-MCNC: 7.7 G/DL (ref 6.8–8.8)
RBC # BLD AUTO: 4.62 10E12/L (ref 4.4–5.9)
SODIUM SERPL-SCNC: 134 MMOL/L (ref 133–144)
WBC # BLD AUTO: 10.2 10E9/L (ref 4–11)

## 2021-04-08 PROCEDURE — 36415 COLL VENOUS BLD VENIPUNCTURE: CPT | Performed by: FAMILY MEDICINE

## 2021-04-08 PROCEDURE — 85027 COMPLETE CBC AUTOMATED: CPT | Performed by: FAMILY MEDICINE

## 2021-04-08 PROCEDURE — 71046 X-RAY EXAM CHEST 2 VIEWS: CPT | Performed by: RADIOLOGY

## 2021-04-08 PROCEDURE — 99214 OFFICE O/P EST MOD 30 MIN: CPT | Performed by: FAMILY MEDICINE

## 2021-04-08 PROCEDURE — 80053 COMPREHEN METABOLIC PANEL: CPT | Performed by: FAMILY MEDICINE

## 2021-04-08 PROCEDURE — 87389 HIV-1 AG W/HIV-1&-2 AB AG IA: CPT | Performed by: FAMILY MEDICINE

## 2021-04-08 PROCEDURE — 82550 ASSAY OF CK (CPK): CPT | Performed by: FAMILY MEDICINE

## 2021-04-08 ASSESSMENT — ANXIETY QUESTIONNAIRES
1. FEELING NERVOUS, ANXIOUS, OR ON EDGE: NOT AT ALL
IF YOU CHECKED OFF ANY PROBLEMS ON THIS QUESTIONNAIRE, HOW DIFFICULT HAVE THESE PROBLEMS MADE IT FOR YOU TO DO YOUR WORK, TAKE CARE OF THINGS AT HOME, OR GET ALONG WITH OTHER PEOPLE: NOT DIFFICULT AT ALL
2. NOT BEING ABLE TO STOP OR CONTROL WORRYING: NOT AT ALL
3. WORRYING TOO MUCH ABOUT DIFFERENT THINGS: NOT AT ALL
5. BEING SO RESTLESS THAT IT IS HARD TO SIT STILL: NOT AT ALL
7. FEELING AFRAID AS IF SOMETHING AWFUL MIGHT HAPPEN: NOT AT ALL
6. BECOMING EASILY ANNOYED OR IRRITABLE: SEVERAL DAYS
GAD7 TOTAL SCORE: 1

## 2021-04-08 ASSESSMENT — PATIENT HEALTH QUESTIONNAIRE - PHQ9
5. POOR APPETITE OR OVEREATING: NOT AT ALL
SUM OF ALL RESPONSES TO PHQ QUESTIONS 1-9: 3

## 2021-04-08 NOTE — PROGRESS NOTES
Assessment & Plan       ICD-10-CM    1. SRIVASTAVA (dyspnea on exertion)  R06.00 Comprehensive metabolic panel     CBC with platelets     CK total     XR Chest 2 Views   2. Diarrhea, unspecified type  R19.7    3. Screening for HIV (human immunodeficiency virus)  Z11.4 HIV Antigen Antibody Combo     I wonder if his significant weight loss and/or diarrhea have contributed to low potassium or other cause for his symptoms above, so we will check a CMP and CBC and a CK for further evaluation  We will also do an HIV screening test because he is due for one  His lungs sound clear and his oxygen saturation is normal and he is not a smoker and he had a normal stress test in the fall, so all of those things are certainly reassuring    I will inform him of his test results when available and a further plan then    Return for a recheck if symptoms worsen or fail to improve.    Johnathon Diaz MD  [facility] HEIDI Arellano is a 61 year old who presents for the following health issues     HPI     Shortness of breath with exertion for about a week. See triage note. Stopped the Crestor.    Last fall he had some similar symptoms along with some vague chest discomfort and he underwent a cardiac stress test which came back normal.  He also had a carotid ultrasound done which showed no significant blockages.  He is not a smoker.  He has had some diarrhea recently.  He has been trying to follow Atkins diet and has lost about 20 pounds with that in the last couple of months.  He is taking a multivitamin.  He wondered if the Crestor was contributing to the symptoms, so he has stopped it, but he has not felt any different off the medicine.  He sometimes feels like he has trouble taking a deep breath.    Patient Active Problem List   Diagnosis     Esophageal reflux     Hyperlipidemia with target LDL less than 130     Reid esophagus     Head ache     Memory changes     Insomnia, psychophysiological     Family  history of Alzheimer's disease     Anxiety     Lumbar degenerative disc disease     Protrusion of lumbar intervertebral disc     Lumbar spinal stenosis     Alcohol use     Non morbid obesity due to excess calories     LUCIAN (obstructive sleep apnea)- 'moderate' (AHI 6)     Current Outpatient Medications   Medication     esomeprazole (NEXIUM) 40 MG DR capsule     ibuprofen 200 MG capsule     rosuvastatin (CRESTOR) 10 MG tablet     Current Facility-Administered Medications   Medication     ropivacaine (NAROPIN) injection 1 mL     triamcinolone (KENALOG-40) injection 40 mg         Review of Systems   Constitutional, HEENT, cardiovascular, pulmonary, gi and gu systems are negative, except as otherwise noted.      Objective    /68 (BP Location: Right arm, Patient Position: Sitting, Cuff Size: Adult Large)   Pulse 79   Temp 97.9  F (36.6  C) (Oral)   Wt 82.6 kg (182 lb)   SpO2 99%   BMI 30.14 kg/m    Body mass index is 30.14 kg/m .  Physical Exam   GENERAL: alert, no distress and over weight  RESP: lungs clear to auscultation - no rales, rhonchi or wheezes  CV: regular rate and rhythm, normal S1 S2, no S3 or S4, no murmur, click or rub    His stress test and carotid ultrasound results from last fall were reviewed, as well as previous lab results.

## 2021-04-09 LAB — HIV 1+2 AB+HIV1 P24 AG SERPL QL IA: NONREACTIVE

## 2021-04-09 ASSESSMENT — ANXIETY QUESTIONNAIRES: GAD7 TOTAL SCORE: 1

## 2021-04-09 NOTE — RESULT ENCOUNTER NOTE
Adan,  Your chest x-ray looks nice and normal and healthy.  No sign of any lung problems.    Johnathon Diaz MD

## 2021-04-09 NOTE — RESULT ENCOUNTER NOTE
Adan,  These lab results are all nice and normal and healthy including electrolytes, liver and kidney tests, blood sugar, blood counts, and CK muscle enzyme test.  No sign of anything worrisome or abnormal internally.    With these lab results all normal and your chest x-ray normal, I think you can safely continue with activities as tolerated and see how things go in the upcoming weeks...    Johnathon Diaz MD

## 2021-06-01 NOTE — PROGRESS NOTES
Adan Oliver  :  1960  DOS: 2021  MRN: [id number]      Sports Medicine Clinic Visit    PCP: Charly Castillo    Adan Oliver is a 60 year old male who is seen as a self referral presenting with right foot pain and right shoulder.    Injury:  1) Toe: He reports pain in the right great toe and medial foot for 2 weeks. His pain increases with walking and standing. He has used OTC arch supports but this has not changed the pain.     Social History: grocery shopping for elderly.    Interim History - 2021  Since last visit on 20 patient has begun to have pain again in his right great toe .  Right foot 1st MTP joint injection completed on 20 provided good relief for 6 months.  No new injury in the interim.  He would be interested in a repeat injection today.        Review of Systems  Skin: no bruising, no swelling  Musculoskeletal: as above  Neurologic: no numbness, paresthesias  Remainder of review of systems is negative including constitutional, CV, pulmonary, GI, except as noted in HPI or medical history.    Patient's current problem list, past medical and surgical history, and family history were reviewed.    Patient Active Problem List   Diagnosis     Esophageal reflux     Hyperlipidemia with target LDL less than 130     Reid esophagus     Head ache     Memory changes     Insomnia, psychophysiological     Family history of Alzheimer's disease     Anxiety     Lumbar degenerative disc disease     Protrusion of lumbar intervertebral disc     Lumbar spinal stenosis     Alcohol use     Non morbid obesity due to excess calories     LUCIAN (obstructive sleep apnea)- 'moderate' (AHI 6)     Past Medical History:   Diagnosis Date     Anxiety 2015     Reid esophagus 10/30/2014     Esophageal reflux 10/01/2014     History of shingles 2012     Hyperlipidemia with target LDL less than 130 10/01/2014     Lumbar degenerative disc disease 2016     Nephrolithiasis 2009     LUCIAN  "(obstructive sleep apnea)- 'moderate' (AHI 6) 10/06/2017    Study Date: 10/2/2017- (200.0 lbs) Scored using 3% rules. It was difficult to discern between PLMS and hyponeas. Apnea/hypopnea index was 28.0 events per hour.  The REM AHI was 29.3 events per hour.  The supine AHI was 32.7 events per hour.  RDI was 28.0 events per hour. Lowest oxygen saturation was 84.0%.  Time spent less than or equal to 88% was 0.3 minutes. PLM index was 32.3 movements per hour     Past Surgical History:   Procedure Laterality Date     APPENDECTOMY  2000s     BLEPHAROPLASTY BILATERAL Bilateral 10/3/2018    Procedure: BLEPHAROPLASTY BILATERAL;;  Surgeon: Dany Berrios MD;  Location: [facility] OR     CARPAL TUNNEL RELEASE RT/LT  1980s     COMBINED ESOPHAGOSCOPY, GASTROSCOPY, DUODENOSCOPY (EGD) WITH CO2 INSUFFLATION N/A 9/20/2019    Procedure: ESOPHAGOGASTRODUODENOSCOPY, WITH CO2 INSUFFLATION;  Surgeon: Filiberto Banuelos MD;  Location: [facility] OR     ESOPHAGOSCOPY, GASTROSCOPY, DUODENOSCOPY (EGD), COMBINED N/A 9/20/2019    Procedure: Esophagogastroduodenoscopy, With Biopsy;  Surgeon: Filiberto Banuelos MD;  Location: [facility]     REPAIR PTOSIS BILATERAL Bilateral 10/3/2018    Procedure: REPAIR PTOSIS BILATERAL;;  Surgeon: Dany Berrios MD;  Location: [facility]     REPAIR PTOSIS BROW BILATERAL Bilateral 10/3/2018    Procedure: REPAIR PTOSIS BROW BILATERAL;;  Surgeon: Dany Berrios MD;  Location: [facility]     SIGMOIDOSCOPY FLEXIBLE N/A 9/20/2019    Procedure: SIGMOIDOSCOPY, FLEXIBLE;  Surgeon: Filiberto Banuelos MD;  Location: [facility] OR     TONSILLECTOMY  1980s     Family History   Problem Relation Age of Onset     Hypertension Mother      Alzheimer Disease Mother      Diabetes Brother      Glaucoma No family hx of      Macular Degeneration No family hx of      Coronary Artery Disease No family hx of      Colon Cancer No family hx of      Retinal detachment No family hx of          Objective  /68   Ht 1.727 m (5' 8\")  "  Wt 77.6 kg (171 lb)   BMI 26.00 kg/m      GENERAL APPEARANCE: healthy, alert and no distress   GAIT: NORMAL  SKIN: no suspicious lesions or rashes  HEENT: Sclera clear, anicteric  CV: good peripheral pulses  RESP: Breathing not labored  NEURO: Normal strength and tone, mentation intact and speech normal  PSYCH:  mentation appears normal and affect normal/bright    Right Foot and Ankle Exam:  Inspection:       no visible ecchymosis       no visible edema or effusion    Foot inspection:       no deformity noted    Tender:       Great toe MTP joint     Non-Tender:       remainder of ankle and foot right    ROM:        Full active and passive ROM, ankle dorsiflexion, plantarflexion, inversion, eversion, great toe dorsiflexion, remainder of toes, midfoot and subtalar right  - some pain with great toe dorsiflexion and plantarflexion    Strength:       ankle dorsiflexion 5/5 right       plantarflexion 5/5 right       inversion 5/5 right       eversion 5/5 right       great toe dorsiflexion 5/5 right       great toe plantarflexion 5/5 right    Gait:       normal    Neurovascular:       2+ peripheral pulses bilaterally and brisk capillary refill       sensation grossly intact    Radiology  I ordered, visualized and reviewed these images with the patient  Xr Foot Right G/e 3 Views  Result Date: 9/28/2020  Examination:  XR FOOT RT G/E 3 VW Date:  9/28/2020 1:53 PM Clinical Information: Acute foot pain, right Additional Information: none Comparison: none   Impression: Right foot negative for fracture, erosion, or bone lesion. Moderate joint space narrowing at the first MTP joint with moderate hypertrophic spurring, particularly on the dorsal aspect of the metatarsal head. Normal joint alignment is maintained. Normal joint alignment and spacing in the right foot elsewhere. No significant soft tissue abnormality in the right foot. HAMILTON MARCANO MD    Small Joint Injection/Arthrocentesis: R great MTP    Date/Time:  6/2/2021 3:08 PM  Performed by: Danny Oleary DO  Authorized by: Danny Oleary DO   Indications:  Pain  Needle Size:  25 G  Guidance: ultrasound     Approach:  Dorsal  Location:  Great toe    Site:  R great MTP                    Medications:  40 mg triamcinolone 40 MG/ML; 2 mL lidocaine 1 %        Outcome:  Tolerated well, no immediate complications  Procedure discussed: discussed risks, benefits, and alternatives    Consent Given by:  Patient  Timeout: timeout called immediately prior to procedure    Prep: patient was prepped and draped in usual sterile fashion            Assessment:  1. Great toe pain, right    2. 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      We discussed these other possible diagnosis: Toe Tendonitis    Plan:  - Today's Plan of Care:  Consider inserts or Dynaflex  Repeat US guided injection to 1st MTP joint today  Reviewed low impact activity strategies and footwear options  XR images independently visualized and reviewed with patient today in clinic  Expectations and goals of CSI reviewed  Often 2-3 days for steroid effect, and can take up to two weeks for maximum effect  We discussed modified progressive pain-free activity as tolerated  Do not overuse in first two weeks if feeling better due to concern for vulnerability while steroid is working  Supportive care reviewed  All questions were answered today  Contact us with additional questions or concerns  Signs and sx of concern reviewed      Danny Oleary DO, ARCENIO  Sports Medicine Physician  [facility]

## 2021-06-02 ENCOUNTER — OFFICE VISIT (OUTPATIENT)
Dept: ORTHOPEDICS | Facility: CLINIC | Age: 61
End: 2021-06-02
Payer: COMMERCIAL

## 2021-06-02 VITALS
DIASTOLIC BLOOD PRESSURE: 68 MMHG | BODY MASS INDEX: 25.91 KG/M2 | SYSTOLIC BLOOD PRESSURE: 106 MMHG | HEIGHT: 68 IN | WEIGHT: 171 LBS

## 2021-06-02 DIAGNOSIS — M19.079 ARTHRITIS OF GREAT TOE AT METATARSOPHALANGEAL JOINT: ICD-10-CM

## 2021-06-02 DIAGNOSIS — M79.674 GREAT TOE PAIN, RIGHT: Primary | ICD-10-CM

## 2021-06-02 PROCEDURE — 20604 DRAIN/INJ JOINT/BURSA W/US: CPT | Mod: RT | Performed by: FAMILY MEDICINE

## 2021-06-02 PROCEDURE — 99213 OFFICE O/P EST LOW 20 MIN: CPT | Mod: 25 | Performed by: FAMILY MEDICINE

## 2021-06-02 RX ORDER — LIDOCAINE HYDROCHLORIDE 10 MG/ML
2 INJECTION, SOLUTION INFILTRATION; PERINEURAL
Status: DISCONTINUED | OUTPATIENT
Start: 2021-06-02 | End: 2021-12-10

## 2021-06-02 RX ORDER — TRIAMCINOLONE ACETONIDE 40 MG/ML
40 INJECTION, SUSPENSION INTRA-ARTICULAR; INTRAMUSCULAR
Status: DISCONTINUED | OUTPATIENT
Start: 2021-06-02 | End: 2021-12-10

## 2021-06-02 RX ADMIN — TRIAMCINOLONE ACETONIDE 40 MG: 40 INJECTION, SUSPENSION INTRA-ARTICULAR; INTRAMUSCULAR at 15:08

## 2021-06-02 RX ADMIN — LIDOCAINE HYDROCHLORIDE 2 ML: 10 INJECTION, SOLUTION INFILTRATION; PERINEURAL at 15:08

## 2021-06-02 ASSESSMENT — MIFFLIN-ST. JEOR: SCORE: 1550.15

## 2021-06-02 NOTE — LETTER
2021         RE: Adan Oliver  [address]  [place] [address]        Dear Colleague,    Thank you for referring your patient, Adan Oliver, to the [facility]. Please see a copy of my visit note below.    Adan Oliver  :  1960  DOS: 2021  MRN: [id number]      Sports Medicine Clinic Visit    PCP: Charly Castillo    Adan Oliver is a 60 year old male who is seen as a self referral presenting with right foot pain and right shoulder.    Injury:  1) Toe: He reports pain in the right great toe and medial foot for 2 weeks. His pain increases with walking and standing. He has used OTC arch supports but this has not changed the pain.     Social History: grocery shopping for elderly.    Interim History - 2021  Since last visit on 20 patient has begun to have pain again in his right great toe .  Right foot 1st MTP joint injection completed on 20 provided good relief for 6 months.  No new injury in the interim.  He would be interested in a repeat injection today.        Review of Systems  Skin: no bruising, no swelling  Musculoskeletal: as above  Neurologic: no numbness, paresthesias  Remainder of review of systems is negative including constitutional, CV, pulmonary, GI, except as noted in HPI or medical history.    Patient's current problem list, past medical and surgical history, and family history were reviewed.    Patient Active Problem List   Diagnosis     Esophageal reflux     Hyperlipidemia with target LDL less than 130     Reid esophagus     Head ache     Memory changes     Insomnia, psychophysiological     Family history of Alzheimer's disease     Anxiety     Lumbar degenerative disc disease     Protrusion of lumbar intervertebral disc     Lumbar spinal stenosis     Alcohol use     Non morbid obesity due to excess calories     LUCIAN (obstructive sleep apnea)- 'moderate' (AHI 6)     Past Medical History:    Diagnosis Date     Anxiety 12/07/2015     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 It was difficult to discern between PLMS and hyponeas. Apnea/hypopnea index was 28.0 events per hour.  The REM AHI was 29.3 events per hour.  The supine AHI was 32.7 events per hour.  RDI was 28.0 events per hour. Lowest oxygen saturation was 84.0%.  Time spent less than or equal to 88% was 0.3 minutes. PLM index was 32.3 movements per hour     Past Surgical History:   Procedure Laterality Date     APPENDECTOMY  2000s     BLEPHAROPLASTY BILATERAL Bilateral 10/3/2018    Procedure: BLEPHAROPLASTY BILATERAL;;  Surgeon: Dany Berrios MD;  Location: [facility] OR     CARPAL TUNNEL RELEASE RT/LT  1980s     COMBINED ESOPHAGOSCOPY, GASTROSCOPY, DUODENOSCOPY (EGD) WITH CO2 INSUFFLATION N/A 9/20/2019    Procedure: ESOPHAGOGASTRODUODENOSCOPY, WITH CO2 INSUFFLATION;  Surgeon: Filiberto Banuelos MD;  Location: [facility] OR     ESOPHAGOSCOPY, GASTROSCOPY, DUODENOSCOPY (EGD), COMBINED N/A 9/20/2019    Procedure: Esophagogastroduodenoscopy, With Biopsy;  Surgeon: Filiberto Banuelos MD;  Location: [facility] OR     REPAIR PTOSIS BILATERAL Bilateral 10/3/2018    Procedure: REPAIR PTOSIS BILATERAL;;  Surgeon: Dany Berrios MD;  Location: [facility] OR     REPAIR PTOSIS BROW BILATERAL Bilateral 10/3/2018    Procedure: REPAIR PTOSIS BROW BILATERAL;;  Surgeon: Dany Berrios MD;  Location: [facility]     SIGMOIDOSCOPY FLEXIBLE N/A 9/20/2019    Procedure: SIGMOIDOSCOPY, FLEXIBLE;  Surgeon: Filiberto Banuelos MD;  Location: [facility] OR     TONSILLECTOMY  1980s     Family History   Problem Relation Age of Onset     Hypertension Mother      Alzheimer  "Disease Mother      Diabetes Brother      Glaucoma No family hx of      Macular Degeneration No family hx of      Coronary Artery Disease No family hx of      Colon Cancer No family hx of      Retinal detachment No family hx of          Objective  /68   Ht 1.727 m (5' 8\")   Wt 77.6 kg (171 lb)   BMI 26.00 kg/m      GENERAL APPEARANCE: healthy, alert and no distress   GAIT: NORMAL  SKIN: no suspicious lesions or rashes  HEENT: Sclera clear, anicteric  CV: good peripheral pulses  RESP: Breathing not labored  NEURO: Normal strength and tone, mentation intact and speech normal  PSYCH:  mentation appears normal and affect normal/bright    Right Foot and Ankle Exam:  Inspection:       no visible ecchymosis       no visible edema or effusion    Foot inspection:       no deformity noted    Tender:       Great toe MTP joint     Non-Tender:       remainder of ankle and foot right    ROM:        Full active and passive ROM, ankle dorsiflexion, plantarflexion, inversion, eversion, great toe dorsiflexion, remainder of toes, midfoot and subtalar right  - some pain with great toe dorsiflexion and plantarflexion    Strength:       ankle dorsiflexion 5/5 right       plantarflexion 5/5 right       inversion 5/5 right       eversion 5/5 right       great toe dorsiflexion 5/5 right       great toe plantarflexion 5/5 right    Gait:       normal    Neurovascular:       2+ peripheral pulses bilaterally and brisk capillary refill       sensation grossly intact    Radiology  I ordered, visualized and reviewed these images with the patient  Xr Foot Right G/e 3 Views  Result Date: 9/28/2020  Examination:  XR FOOT RT G/E 3 VW Date:  9/28/2020 1:53 PM Clinical Information: Acute foot pain, right Additional Information: none Comparison: none   Impression: Right foot negative for fracture, erosion, or bone lesion. Moderate joint space narrowing at the first MTP joint with moderate hypertrophic spurring, particularly on the dorsal aspect " of the metatarsal head. Normal joint alignment is maintained. Normal joint alignment and spacing in the right foot elsewhere. No significant soft tissue abnormality in the right foot. HAMILTON MARCANO MD    Small Joint Injection/Arthrocentesis: R great MTP    Date/Time: 6/2/2021 3:08 PM  Performed by: Danny Olaery DO  Authorized by: Danny Oleary DO   Indications:  Pain  Needle Size:  25 G  Guidance: ultrasound     Approach:  Dorsal  Location:  Great toe    Site:  R great MTP                    Medications:  40 mg triamcinolone 40 MG/ML; 2 mL lidocaine 1 %        Outcome:  Tolerated well, no immediate complications  Procedure discussed: discussed risks, benefits, and alternatives    Consent Given by:  Patient  Timeout: timeout called immediately prior to procedure    Prep: patient was prepped and draped in usual sterile fashion            Assessment:  1. Great toe pain, right    2. 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      We discussed these other possible diagnosis: Toe Tendonitis    Plan:  - Today's Plan of Care:  Consider inserts or Dynaflex  Repeat US guided injection to 1st MTP joint today  Reviewed low impact activity strategies and footwear options  XR images independently visualized and reviewed with patient today in clinic  Expectations and goals of CSI reviewed  Often 2-3 days for steroid effect, and can take up to two weeks for maximum effect  We discussed modified progressive pain-free activity as tolerated  Do not overuse in first two weeks if feeling better due to concern for vulnerability while steroid is working  Supportive care reviewed  All questions were answered today  Contact us with additional questions or concerns  Signs and sx of concern reviewed      Danny Oleary DO, ARCENIO  Sports Medicine Physician  [facility]          Again, thank you for allowing me to participate in the care of your patient.         Sincerely,        Danny Oleary, DO

## 2021-07-08 ENCOUNTER — OFFICE VISIT (OUTPATIENT)
Dept: OPTOMETRY | Facility: CLINIC | Age: 61
End: 2021-07-08
Payer: COMMERCIAL

## 2021-07-08 DIAGNOSIS — G43.109 OCULAR MIGRAINE: Primary | ICD-10-CM

## 2021-07-08 PROCEDURE — 99213 OFFICE O/P EST LOW 20 MIN: CPT | Performed by: OPTOMETRIST

## 2021-07-08 ASSESSMENT — REFRACTION_MANIFEST
OS_ADD: +2.50
OD_CYLINDER: SPHERE
OD_SPHERE: -0.25
OD_ADD: +2.50
OS_CYLINDER: SPHERE
OS_SPHERE: -0.75

## 2021-07-08 ASSESSMENT — VISUAL ACUITY
OD_SC: 20/20
METHOD: SNELLEN - LINEAR
OS_SC: 20/30

## 2021-07-08 ASSESSMENT — TONOMETRY
OS_IOP_MMHG: 12
OD_IOP_MMHG: 12
IOP_METHOD: APPLANATION

## 2021-07-08 ASSESSMENT — CUP TO DISC RATIO
OS_RATIO: 0.35
OD_RATIO: 0.35

## 2021-07-08 ASSESSMENT — SLIT LAMP EXAM - LIDS
COMMENTS: NORMAL, S/P BLEPHAROPLASTY
COMMENTS: NORMAL, S/P BLEPHAROPLASTY

## 2021-07-08 ASSESSMENT — CONF VISUAL FIELD
OS_NORMAL: 1
OD_NORMAL: 1

## 2021-07-08 ASSESSMENT — EXTERNAL EXAM - LEFT EYE: OS_EXAM: NORMAL

## 2021-07-08 NOTE — LETTER
7/8/2021         RE: Adan Oliver  [address]  [place] [address]        Dear Colleague,    Thank you for referring your patient, Adan Oliver, to the [facility]. Please see a copy of my visit note below.    Chief Complaint   Patient presents with     Flashes Evaluation     **Last exam ~6 months ago at outside practice ([facility]?)    See Review Of Systems   Eyes: flashes left eye > right eye  Constitutional: No fevers, chills, or weight changes.      Medical, surgical and family histories reviewed and updated 7/8/2021.         OBJECTIVE: See Ophthalmology exam    ASSESSMENT:    ICD-10-CM    1. Ocular migraine  G43.109       PLAN:    Patient Instructions   Ocular migraines are painless, temporary visual disturbances that can affect one or both eyes. Though they can be frightening, ocular migraines typically are harmless and self-resolve without medication within 20 to 30 minutes.  Some people may find that taking a pain reliever such as tylenol or ibuprofen may lessen the length of the visual changes.  Ocular or eye migraines can be triggered by certain foods, caffeinated drinks, red wine, smoked meats and chocolate.  Food additives such as MSG and artificial sweeteners may also trigger the migraines in some people.  A person should seek immediate medical attention in cases such as:  A noticeable disruption of any of the five basic senses (seeing, hearing, smelling, tasting, touching).   A sudden change in mental status, such as unusual memory loss or inability to recognize a familiar face.   Difficulty in ambulating, which includes walking in a straight line or standing in place without falling.   Difficulty in communicating, such as slurred speech    Updated glasses prescription provided. Optional to fill.     Return in 1 year for a comprehensive eye exam, or sooner if needed.     The effects of the dilating drops last for 4- 6 hours.  You will be more sensitive  to light and vision will be blurry up close.  Mydriatic sunglasses were given if needed.      Jair Garcia OD  [address] [place]  [address] [phone]             Again, thank you for allowing me to participate in the care of your patient.        Sincerely,        Jair Garcia OD

## 2021-07-08 NOTE — PATIENT INSTRUCTIONS
Ocular migraines are painless, temporary visual disturbances that can affect one or both eyes. Though they can be frightening, ocular migraines typically are harmless and self-resolve without medication within 20 to 30 minutes.  Some people may find that taking a pain reliever such as tylenol or ibuprofen may lessen the length of the visual changes.  Ocular or eye migraines can be triggered by certain foods, caffeinated drinks, red wine, smoked meats and chocolate.  Food additives such as MSG and artificial sweeteners may also trigger the migraines in some people.  A person should seek immediate medical attention in cases such as:  A noticeable disruption of any of the five basic senses (seeing, hearing, smelling, tasting, touching).   A sudden change in mental status, such as unusual memory loss or inability to recognize a familiar face.   Difficulty in ambulating, which includes walking in a straight line or standing in place without falling.   Difficulty in communicating, such as slurred speech    Updated glasses prescription provided. Optional to fill.     Return in 1 year for a comprehensive eye exam, or sooner if needed.     The effects of the dilating drops last for 4- 6 hours.  You will be more sensitive to light and vision will be blurry up close.  Mydriatic sunglasses were given if needed.      Jair Garcia, OD  [facility]  [address] [place] [address]. [place]  [address] [phone]

## 2021-07-08 NOTE — PROGRESS NOTES
Chief Complaint   Patient presents with     Flashes Evaluation     **Last exam ~6 months ago at outside practice ([employer]?)    See Review Of Systems   Eyes: flashes left eye > right eye  Constitutional: No fevers, chills, or weight changes.      Medical, surgical and family histories reviewed and updated 7/8/2021.         OBJECTIVE: See Ophthalmology exam    ASSESSMENT:    ICD-10-CM    1. Ocular migraine  G43.109       PLAN:    Patient Instructions   Ocular migraines are painless, temporary visual disturbances that can affect one or both eyes. Though they can be frightening, ocular migraines typically are harmless and self-resolve without medication within 20 to 30 minutes.  Some people may find that taking a pain reliever such as tylenol or ibuprofen may lessen the length of the visual changes.  Ocular or eye migraines can be triggered by certain foods, caffeinated drinks, red wine, smoked meats and chocolate.  Food additives such as MSG and artificial sweeteners may also trigger the migraines in some people.  A person should seek immediate medical attention in cases such as:  A noticeable disruption of any of the five basic senses (seeing, hearing, smelling, tasting, touching).   A sudden change in mental status, such as unusual memory loss or inability to recognize a familiar face.   Difficulty in ambulating, which includes walking in a straight line or standing in place without falling.   Difficulty in communicating, such as slurred speech    Updated glasses prescription provided. Optional to fill.     Return in 1 year for a comprehensive eye exam, or sooner if needed.     The effects of the dilating drops last for 4- 6 hours.  You will be more sensitive to light and vision will be blurry up close.  Mydriatic sunglasses were given if needed.      Jair Garcia, OD  [facility]  [address] [place] [address]. [place]  [address]    [phone]

## 2021-07-11 NOTE — TELEPHONE ENCOUNTER
"Forwarding to PCP for review please.  Are you able to fit patient in for telephone visit this week to discuss COVID-19 PCR order?    Patient reports he was out of the country when COVID-19 started and was held up there until a couple weeks ago. He quarantined at a hotel for 2 weeks when he returned.  He had a slight headache and \"scratchy throat\" for a few days, which have resolved and he thinks these symptoms were likely due to the travel/jet lag, change of climate, etc.  He did wear a mask, but there were quite a few people at the airport, and he would like to get tested for COVID-19.  He also has a mother in [place] whom he can't visit until he is tested. We discussed parameters and procedure for testing, and that if an order is placed, it may take awhile for him to get tested based on priority, as he is currently asymptomatic. He voices understanding, states he would prefer to have a telephone visit with PCP vs [facility]. PCP booked this week, so will forward message.  Patient will await response.     Christine Kelly RN    "
Called and left message. I will call again.  
Patient has returned from [facility]. quarant[place] for [place] weeks [place]    Cannot see his mother with Alzheimer unless tested.     Will order test.     Orders Placed This Encounter     Asymptomatic COVID-19 Virus (Coronavirus) by PCR     Standing Status:   Future     Standing Expiration Date:   7/22/2021     Order Specific Question:   Asymptomatic or Symptomatic:     Answer:   Asymptomatic     Order Specific Question:   Reason?     Answer:   No Procedure     Comments:   needs testing to be able to see his omother with alzheimers       
Reason for Call:  Other     Detailed comments: Patient had traveled and now would like an order place for COVID 19 testing.    Phone Number Patient can be reached at: Home number on file [phone] (home)    Best Time:     Can we leave a detailed message on this number? YES    Call taken on 7/22/2020 at 11:57 AM by Dariela Osborn      
Yes

## 2021-10-02 ENCOUNTER — OFFICE VISIT (OUTPATIENT)
Dept: URGENT CARE | Facility: URGENT CARE | Age: 61
End: 2021-10-02
Payer: COMMERCIAL

## 2021-10-02 ENCOUNTER — NURSE TRIAGE (OUTPATIENT)
Dept: NURSING | Facility: CLINIC | Age: 61
End: 2021-10-02

## 2021-10-02 VITALS
BODY MASS INDEX: 26.85 KG/M2 | DIASTOLIC BLOOD PRESSURE: 84 MMHG | RESPIRATION RATE: 16 BRPM | WEIGHT: 176.6 LBS | SYSTOLIC BLOOD PRESSURE: 124 MMHG | OXYGEN SATURATION: 99 % | HEART RATE: 79 BPM | TEMPERATURE: 97.8 F

## 2021-10-02 DIAGNOSIS — Z11.52 ENCOUNTER FOR SCREENING FOR COVID-19: ICD-10-CM

## 2021-10-02 DIAGNOSIS — R35.0 URINARY FREQUENCY: Primary | ICD-10-CM

## 2021-10-02 DIAGNOSIS — M54.50 ACUTE LEFT-SIDED LOW BACK PAIN WITHOUT SCIATICA: ICD-10-CM

## 2021-10-02 LAB
ALBUMIN UR-MCNC: NEGATIVE MG/DL
APPEARANCE UR: CLEAR
BACTERIA #/AREA URNS HPF: ABNORMAL /HPF
BILIRUB UR QL STRIP: NEGATIVE
COLOR UR AUTO: YELLOW
GLUCOSE UR STRIP-MCNC: NEGATIVE MG/DL
HGB UR QL STRIP: ABNORMAL
KETONES UR STRIP-MCNC: NEGATIVE MG/DL
LEUKOCYTE ESTERASE UR QL STRIP: NEGATIVE
NITRATE UR QL: NEGATIVE
PH UR STRIP: 5.5 [PH] (ref 5–7)
RBC #/AREA URNS AUTO: ABNORMAL /HPF
SP GR UR STRIP: 1.02 (ref 1–1.03)
SQUAMOUS #/AREA URNS AUTO: ABNORMAL /LPF
UROBILINOGEN UR STRIP-ACNC: 0.2 E.U./DL
WBC #/AREA URNS AUTO: ABNORMAL /HPF

## 2021-10-02 PROCEDURE — 81001 URINALYSIS AUTO W/SCOPE: CPT | Performed by: FAMILY MEDICINE

## 2021-10-02 PROCEDURE — 99000 SPECIMEN HANDLING OFFICE-LAB: CPT | Performed by: FAMILY MEDICINE

## 2021-10-02 PROCEDURE — 99213 OFFICE O/P EST LOW 20 MIN: CPT | Performed by: FAMILY MEDICINE

## 2021-10-02 PROCEDURE — U0003 INFECTIOUS AGENT DETECTION BY NUCLEIC ACID (DNA OR RNA); SEVERE ACUTE RESPIRATORY SYNDROME CORONAVIRUS 2 (SARS-COV-2) (CORONAVIRUS DISEASE [COVID-19]), AMPLIFIED PROBE TECHNIQUE, MAKING USE OF HIGH THROUGHPUT TECHNOLOGIES AS DESCRIBED BY CMS-2020-01-R: HCPCS | Mod: 90 | Performed by: FAMILY MEDICINE

## 2021-10-02 PROCEDURE — U0005 INFEC AGEN DETEC AMPLI PROBE: HCPCS | Mod: 90 | Performed by: FAMILY MEDICINE

## 2021-10-02 RX ORDER — CYCLOBENZAPRINE HCL 5 MG
TABLET ORAL
Qty: 30 TABLET | Refills: 0 | Status: SHIPPED | OUTPATIENT
Start: 2021-10-02 | End: 2021-10-29

## 2021-10-02 NOTE — TELEPHONE ENCOUNTER
Headache, pulled muscle in back. Left side when working on car. Thinks it's a kidney infection. Four days now. He doesn't want to go to emergency because insurance. We reviewed urgent care and ER options. I told him urgent care can't do much. He then wanted an appointment so I connected him to scheduling.  Yolande العراقي RN  [facility]      Reason for Disposition    [1] SEVERE pain (e.g., excruciating, scale 8-10) AND [2] present > 1 hour     Pain at 8    Additional Information    Negative: Passed out (i.e., lost consciousness, collapsed and was not responding)    Negative: Shock suspected (e.g., cold/pale/clammy skin, too weak to stand, low BP, rapid pulse)    Negative: Difficult to awaken or acting confused (e.g., disoriented, slurred speech)    Negative: Sounds like a life-threatening emergency to the triager    Negative: Followed a major injury to the back (e.g., MVA, fall > 10 feet or 3 meters, penetrating injury, etc.)    Negative: Back pain or flank pain during pregnancy    Negative: Upper, mid or lower back pain that occurs mainly in the midline    Protocols used: FLANK PAIN-A-AH

## 2021-10-02 NOTE — PROGRESS NOTES
"    Assessment & Plan     Urinary frequency  Negative for UA  - UA Macro with Reflex to Micro and Culture - lab collect; Future  - UA Macro with Reflex to Micro and Culture - lab collect  - Urine Microscopic    Encounter for screening for COVID-19  Screening, no symptoms  - Asymptomatic COVID-19 Virus (Coronavirus) by PCR; Future  - Asymptomatic COVID-19 Virus (Coronavirus) by PCR Nose    Acute left-sided low back pain without sciatica  advised with supportive care,  Home, daily exercises.  - cyclobenzaprine (FLEXERIL) 5 MG tablet; 1-2 tab at bedtime as needed  - diclofenac (VOLTAREN) 1 % topical gel; Apply 2 gram to area qid as needed    BMI:   Estimated body mass index is 26.85 kg/m  as calculated from the following:    Height as of 6/2/21: 1.727 m (5' 8\").    Weight as of this encounter: 80.1 kg (176 lb 9.6 oz).   Weight management plan: Discussed healthy diet and exercise guidelines    See Patient Instructions    No follow-ups on file.    Ciara Jhonson MD  [facility]    Ruy Arellano is a 61 year old who presents for the following health issuesHPI   Patient reports for the past 4 to 5 days he has been having stiffness in the left mid lower back, pain more spasm, sharp for some time.  Been taken Ibuprofen.  He has no frequency, no urgency.  No blood in the urine.  Previous history of kidney stone.  Has no fever, no chills.  Denies night sweats.  No history of trauma.    Would like to be screen for covid, no symptoms. Fully vaccinated.    Review of Systems   Constitutional, HEENT, cardiovascular, pulmonary, gi and gu systems are negative, except as otherwise noted.      Objective    /84 (BP Location: Left arm, Patient Position: Sitting, Cuff Size: Adult Regular)   Pulse 79   Temp 97.8  F (36.6  C) (Tympanic)   Resp 16   Wt 80.1 kg (176 lb 9.6 oz)   SpO2 99%   BMI 26.85 kg/m    Body mass index is 26.85 kg/m .  Physical Exam   GENERAL: healthy, alert and no " distress  RESP: lungs clear to auscultation - no rales, rhonchi or wheezes  ABDOMEN: soft, nontender, no hepatosplenomegaly, no masses and bowel sounds normal  MS: no gross musculoskeletal defects noted, no edema  SKIN: no suspicious lesions or rashes  NEURO: Normal strength and tone, mentation intact and speech normal  BACK: no CVA tenderness, no paralumbar tenderness  Comprehensive back pain exam:  Tenderness of left lower mid back, Range of motion not limited by pain, Lower extremity strength functional and equal on both sides, Lower extremity reflexes within normal limits bilaterally, Lower extremity sensation normal and equal on both sides and Straight leg raise negative bilaterally    UA RESULTS:  Recent Labs   Lab Test 10/02/21  1419   COLOR Yellow   APPEARANCE Clear   URINEGLC Negative   URINEBILI Negative   URINEKETONE Negative   SG 1.025   UBLD Small*   URINEPH 5.5   PROTEIN Negative   UROBILINOGEN 0.2   NITRITE Negative   LEUKEST Negative   RBCU 0-2   WBCU 0-5     Orders Placed This Encounter   Procedures     Asymptomatic COVID-19 Virus (Coronavirus) by PCR     UA Macro with Reflex to Micro and Culture - lab collect     Urine Microscopic     Ciara Johnson MD

## 2021-10-04 LAB — SARS-COV-2 RNA RESP QL NAA+PROBE: NOT DETECTED

## 2021-10-22 ENCOUNTER — OFFICE VISIT (OUTPATIENT)
Dept: FAMILY MEDICINE | Facility: CLINIC | Age: 61
End: 2021-10-22
Payer: COMMERCIAL

## 2021-10-22 VITALS
OXYGEN SATURATION: 99 % | SYSTOLIC BLOOD PRESSURE: 122 MMHG | WEIGHT: 178 LBS | BODY MASS INDEX: 27.94 KG/M2 | TEMPERATURE: 98.9 F | HEART RATE: 69 BPM | HEIGHT: 67 IN | DIASTOLIC BLOOD PRESSURE: 77 MMHG

## 2021-10-22 DIAGNOSIS — E66.3 OVERWEIGHT (BMI 25.0-29.9): ICD-10-CM

## 2021-10-22 DIAGNOSIS — E78.5 HYPERLIPIDEMIA WITH TARGET LDL LESS THAN 130: Chronic | ICD-10-CM

## 2021-10-22 DIAGNOSIS — Z00.00 ROUTINE GENERAL MEDICAL EXAMINATION AT A HEALTH CARE FACILITY: Primary | ICD-10-CM

## 2021-10-22 DIAGNOSIS — Z12.11 SCREEN FOR COLON CANCER: ICD-10-CM

## 2021-10-22 DIAGNOSIS — Z86.0100 HX OF COLONIC POLYP: ICD-10-CM

## 2021-10-22 DIAGNOSIS — K22.70 BARRETT'S ESOPHAGUS WITHOUT DYSPLASIA: Chronic | ICD-10-CM

## 2021-10-22 DIAGNOSIS — Z78.9 ALCOHOL USE: ICD-10-CM

## 2021-10-22 DIAGNOSIS — K21.9 GASTROESOPHAGEAL REFLUX DISEASE, UNSPECIFIED WHETHER ESOPHAGITIS PRESENT: Chronic | ICD-10-CM

## 2021-10-22 PROBLEM — E66.09 NON MORBID OBESITY DUE TO EXCESS CALORIES: Chronic | Status: RESOLVED | Noted: 2017-09-26 | Resolved: 2021-10-22

## 2021-10-22 PROCEDURE — 99396 PREV VISIT EST AGE 40-64: CPT | Performed by: FAMILY MEDICINE

## 2021-10-22 ASSESSMENT — ENCOUNTER SYMPTOMS
HEARTBURN: 0
JOINT SWELLING: 0
DIARRHEA: 0
NERVOUS/ANXIOUS: 0
CHILLS: 0
PALPITATIONS: 0
SHORTNESS OF BREATH: 0
HEADACHES: 1
SORE THROAT: 0
NAUSEA: 0
MYALGIAS: 1
FREQUENCY: 1
HEMATOCHEZIA: 0
EYE PAIN: 0
DYSURIA: 0
PARESTHESIAS: 0
COUGH: 0
ABDOMINAL PAIN: 0
CONSTIPATION: 0
WEAKNESS: 1
HEMATURIA: 0
FEVER: 0
ARTHRALGIAS: 1
DIZZINESS: 0

## 2021-10-22 ASSESSMENT — MIFFLIN-ST. JEOR: SCORE: 1560.53

## 2021-10-22 ASSESSMENT — PATIENT HEALTH QUESTIONNAIRE - PHQ9: SUM OF ALL RESPONSES TO PHQ QUESTIONS 1-9: 4

## 2021-10-22 NOTE — PROGRESS NOTES
SUBJECTIVE:   CC: Adan Oliver is an 61 year old male who presents for a preventative health visit and follow-up on some baseline health conditions.       Patient has been advised of split billing requirements and indicates understanding: Yes  Healthy Habits:     Getting at least 3 servings of Calcium per day:  NO    Bi-annual eye exam:  Yes    Dental care twice a year:  Yes    Sleep apnea or symptoms of sleep apnea:  Daytime drowsiness    Diet:  Carbohydrate counting    Frequency of exercise:  None    Taking medications regularly:  Yes    PHQ-2 Total Score: 1    Additional concerns today:  No    He had stopped taking rosuvastatin after only taking it for a very short while because he thought it may be causing some achiness.  In hindsight, he does not feel like those symptoms ever really got better when he stopped taking the rosuvastatin.  He does have some orthopedic issues for which he will be seeing Dr. Oleary next week.  He would like to have his cholesterol checked again to see how high it is.  He might be open to trying the rosuvastatin again.  He continues to drink 2 alcoholic drinks per day and would like to have his liver tests done again.  He did have a flexible sigmoidoscopy done a couple of years ago and was found to have a tubular adenoma.  His GI provider recommended a full colonoscopy, but he has not had that done yet.  He does have a history of Reid's esophagus and takes Nexium for his GERD.  He does not like taking medication.        Today's PHQ-2 Score:   PHQ-2 ( 1999 [employer]) 10/22/2021   Q1: Little interest or pleasure in doing things 1   Q2: Feeling down, depressed or hopeless 0   PHQ-2 Score 1   Q1: Little interest or pleasure in doing things Several days   Q2: Feeling down, depressed or hopeless Not at all   PHQ-2 Score 1       Abuse: Current or Past(Physical, Sexual or Emotional)- No  Do you feel safe in your environment? Yes        Social History     Tobacco Use     Smoking status: Never  Smoker     Smokeless tobacco: Never Used   Substance Use Topics     Alcohol use: Yes     Alcohol/week: 14.0 - 21.0 standard drinks     Types: 14 - 21 Standard drinks or equivalent per week     Comment: varies, a couple drinks a day         Alcohol Use 10/22/2021   Prescreen: >3 drinks/day or >7 drinks/week? No   Prescreen: >3 drinks/day or >7 drinks/week? -       Last PSA:   PSA   Date Value Ref Range Status   10/05/2020 0.89 0 - 4 ug/L Final     Comment:     Assay Method:  Chemiluminescence using Siemens Vista analyzer       Reviewed orders with patient. Reviewed health maintenance and updated orders accordingly - Yes  Patient Active Problem List   Diagnosis     Esophageal reflux     Hyperlipidemia with target LDL less than 130     Reid esophagus     Memory changes     Insomnia, psychophysiological     Family history of Alzheimer's disease     Anxiety     Lumbar degenerative disc disease     Protrusion of lumbar intervertebral disc     Lumbar spinal stenosis     Alcohol use     LUCIAN (obstructive sleep apnea)- 'moderate' (AHI 6)     Past Surgical History:   Procedure Laterality Date     APPENDECTOMY  2000s     BLEPHAROPLASTY BILATERAL Bilateral 10/3/2018    Procedure: BLEPHAROPLASTY BILATERAL;;  Surgeon: Dany Berrios MD;  Location: [facility]     CARPAL TUNNEL RELEASE RT/LT  1980s     COMBINED ESOPHAGOSCOPY, GASTROSCOPY, DUODENOSCOPY (EGD) WITH CO2 INSUFFLATION N/A 9/20/2019    Procedure: ESOPHAGOGASTRODUODENOSCOPY, WITH CO2 INSUFFLATION;  Surgeon: Filiberto Banuelos MD;  Location: [facility]     ESOPHAGOSCOPY, GASTROSCOPY, DUODENOSCOPY (EGD), COMBINED N/A 9/20/2019    Procedure: Esophagogastroduodenoscopy, With Biopsy;  Surgeon: Filiberto Banuelos MD;  Location: [facility]     REPAIR PTOSIS BILATERAL Bilateral 10/3/2018    Procedure: REPAIR PTOSIS BILATERAL;;  Surgeon: Dany Berrios MD;  Location[facility]     REPAIR PTOSIS BROW BILATERAL Bilateral 10/3/2018    Procedure: REPAIR PTOSIS BROW BILATERAL;;   Surgeon: Dany Beriros MD;  Location: [facility]     SIGMOIDOSCOPY FLEXIBLE N/A 9/20/2019    Procedure: SIGMOIDOSCOPY, FLEXIBLE;  Surgeon: Filiberto Banuelos MD;  Location: [facility]     TONSILLECTOMY  1980s       Social History     Tobacco Use     Smoking status: Never Smoker     Smokeless tobacco: Never Used   Substance Use Topics     Alcohol use: Yes     Alcohol/week: 14.0 - 21.0 standard drinks     Types: 14 - 21 Standard drinks or equivalent per week     Comment: varies, a couple drinks a day     Family History   Problem Relation Age of Onset     Hypertension Mother      Alzheimer Disease Mother      Diabetes Brother      Glaucoma No family hx of      Macular Degeneration No family hx of      Coronary Artery Disease No family hx of      Colon Cancer No family hx of      Retinal detachment No family hx of          Current Outpatient Medications   Medication Sig Dispense Refill     esomeprazole (NEXIUM) 40 MG DR capsule TAKE ONE CAPSULE BY MOUTH EVERY MORNING BEFORE BREAKFAST. TAKE 30-60 MINUTES BEFORE EATING. 90 capsule 1     ibuprofen 200 MG capsule Take 200 mg by mouth every 4 hours as needed for fever Takes 3 every morning       cyclobenzaprine (FLEXERIL) 5 MG tablet 1-2 tab at bedtime as needed (Patient not taking: Reported on 10/22/2021) 30 tablet 0     diclofenac (VOLTAREN) 1 % topical gel Apply 2 gram to area qid as needed (Patient not taking: Reported on 10/22/2021) 150 g 0     rosuvastatin (CRESTOR) 10 MG tablet Take 1 tablet (10 mg) by mouth daily (Patient not taking: Reported on 4/8/2021) 90 tablet 2     No Known Allergies    Reviewed and updated as needed this visit by clinical staff  Tobacco  Allergies  Meds  Problems  Med Hx  Surg Hx  Fam Hx  Soc Hx          Reviewed and updated as needed this visit by Provider     Problems                Review of Systems   Constitutional: Negative for chills and fever.   HENT: Positive for hearing loss. Negative for congestion, ear pain and sore  "throat.    Eyes: Positive for visual disturbance. Negative for pain.   Respiratory: Negative for cough and shortness of breath.    Cardiovascular: Negative for chest pain, palpitations and peripheral edema.   Gastrointestinal: Negative for abdominal pain, constipation, diarrhea, heartburn, hematochezia and nausea.   Genitourinary: Positive for frequency. Negative for discharge, dysuria, genital sores, hematuria, impotence and urgency.   Musculoskeletal: Positive for arthralgias and myalgias. Negative for joint swelling.   Skin: Negative for rash.   Neurological: Positive for weakness and headaches. Negative for dizziness and paresthesias.   Psychiatric/Behavioral: Negative for mood changes. The patient is not nervous/anxious.          OBJECTIVE:   /77 (BP Location: Right arm, Patient Position: Sitting, Cuff Size: Adult Large)   Pulse 69   Temp 98.9  F (37.2  C) (Oral)   Ht 1.693 m (5' 6.65\")   Wt 80.7 kg (178 lb)   SpO2 99%   BMI 28.17 kg/m      Physical Exam  GENERAL: alert, no distress and over weight  EYES: Eyes grossly normal to inspection, PERRL and conjunctivae and sclerae normal  HENT: Grossly normal  NECK: no adenopathy, no asymmetry, masses, or scars and thyroid normal to palpation  RESP: lungs clear to auscultation - no rales, rhonchi or wheezes  CV: regular rate and rhythm, normal S1 S2, no S3 or S4, no murmur, click or rub, no peripheral edema and peripheral pulses intact  ABDOMEN: soft, nontender, no hepatosplenomegaly, no masses and bowel sounds normal  MS: no gross musculoskeletal defects noted, no edema  SKIN: no suspicious lesions or rashes  NEURO: Normal strength and tone, mentation intact and speech normal  PSYCH: mentation appears normal, affect normal/bright.  He scored a 4 on his PHQ-9 today.    Diagnostic Test Results:  Labs reviewed in Epic    ASSESSMENT/PLAN:       ICD-10-CM    1. Routine general medical examination at a health care facility  Z00.00    2. Overweight (BMI " "25.0-29.9)  E66.3    3. Hx of colonic polyp  Z86.010 Adult Gastro Ref - Procedure Only   4. Screen for colon cancer  Z12.11 Adult Gastro Ref - Procedure Only   5. Gastroesophageal reflux disease, unspecified whether esophagitis present  K21.9    6. Reid's esophagus without dysplasia  K22.70    7. Hyperlipidemia with target LDL less than 130  E78.5 Lipid panel reflex to direct LDL Fasting     Comprehensive metabolic panel   8. Alcohol use  Z72.89 Comprehensive metabolic panel     Blood pressure and other vital signs look fine  We discussed the above items  We will have him return soon for fasting labs as ordered above  He was encouraged to limit his alcohol intake to no more than 2 drinks per day  I will make a referral to have him undergo a full colonoscopy with Dr. Banuelos  He will follow up with sports medicine next week as planned  If his cholesterol values are significantly elevated still, then I will recommend he resume taking rosuvastatin  I recommended a flu shot, but he wanted to hold off on that  Continue esomeprazole for the GERD and Reid's esophagus  Plan a tentative recheck in 1 year, or sooner prn    Patient has been advised of split billing requirements and indicates understanding: Yes  COUNSELING:   Reviewed preventive health counseling, as reflected in patient instructions       Regular exercise       Healthy diet/nutrition    Estimated body mass index is 28.17 kg/m  as calculated from the following:    Height as of this encounter: 1.693 m (5' 6.65\").    Weight as of this encounter: 80.7 kg (178 lb).     Weight management plan: Discussed healthy diet and exercise guidelines    He reports that he has never smoked. He has never used smokeless tobacco.      Counseling Resources:  ATP IV Guidelines  Pooled Cohorts Equation Calculator  FRAX Risk Assessment  ICSI Preventive Guidelines  Dietary Guidelines for Americans, 2010  USDA's MyPlate  ASA Prophylaxis  Lung CA Screening    Johnathon Diaz, " MD [facility]

## 2021-10-23 ENCOUNTER — HEALTH MAINTENANCE LETTER (OUTPATIENT)
Age: 61
End: 2021-10-23

## 2021-10-28 DIAGNOSIS — K22.70 BARRETT'S ESOPHAGUS WITHOUT DYSPLASIA: Chronic | ICD-10-CM

## 2021-10-29 ENCOUNTER — ANCILLARY PROCEDURE (OUTPATIENT)
Dept: GENERAL RADIOLOGY | Facility: CLINIC | Age: 61
End: 2021-10-29
Attending: FAMILY MEDICINE
Payer: COMMERCIAL

## 2021-10-29 ENCOUNTER — OFFICE VISIT (OUTPATIENT)
Dept: ORTHOPEDICS | Facility: CLINIC | Age: 61
End: 2021-10-29
Payer: COMMERCIAL

## 2021-10-29 VITALS
SYSTOLIC BLOOD PRESSURE: 126 MMHG | BODY MASS INDEX: 27.62 KG/M2 | WEIGHT: 176 LBS | HEIGHT: 67 IN | DIASTOLIC BLOOD PRESSURE: 79 MMHG

## 2021-10-29 DIAGNOSIS — M54.41 CHRONIC BILATERAL LOW BACK PAIN WITH RIGHT-SIDED SCIATICA: ICD-10-CM

## 2021-10-29 DIAGNOSIS — M48.00 CENTRAL STENOSIS OF SPINAL CANAL: ICD-10-CM

## 2021-10-29 DIAGNOSIS — M25.551 HIP PAIN, BILATERAL: ICD-10-CM

## 2021-10-29 DIAGNOSIS — M51.369 DDD (DEGENERATIVE DISC DISEASE), LUMBAR: ICD-10-CM

## 2021-10-29 DIAGNOSIS — R29.898 RIGHT LEG WEAKNESS: ICD-10-CM

## 2021-10-29 DIAGNOSIS — M25.552 HIP PAIN, BILATERAL: ICD-10-CM

## 2021-10-29 DIAGNOSIS — M79.674 GREAT TOE PAIN, RIGHT: ICD-10-CM

## 2021-10-29 DIAGNOSIS — M19.079 ARTHRITIS OF GREAT TOE AT METATARSOPHALANGEAL JOINT: ICD-10-CM

## 2021-10-29 DIAGNOSIS — G89.29 CHRONIC BILATERAL LOW BACK PAIN WITH RIGHT-SIDED SCIATICA: ICD-10-CM

## 2021-10-29 DIAGNOSIS — M25.551 HIP PAIN, BILATERAL: Primary | ICD-10-CM

## 2021-10-29 DIAGNOSIS — M25.859 HIP IMPINGEMENT SYNDROME, UNSPECIFIED LATERALITY: ICD-10-CM

## 2021-10-29 DIAGNOSIS — M25.552 HIP PAIN, BILATERAL: Primary | ICD-10-CM

## 2021-10-29 PROCEDURE — 73522 X-RAY EXAM HIPS BI 3-4 VIEWS: CPT | Performed by: RADIOLOGY

## 2021-10-29 PROCEDURE — 99214 OFFICE O/P EST MOD 30 MIN: CPT | Performed by: FAMILY MEDICINE

## 2021-10-29 RX ORDER — PREDNISONE 20 MG/1
40 TABLET ORAL DAILY
Qty: 10 TABLET | Refills: 0 | Status: SHIPPED | OUTPATIENT
Start: 2021-10-29 | End: 2022-03-25

## 2021-10-29 ASSESSMENT — MIFFLIN-ST. JEOR: SCORE: 1551.4

## 2021-10-29 NOTE — PROGRESS NOTES
"Adan Oliver  :  1960  DOS: 10/29/2021  MRN: [id number]    Sports Medicine Clinic Visit    PCP: Johnathon Diaz    Adan Oliver is a 61 year old male who is seen as a self referral presenting with acute on chronic radiating bilateral lower extremity pain.    Injury: Reports insidious onset without acute precipitating event that patient first noticed progressing over the past ~ 6+ weeks, initially on left, now on right.  Pain located over right lower lumbar spine/buttocks, radiating to bilateral lower extremity.  Reports intermittent radiating, pain and weakness to right anterior, posterior thigh.  Additional Features:  Positive: numbness and weakness.  Symptoms are better with Rest.  Symptoms are worse with: going from sit to stand, bending at the waist, crossing right leg, getting out of car, hip flexion.  Other evaluation and/or treatments so far consists of: Tylenol, Ibuprofen, Rest and topical diclofenac.  Recent imaging completed: X-rays/MRI lumbar spine completed 2019.  Prior History of related problems: history of chronic radiating low back pain in past that he has previously seen Dr Tanner.    Patient is also following up on right great toe and metatarsalgia pain. Right Great toe MTP joint injection completed on 21 provided good relief for ~ 4 months.  Patient notes the feels like has \"rock in his shoe\".      Social History: currently employed as [employer]       Review of Systems  Musculoskeletal: as above  Remainder of review of systems is negative including constitutional, CV, pulmonary, GI, Skin and Neurologic except as noted in HPI or medical history.    Past Medical History:   Diagnosis Date     Anxiety 2015     Reid esophagus 10/30/2014     Esophageal reflux 10/01/2014     History of shingles 2012     Hyperlipidemia with target LDL less than 130 10/01/2014     Lumbar degenerative disc disease 2016     Nephrolithiasis 2009     LUCIAN (obstructive sleep apnea)- " "'moderate' (AHI 6) 10/06/2017    Study Date: 10/2/2017- (200.0 lbs) Scored using 3% rules. It was difficult to discern between PLMS and hyponeas. Apnea/hypopnea index was 28.0 events per hour.  The REM AHI was 29.3 events per hour.  The supine AHI was 32.7 events per hour.  RDI was 28.0 events per hour. Lowest oxygen saturation was 84.0%.  Time spent less than or equal to 88% was 0.3 minutes. PLM index was 32.3 movements per hour     Past Surgical History:   Procedure Laterality Date     APPENDECTOMY  2000s     BLEPHAROPLASTY BILATERAL Bilateral 10/3/2018    Procedure: BLEPHAROPLASTY BILATERAL;;  Surgeon: Dany Berrios MD;  Location: [facility]     CARPAL TUNNEL RELEASE RT/LT  1980s     COMBINED ESOPHAGOSCOPY, GASTROSCOPY, DUODENOSCOPY (EGD) WITH CO2 INSUFFLATION N/A 9/20/2019    Procedure: ESOPHAGOGASTRODUODENOSCOPY, WITH CO2 INSUFFLATION;  Surgeon: Filiberto Banuelos MD;  Location: [facility]     ESOPHAGOSCOPY, GASTROSCOPY, DUODENOSCOPY (EGD), COMBINED N/A 9/20/2019    Procedure: Esophagogastroduodenoscopy, With Biopsy;  Surgeon: Filiberto Banuelos MD;  [facility]: [facility]     REPAIR PTOSIS BILATERAL Bilateral 10/3/2018    Procedure: REPAIR PTOSIS BILATERAL;;  Surgeon: Dany Berrios MD;  [facility]     REPAIR PTOSIS BROW BILATERAL Bilateral 10/3/2018    Procedure: REPAIR PTOSIS BROW BILATERAL;;  Surgeon: Dany Berrios MD;  [facility]     SIGMOIDOSCOPY FLEXIBLE N/A 9/20/2019    Procedure: SIGMOIDOSCOPY, FLEXIBLE;  Surgeon: Filiberto Banuelos MD;  Location: [facility]     TONSILLECTOMY  1980s     Family History   Problem Relation Age of Onset     Hypertension Mother      Alzheimer Disease Mother      Diabetes Brother      Glaucoma No family hx of      Macular Degeneration No family hx of      Coronary Artery Disease No family hx of      Colon Cancer No family hx of      Retinal detachment No family hx of        Objective  /79   Ht 1.693 m (5' 6.65\")   Wt 79.8 kg (176 lb)   " BMI 27.86 kg/m        General: healthy, alert and in no distress      HEENT: no scleral icterus or conjunctival erythema     Skin: no suspicious lesions or rash. No jaundice.     CV: regular rhythm by palpation, 2+ distal pulses, no pedal edema      Resp: normal respiratory effort without conversational dyspnea     Psych: normal mood and affect      Gait: nonantalgic, appropriate coordination and balance     Neuro: normal light touch sensory exam of the extremities. Motor strength as noted below       Low back exam:    Inspection:       no visible deformity in the low back       normal skin       normal vascular       normal lymphatic    ROM:       full flexion       limited extension due to stiffness, minimal pain       asymmetric rotation of the pelvis with flexion       Mild pain with SB and rotation       Decreased hamstring flexibility    Tender:       paraspinal muscles       Lower lumbar, b/l SI    Non Tender:       remainder of lumbar spine    Strength:       hip flexion 5/5       knee extension 5/5       ankle dorsiflexion 5/5       ankle plantarflexion 5/5       dorsiflexion of the great toe 5-/5 left    Reflexes:       patellar (L3, L4) symmetric normal       achilles tendons (S1) asymmetric diminished R>L    Sensation:      grossly intact throughout lower extremities    Skin:       well perfused       capillary refill brisk    Special tests:       straight leg raise left neg for radicular sx         straight leg raise right mild pain with unclear radicular sx       Neg ROSEMARIE b/l       Neg slump, mild + facet compression     Right Foot and Ankle Exam:  Inspection:       no visible ecchymosis       no visible edema or effusion     Foot inspection:       no deformity noted     Tender:       Great toe MTP joint      Non-Tender:       remainder of ankle and foot right     ROM:        Full active and passive ROM, ankle dorsiflexion, plantarflexion, inversion, eversion, great toe dorsiflexion, remainder of  toes, midfoot and subtalar right  - mild pain with great toe dorsiflexion and plantarflexion     Strength:       ankle dorsiflexion 5/5 right       plantarflexion 4-/5 right       inversion 5/5 right       eversion 5/5 right       great toe dorsiflexion 5-/5 right       great toe plantarflexion 5-/5 right     Gait:       normal     Neurovascular:       2+ peripheral pulses bilaterally and brisk capillary refill       sensation grossly intact    Right hip exam    Inspection:        no edema or ecchymosis in hip area    ROM:       Flexion 120       internal rotation 10      external rotation 30      Range of motion limited by pain    Strength:        flexion 5-/5       extension 5/5       abduction 4/5       adduction 5-/5    Tender:        greater trochanter       Anterior hip joint       Mild ipsilateral SI joint TTP    Non Tender:        remainder of hip area       illiac crest       ASIS       pubis    Sensation:        grossly intact in hip and thigh    Skin:       well perfused       capillary refill brisk    Special Tests:        neg (-) ROSEMARIE       positive (+) FADIR R>>L       equivocal scour    Radiology:  Recent Results (from the past 744 hour(s))   XR Pelvis w bilateral hips 1 View    Narrative    PELVIS AND HIP BILATERAL ONE VIEW   10/29/2021 1:51 PM     HISTORY: Hip pain, bilateral    COMPARISON: None.      Impression    IMPRESSION: Moderate right hip degenerative changes. Mild left hip  degenerative changes. No evidence of acute fracture or AVN.    SAIDA GRANDA MD         SYSTEM ID:  [id number]     MRI LUMBAR SPINE WITHOUT CONTRAST September 27, 2017 7:32 PM      HISTORY: Several years of increasing low back and bilateral leg pain.     COMPARISON: An MRI on 7/25/2015.     TECHNIQUE: Sagittal T1, T2, and STIR, and transverse proton density  and T2-weighted images were obtained through the lumbar spine.     FINDINGS: Numbering of the levels is again based on what appear to be  five lumbar type vertebral  bodies. Vertebral body alignment is normal.  No fracture is seen. No pars interarticularis defect is demonstrated.  No osseous lesion is seen. No abnormal marrow signal intensity is  identified. The conus medullaris terminates at the level of the L1  vertebral body. No intrathecal abnormality is seen. The adjacent soft  tissues are unremarkable.     Findings by specific level:     T12-L1: The disc and facet joints are normal. No stenosis is seen.     L1-L2: The disc and facet joints are normal. No stenosis is seen.     L2-L3: The disc and facet joints are normal. No stenosis is seen.     L3-L4: Again seen is mild disc dehydration. The disc height is  well-preserved. There is a mild disc bulge with no focal herniation  seen. There are mild degenerative changes in the facet joints. Again  seen is a mild degree of central canal stenosis and mild to moderate  bilateral neural foraminal stenosis. This level is unchanged.     L4-L5: There is disc dehydration. The disc height is well-preserved.  Previously there is a moderate size right central disc protrusion.  This has significantly increased in size and now represents a large  broad-based left central disc protrusion. There has been progression  of what is now severe central canal stenosis with compression of the  left L5 nerve root. Mild to moderate bilateral neural foraminal  stenosis is unchanged. There are mild degenerative changes in the  facet joints.     L5-S1: Again seen is disc dehydration and mild disc height loss. There  is a mild disc bulge with no focal herniation seen. There are mild  degenerative changes in the facet joints. No central canal stenosis is  present. Again seen is mild to moderate bilateral neural foraminal  stenosis. This level is unchanged.                                                                      IMPRESSION:  1.  At L4-L5 there has been progression/development of a large left  central disc protrusion. There is now severe central  canal stenosis  with compression of the left L5 nerve root. Mild to moderate bilateral  neural foraminal stenosis is unchanged.  2. Degenerative changes elsewhere with no other disc herniation seen.  There is mild to moderate foraminal stenosis bilaterally at L3-L4 and  L5-S1 with mild central canal stenosis at L3-L4.    Examination:  XR FOOT RT G/E 3 VW     Date:  9/28/2020 1:53 PM      Clinical Information: Acute foot pain, right      Additional Information: none     Comparison: none                                                                      Impression:     Right foot negative for fracture, erosion, or bone lesion. Moderate  joint space narrowing at the first MTP joint with moderate  hypertrophic spurring, particularly on the dorsal aspect of the  metatarsal head. Normal joint alignment is maintained. Normal joint  alignment and spacing in the right foot elsewhere. No significant soft  tissue abnormality in the right foot.       Assessment:  1. Hip pain, bilateral    2. Hip impingement syndrome, unspecified laterality    3. Right leg weakness    4. Chronic bilateral low back pain with right-sided sciatica    5. DDD (degenerative disc disease), lumbar    6. Central stenosis of spinal canal        Plan:  Discussed the assessment with the patient.  Follow up: 2-4 weeks prn based on clinical progress  He will provide update likely via MyChart of his progress while on prednisone  Complicated, overlapping MSK issues today  Has known central stenosis and signs of intermittent neurogenic claudication with pain/weakness after prolonged walking  Sx used to be left predominant, L5 distribution, possible radicular sx today more consistent with right S1 +/- L5 distribution, with some weakness on exam  Prior MR imaging reviewed in detail  Reviewed concerns for any weakness, and need for prompt evaluation if he has any rapidly progressive weakness  Also having right hip and thigh pain, likely from AILYN and likely mild  OA, as well as recurrent right 1st MTP joint pain  Reviewed how both of these underlying degenerative issues can be flared due to both weakness resulting in less protective support, as well as from compensation for subtle dermatomal weakness  Reviewed goal of limiting CSI over time, as well as its potential diagnostic value  We know that he will get relief from CSI to the 1st MTP based on prior success  Reviewed option for CSI to right hip for pain control and diagnostic value  Reviewed option for HARPAL, last attempts were not helpful  Reviewed option for neurosurgery consultation for spine/pain referral, has been offered in the past  Reviewed footwear options including utilizing stiff-soled shoes or insert like Superfeet  Ultimately we chose a burst of prednisone given his pain in multiple locations, reviewed potential SE, reviewed limited diagnostic value  We discussed at length today the critical need to work on strength and conditioning and flexibility to help manage these issues longer term, regardless of what other tx interventions we can offer  PT referral placed today  XR images independently visualized and reviewed with patient today in clinic  We discussed modified progressive pain-free activity as tolerated  Home handouts provided and supportive care reviewed  All questions were answered today  Contact us with additional questions or concerns  Signs and sx of concern reviewed      Danny Oleary DO, ARCENIO  Sports Medicine Physician  [facility]      Time spent in chart review, one-on-one evaluation, discussion with patient regarding nature of problem, course, prior treatments, and therapeutic options, share-decision making, procedures and referrals as appropriate/documented, and charting related to the visit: 39 minutes        Disclaimer: This note consists of symbols derived from keyboarding, dictation and/or voice recognition software. As a result, there may be errors in  the script that have gone undetected. Please consider this when interpreting information found in this chart.

## 2021-10-29 NOTE — LETTER
"    10/29/2021         RE: Adan Oliver  [address]  [place] [address]        Dear Colleague,    Thank you for referring your patient, Adan Oliver, to the [facility]. Please see a copy of my visit note below.    Adan Oliver  :  1960  DOS: 10/29/2021  MRN: [id number]    Sports Medicine Clinic Visit    PCP: Johnathon Diaz    Adan Oliver is a 61 year old male who is seen as a self referral presenting with acute on chronic radiating bilateral lower extremity pain.    Injury: Reports insidious onset without acute precipitating event that patient first noticed progressing over the past ~ 6+ weeks, initially on left, now on right.  Pain located over right lower lumbar spine/buttocks, radiating to bilateral lower extremity.  Reports intermittent radiating, pain and weakness to right anterior, posterior thigh.  Additional Features:  Positive: numbness and weakness.  Symptoms are better with Rest.  Symptoms are worse with: going from sit to stand, bending at the waist, crossing right leg, getting out of car, hip flexion.  Other evaluation and/or treatments so far consists of: Tylenol, Ibuprofen, Rest and topical diclofenac.  Recent imaging completed: X-rays/MRI lumbar spine completed 2019.  Prior History of related problems: history of chronic radiating low back pain in past that he has previously seen Dr Tanner.    Patient is also following up on right great toe and metatarsalgia pain. Right Great toe MTP joint injection completed on 21 provided good relief for ~ 4 months.  Patient notes the feels like has \"rock in his shoe\".      Social History: currently employed as [employer]       Review of Systems  Musculoskeletal: as above  Remainder of review of systems is negative including constitutional, CV, pulmonary, GI, Skin and Neurologic except as noted in HPI or medical history.    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 It was difficult to discern between PLMS and hyponeas. Apnea/hypopnea index was 28.0 events per hour.  The REM AHI was 29.3 events per hour.  The supine AHI was 32.7 events per hour.  RDI was 28.0 events per hour. Lowest oxygen saturation was 84.0%.  Time spent less than or equal to 88% was 0.3 minutes. PLM index was 32.3 movements per hour     Past Surgical History:   Procedure Laterality Date     APPENDECTOMY  2000s     BLEPHAROPLASTY BILATERAL Bilateral 10/3/2018    Procedure: BLEPHAROPLASTY BILATERAL;;  Surgeon: Dany Berrios MD;  Location: [facility]     CARPAL TUNNEL RELEASE RT/LT  1980s     COMBINED ESOPHAGOSCOPY, GASTROSCOPY, DUODENOSCOPY (EGD) WITH CO2 INSUFFLATION N/A 9/20/2019    Procedure: ESOPHAGOGASTRODUODENOSCOPY, WITH CO2 INSUFFLATION;  Surgeon: Filiberto Banuelos MD;  Location: [facility]     ESOPHAGOSCOPY, GASTROSCOPY, DUODENOSCOPY (EGD), COMBINED N/A 9/20/2019    Procedure: Esophagogastroduodenoscopy, With Biopsy;  Surgeon: Filiberto Banuelos MD;  Location: [facility]     REPAIR PTOSIS BILATERAL Bilateral 10/3/2018    Procedure: REPAIR PTOSIS BILATERAL;;  Surgeon: Dany Berrios MD;  Location: [facility]     REPAIR PTOSIS BROW BILATERAL Bilateral 10/3/2018    Procedure: REPAIR PTOSIS BROW BILATERAL;;  Surgeon: Dany Berrios MD;  Location: [facility]     SIGMOIDOSCOPY FLEXIBLE N/A 9/20/2019    Procedure: SIGMOIDOSCOPY, FLEXIBLE;  Surgeon: Filiberto Banuelos MD;  Location: [facility]     TONSILLECTOMY  1980s     Family History   Problem Relation Age of Onset     Hypertension Mother      Alzheimer Disease Mother      Diabetes  "Brother      Glaucoma No family hx of      Macular Degeneration No family hx of      Coronary Artery Disease No family hx of      Colon Cancer No family hx of      Retinal detachment No family hx of        Objective  /79   Ht 1.693 m (5' 6.65\")   Wt 79.8 kg (176 lb)   BMI 27.86 kg/m        General: healthy, alert and in no distress      HEENT: no scleral icterus or conjunctival erythema     Skin: no suspicious lesions or rash. No jaundice.     CV: regular rhythm by palpation, 2+ distal pulses, no pedal edema      Resp: normal respiratory effort without conversational dyspnea     Psych: normal mood and affect      Gait: nonantalgic, appropriate coordination and balance     Neuro: normal light touch sensory exam of the extremities. Motor strength as noted below       Low back exam:    Inspection:       no visible deformity in the low back       normal skin       normal vascular       normal lymphatic    ROM:       full flexion       limited extension due to stiffness, minimal pain       asymmetric rotation of the pelvis with flexion       Mild pain with SB and rotation       Decreased hamstring flexibility    Tender:       paraspinal muscles       Lower lumbar, b/l SI    Non Tender:       remainder of lumbar spine    Strength:       hip flexion 5/5       knee extension 5/5       ankle dorsiflexion 5/5       ankle plantarflexion 5/5       dorsiflexion of the great toe 5-/5 left    Reflexes:       patellar (L3, L4) symmetric normal       achilles tendons (S1) asymmetric diminished R>L    Sensation:      grossly intact throughout lower extremities    Skin:       well perfused       capillary refill brisk    Special tests:       straight leg raise left neg for radicular sx         straight leg raise right mild pain with unclear radicular sx       Neg ROSEMARIE b/l       Neg slump, mild + facet compression     Right Foot and Ankle Exam:  Inspection:       no visible ecchymosis       no visible edema or " effusion     Foot inspection:       no deformity noted     Tender:       Great toe MTP joint      Non-Tender:       remainder of ankle and foot right     ROM:        Full active and passive ROM, ankle dorsiflexion, plantarflexion, inversion, eversion, great toe dorsiflexion, remainder of toes, midfoot and subtalar right  - mild pain with great toe dorsiflexion and plantarflexion     Strength:       ankle dorsiflexion 5/5 right       plantarflexion 4-/5 right       inversion 5/5 right       eversion 5/5 right       great toe dorsiflexion 5-/5 right       great toe plantarflexion 5-/5 right     Gait:       normal     Neurovascular:       2+ peripheral pulses bilaterally and brisk capillary refill       sensation grossly intact    Right hip exam    Inspection:        no edema or ecchymosis in hip area    ROM:       Flexion 120       internal rotation 10      external rotation 30      Range of motion limited by pain    Strength:        flexion 5-/5       extension 5/5       abduction 4/5       adduction 5-/5    Tender:        greater trochanter       Anterior hip joint       Mild ipsilateral SI joint TTP    Non Tender:        remainder of hip area       illiac crest       ASIS       pubis    Sensation:        grossly intact in hip and thigh    Skin:       well perfused       capillary refill brisk    Special Tests:        neg (-) ROSEMARIE       positive (+) FADIR R>>L       equivocal scour    Radiology:  Recent Results (from the past 744 hour(s))   XR Pelvis w bilateral hips 1 View    Narrative    PELVIS AND HIP BILATERAL ONE VIEW   10/29/2021 1:51 PM     HISTORY: Hip pain, bilateral    COMPARISON: None.      Impression    IMPRESSION: Moderate right hip degenerative changes. Mild left hip  degenerative changes. No evidence of acute fracture or AVN.    SAIDA GRANDA MD         SYSTEM ID:  [id number]     MRI LUMBAR SPINE WITHOUT CONTRAST September 27, 2017 7:32 PM      HISTORY: Several years of increasing low back and  bilateral leg pain.     COMPARISON: An MRI on 7/25/2015.     TECHNIQUE: Sagittal T1, T2, and STIR, and transverse proton density  and T2-weighted images were obtained through the lumbar spine.     FINDINGS: Numbering of the levels is again based on what appear to be  five lumbar type vertebral bodies. Vertebral body alignment is normal.  No fracture is seen. No pars interarticularis defect is demonstrated.  No osseous lesion is seen. No abnormal marrow signal intensity is  identified. The conus medullaris terminates at the level of the L1  vertebral body. No intrathecal abnormality is seen. The adjacent soft  tissues are unremarkable.     Findings by specific level:     T12-L1: The disc and facet joints are normal. No stenosis is seen.     L1-L2: The disc and facet joints are normal. No stenosis is seen.     L2-L3: The disc and facet joints are normal. No stenosis is seen.     L3-L4: Again seen is mild disc dehydration. The disc height is  well-preserved. There is a mild disc bulge with no focal herniation  seen. There are mild degenerative changes in the facet joints. Again  seen is a mild degree of central canal stenosis and mild to moderate  bilateral neural foraminal stenosis. This level is unchanged.     L4-L5: There is disc dehydration. The disc height is well-preserved.  Previously there is a moderate size right central disc protrusion.  This has significantly increased in size and now represents a large  broad-based left central disc protrusion. There has been progression  of what is now severe central canal stenosis with compression of the  left L5 nerve root. Mild to moderate bilateral neural foraminal  stenosis is unchanged. There are mild degenerative changes in the  facet joints.     L5-S1: Again seen is disc dehydration and mild disc height loss. There  is a mild disc bulge with no focal herniation seen. There are mild  degenerative changes in the facet joints. No central canal stenosis is  present.  Again seen is mild to moderate bilateral neural foraminal  stenosis. This level is unchanged.                                                                      IMPRESSION:  1.  At L4-L5 there has been progression/development of a large left  central disc protrusion. There is now severe central canal stenosis  with compression of the left L5 nerve root. Mild to moderate bilateral  neural foraminal stenosis is unchanged.  2. Degenerative changes elsewhere with no other disc herniation seen.  There is mild to moderate foraminal stenosis bilaterally at L3-L4 and  L5-S1 with mild central canal stenosis at L3-L4.    Examination:  XR FOOT RT G/E 3 VW     Date:  9/28/2020 1:53 PM      Clinical Information: Acute foot pain, right      Additional Information: none     Comparison: none                                                                      Impression:     Right foot negative for fracture, erosion, or bone lesion. Moderate  joint space narrowing at the first MTP joint with moderate  hypertrophic spurring, particularly on the dorsal aspect of the  metatarsal head. Normal joint alignment is maintained. Normal joint  alignment and spacing in the right foot elsewhere. No significant soft  tissue abnormality in the right foot.       Assessment:  1. Hip pain, bilateral    2. Hip impingement syndrome, unspecified laterality    3. Right leg weakness    4. Chronic bilateral low back pain with right-sided sciatica    5. DDD (degenerative disc disease), lumbar    6. Central stenosis of spinal canal        Plan:  Discussed the assessment with the patient.  Follow up: 2-4 weeks prn based on clinical progress  He will provide update likely via MyChart of his progress while on prednisone  Complicated, overlapping MSK issues today  Has known central stenosis and signs of intermittent neurogenic claudication with pain/weakness after prolonged walking  Sx used to be left predominant, L5 distribution, possible radicular sx today  more consistent with right S1 +/- L5 distribution, with some weakness on exam  Prior MR imaging reviewed in detail  Reviewed concerns for any weakness, and need for prompt evaluation if he has any rapidly progressive weakness  Also having right hip and thigh pain, likely from AILYN and likely mild OA, as well as recurrent right 1st MTP joint pain  Reviewed how both of these underlying degenerative issues can be flared due to both weakness resulting in less protective support, as well as from compensation for subtle dermatomal weakness  Reviewed goal of limiting CSI over time, as well as its potential diagnostic value  We know that he will get relief from CSI to the 1st MTP based on prior success  Reviewed option for CSI to right hip for pain control and diagnostic value  Reviewed option for HARPAL, last attempts were not helpful  Reviewed option for neurosurgery consultation for spine/pain referral, has been offered in the past  Reviewed footwear options including utilizing stiff-soled shoes or insert like Superfeet  Ultimately we chose a burst of prednisone given his pain in multiple locations, reviewed potential SE, reviewed limited diagnostic value  We discussed at length today the critical need to work on strength and conditioning and flexibility to help manage these issues longer term, regardless of what other tx interventions we can offer  PT referral placed today  XR images independently visualized and reviewed with patient today in clinic  We discussed modified progressive pain-free activity as tolerated  Home handouts provided and supportive care reviewed  All questions were answered today  Contact us with additional questions or concerns  Signs and sx of concern reviewed      Danny Oleary DO, ARCENIO  Sports Medicine Physician  [facility]      Time spent in chart review, one-on-one evaluation, discussion with patient regarding nature of problem, course, prior treatments, and  therapeutic options, share-decision making, procedures and referrals as appropriate/documented, and charting related to the visit: 39 minutes        Disclaimer: This note consists of symbols derived from keyboarding, dictation and/or voice recognition software. As a result, there may be errors in the script that have gone undetected. Please consider this when interpreting information found in this chart.        Again, thank you for allowing me to participate in the care of your patient.        Sincerely,        Danny Oleary, DO

## 2021-10-31 RX ORDER — ESOMEPRAZOLE MAGNESIUM 40 MG/1
CAPSULE, DELAYED RELEASE ORAL
Qty: 90 CAPSULE | Refills: 3 | Status: SHIPPED | OUTPATIENT
Start: 2021-10-31 | End: 2023-01-18

## 2021-11-02 ENCOUNTER — TELEPHONE (OUTPATIENT)
Dept: FAMILY MEDICINE | Facility: CLINIC | Age: 61
End: 2021-11-02

## 2021-11-02 NOTE — TELEPHONE ENCOUNTER
Patient called asking why he couldn't get his prescription for Nexium. RN reviewed medications and found that it had been refilled on 10/31/21 for 90 days with 3 refills. Patient said pharmacy wouldn't give it to him and they wouldn't tell him why. RN called [facility] pharmacy and pharmacy said they had sent a prior authorization. RN verified that patient could purchase out of pocket until prior auth is completed. RN contacted patient and explained situation to patient. Patient also had questions about when to take his new Rx for prednisone. RN answered questions and advised him to take in the morning.  Nena Foster RN on 11/2/2021 at 4:04 PM

## 2021-11-02 NOTE — TELEPHONE ENCOUNTER
Central Prior Authorization Team   Phone: [phone]    PA Initiation    Medication: esomeprazole 40mg  Insurance Company: [facility] - Phone [phone] Fax [phone]  Pharmacy Filling the Rx: [facility] - [place], [place] - [address] [place] [address].  Filling Pharmacy Phone: [phone]  Filling Pharmacy Fax:    Start Date: 11/2/2021

## 2021-11-03 NOTE — TELEPHONE ENCOUNTER
Prior Authorization Approval    Authorization Effective Date: 10/8/2021  Authorization Expiration Date: 11/3/2022  Medication: esomeprazole 40mg  Approved Dose/Quantity:    Reference #:     Insurance Company: [employer] [place] - Phone [phone] Fax [phone]  Expected CoPay:       CoPay Card Available:      Foundation Assistance Needed:    Which Pharmacy is filling the prescription (Not needed for infusion/clinic administered): [facility] - [place], [place] - [address] [place] [address].  Pharmacy Notified: Yes  Patient Notified: Yes  **Instructed pharmacy to notify patient when script is ready to /ship.**

## 2021-11-04 ENCOUNTER — LAB (OUTPATIENT)
Dept: LAB | Facility: CLINIC | Age: 61
End: 2021-11-04
Payer: COMMERCIAL

## 2021-11-04 DIAGNOSIS — E78.5 HYPERLIPIDEMIA WITH TARGET LDL LESS THAN 130: Chronic | ICD-10-CM

## 2021-11-04 DIAGNOSIS — Z78.9 ALCOHOL USE: ICD-10-CM

## 2021-11-04 PROCEDURE — 36415 COLL VENOUS BLD VENIPUNCTURE: CPT

## 2021-11-04 PROCEDURE — 80061 LIPID PANEL: CPT

## 2021-11-04 PROCEDURE — 80053 COMPREHEN METABOLIC PANEL: CPT

## 2021-11-05 LAB
ALBUMIN SERPL-MCNC: 4.1 G/DL (ref 3.4–5)
ALP SERPL-CCNC: 73 U/L (ref 40–150)
ALT SERPL W P-5'-P-CCNC: 20 U/L (ref 0–70)
ANION GAP SERPL CALCULATED.3IONS-SCNC: 10 MMOL/L (ref 3–14)
AST SERPL W P-5'-P-CCNC: 7 U/L (ref 0–45)
BILIRUB SERPL-MCNC: 0.5 MG/DL (ref 0.2–1.3)
BUN SERPL-MCNC: 22 MG/DL (ref 7–30)
CALCIUM SERPL-MCNC: 8.7 MG/DL (ref 8.5–10.1)
CHLORIDE BLD-SCNC: 105 MMOL/L (ref 94–109)
CHOLEST SERPL-MCNC: 254 MG/DL
CO2 SERPL-SCNC: 22 MMOL/L (ref 20–32)
CREAT SERPL-MCNC: 1.27 MG/DL (ref 0.66–1.25)
FASTING STATUS PATIENT QL REPORTED: YES
GFR SERPL CREATININE-BSD FRML MDRD: 60 ML/MIN/1.73M2
GLUCOSE BLD-MCNC: 96 MG/DL (ref 70–99)
HDLC SERPL-MCNC: 49 MG/DL
LDLC SERPL CALC-MCNC: 178 MG/DL
NONHDLC SERPL-MCNC: 205 MG/DL
POTASSIUM BLD-SCNC: 3.8 MMOL/L (ref 3.4–5.3)
PROT SERPL-MCNC: 7.6 G/DL (ref 6.8–8.8)
SODIUM SERPL-SCNC: 137 MMOL/L (ref 133–144)
TRIGL SERPL-MCNC: 137 MG/DL

## 2021-11-05 RX ORDER — ROSUVASTATIN CALCIUM 10 MG/1
10 TABLET, COATED ORAL DAILY
Qty: 90 TABLET | Refills: 3 | Status: SHIPPED | OUTPATIENT
Start: 2021-11-05 | End: 2023-02-28

## 2021-11-05 NOTE — RESULT ENCOUNTER NOTE
Adan,  Your lab work shows that your liver tests are normal.  Your creatinine kidney test was slightly elevated (and therefore your estimated GFR slightly low), but not especially worrisome.  Blood sugar and electrolytes are normal.Your cholesterol values are still elevated and high enough to warrant use of a statin, especially given your 10-year risk of having a heart attack or stroke as calculated below.    The 10-year ASCVD risk score (Michelle HOSKINS Jr., et al., 2013) is: 12.1%    Values used to calculate the score:      Age: 61 years      Sex: Male      Is Non- : No      Diabetic: No      Tobacco smoker: No      Systolic Blood Pressure: 126 mmHg      Is BP treated: No      HDL Cholesterol: 49 mg/dL      Total Cholesterol: 254 mg/dLI would therefore encourage you to resume taking the rosuvastatin 10 mg daily.  You can use up whenever remaining pills you have at home, but I sent a new prescription for the medication to our pharmacy in [place] for you as well.I would recommend another recheck in 1 year with repeat fasting lab work.    Johnathon Diaz MD

## 2021-11-16 ENCOUNTER — MYC MEDICAL ADVICE (OUTPATIENT)
Dept: ORTHOPEDICS | Facility: CLINIC | Age: 61
End: 2021-11-16
Payer: COMMERCIAL

## 2021-11-16 DIAGNOSIS — M19.079 ARTHRITIS OF GREAT TOE AT METATARSOPHALANGEAL JOINT: Primary | ICD-10-CM

## 2021-11-19 ENCOUNTER — THERAPY VISIT (OUTPATIENT)
Dept: PHYSICAL THERAPY | Facility: CLINIC | Age: 61
End: 2021-11-19
Attending: FAMILY MEDICINE
Payer: COMMERCIAL

## 2021-11-19 DIAGNOSIS — M25.859 HIP IMPINGEMENT SYNDROME, UNSPECIFIED LATERALITY: ICD-10-CM

## 2021-11-19 DIAGNOSIS — G89.29 CHRONIC BILATERAL LOW BACK PAIN WITH RIGHT-SIDED SCIATICA: ICD-10-CM

## 2021-11-19 DIAGNOSIS — M54.41 CHRONIC BILATERAL LOW BACK PAIN WITH RIGHT-SIDED SCIATICA: ICD-10-CM

## 2021-11-19 DIAGNOSIS — R29.898 RIGHT LEG WEAKNESS: ICD-10-CM

## 2021-11-19 DIAGNOSIS — M51.369 DDD (DEGENERATIVE DISC DISEASE), LUMBAR: ICD-10-CM

## 2021-11-19 DIAGNOSIS — M25.551 HIP PAIN, BILATERAL: ICD-10-CM

## 2021-11-19 DIAGNOSIS — M48.00 CENTRAL STENOSIS OF SPINAL CANAL: ICD-10-CM

## 2021-11-19 DIAGNOSIS — M25.552 HIP PAIN, BILATERAL: ICD-10-CM

## 2021-11-19 PROCEDURE — 97161 PT EVAL LOW COMPLEX 20 MIN: CPT | Mod: GP | Performed by: PHYSICAL THERAPIST

## 2021-11-19 PROCEDURE — 97110 THERAPEUTIC EXERCISES: CPT | Mod: GP | Performed by: PHYSICAL THERAPIST

## 2021-11-19 ASSESSMENT — ACTIVITIES OF DAILY LIVING (ADL)
STANDING_FOR_15_MINUTES: SLIGHT DIFFICULTY
GETTING_INTO_AND_OUT_OF_AN_AVERAGE_CAR: MODERATE DIFFICULTY
GOING_DOWN_1_FLIGHT_OF_STAIRS: SLIGHT DIFFICULTY
WALKING_UP_STEEP_HILLS: MODERATE DIFFICULTY
ROLLING_OVER_IN_BED: MODERATE DIFFICULTY
LIGHT_TO_MODERATE_WORK: SLIGHT DIFFICULTY
PUTTING_ON_SOCKS_AND_SHOES: MODERATE DIFFICULTY
HEAVY_WORK: MODERATE DIFFICULTY
HOW_WOULD_YOU_RATE_YOUR_CURRENT_LEVEL_OF_FUNCTION_DURING_YOUR_USUAL_ACTIVITIES_OF_DAILY_LIVING_FROM_0_TO_100_WITH_100_BEING_YOUR_LEVEL_OF_FUNCTION_PRIOR_TO_YOUR_HIP_PROBLEM_AND_0_BEING_THE_INABILITY_TO_PERFORM_ANY_OF_YOUR_USUAL_DAILY_ACTIVITIES?: 70
HOS_ADL_HIGHEST_POTENTIAL_SCORE: 68
WALKING_INITIALLY: SLIGHT DIFFICULTY
RECREATIONAL_ACTIVITIES: MODERATE DIFFICULTY
GETTING_INTO_AND_OUT_OF_A_BATHTUB: SLIGHT DIFFICULTY
HOS_ADL_COUNT: 17
SITTING_FOR_15_MINUTES: SLIGHT DIFFICULTY
GOING_UP_1_FLIGHT_OF_STAIRS: MODERATE DIFFICULTY
TWISTING/PIVOTING_ON_INVOLVED_LEG: MODERATE DIFFICULTY
HOS_ADL_SCORE(%): 58.82
WALKING_15_MINUTES_OR_GREATER: MODERATE DIFFICULTY
STEPPING_UP_AND_DOWN_CURBS: SLIGHT DIFFICULTY
WALKING_APPROXIMATELY_10_MINUTES: MODERATE DIFFICULTY
HOS_ADL_ITEM_SCORE_TOTAL: 40
WALKING_DOWN_STEEP_HILLS: MODERATE DIFFICULTY
DEEP_SQUATTING: MODERATE DIFFICULTY

## 2021-11-19 NOTE — PROGRESS NOTES
Physical Therapy Initial Evaluation  Subjective:  The history is provided by the patient. No  was used.   Patient Health History  Adan Oliver being seen for Physical Therapy.     Problem began: 10/29/2021 (date of order).   Problem occurred: Unknown   Pain is reported as 8/10 on pain scale.  General health as reported by patient is good.  Pertinent medical history includes: other (Metatarsalgia).   Red flags:  None as reported by patient.  Medical allergies: none.   Surgeries include:  None.    Current medications:  Pain medication.    Current occupation is [employer] .   Primary job tasks include:  Computer work, driving and lifting/carrying.                  Therapist Generated HPI Evaluation  Problem details: Patient presents to physical therapy with bilateral hip and leg pain. Patient states that he often feels pain all over the place, but feels it mostly in the R leg right now. Patient reports difficulty with walking, getting into and out of the car, rolling over in bed, and sleeping on the R side. Patient states R leg feels heavier and weaker than the L leg. However, 3-4 weeks ago, patient experienced the same thing with the other leg. Currently, he feels it on the anterior R thigh, but can also go down lateral leg to the ankle. Patient also reports a constant cramping feeling in the L lower leg and hamstring. .         Type of problem:  Bilateral hips.    This is a chronic condition.  Condition occurred with:  Insidious onset.  Where condition occurred: for unknown reasons.  Patient reports pain:  Posterior (Anterior thigh).  Pain is described as aching, sharp and shooting and is intermittent.  Pain radiates to:  Low back, lower leg and thigh. Pain is the same all the time.  Since onset symptoms are gradually worsening.  Associated symptoms:  Loss of strength and loss of motion/stiffness. Symptoms are exacerbated by walking, standing, lying on extremity and ascending stairs  and  relieved by nothing.  Special tests included:  X-ray.    Restrictions due to condition include:  Working in normal job without restrictions.  Barriers include:  None as reported by patient.                        Objective:    Gait:    Gait Type:  Antalgic   Assistive Devices:  None      Flexibility/Screens:       Lower Extremity:  Decreased left lower extremity flexibility:Quadriceps    Decreased right lower extremity flexibility:  Quadriceps               Lumbar/SI Evaluation  ROM:    AROM Lumbar:   Flexion:            50% limited; increased pain in low back and down R lateral leg  Ext:                    75% limited; pain when coming forward to neutral   Side Bend:        Left:  50% limited    Right:  50% limited; increased pain in R low back and buttock  Rotation:           Left:     Right:   Side Glide:        Left:  75% limited    Right:  75% limited; increased pain in R low back                  Neural Tension/Mobility:      Left side:Slump  negative.     Right side:   Slump  negative.         Spinal Segmental Conclusions:     Level: Hypo noted at L3, L4 and L5                                        Hip Evaluation    Hip Strength:    Flexion:   Left: 5-/5   -  Pain:  Right: 4-/5   +  Pain:                    Extension:  Left: 4-/5  -  Pain:Right: 3+/5    -  Pain:    Abduction:  Left: 4+/5    -   Pain:Right: 4+/5   -   Pain:    Internal Rotation:  Left: 5-/5   -   Pain:Right: 3+/5   +  Pain:  External Rotation:  Left: 4-/5   -  Pain:  Right: 4/5   +  Pain:  Knee Flexion:  Left: 5/5   -  Pain:Right: 4+/5   +  Pain:  Knee Extension:  Left: 5/5   -  Pain:Right: 4+/5    +  Pain:        Hip Special Testing:   Special tests hip not assessed: Hip scour test negative bilaterally.    Left hip negative for the following special tests:  Adria; Fadir/Labrum or SLR   Right hip positive for the following special tests:  Fadir/Labrum and SLRRight hip negative for the following special tests:  Adria      Functional Testing:           Quad:      Bilateral leg squat: Increased pain in R anterior thigh and R low back.                   General     ROS    Assessment/Plan:    Patient is a 61 year old male with bilateral hip complaints.    Patient has the following significant findings with corresponding treatment plan.                Diagnosis 1:  Low back and hip pain  Pain -  manual therapy, self management, education and home program  Decreased ROM/flexibility - manual therapy, therapeutic exercise and home program  Decreased strength - therapeutic exercise, therapeutic activities and home program  Impaired gait - gait training and home program    Therapy Evaluation Codes:   1) History comprised of:   Personal factors that impact the plan of care:      Time since onset of symptoms.    Comorbidity factors that impact the plan of care are:      None.     Medications impacting care: None.  2) Examination of Body Systems comprised of:   Body structures and functions that impact the plan of care:      Hip and Lumbar spine.   Activity limitations that impact the plan of care are:      Bending, Driving, Lifting, Sitting, Stairs, Standing and Walking.  3) Clinical presentation characteristics are:   Stable/Uncomplicated.  4) Decision-Making    Low complexity using standardized patient assessment instrument and/or measureable assessment of functional outcome.  Cumulative Therapy Evaluation is: Low complexity.    Previous and current functional limitations:  (See Goal Flow Sheet for this information)    Short term and Long term goals: (See Goal Flow Sheet for this information)     Communication ability:  Patient appears to be able to clearly communicate and understand verbal and written communication and follow directions correctly.  Treatment Explanation - The following has been discussed with the patient:   RX ordered/plan of care  Anticipated outcomes  Possible risks and side effects  This patient would benefit from PT intervention to resume  normal activities.   Rehab potential is good.    Frequency:  2 X a month, once daily  Duration:  for 1 months due to patient leaving the country at the end of December for 3 months.  Discharge Plan:  Achieve all LTG.  Independent in home treatment program.  Reach maximal therapeutic benefit.    Please refer to the daily flowsheet for treatment today, total treatment time and time spent performing 1:1 timed codes.

## 2021-11-19 NOTE — PROGRESS NOTES
[facility]    OUTPATIENT Physical Therapy ORTHOPEDIC EVALUATION  PLAN OF TREATMENT FOR OUTPATIENT REHABILITATION  (COMPLETE FOR INITIAL CLAIMS ONLY)  Patient's Last Name, First Name, M.I.  YOB: 1960  Adan Oliver    Provider s Name:  [facility]   Medical Record No.  [id number]   Start of Care Date:      Onset Date:       Type:     _X__PT   ___OT Medical Diagnosis:    Encounter Diagnoses   Name Primary?     Hip pain, bilateral      Hip impingement syndrome, unspecified laterality      Right leg weakness      Chronic bilateral low back pain with right-sided sciatica      DDD (degenerative disc disease), lumbar      Central stenosis of spinal canal         Treatment Diagnosis:  Low Back and Hip Pain        Goals:     11/19/21 0500   Body Part   Goals listed below are for Low Back/Hip   Goal #1   Goal #1 ambulation   Previous Functional Level No restrictions   Current Functional Level Minutes patient can walk   Performance Level Less than 10 minutes with pain 8/10    LTG Target Performance Minutes patient will be able to  walk   Performance Level 15 minutes with pain 4/10 or less   Rationale for safe community ambulation   Due Date 12/19/21       Therapy Frequency:     Predicted Duration of Therapy Intervention:       Varun Daniels, PT                 I CERTIFY THE NEED FOR THESE SERVICES FURNISHED UNDER        THIS PLAN OF TREATMENT AND WHILE UNDER MY CARE     (Physician attestation of this document indicates review and certification of the therapy plan).                       Certification Date From:      Certification Date To:       Referring Provider:  Danny Oleary    Initial Assessment        See Epic Evaluation

## 2021-12-02 ENCOUNTER — THERAPY VISIT (OUTPATIENT)
Dept: PHYSICAL THERAPY | Facility: CLINIC | Age: 61
End: 2021-12-02
Payer: COMMERCIAL

## 2021-12-02 DIAGNOSIS — M25.551 HIP PAIN, BILATERAL: ICD-10-CM

## 2021-12-02 DIAGNOSIS — M25.552 HIP PAIN, BILATERAL: ICD-10-CM

## 2021-12-02 PROCEDURE — 97110 THERAPEUTIC EXERCISES: CPT | Mod: GP | Performed by: PHYSICAL THERAPIST

## 2021-12-02 NOTE — PROGRESS NOTES
SUBJECTIVE  Subjective: Pt had sent a message stating he felt much worse after starting PT exercises so we had him come back sooner than planned.  States he waited 2 days after evaluation to start the exercises.  Did one set of 10 in the evening and then next morning had pain pulsing down his leg.  Has not done the exercises since.  Feels mostly back to baseline but was getting the pulsing this morning   Current Pain level: 8/10   Changes in function:  None  Adverse reaction to treatment or activity:  None    OBJECTIVE  Objective: Standing lumbar AROM: Flex: min loss, peripheralized, Ext: mod loss, increased, L SG: mod loss, increased, R SG: min loss, no change     ASSESSMENT  Adan continues to require intervention to meet STG and LTG's: PT  Patient has experienced an exacerbation of symptoms.  Hold press ups in mid range was likely irritating to his lower back  Response to therapy has shown lack of progress in  pain level  Progress made towards STG/LTG?  None    PLAN  Continue current treatment plan until patient demonstrates readiness to progress to higher level exercises.    PTA/ATC plan:  N/A    Please refer to the daily flowsheet for treatment today, total treatment time and time spent performing 1:1 timed codes.

## 2021-12-10 ENCOUNTER — OFFICE VISIT (OUTPATIENT)
Dept: ORTHOPEDICS | Facility: CLINIC | Age: 61
End: 2021-12-10
Payer: COMMERCIAL

## 2021-12-10 VITALS
SYSTOLIC BLOOD PRESSURE: 129 MMHG | HEIGHT: 67 IN | BODY MASS INDEX: 27.47 KG/M2 | DIASTOLIC BLOOD PRESSURE: 87 MMHG | WEIGHT: 175 LBS

## 2021-12-10 DIAGNOSIS — M19.079 ARTHRITIS OF GREAT TOE AT METATARSOPHALANGEAL JOINT: Primary | ICD-10-CM

## 2021-12-10 DIAGNOSIS — M79.674 GREAT TOE PAIN, RIGHT: ICD-10-CM

## 2021-12-10 PROCEDURE — 99213 OFFICE O/P EST LOW 20 MIN: CPT | Mod: 25 | Performed by: FAMILY MEDICINE

## 2021-12-10 PROCEDURE — 20604 DRAIN/INJ JOINT/BURSA W/US: CPT | Mod: RT | Performed by: FAMILY MEDICINE

## 2021-12-10 RX ADMIN — LIDOCAINE HYDROCHLORIDE 1 ML: 10 INJECTION, SOLUTION INFILTRATION; PERINEURAL at 13:38

## 2021-12-10 RX ADMIN — TRIAMCINOLONE ACETONIDE 40 MG: 40 INJECTION, SUSPENSION INTRA-ARTICULAR; INTRAMUSCULAR at 13:38

## 2021-12-10 ASSESSMENT — MIFFLIN-ST. JEOR: SCORE: 1552.42

## 2021-12-10 NOTE — PROGRESS NOTES
Adan Oliver  :  1960  DOS: 2021  MRN: [id number]      Sports Medicine Clinic Visit    PCP: Charly Castillo    Adan Oliver is a 60 year old male who is seen as a self referral presenting with right foot pain and right shoulder.    Injury:  1) Toe: He reports pain in the right great toe and medial foot for 2 weeks. His pain increases with walking and standing. He has used OTC arch supports but this has not changed the pain.     Social History: grocery shopping for elderly.    Interim History - 2021  Since last visit on 20 patient has begun to have pain again in his right great toe .  Right foot 1st MTP joint injection completed on 20 provided good relief for 6 months.  No new injury in the interim.  He would be interested in a repeat injection today.      Interim History - December 10, 2021  Since last visit on 10/29/2021 patient has begun to have pain again in his right great toe.  Injection done on 21, provided relief from about 6 months.   No interim injury.           Review of Systems  Skin: no bruising, no swelling  Musculoskeletal: as above  Neurologic: no numbness, paresthesias  Remainder of review of systems is negative including constitutional, CV, pulmonary, GI, except as noted in HPI or medical history.    Patient's current problem list, past medical and surgical history, and family history were reviewed.    Patient Active Problem List   Diagnosis     Esophageal reflux     Hyperlipidemia with target LDL less than 130     Reid esophagus     Memory changes     Insomnia, psychophysiological     Family history of Alzheimer's disease     Anxiety     Lumbar degenerative disc disease     Protrusion of lumbar intervertebral disc     Lumbar spinal stenosis     Alcohol use     LUCIAN (obstructive sleep apnea)- 'moderate' (AHI 6)     Hip pain, bilateral     Past Medical History:   Diagnosis Date     Anxiety 2015     Reid esophagus 10/30/2014     Esophageal  reflux 10/01/2014     History of shingles 2012     Hyperlipidemia with target LDL less than 130 10/01/2014     Lumbar degenerative disc disease 05/17/2016     Nephrolithiasis 2009     LUCIAN (obstructive sleep apnea)- 'moderate' (AHI 6) 10/06/2017    Study Date: 10/2/2017- (200.0 lbs) Scored using 3% rules. It was difficult to discern between PLMS and hyponeas. Apnea/hypopnea index was 28.0 events per hour.  The REM AHI was 29.3 events per hour.  The supine AHI was 32.7 events per hour.  RDI was 28.0 events per hour. Lowest oxygen saturation was 84.0%.  Time spent less than or equal to 88% was 0.3 minutes. PLM index was 32.3 movements per hour     Past Surgical History:   Procedure Laterality Date     APPENDECTOMY  2000s     BLEPHAROPLASTY BILATERAL Bilateral 10/3/2018    Procedure: BLEPHAROPLASTY BILATERAL;;  Surgeon: Dany Berrios MD;  Location: [facility] OR     CARPAL TUNNEL RELEASE RT/LT  1980s     COMBINED ESOPHAGOSCOPY, GASTROSCOPY, DUODENOSCOPY (EGD) WITH CO2 INSUFFLATION N/A 9/20/2019    Procedure: ESOPHAGOGASTRODUODENOSCOPY, WITH CO2 INSUFFLATION;  Surgeon: Filiberto Banuelos MD;  Location: [facility] OR     ESOPHAGOSCOPY, GASTROSCOPY, DUODENOSCOPY (EGD), COMBINED N/A 9/20/2019    Procedure: Esophagogastroduodenoscopy, With Biopsy;  Surgeon: Filiberto Banuelos MD;  Location: [facility]     REPAIR PTOSIS BILATERAL Bilateral 10/3/2018    Procedure: REPAIR PTOSIS BILATERAL;;  Surgeon: Dany Berrios MD;  Location: [facility]     REPAIR PTOSIS BROW BILATERAL Bilateral 10/3/2018    Procedure: REPAIR PTOSIS BROW BILATERAL;;  Surgeon: Dany Berrios MD;  Location: [facility]     SIGMOIDOSCOPY FLEXIBLE N/A 9/20/2019    Procedure: SIGMOIDOSCOPY, FLEXIBLE;  Surgeon: Filiberto Banuelos MD;  Location: [facility] OR     TONSILLECTOMY  1980s     Family History   Problem Relation Age of Onset     Hypertension Mother      Alzheimer Disease Mother      Diabetes Brother      Glaucoma No family hx of      Macular Degeneration  "No family hx of      Coronary Artery Disease No family hx of      Colon Cancer No family hx of      Retinal detachment No family hx of          Objective  /87   Ht 1.702 m (5' 7\")   Wt 79.4 kg (175 lb)   BMI 27.41 kg/m      GENERAL APPEARANCE: healthy, alert and no distress   GAIT: NORMAL  SKIN: no suspicious lesions or rashes  HEENT: Sclera clear, anicteric  CV: good peripheral pulses  RESP: Breathing not labored  NEURO: Normal strength and tone, mentation intact and speech normal  PSYCH:  mentation appears normal and affect normal/bright    Right Foot and Ankle Exam:  Inspection:       no visible ecchymosis       no visible edema or effusion    Foot inspection:       no deformity noted    Tender:       Great toe MTP joint     Non-Tender:       remainder of ankle and foot right    ROM:        Full active and passive ROM, ankle dorsiflexion, plantarflexion, inversion, eversion, great toe dorsiflexion, remainder of toes, midfoot and subtalar right  - some pain with great toe dorsiflexion and plantarflexion    Strength:       ankle dorsiflexion 5/5 right       plantarflexion 5/5 right       inversion 5/5 right       eversion 5/5 right       great toe dorsiflexion 5/5 right       great toe plantarflexion 5/5 right    Gait:       normal    Neurovascular:       2+ peripheral pulses bilaterally and brisk capillary refill       sensation grossly intact    Radiology  I ordered, visualized and reviewed these images with the patient  Xr Foot Right G/e 3 Views  Result Date: 9/28/2020  Examination:  XR FOOT RT G/E 3 VW Date:  9/28/2020 1:53 PM Clinical Information: Acute foot pain, right Additional Information: none Comparison: none   Impression: Right foot negative for fracture, erosion, or bone lesion. Moderate joint space narrowing at the first MTP joint with moderate hypertrophic spurring, particularly on the dorsal aspect of the metatarsal head. Normal joint alignment is maintained. Normal joint alignment and " spacing in the right foot elsewhere. No significant soft tissue abnormality in the right foot. HAMILTON MARCANO MD    Small Joint Injection/Arthrocentesis: R great MTP    Date/Time: 12/10/2021 1:38 PM  Performed by: Danny Oleary DO  Authorized by: Danny Oleary DO   Indications:  Pain  Needle Size:  25 G  Guidance: ultrasound     Approach:  Dorsal  Location:  Great toe    Site:  R great MTP                    Medications:  40 mg triamcinolone 40 MG/ML; 1 mL lidocaine 1 %        Outcome:  Tolerated well, no immediate complications  Procedure discussed: discussed risks, benefits, and alternatives    Consent Given by:  Patient  Timeout: timeout called immediately prior to procedure    Prep: patient was prepped and draped in usual sterile fashion            Assessment:  1. 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 Great toe pain, right          Plan:  - Today's Plan of Care:  Reviewed goals/roles for inserts or Dynaflex  Repeat US guided injection to 1st MTP joint today  Reviewed low impact activity strategies and footwear options  XR images independently visualized and reviewed with patient today in clinic  Expectations and goals of CSI reviewed  Often 2-3 days for steroid effect, and can take up to two weeks for maximum effect  We discussed modified progressive pain-free activity as tolerated  Do not overuse in first two weeks if feeling better due to concern for vulnerability while steroid is working  Supportive care reviewed  All questions were answered today  Contact us with additional questions or concerns  Signs and sx of concern reviewed      Danny Oleary DO, ARCENIO  Sports Medicine Physician  [facility]

## 2021-12-14 RX ORDER — TRIAMCINOLONE ACETONIDE 40 MG/ML
40 INJECTION, SUSPENSION INTRA-ARTICULAR; INTRAMUSCULAR
Status: DISCONTINUED | OUTPATIENT
Start: 2021-12-10 | End: 2022-05-27

## 2021-12-14 RX ORDER — LIDOCAINE HYDROCHLORIDE 10 MG/ML
1 INJECTION, SOLUTION INFILTRATION; PERINEURAL
Status: DISCONTINUED | OUTPATIENT
Start: 2021-12-10 | End: 2022-05-27

## 2021-12-17 ENCOUNTER — LAB (OUTPATIENT)
Dept: LAB | Facility: CLINIC | Age: 61
End: 2021-12-17
Attending: FAMILY MEDICINE
Payer: COMMERCIAL

## 2021-12-17 DIAGNOSIS — Z20.822 ENCOUNTER FOR LABORATORY TESTING FOR COVID-19 VIRUS: ICD-10-CM

## 2021-12-17 PROCEDURE — U0005 INFEC AGEN DETEC AMPLI PROBE: HCPCS

## 2021-12-17 PROCEDURE — U0003 INFECTIOUS AGENT DETECTION BY NUCLEIC ACID (DNA OR RNA); SEVERE ACUTE RESPIRATORY SYNDROME CORONAVIRUS 2 (SARS-COV-2) (CORONAVIRUS DISEASE [COVID-19]), AMPLIFIED PROBE TECHNIQUE, MAKING USE OF HIGH THROUGHPUT TECHNOLOGIES AS DESCRIBED BY CMS-2020-01-R: HCPCS

## 2021-12-18 LAB — SARS-COV-2 RNA RESP QL NAA+PROBE: NEGATIVE

## 2022-03-21 ENCOUNTER — ALLIED HEALTH/NURSE VISIT (OUTPATIENT)
Dept: FAMILY MEDICINE | Facility: CLINIC | Age: 62
End: 2022-03-21
Payer: COMMERCIAL

## 2022-03-21 VITALS — SYSTOLIC BLOOD PRESSURE: 146 MMHG | DIASTOLIC BLOOD PRESSURE: 86 MMHG

## 2022-03-21 DIAGNOSIS — Z01.30 BP CHECK: Primary | ICD-10-CM

## 2022-03-21 PROCEDURE — 99207 PR NO CHARGE NURSE ONLY: CPT | Performed by: FAMILY MEDICINE

## 2022-03-21 NOTE — Clinical Note
Routing message to PCP for review because BP checked at pharmacy is above goal. Recommended patient to follow-up with PCP.  PCP please close this encounter.  83 yo female admit to [facility] with worsening cough x 10 days and with mucus build up. Pt found to have positive troponins. Pt admit to [facility] for STEMI managment. Pt s/p rapid response called on 10/12/17 due to hypotension and syncope.Pt requiring pressors and MICU admission. Patient is currently off pressors (very brief requirement) and stablePMH: HLD, CVA c/b dysphagia s/p PEG, grade 1 DHF, LVOT obstruction, GERD, ESRD (T/Th/Sat via left IJ tunneled dialysis cath), left arm vascular stent

## 2022-03-21 NOTE — PROGRESS NOTES
Adan Oliver was evaluated at [facility] on March 21, 2022 at which time his blood pressure was:    BP Readings from Last 3 Encounters:   03/21/22 (!) 146/86   12/10/21 129/87   10/29/21 126/79     Pulse Readings from Last 3 Encounters:   10/22/21 69   10/02/21 79   04/08/21 79       Reviewed lifestyle modifications for blood pressure control and reduction: including making healthy food choices, managing weight, getting regular exercise, smoking cessation, reducing alcohol consumption, monitoring blood pressure regularly.     Symptoms: Other: palpitations approximately 2 weeks ago     BP Goal:< 140/90 mmHg    BP Assessment:  BP too high    Potential Reasons for BP too high: NA - Not applicable    BP Follow-Up Plan: Referral to PCP    Recommendation to Provider: Patient mentioned possible tooth infection, should be in to dentist on Thursday 3/24/22. Recommended that they follow up with primary care provider and patient agreed.     Note completed by: Ania Reynoso, PharmD IV Student

## 2022-03-22 ENCOUNTER — NURSE TRIAGE (OUTPATIENT)
Dept: FAMILY MEDICINE | Facility: CLINIC | Age: 62
End: 2022-03-22
Payer: COMMERCIAL

## 2022-03-22 NOTE — TELEPHONE ENCOUNTER
Patient called the clinic.  He reports that he was out of the country ([place] for 2 weeks.  While in [place] he had 2 reading that were 160/???.    Patient had his blood pressure evaluated 3/21/22 (146/86) see nurse visit.  He reports experiencing throbbing heart rate and heavy chest alst night.  Patient reports intermittent headaches, fogginess and not thinking clearly.    He has been having a hard time (intermittent) taking deep breathes X 2 months.  He broke a tooth and was treated with antibiotic for an infected tooth while in [place].    Patient denies any other symptoms or concerns at this time.      Patient has an appointment with the dentist 3/24/22 and scheduled a follow-up appointment with PCP on 3/25/22.    Patient was advised to call the clinic [phone] or seek medical assistance at the nearest ER or , if the symptoms persist or worsen before scheduled appointment.    Patient verbalized understanding and has no further questions or concerns at this time.      Sent [employer] messages    Reason for Disposition    Systolic BP >= 130 OR Diastolic >= 80, and is not taking BP medications    Additional Information    Negative: Sounds like a life-threatening emergency to the triager    Negative: Pregnant > 20 weeks or postpartum (< 6 weeks after delivery) and new hand or face swelling    Negative: Pregnant > 20 weeks and BP > 140/90    Negative: Systolic BP >= 160 OR Diastolic >= 100, and any cardiac or neurologic symptoms (e.g., chest pain, difficulty breathing, unsteady gait, blurred vision)    Negative: Patient sounds very sick or weak to the triager    Negative: BP Systolic BP >= 140 OR Diastolic >= 90 and postpartum (from 0 to 6 weeks after delivery)    Negative: Systolic BP >= 180 OR Diastolic >= 110, and missed most recent dose of blood pressure medication    Negative: Systolic BP >= 180 OR Diastolic >= 110    Negative: Patient wants to be seen    Negative: Ran out of BP medications    Negative:  "Taking BP medications and feels is having side effects (e.g., impotence, cough, dizziness)    Negative: Systolic BP >= 160 OR Diastolic >= 100    Negative: Systolic BP >= 130 OR Diastolic >= 80, and pregnant    Negative: Systolic BP >= 130 OR Diastolic >= 80, and is taking BP medications    Answer Assessment - Initial Assessment Questions  1. BLOOD PRESSURE: \"What is the blood pressure?\" \"Did you take at least two measurements 5 minutes apart?\"      160/???    2. ONSET: \"When did you take your blood pressure?\"      X 2 weeks    3. HOW: \"How did you obtain the blood pressure?\" (e.g., visiting nurse, automatic home BP monitor)      automatic  4. HISTORY: \"Do you have a history of high blood pressure?\"      No  5. MEDICATIONS: \"Are you taking any medications for blood pressure?\" \"Have you missed any doses recently?\"      No  6. OTHER SYMPTOMS: \"Do you have any symptoms?\" (e.g., headache, chest pain, blurred vision, difficulty breathing, weakness)      Chest heaviness, heart throbbing    Protocols used: HIGH BLOOD PRESSURE-A-OH        "

## 2022-03-25 ENCOUNTER — ALLIED HEALTH/NURSE VISIT (OUTPATIENT)
Dept: NURSING | Facility: CLINIC | Age: 62
End: 2022-03-25
Payer: COMMERCIAL

## 2022-03-25 ENCOUNTER — NURSE TRIAGE (OUTPATIENT)
Dept: FAMILY MEDICINE | Facility: CLINIC | Age: 62
End: 2022-03-25

## 2022-03-25 ENCOUNTER — OFFICE VISIT (OUTPATIENT)
Dept: FAMILY MEDICINE | Facility: CLINIC | Age: 62
End: 2022-03-25
Payer: COMMERCIAL

## 2022-03-25 VITALS
TEMPERATURE: 98.6 F | WEIGHT: 189.5 LBS | SYSTOLIC BLOOD PRESSURE: 133 MMHG | BODY MASS INDEX: 29.68 KG/M2 | DIASTOLIC BLOOD PRESSURE: 87 MMHG | RESPIRATION RATE: 18 BRPM | HEART RATE: 78 BPM | OXYGEN SATURATION: 98 %

## 2022-03-25 VITALS
SYSTOLIC BLOOD PRESSURE: 133 MMHG | TEMPERATURE: 98.6 F | OXYGEN SATURATION: 98 % | HEART RATE: 78 BPM | BODY MASS INDEX: 29.6 KG/M2 | WEIGHT: 189 LBS | DIASTOLIC BLOOD PRESSURE: 87 MMHG

## 2022-03-25 DIAGNOSIS — F32.1 MODERATE MAJOR DEPRESSION (H): ICD-10-CM

## 2022-03-25 DIAGNOSIS — F41.1 GAD (GENERALIZED ANXIETY DISORDER): ICD-10-CM

## 2022-03-25 DIAGNOSIS — Z82.0 FAMILY HISTORY OF ALZHEIMER'S DISEASE: ICD-10-CM

## 2022-03-25 DIAGNOSIS — R41.3 MEMORY CHANGES: Primary | ICD-10-CM

## 2022-03-25 DIAGNOSIS — R03.0 ELEVATED BLOOD PRESSURE READING WITHOUT DIAGNOSIS OF HYPERTENSION: Primary | ICD-10-CM

## 2022-03-25 PROCEDURE — 99214 OFFICE O/P EST MOD 30 MIN: CPT | Performed by: FAMILY MEDICINE

## 2022-03-25 ASSESSMENT — ANXIETY QUESTIONNAIRES
1. FEELING NERVOUS, ANXIOUS, OR ON EDGE: NEARLY EVERY DAY
GAD7 TOTAL SCORE: 17
2. NOT BEING ABLE TO STOP OR CONTROL WORRYING: NEARLY EVERY DAY
6. BECOMING EASILY ANNOYED OR IRRITABLE: MORE THAN HALF THE DAYS
3. WORRYING TOO MUCH ABOUT DIFFERENT THINGS: NEARLY EVERY DAY
7. FEELING AFRAID AS IF SOMETHING AWFUL MIGHT HAPPEN: SEVERAL DAYS
5. BEING SO RESTLESS THAT IT IS HARD TO SIT STILL: MORE THAN HALF THE DAYS

## 2022-03-25 ASSESSMENT — PATIENT HEALTH QUESTIONNAIRE - PHQ9
SUM OF ALL RESPONSES TO PHQ QUESTIONS 1-9: 17
5. POOR APPETITE OR OVEREATING: NEARLY EVERY DAY

## 2022-03-25 NOTE — TELEPHONE ENCOUNTER
"Patient came to clinic and thought that he had appointment with Dr. Diaz scheduled. Patient not on provider's schedule. Triaged for elevated blood pressure. Patient states that blood pressure has been fluctuating and he endorses several additional symptoms-see #6 below. Huddled with Dr. Diaz who agreed to see patient. Added to provider's schedule.     1. BLOOD PRESSURE: \"What is the blood pressure?\" \"Did you take at least two measurements 5 minutes apart?\"  Current 133/87, at pharmacy it was 155/unknown    2. ONSET: \"When did you take your blood pressure?\"  Had a reading of 161 a couple weeks ago     3. HOW: \"How did you obtain the blood pressure?\" (e.g., visiting nurse, automatic home BP monitor)  Goes to pharmacy, automatic cuff.     4. HISTORY: \"Do you have a history of high blood pressure?\"  No    5. MEDICATIONS: \"Are you taking any medications for blood pressure?\" \"Have you missed any doses recently?\"  No     6. OTHER SYMPTOMS: \"Do you have any symptoms?\" (e.g., headache, chest pain, blurred vision, difficulty breathing, weakness)  Headaches on and off. Lethargy, does not feel refreshed after sleeping. \"Almost resembles depression\". Sometimes chest feels heavy (not current). At times it takes patient a long time to catch his breath. Weakness. Eating less than normal, no appetite. Feels disconnected and forgetful. Conccerned-hx of alzheimer's.     7. PREGNANCY: \"Is there any chance you are pregnant?\" \"When was your last menstrual period?\"  N/A    Reason for Disposition    Patient wants to be seen    Additional Information    Negative: Pregnant > 20 weeks or postpartum (< 6 weeks after delivery) and new hand or face swelling    Negative: Pregnant > 20 weeks and BP > 140/90    Negative: Sounds like a life-threatening emergency to the triager    Negative: Systolic BP >= 160 OR Diastolic >= 100, and any cardiac or neurologic symptoms (e.g., chest pain, difficulty breathing, unsteady gait, blurred vision)    " Negative: Patient sounds very sick or weak to the triager    Negative: BP Systolic BP >= 140 OR Diastolic >= 90 and postpartum (from 0 to 6 weeks after delivery)    Negative: Systolic BP >= 180 OR Diastolic >= 110, and missed most recent dose of blood pressure medication    Negative: Systolic BP >= 180 OR Diastolic >= 110    Protocols used: HIGH BLOOD PRESSURE-A-OH    Melissa Beltran RN   [facility]

## 2022-03-25 NOTE — PROGRESS NOTES
Assessment & Plan       ICD-10-CM    1. Memory changes  R41.3 Occupational Therapy Referral   2. Family history of Alzheimer's disease  Z82.0 Occupational Therapy Referral   3. Moderate major depression (H)  F32.1 Adult Mental Health  Referral   4. TALITA (generalized anxiety disorder)  F41.1 Adult Mental Health  Referral     His blood pressure seems to be okay, so he was reassured about that  He has concerns about his memory and cognition, so I will refer him to occupational therapy for a cognitive performance test  I suspect his symptoms are primarily related to underlying anxiety and depression  I discussed possible medication for that, but he would prefer to avoid medication and try counseling instead  He was therefore referred to for some counseling    He will work with his dentist to get his tooth problem taken care of, which sounds like it will be fairly extensive    I suggested a follow-up visit in the couple months if he still not feeling better    Return in about 2 months (around 5/25/2022) for a recheck if symptoms worsen or fail to improve.    Johnathon Diaz MD  [facility] HEIDI Arellano is a 62 year old who presents for the following health issues:    HPI     Patient is here today and had a nurse visit for his BP. Elevated Blood pressure previously.    He returned last week from spending 3 months in [place].  He had a cracked tooth when he was in [place] and went through a few rounds of antibiotics.  He met with 2 dentists yesterday and anticipates getting his tooth fixed.  During this time, he has been feeling poorly.  He has had poor energy level, poor motivation, poor appetite, etc.  He had some elevated blood pressure readings while in [place] with a systolic reading of around 160.  His mother had Alzheimer's of disease and he feels like his memory is not as good as it used to be and he has some concerns about that.  He does acknowledge feeling somewhat  depressed and anxious.  Review of his chart shows that he went through neuropsychiatric testing in 2015 for similar symptoms.  His cognitive performance was within normal limits.  He has been tried on some medicines for anxiety and depression in the past, but he has had side effects and did not stick with them.  He would be open to doing some counseling.    He still has not started the rosuvastatin.    Patient Active Problem List   Diagnosis     Esophageal reflux     Hyperlipidemia with target LDL less than 130     Reid esophagus     Memory changes     Insomnia, psychophysiological     Family history of Alzheimer's disease     Anxiety     Lumbar degenerative disc disease     Protrusion of lumbar intervertebral disc     Lumbar spinal stenosis     Alcohol use     LUCIAN (obstructive sleep apnea)- 'moderate' (AHI 6)     Hip pain, bilateral     Current Outpatient Medications   Medication     esomeprazole (NEXIUM) 40 MG DR capsule     ibuprofen 200 MG capsule     rosuvastatin (CRESTOR) 10 MG tablet     Current Facility-Administered Medications   Medication     lidocaine 1 % injection 1 mL     triamcinolone (KENALOG-40) injection 40 mg         Review of Systems   Mainly significant for the above.      Objective    /87 (BP Location: Right arm, Patient Position: Sitting, Cuff Size: Adult Regular)   Pulse 78   Temp 98.6  F (37  C) (Oral)   Wt 85.7 kg (189 lb)   SpO2 98%   BMI 29.60 kg/m    Body mass index is 29.6 kg/m .  Physical Exam   GENERAL: alert, no distress and over weight  PSYCH: mentation appears normal and affect is somewhat flat and depressed.  He scored a 17 on his PHQ-9 and also on his TALITA-7 today.    CMP and CBC lab results from November were reviewed in his chart.  His neuropsych report from November 2015 was reviewed as well.

## 2022-03-25 NOTE — PROGRESS NOTES
See telephone encounter 3/25/22 for triage information.     Melissa Beltran RN   [facility]

## 2022-03-26 ASSESSMENT — ANXIETY QUESTIONNAIRES: GAD7 TOTAL SCORE: 17

## 2022-04-08 ENCOUNTER — MYC MEDICAL ADVICE (OUTPATIENT)
Dept: FAMILY MEDICINE | Facility: CLINIC | Age: 62
End: 2022-04-08
Payer: COMMERCIAL

## 2022-04-08 NOTE — TELEPHONE ENCOUNTER
Please offer him a follow-up visit then for further evaluation.  He can be placed in a same-day or approval required slot for next week if needed.    Johnathon Diaz MD

## 2022-04-08 NOTE — TELEPHONE ENCOUNTER
Patient was triaged for these symptoms on 3/25/2022 (see nurse triage note) and had an OV with provider on same day.  However, OV notes does not mention breathing problems, chest heaviness, weakness.   Not sure if these symptoms were assessed or discussed during the visit.  Please advise    Enriqueta Dunn RN  [facility]

## 2022-04-11 ENCOUNTER — OFFICE VISIT (OUTPATIENT)
Dept: FAMILY MEDICINE | Facility: CLINIC | Age: 62
End: 2022-04-11
Payer: COMMERCIAL

## 2022-04-11 VITALS
HEART RATE: 91 BPM | WEIGHT: 169.4 LBS | BODY MASS INDEX: 26.59 KG/M2 | TEMPERATURE: 98.7 F | SYSTOLIC BLOOD PRESSURE: 126 MMHG | DIASTOLIC BLOOD PRESSURE: 86 MMHG | HEIGHT: 67 IN | OXYGEN SATURATION: 97 %

## 2022-04-11 DIAGNOSIS — E78.5 HYPERLIPIDEMIA WITH TARGET LDL LESS THAN 130: Chronic | ICD-10-CM

## 2022-04-11 DIAGNOSIS — R07.9 CHEST PAIN, UNSPECIFIED TYPE: Primary | ICD-10-CM

## 2022-04-11 DIAGNOSIS — R06.02 SOB (SHORTNESS OF BREATH): ICD-10-CM

## 2022-04-11 DIAGNOSIS — F41.9 ANXIETY: Chronic | ICD-10-CM

## 2022-04-11 DIAGNOSIS — Z86.0100 HX OF COLONIC POLYP: ICD-10-CM

## 2022-04-11 PROCEDURE — 93000 ELECTROCARDIOGRAM COMPLETE: CPT | Performed by: FAMILY MEDICINE

## 2022-04-11 PROCEDURE — 99215 OFFICE O/P EST HI 40 MIN: CPT | Performed by: FAMILY MEDICINE

## 2022-04-11 NOTE — PROGRESS NOTES
"  Assessment & Plan       ICD-10-CM    1. Chest pain, unspecified type  R07.9 EKG 12-lead complete w/read - Clinics   2. SOB (shortness of breath)  R06.02 XR Chest 2 Views   3. Hx of colonic polyp  Z86.010    4. Anxiety  F41.9    5. Hyperlipidemia with target LDL less than 130  E78.5      He is having a lot of physical symptoms currently affecting a number of different body areas  I still suspect he has underlying anxiety and depression that is playing a significant role with the symptoms  We will do an EKG and chest x-ray today to evaluate his chest and breathing symptoms  He is not having exertional chest discomfort to suggest angina and he did have a normal stress test less than a year and a half ago, so that is reassuring  I did ask him to get a heart rate monitor so that he can check his heart rate when he has these episodes at night    I advised him to schedule his full colonoscopy and eventually schedule his cognitive screening and a visit with a therapist  I encouraged him to start the rosuvastatin for his hyperlipidemia    We will likely want to check some repeat labs in a month or 2, preferably after he has been on the rosuvastatin for a while     BMI:   Estimated body mass index is 26.78 kg/m  as calculated from the following:    Height as of this encounter: 1.694 m (5' 6.69\").    Weight as of this encounter: 76.8 kg (169 lb 6.4 oz).   Weight management plan: Discussed healthy diet and exercise guidelines    I will inform him of his EKG and chest x-ray results when available and then a further plan of action    Return in about 1 month (around 5/11/2022) for a recheck if symptoms worsen or fail to improve.    40-minute visit today including chart prep, the visit itself, and subsequent documentation    Johnathon Diaz MD  [facility] HEIDI Arellano is a 62 year old who presents for the following health issues     HPI     Chest Pain  Onset/Duration: 2-3 weeks  Description: "   Location: entire chest  Character: tight and heavy  Radiation: no  Duration: 30 minutes to hours  Intensity: moderate, to severe  Progression of Symptoms: same and intermittent  Accompanying Signs & Symptoms:  Shortness of breath: YES  Sweating: no  Nausea/vomiting: no  Lightheadedness: no  Palpitations: YES  Fever/Chills: no  Cough: no           Heartburn: no  History:   Family history of heart disease: no  Tobacco use: no  Previous similar symptoms: no   Precipitating factors:   Worse with exertion: YES  Worse with deep breaths: no           Related to eating: no           Better with burping: no  Alleviating factors: none  Therapies tried and outcome: none    In the last 2 to 3 weeks he has woken up a few times at night feeling short of breath and like his heart is racing.  He has tried to check his heart rate, but he has been unable to determine how fast it is going.  He does have a wrist home blood pressure cuff, but is not sure if it has a heart rate monitor on it.  He generally has not had episodes like this during the day with activity.  He did have a stress test done less than a year and a half ago which was negative/normal.  There are times when he feels like it is hard for him to take a deep breath.  He still has not started the rosuvastatin.  He just finished antibiotics for a tooth infection and he had the tooth pulled.  He has noticed a lot of other symptoms as well including a slow urine stream and increased urinary frequency.  His appetite is not very great.  He has been having a lot of aches and pains and is using 3 ibuprofen tablets each morning to help with pain.  He wonders if that is okay.    He still has not done the colonoscopy that was recommended last fall to him.  He has a history of a colon polyp and was advised to have a full colonoscopy after his flexible sigmoidoscopy in 2019.  He has not made any appointment to have cognitive testing done.  He has not made any appointment to see a  "counselor/therapist.  See previous note for details on this.    Patient Active Problem List   Diagnosis     Esophageal reflux     Hyperlipidemia with target LDL less than 130     Reid esophagus     Memory changes     Insomnia, psychophysiological     Family history of Alzheimer's disease     Anxiety     Lumbar degenerative disc disease     Protrusion of lumbar intervertebral disc     Lumbar spinal stenosis     Alcohol use     LUCIAN (obstructive sleep apnea)- 'moderate' (AHI 6)     Hip pain, bilateral     Current Outpatient Medications   Medication     esomeprazole (NEXIUM) 40 MG DR capsule     ibuprofen 200 MG capsule     rosuvastatin (CRESTOR) 10 MG tablet     Current Facility-Administered Medications   Medication     lidocaine 1 % injection 1 mL     triamcinolone (KENALOG-40) injection 40 mg         Review of Systems   Mainly significant for the above.      Objective    /86 (BP Location: Right arm, Patient Position: Sitting, Cuff Size: Adult Regular)   Pulse 91   Temp 98.7  F (37.1  C) (Oral)   Ht 1.694 m (5' 6.69\")   Wt 76.8 kg (169 lb 6.4 oz)   SpO2 97%   BMI 26.78 kg/m    Body mass index is 26.78 kg/m .  Physical Exam   GENERAL: healthy, alert and no distress  RESP: lungs clear to auscultation - no rales, rhonchi or wheezes  CV: regular rate and rhythm, normal S1 S2, no S3 or S4, no murmur, click or rub    Past lab values were reviewed from the last year and his stress test results reviewed from 2020        "

## 2022-04-23 ENCOUNTER — MYC MEDICAL ADVICE (OUTPATIENT)
Dept: FAMILY MEDICINE | Facility: CLINIC | Age: 62
End: 2022-04-23
Payer: COMMERCIAL

## 2022-04-23 DIAGNOSIS — R00.2 PALPITATIONS: Primary | ICD-10-CM

## 2022-04-25 NOTE — TELEPHONE ENCOUNTER
Patient reported vitals entered in flow sheet. Await pt response, then will route to provider to advise.    Skye Contreras RN  [facility]

## 2022-04-29 ENCOUNTER — MYC MEDICAL ADVICE (OUTPATIENT)
Dept: FAMILY MEDICINE | Facility: CLINIC | Age: 62
End: 2022-04-29
Payer: COMMERCIAL

## 2022-05-16 ENCOUNTER — TELEPHONE (OUTPATIENT)
Dept: GASTROENTEROLOGY | Facility: CLINIC | Age: 62
End: 2022-05-16
Payer: COMMERCIAL

## 2022-05-16 NOTE — TELEPHONE ENCOUNTER
Screening Questions  BlueKIND OF PREP RedLOCATION [review exclusion criteria] GreenSEDATION TYPE  1. Have you had a positive covid test in the last 90 days? No      2. Do you have a legal guardian or medical Power of ?  Are you able to give consent for your medical care? No  (Sedation review/consideration needed)    3. Are you active on mychart? Yes     4. What insurance is in the chart? Annabelle     3.   Ordering/Referring Provider: Joe     4. BMI 25.5 [BMI OVER 40-EXTENDED PREP]  If greater than 40 review exclusion criteria [PAC APPT IF @ [facility]]        5.  Respiratory Screening :  [If yes to any of the following HOSPITAL setting only]     Do you use daily home oxygen? no     Do you have mod to severe Obstructive Sleep Apnea? No   [OKAY @ [facility] SH PH RI]   Do you have Pulmonary Hypertension? No      Do you have UNCONTROLLED asthma? No         6.   Have you had a heart or lung transplant? No       7.   Are you currently on dialysis? No  [ If yes, G-PREP & HOSPITAL setting only]     8.   Do you have chronic kidney disease? No  [ If yes, G-PREP ]    9.   Have you had a stroke or Transient ischemic attack (TIA - aka  mini stroke ) within 6 months?  No  (If yes, please review exclusion criteria)    10.   In the past 6 months, have you had any heart related issues including cardiomyopathy or heart attack? No            If yes, did it require cardiac stenting or other implantable device?   Seeing Cardio for High blood pressure issues.     11.   Do you have any implantable devices in your body (pacemaker, defib, LVAD)? no  (If yes, please review exclusion criteria)    12.   Do you take nitroglycerin? no           If yes, how often?   (if yes, HOSPITAL setting ONLY)    13.   Are you currently taking any blood thinners? No            [IF YES, INFORM PATIENT TO FOLLOW UP W/ ORDERING PROVIDER FOR BRIDGING INSTRUCTIONS]     14.   Do you have a diagnosis of diabetes? No    [ If yes, G-PREP ]    15.   [FEMALES]  Are you currently pregnant?     If yes, how many weeks?     16.   Are you taking any prescription pain medications on a routine schedule?  NO    [ If yes, EXTENDED PREP.] [If yes, MAC]    17.   Do you have any chemical dependencies such as alcohol, street drugs, or methadone?  No  [If yes, MAC]    18.   Do you have any history of post-traumatic stress syndrome, severe anxiety or history of psychosis?  no  [If yes, MAC]    19.   Do you transfer independently? Yes     20.  On a regular basis do you go 3-5 days between bowel movements?  No   [ If yes, EXTENDED PREP.]    21.   Preferred [place] for [place] Pres[place]      [facility] [place] [address]        Scheduling Details      Caller : Adan Oliver  (Please ask for phone number if not scheduled by patient)    Type of Procedure Scheduled: Colonoscopy   Which Colonoscopy Prep was Sent?: miralax  KHORUTS CF PATIENTS & GROEN'S PATIENTS NEEDS EXTENDED PREP  Surgeon: Lakisha   Date of Procedure: 7/13/2022  Location:       Sedation Type: CS  Conscious Sedation- Needs  for 6 hours after the procedure  MAC/General-Needs  for 24 hours after procedure    Pre-op Required at [facility], [place], [place] and OR for MAC sedation: no   (advise patient they will need a pre-op prior to procedure -)      Informed patient they will need an adult  yes   Cannot take any type of public or medical transportation alone    Pre-Procedure Covid test to be completed at [facility] or Externally: yes     Confirmed Nurse will call to complete assessment yes     Additional comments: Pt also needs EGD scheduled, but does not have an order. Message sent to [facility]

## 2022-05-17 ENCOUNTER — TELEPHONE (OUTPATIENT)
Dept: GASTROENTEROLOGY | Facility: CLINIC | Age: 62
End: 2022-05-17
Payer: COMMERCIAL

## 2022-05-17 NOTE — TELEPHONE ENCOUNTER
Scheduling Details      Caller : Luke  for Adan EMERSON Benito  (Please ask for phone number if not scheduled by patient)    Type of Procedure Scheduled: EGD  Which Colonoscopy Prep was Sent?:   POLI CF PATIENTS & GROEN'S PATIENTS NEEDS EXTENDED PREP  Surgeon: Lakisha  Date of Procedure: 7/13/2022  Location: [facility]      Sedation Type: CS  Conscious Sedation- Needs  for 6 hours after the procedure  MAC/General-Needs  for 24 hours after procedure    Pre-op Required at [facility] [place], [place] and OR for MAC sedation: no   (advise patient they will need a pre-op prior to procedure -)      Informed patient they will need an adult  yes   Cannot take any type of public or medical transportation alone    Pre-Procedure Covid test to be completed at [facility] or Externally: yes at [place]    Confirmed Nurse will call to complete assessment yes     Additional comments: no

## 2022-05-18 ENCOUNTER — OFFICE VISIT (OUTPATIENT)
Dept: CARDIOLOGY | Facility: CLINIC | Age: 62
End: 2022-05-18
Attending: FAMILY MEDICINE
Payer: COMMERCIAL

## 2022-05-18 VITALS
SYSTOLIC BLOOD PRESSURE: 116 MMHG | HEART RATE: 79 BPM | OXYGEN SATURATION: 98 % | DIASTOLIC BLOOD PRESSURE: 78 MMHG | BODY MASS INDEX: 26.87 KG/M2 | WEIGHT: 170 LBS

## 2022-05-18 DIAGNOSIS — R00.2 PALPITATIONS: Primary | ICD-10-CM

## 2022-05-18 DIAGNOSIS — E78.5 HYPERLIPIDEMIA LDL GOAL <100: ICD-10-CM

## 2022-05-18 PROCEDURE — 99205 OFFICE O/P NEW HI 60 MIN: CPT | Performed by: INTERNAL MEDICINE

## 2022-05-18 NOTE — PATIENT INSTRUCTIONS
[facility] [place]; please note the following instructions:    1. Dr. Janice Crawley has requested you to have a Cardiac CT Calcium Screening. Please let us know if you will like to complete this screening by contacting us at [phone].     2. MONITOR heart palpitations and if becomes frequent contact us to schedule a heart monitor. This can be placed in clinic.     3 Follow up in Cardiology as needed.            If you have any questions regarding your visit please contact your care team:     Cardiology  Telephone Number   Abida BYRNE., RN  Kitty ARELLANO, RN   Jeannette RAMOS, RMA  Britta BUCHANAN, RMA  Madison Peterson., [facility]  Irene NYE, Visit Facilitator   [phone] (option 1)   For scheduling appts:     [phone] (select option 1)       For the Device Clinic (Pacemakers and ICD's)  RN's :  Aline Carty   During business hours: [phone]    *After business hours:  [phone] (select option 4)      Normal test result notifications will be released via Taigen or mailed within 7 business days.  All other test results, will be communicated via telephone once reviewed by your cardiologist.    If you need a medication refill please contact your pharmacy.  Please allow 3 business days for your refill to be completed.    As always, thank you for trusting us with your health care needs

## 2022-05-18 NOTE — PROGRESS NOTES
[facility]      Referring provider:Johnathon Diaz MD    HPI: Mr. Adan Oliver is a 62 year old  male with PMH significant for    -Obstructive sleep apnea  -Alcohol use  -Reid's esophagus    Patient reports feeling palpitations over the last 1-1/2-month.  So far it has happened only at night.  He has woken up 6 or 7 times over the last 1-1/2 months between 3 AM to 7 AM with chest pressure and palpitations.  He was actually able to capture the heart rate on blood pressure machine which showed 150 bpm (showed me pictures of that from his cell phone).  No EKG documentation so far.  The heart pounding sensation lasted for about 30 minutes.  Over the last 2 weeks he has not had any of these.  He tells me he was under extreme stress when he was having frequent nightly palpitations. Denies chest pain.  No shortness of breath with activity.  No dizziness, syncope or lower extremity edema.    No prior history of cardiac disease.    Lifetime non-smoker.  No hypertension or diabetes.  Family history is unremarkable.  He has hyperlipidemia with elevated LDL at 178 mg/deciliter.  He has been on Crestor 10 mg over the last 3 weeks. Patient tells me that he has been on low-carb keto diet to lose weight.    Patient had exercise his echocardiogram in 11/2020 which showed normal biventricular function with no valve disease.  Stress test was normal.  I have reviewed patient's EKG from 4/11/2022 which shows sinus rhythm otherwise unremarkable.  Labs from 2021 showed normal CBC and BMP.  Medications, personal, family, and social history reviewed with patient and revised.    PAST MEDICAL HISTORY:  Past Medical History:   Diagnosis Date     Anxiety 12/07/2015     Reid esophagus 10/30/2014     Esophageal reflux 10/01/2014     History of shingles 2012     Hyperlipidemia with target LDL less than 130 10/01/2014     Lumbar degenerative disc disease 05/17/2016     Nephrolithiasis 2009     LUCIAN  (obstructive sleep apnea)- 'moderate' (AHI 6) 10/06/2017    Study Date: 10/2/2017- (200.0 lbs) Scored using 3% rules. It was difficult to discern between PLMS and hyponeas. Apnea/hypopnea index was 28.0 events per hour.  The REM AHI was 29.3 events per hour.  The supine AHI was 32.7 events per hour.  RDI was 28.0 events per hour. Lowest oxygen saturation was 84.0%.  Time spent less than or equal to 88% was 0.3 minutes. PLM index was 32.3 movements per hour       CURRENT MEDICATIONS:  Current Outpatient Medications   Medication Sig Dispense Refill     esomeprazole (NEXIUM) 40 MG DR capsule TAKE ONE CAPSULE BY MOUTH EVERY MORNING 30-60 MINUTES BEFORE BREAKFAST 90 capsule 3     ibuprofen 200 MG capsule Take 200 mg by mouth every 4 hours as needed for fever Takes 3 every morning       rosuvastatin (CRESTOR) 10 MG tablet Take 1 tablet (10 mg) by mouth daily (Patient not taking: Reported on 4/11/2022) 90 tablet 3       PAST SURGICAL HISTORY:  Past Surgical History:   Procedure Laterality Date     APPENDECTOMY  2000s     BLEPHAROPLASTY BILATERAL Bilateral 10/3/2018    Procedure: BLEPHAROPLASTY BILATERAL;;  Surgeon: Dany Berrios MD;  Location: [facility] OR     CARPAL TUNNEL RELEASE RT/LT  1980s     COMBINED ESOPHAGOSCOPY, GASTROSCOPY, DUODENOSCOPY (EGD) WITH CO2 INSUFFLATION N/A 9/20/2019    Procedure: ESOPHAGOGASTRODUODENOSCOPY, WITH CO2 INSUFFLATION;  Surgeon: Filiberto Banuelos MD;  Location: [facility] OR     ESOPHAGOSCOPY, GASTROSCOPY, DUODENOSCOPY (EGD), COMBINED N/A 9/20/2019    Procedure: Esophagogastroduodenoscopy, With Biopsy;  Surgeon: Filiberto Banuelos MD;  Location: [facility] OR     REPAIR PTOSIS BILATERAL Bilateral 10/3/2018    Procedure: REPAIR PTOSIS BILATERAL;;  Surgeon: Dany Berrios MD;  Location: [facility] OR     REPAIR PTOSIS BROW BILATERAL Bilateral 10/3/2018    Procedure: REPAIR PTOSIS BROW BILATERAL;;  Surgeon: Dany Berrios MD;  Location: [facility] OR     SIGMOIDOSCOPY FLEXIBLE N/A 9/20/2019     Procedure: SIGMOIDOSCOPY, FLEXIBLE;  Surgeon: Filiberto Banuelos MD;  Location: [facility] OR     TONSILLECTOMY  1980s       ALLERGIES:   No Known Allergies    FAMILY HISTORY:  Family History   Problem Relation Age of Onset     Hypertension Mother      Alzheimer Disease Mother      Diabetes Brother      Glaucoma No family hx of      Macular Degeneration No family hx of      Coronary Artery Disease No family hx of      Colon Cancer No family hx of      Retinal detachment No family hx of          SOCIAL HISTORY:  Social History     Tobacco Use     Smoking status: Never Smoker     Smokeless tobacco: Never Used   Substance Use Topics     Alcohol use: Yes     Alcohol/week: 14.0 - 21.0 standard drinks     Types: 14 - 21 Standard drinks or equivalent per week     Comment: varies, a couple drinks a day     Drug use: No       ROS:   Constitutional: No fever, chills, or sweats. Weight stable.   Cardiovascular: As per HPI.       Exam:  /78 (BP Location: Right arm, Patient Position: Sitting, Cuff Size: Adult Regular)   Pulse 79   Wt 77.1 kg (170 lb)   SpO2 98%   BMI 26.87 kg/m    GENERAL APPEARANCE: alert and no distress  HEENT: no icterus, no central cyanosis  LYMPH/NECK: no adenopathy, no asymmetry, JVP not elevated, no carotid bruits.  RESPIRATORY: lungs clear to auscultation - no rales, rhonchi or wheezes, no use of accessory muscles, no retractions, respirations are unlabored, normal respiratory rate  CARDIOVASCULAR: regular rhythm, normal S1, S2, no S3 or S4 and no murmur, click or rub, precordium quiet with normal PMI.  GI: soft, non tender  EXTREMITIES: no edema  NEURO: alert, normal speech,and affect  SKIN: no ecchymoses, no rashes     I have reviewed the labs and personally reviewed the imaging below and made my comment in the assessment and plan.    Labs:  CBC RESULTS:   Lab Results   Component Value Date    WBC 10.2 04/08/2021    RBC 4.62 04/08/2021    HGB 14.6 04/08/2021    HCT 42.9 04/08/2021    MCV 93  04/08/2021    MCH 31.6 04/08/2021    MCHC 34.0 04/08/2021    RDW 13.9 04/08/2021     04/08/2021       BMP RESULTS:  Lab Results   Component Value Date     11/04/2021     04/08/2021    POTASSIUM 3.8 11/04/2021    POTASSIUM 4.2 04/08/2021    CHLORIDE 105 11/04/2021    CHLORIDE 104 04/08/2021    CO2 22 11/04/2021    CO2 23 04/08/2021    ANIONGAP 10 11/04/2021    ANIONGAP 7 04/08/2021    GLC 96 11/04/2021    GLC 85 04/08/2021    BUN 22 11/04/2021    BUN 21 04/08/2021    CR 1.27 (H) 11/04/2021    CR 1.12 04/08/2021    GFRESTIMATED 60 (L) 11/04/2021    GFRESTIMATED 70 04/08/2021    GFRESTBLACK 81 04/08/2021    GAYLA 8.7 11/04/2021    GAYLA 9.2 04/08/2021      Exercise stress echocardiogram 11/18/2020  Normal, low-risk exercise echocardiogram without evidence of ischemia at a  diagnostic workload (92% MPHR.)     The target heart rate was achieved. Normal heart rate and BP response to  exercise.  Normal biventricular size, thickness, and global systolic function at  baseline, LVEF=60-65%.  With exercise, LVEF increased to >70% and LV cavity size decreased  appropriately.  No regional wall motion abnormalities are present at rest or with exercise.  No angina was elicited.  No ECG evidence of ischemia. Occasional PVCs with exercise.  Functional capacity is normal for age.  No significant valvular abnormalities are noted on screening Doppler exam.  The aortic root and visualized ascending aorta are normal.    EKG 4/11/2022 shows sinus rhythm otherwise unremarkable.          Assessment and Plan:   Patient is being seen today for palpitation episodes that has been occurring only at night waking him up in the middle of the night over the last 1-1/2 months.  So far it happened 6 or 7 times.  No EKG documentation.  No prior history of cardiac arrhythmia.  Physical exam and vital signs are normal today.  Today I had a long discussion with the patient about the available ambulatory EKG monitoring that we can give him  for documentation.  He tells me that since he has not had any of these episodes over the last 2 weeks he would like to wait.  He thinks the episodes that he had was stress related.  He will try to get his father's [employer] hand-held EKG device to capture one of the future episodes.  I also told him that if it is lasting long he should seek medical attention.  In the future if it becomes frequent he will call our clinic so that we can give him a Zio patch or 1 month CardioNet monitor. No other concerning cardiac symptoms at this time.    Regarding hyperlipidemia he recently started Crestor.  Recommended coronary calcium screening however he said he cannot afford this test at this time.  Stress echocardiogram a year ago was normal.     Return to clinic as needed.    Total time spent today for this visit is 66 minutes including precharting, face-to-face clinic visit, review of labs/imaging and medical documentation.    Please donot hesitate to contact me if you have any questions or concerns. Again, thank you for allowing me to participate in the care of your patient.    Janice GRANT MD  [facility] Division of Cardiology  Pager [phone]

## 2022-05-18 NOTE — LETTER
5/18/2022      RE: Adan Oliver  [address]  [place] [address]       Dear Colleague,    Thank you for the opportunity to participate in the care of your patient, Adan Oliver, at the [facility]. Please see a copy of my visit note below.    For this   General Cardiology Clinic-[place]      Referring provider:Johnathon Diaz MD    HPI: Mr. Adan Oliver is a 62 year old  male with PMH significant for    -Obstructive sleep apnea  -Alcohol use  -Reid's esophagus    Patient reports feeling palpitations over the last 1-1/2-month.  So far it has happened only at night.  He has woken up 6 or 7 times over the last 1-1/2 months between 3 AM to 7 AM with chest pressure and palpitations.  He was actually able to capture the heart rate on blood pressure machine which showed 150 bpm (showed me pictures of that from his cell phone).  No EKG documentation so far.  The heart pounding sensation lasted for about 30 minutes.  Over the last 2 weeks he has not had any of these.  He tells me he was under extreme stress when he was having frequent nightly palpitations. Denies chest pain.  No shortness of breath with activity.  No dizziness, syncope or lower extremity edema.    No prior history of cardiac disease.    Lifetime non-smoker.  No hypertension or diabetes.  Family history is unremarkable.  He has hyperlipidemia with elevated LDL at 178 mg/deciliter.  He has been on Crestor 10 mg over the last 3 weeks. Patient tells me that he has been on low-carb keto diet to lose weight.    Patient had exercise his echocardiogram in 11/2020 which showed normal biventricular function with no valve disease.  Stress test was normal.  I have reviewed patient's EKG from 4/11/2022 which shows sinus rhythm otherwise unremarkable.  Labs from 2021 showed normal CBC and BMP.  Medications, personal, family, and social history reviewed with  patient and revised.    PAST MEDICAL HISTORY:  Past Medical History:   Diagnosis Date     Anxiety 12/07/2015     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 It was difficult to discern between PLMS and hyponeas. Apnea/hypopnea index was 28.0 events per hour.  The REM AHI was 29.3 events per hour.  The supine AHI was 32.7 events per hour.  RDI was 28.0 events per hour. Lowest oxygen saturation was 84.0%.  Time spent less than or equal to 88% was 0.3 minutes. PLM index was 32.3 movements per hour       CURRENT MEDICATIONS:  Current Outpatient Medications   Medication Sig Dispense Refill     esomeprazole (NEXIUM) 40 MG DR capsule TAKE ONE CAPSULE BY MOUTH EVERY MORNING 30-60 MINUTES BEFORE BREAKFAST 90 capsule 3     ibuprofen 200 MG capsule Take 200 mg by mouth every 4 hours as needed for fever Takes 3 every morning       rosuvastatin (CRESTOR) 10 MG tablet Take 1 tablet (10 mg) by mouth daily (Patient not taking: Reported on 4/11/2022) 90 tablet 3       PAST SURGICAL HISTORY:  Past Surgical History:   Procedure Laterality Date     APPENDECTOMY  2000s     BLEPHAROPLASTY BILATERAL Bilateral 10/3/2018    Procedure: BLEPHAROPLASTY BILATERAL;;  Surgeon: Dany Berrios MD;  Location: [facility]     CARPAL TUNNEL RELEASE RT/LT  1980s     COMBINED ESOPHAGOSCOPY, GASTROSCOPY, DUODENOSCOPY (EGD) WITH CO2 INSUFFLATION N/A 9/20/2019    Procedure: ESOPHAGOGASTRODUODENOSCOPY, WITH CO2 INSUFFLATION;  Surgeon: Filiberto Banuelos MD;  Location: [facility]     ESOPHAGOSCOPY, GASTROSCOPY, DUODENOSCOPY (EGD), COMBINED N/A 9/20/2019    Procedure: Esophagogastroduodenoscopy, With Biopsy;  Surgeon: Filiberto Banuelos MD;   Location: [facility]     REPAIR PTOSIS BILATERAL Bilateral 10/3/2018    Procedure: REPAIR PTOSIS BILATERAL;;  Surgeon: Dany Berrios MD;  [facility]     REPAIR PTOSIS BROW BILATERAL Bilateral 10/3/2018    Procedure: REPAIR PTOSIS BROW BILATERAL;;  Surgeon: Dany Berrios MD;  Location: [facility]     SIGMOIDOSCOPY FLEXIBLE N/A 9/20/2019    Procedure: SIGMOIDOSCOPY, FLEXIBLE;  Surgeon: Filiberto Banuelos MD;  Location: [facility]     TONSILLECTOMY  1980s       ALLERGIES:   No Known Allergies    FAMILY HISTORY:  Family History   Problem Relation Age of Onset     Hypertension Mother      Alzheimer Disease Mother      Diabetes Brother      Glaucoma No family hx of      Macular Degeneration No family hx of      Coronary Artery Disease No family hx of      Colon Cancer No family hx of      Retinal detachment No family hx of          SOCIAL HISTORY:  Social History     Tobacco Use     Smoking status: Never Smoker     Smokeless tobacco: Never Used   Substance Use Topics     Alcohol use: Yes     Alcohol/week: 14.0 - 21.0 standard drinks     Types: 14 - 21 Standard drinks or equivalent per week     Comment: varies, a couple drinks a day     Drug use: No       ROS:   Constitutional: No fever, chills, or sweats. Weight stable.   Cardiovascular: As per HPI.       Exam:  /78 (BP Location: Right arm, Patient Position: Sitting, Cuff Size: Adult Regular)   Pulse 79   Wt 77.1 kg (170 lb)   SpO2 98%   BMI 26.87 kg/m    GENERAL APPEARANCE: alert and no distress  HEENT: no icterus, no central cyanosis  LYMPH/NECK: no adenopathy, no asymmetry, JVP not elevated, no carotid bruits.  RESPIRATORY: lungs clear to auscultation - no rales, rhonchi or wheezes, no use of accessory muscles, no retractions, respirations are unlabored, normal respiratory rate  CARDIOVASCULAR: regular rhythm, normal S1, S2, no S3 or S4 and no murmur, click or rub, precordium quiet with normal PMI.  GI: soft, non tender  EXTREMITIES: no edema  NEURO:  alert, normal speech,and affect  SKIN: no ecchymoses, no rashes     I have reviewed the labs and personally reviewed the imaging below and made my comment in the assessment and plan.    Labs:  CBC RESULTS:   Lab Results   Component Value Date    WBC 10.2 04/08/2021    RBC 4.62 04/08/2021    HGB 14.6 04/08/2021    HCT 42.9 04/08/2021    MCV 93 04/08/2021    MCH 31.6 04/08/2021    MCHC 34.0 04/08/2021    RDW 13.9 04/08/2021     04/08/2021       BMP RESULTS:  Lab Results   Component Value Date     11/04/2021     04/08/2021    POTASSIUM 3.8 11/04/2021    POTASSIUM 4.2 04/08/2021    CHLORIDE 105 11/04/2021    CHLORIDE 104 04/08/2021    CO2 22 11/04/2021    CO2 23 04/08/2021    ANIONGAP 10 11/04/2021    ANIONGAP 7 04/08/2021    GLC 96 11/04/2021    GLC 85 04/08/2021    BUN 22 11/04/2021    BUN 21 04/08/2021    CR 1.27 (H) 11/04/2021    CR 1.12 04/08/2021    GFRESTIMATED 60 (L) 11/04/2021    GFRESTIMATED 70 04/08/2021    GFRESTBLACK 81 04/08/2021    GAYLA 8.7 11/04/2021    GAYLA 9.2 04/08/2021      Exercise stress echocardiogram 11/18/2020  Normal, low-risk exercise echocardiogram without evidence of ischemia at a  diagnostic workload (92% MPHR.)     The target heart rate was achieved. Normal heart rate and BP response to  exercise.  Normal biventricular size, thickness, and global systolic function at  baseline, LVEF=60-65%.  With exercise, LVEF increased to >70% and LV cavity size decreased  appropriately.  No regional wall motion abnormalities are present at rest or with exercise.  No angina was elicited.  No ECG evidence of ischemia. Occasional PVCs with exercise.  Functional capacity is normal for age.  No significant valvular abnormalities are noted on screening Doppler exam.  The aortic root and visualized ascending aorta are normal.    EKG 4/11/2022 shows sinus rhythm otherwise unremarkable.          Assessment and Plan:   Patient is being seen today for palpitation episodes that has been occurring  only at night waking him up in the middle of the night over the last 1-1/2 months.  So far it happened 6 or 7 times.  No EKG documentation.  No prior history of cardiac arrhythmia.  Physical exam and vital signs are normal today.  Today I had a long discussion with the patient about the available ambulatory EKG monitoring that we can give him for documentation.  He tells me that since he has not had any of these episodes over the last 2 weeks he would like to wait.  He thinks the episodes that he had was stress related.  He will try to get his father's "[employer]a hand-held EKG device to capture one of the future episodes.  I also told him that if it is lasting long he should seek medical attention.  In the future if it becomes frequent he will call our clinic so that we can give him a Zio patch or 1 month CardioNet monitor. No other concerning cardiac symptoms at this time.    Regarding hyperlipidemia he recently started Crestor.  Recommended coronary calcium screening however he said he cannot afford this test at this time.  Stress echocardiogram a year ago was normal.     Return to clinic as needed.    Total time spent today for this visit is 66 minutes including precharting, face-to-face clinic visit, review of labs/imaging and medical documentation.    Please donot hesitate to contact me if you have any questions or concerns. Again, thank you for allowing me to participate in the care of your patient.    Janice GRANT MD  [facility] Division of Cardiology  Pager [phone]

## 2022-05-18 NOTE — NURSING NOTE
"Chief Complaint   Patient presents with     Palpitations     PCP referral for episodes of Tachycardia. Patient reports having episodes of chest pressure, SOB, and heart palpitation.       Initial /78 (BP Location: Right arm, Patient Position: Sitting, Cuff Size: Adult Regular)   Pulse 79   Wt 77.1 kg (170 lb)   SpO2 98%   BMI 26.87 kg/m   Estimated body mass index is 26.87 kg/m  as calculated from the following:    Height as of 4/11/22: 1.694 m (5' 6.69\").    Weight as of this encounter: 77.1 kg (170 lb)..  BP completed using cuff size: regular    Madison Peterson CMA      "

## 2022-05-18 NOTE — PROGRESS NOTES
Adan Oliver  :  1960  DOS: 2022  MRN: [id number]      Sports Medicine Clinic Visit    PCP: Charly Castillo    Adan Oliver is a 60 year old male who is seen as a self referral presenting with right foot pain and right shoulder.    Injury:  1) Toe: He reports pain in the right great toe and medial foot for 2 weeks. His pain increases with walking and standing. He has used OTC arch supports but this has not changed the pain.     Social History: grocery shopping for elderly.    Interim History - 2021  Since last visit on 20 patient has begun to have pain again in his right great toe .  Right foot 1st MTP joint injection completed on 20 provided good relief for 6 months.  No new injury in the interim.  He would be interested in a repeat injection today.      Interim History - December 10, 2021  Since last visit on 10/29/2021 patient has begun to have pain again in his right great toe.  Injection done on 21, provided relief from about 6 months.   No interim injury.       Interim History - May 27, 2022  Since last visit on 12/10/2021 patient has noticed an increase in right 1st MTP joint pain over the past 3 weeks. Right great MTP joint injection completed on 12/10/2021 provided great relief for 4.5 months. He notes that driving is not bothersome but walking is. Has been using ibuprofen. Is hopeful for a repeat corticosteroid injection today.  No new injury in the interim.      Review of Systems  Skin: no bruising, no swelling  Musculoskeletal: as above  Neurologic: no numbness, paresthesias  Remainder of review of systems is negative including constitutional, CV, pulmonary, GI, except as noted in HPI or medical history.    Patient's current problem list, past medical and surgical history, and family history were reviewed.    Patient Active Problem List   Diagnosis     Esophageal reflux     Hyperlipidemia with target LDL less than 130     Reid esophagus     Memory changes      Insomnia, psychophysiological     Family history of Alzheimer's disease     Anxiety     Lumbar degenerative disc disease     Protrusion of lumbar intervertebral disc     Lumbar spinal stenosis     Alcohol use     LUCIAN (obstructive sleep apnea)- 'moderate' (AHI 6)     Hip pain, bilateral     Past Medical History:   Diagnosis Date     Anxiety 12/07/2015     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 It was difficult to discern between PLMS and hyponeas. Apnea/hypopnea index was 28.0 events per hour.  The REM AHI was 29.3 events per hour.  The supine AHI was 32.7 events per hour.  RDI was 28.0 events per hour. Lowest oxygen saturation was 84.0%.  Time spent less than or equal to 88% was 0.3 minutes. PLM index was 32.3 movements per hour     Past Surgical History:   Procedure Laterality Date     APPENDECTOMY  2000s     BLEPHAROPLASTY BILATERAL Bilateral 10/3/2018    Procedure: BLEPHAROPLASTY BILATERAL;;  Surgeon: Dany Berrios MD;  Location: [facility]     CARPAL TUNNEL RELEASE RT/LT  1980s     COMBINED ESOPHAGOSCOPY, GASTROSCOPY, DUODENOSCOPY (EGD) WITH CO2 INSUFFLATION N/A 9/20/2019    Procedure: ESOPHAGOGASTRODUODENOSCOPY, WITH CO2 INSUFFLATION;  Surgeon: Filiberto Banuelos MD;  Location: [facility]     ESOPHAGOSCOPY, GASTROSCOPY, DUODENOSCOPY (EGD), COMBINED N/A 9/20/2019    Procedure: Esophagogastroduodenoscopy, With Biopsy;  Surgeon: Filiberto Banuelos MD;  Location: [facility]     REPAIR PTOSIS BILATERAL Bilateral 10/3/2018    Procedure: REPAIR PTOSIS BILATERAL;;  Surgeon: Dany Berrios MD;  [facility]: [facility]     REPAIR PTOSIS BROW BILATERAL Bilateral 10/3/2018    Procedure: REPAIR PTOSIS BROW  BILATERAL;;  Surgeon: Dany Berrios MD;  [facility]: [facility]     SIGMOIDOSCOPY FLEXIBLE N/A 9/20/2019    Procedure: SIGMOIDOSCOPY, FLEXIBLE;  Surgeon: Filiberto Banuelos MD;  Location: [facility]     TONSILLECTOMY  1980s     Family History   Problem Relation Age of Onset     Hypertension Mother      Alzheimer Disease Mother      Diabetes Brother      Glaucoma No family hx of      Macular Degeneration No family hx of      Coronary Artery Disease No family hx of      Colon Cancer No family hx of      Retinal detachment No family hx of        Objective  /84   Wt 77.2 kg (170 lb 1.6 oz)   BMI 26.89 kg/m      GENERAL APPEARANCE: healthy, alert and no distress   GAIT: NORMAL  SKIN: no suspicious lesions or rashes  HEENT: Sclera clear, anicteric  CV: good peripheral pulses  RESP: Breathing not labored  NEURO: Normal strength and tone, mentation intact and speech normal  PSYCH:  mentation appears normal and affect normal/bright    Right Foot and Ankle Exam:  Inspection:       no visible ecchymosis       no visible edema or effusion    Foot inspection:       no deformity noted    Tender:       Great toe MTP joint     Non-Tender:       remainder of ankle and foot right    ROM:        Full active and passive ROM, ankle dorsiflexion, plantarflexion, inversion, eversion, great toe dorsiflexion, remainder of toes, midfoot and subtalar right  - some pain with great toe dorsiflexion and plantarflexion    Strength:       ankle dorsiflexion 5/5 right       plantarflexion 5/5 right       inversion 5/5 right       eversion 5/5 right       great toe dorsiflexion 5/5 right       great toe plantarflexion 5/5 right    Gait:       normal    Neurovascular:       2+ peripheral pulses bilaterally and brisk capillary refill       sensation grossly intact    Radiology  I visualized and reviewed these images with the patient  Xr Foot Right G/e 3 Views  Result Date: 9/28/2020  Examination:  XR FOOT RT G/E 3 VW Date:  9/28/2020 1:53 PM  Clinical Information: Acute foot pain, right Additional Information: none Comparison: none   Impression: Right foot negative for fracture, erosion, or bone lesion. Moderate joint space narrowing at the first MTP joint with moderate hypertrophic spurring, particularly on the dorsal aspect of the metatarsal head. Normal joint alignment is maintained. Normal joint alignment and spacing in the right foot elsewhere. No significant soft tissue abnormality in the right foot. HAMILTON MARCANO MD    Small Joint Injection/Arthrocentesis: R great MTP    Date/Time: 5/27/2022 3:00 PM  Performed by: Danny Oleary DO  Authorized by: Danny Oleary DO   Indications:  Pain    Guidance: ultrasound     Approach:  Dorsal  Location:  Great toe    Site:  R great MTP                    Medications:  40 mg triamcinolone 40 MG/ML; 1 mL lidocaine 1 %        Outcome:  Tolerated well, no immediate complications  Procedure discussed: discussed risks, benefits, and alternatives    Consent Given by:  Patient  Timeout: timeout called immediately prior to procedure    Prep: patient was prepped and draped in usual sterile fashion       Ultrasound images of procedure were permanently stored.             Assessment:  1. 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 Great toe pain, right        Plan:  Reviewed goals/roles for inserts or Dynaflex  Repeat US guided injection to 1st MTP joint today  Reviewed low impact activity strategies and footwear options  XR images independently visualized and reviewed with patient today in clinic  Referral to foot/ankle surgeon available in the future prn for discussion of surgical options  PRP option reviewed  Expectations and goals of CSI reviewed  Often 2-3 days for steroid effect, and can take up to two weeks for maximum effect  We discussed modified progressive pain-free activity as tolerated  Do not overuse in first two weeks if feeling better due to concern for  vulnerability while steroid is working  Supportive care reviewed  All questions were answered today  Contact us with additional questions or concerns  Signs and sx of concern reviewed      Danny Oleary DO, ARCENIO  Sports Medicine Physician  [facility]

## 2022-05-23 ENCOUNTER — TELEPHONE (OUTPATIENT)
Dept: CARDIOLOGY | Facility: CLINIC | Age: 62
End: 2022-05-23
Payer: COMMERCIAL

## 2022-05-23 DIAGNOSIS — R00.2 PALPITATIONS: Primary | ICD-10-CM

## 2022-05-23 NOTE — TELEPHONE ENCOUNTER
Need to speak with RN to establish which type of monitor is most appropriate for this patient. The note from the Doctor listed two different types of monitors that could be used.    LATONYA Castillo

## 2022-05-23 NOTE — TELEPHONE ENCOUNTER
[facility] Call Center    Phone Message    May a detailed message be left on voicemail: yes     Reason for Call: Order(s): Other:   Reason for requested: Patient decided he would like to try the monitor for 2 weeks.   Monitor per clinic visit notes   Date needed: Soon please  Provider name: Can   Please call to schedule after orders entered. Thank you    Action Taken: Other: cardio    Travel Screening: Not Applicable

## 2022-05-23 NOTE — CONFIDENTIAL NOTE
zio heart monitor is appropriate.  zio ordered to wear for 2 weeks  Please call pt to schedule.    Abida Garcia, RN  Cardiology Care Coordinator  [facility]  [phone] option 1

## 2022-05-26 NOTE — TELEPHONE ENCOUNTER
Spoke with patient to schedule a nurse visit to place a Zio monitor. Patient stated he would call back to schedule.     FELICIANO Sorto

## 2022-05-27 ENCOUNTER — OFFICE VISIT (OUTPATIENT)
Dept: ORTHOPEDICS | Facility: CLINIC | Age: 62
End: 2022-05-27
Payer: COMMERCIAL

## 2022-05-27 VITALS — DIASTOLIC BLOOD PRESSURE: 84 MMHG | BODY MASS INDEX: 26.89 KG/M2 | SYSTOLIC BLOOD PRESSURE: 122 MMHG | WEIGHT: 170.1 LBS

## 2022-05-27 DIAGNOSIS — M79.674 GREAT TOE PAIN, RIGHT: ICD-10-CM

## 2022-05-27 DIAGNOSIS — M19.079 ARTHRITIS OF GREAT TOE AT METATARSOPHALANGEAL JOINT: Primary | ICD-10-CM

## 2022-05-27 PROCEDURE — 20604 DRAIN/INJ JOINT/BURSA W/US: CPT | Mod: RT | Performed by: FAMILY MEDICINE

## 2022-05-27 PROCEDURE — 99213 OFFICE O/P EST LOW 20 MIN: CPT | Mod: 25 | Performed by: FAMILY MEDICINE

## 2022-05-27 RX ORDER — TRIAMCINOLONE ACETONIDE 40 MG/ML
40 INJECTION, SUSPENSION INTRA-ARTICULAR; INTRAMUSCULAR
Status: DISCONTINUED | OUTPATIENT
Start: 2022-05-27 | End: 2023-02-01 | Stop reason: ALTCHOICE

## 2022-05-27 RX ORDER — LIDOCAINE HYDROCHLORIDE 10 MG/ML
1 INJECTION, SOLUTION INFILTRATION; PERINEURAL
Status: DISCONTINUED | OUTPATIENT
Start: 2022-05-27 | End: 2023-02-01 | Stop reason: ALTCHOICE

## 2022-05-27 RX ADMIN — TRIAMCINOLONE ACETONIDE 40 MG: 40 INJECTION, SUSPENSION INTRA-ARTICULAR; INTRAMUSCULAR at 15:00

## 2022-05-27 RX ADMIN — LIDOCAINE HYDROCHLORIDE 1 ML: 10 INJECTION, SOLUTION INFILTRATION; PERINEURAL at 15:00

## 2022-05-27 ASSESSMENT — PAIN SCALES - GENERAL: PAINLEVEL: SEVERE PAIN (6)

## 2022-05-27 NOTE — LETTER
2022         RE: Adan Oliver  [address]  [place] [address]        Dear Colleague,    Thank you for referring your patient, Adan Oliver, to the [facility]. Please see a copy of my visit note below.    Adan Oliver  :  1960  DOS: 2022  MRN: [id number]      Sports Medicine Clinic Visit    PCP: Charly Castillo    Adan Oliver is a 60 year old male who is seen as a self referral presenting with right foot pain and right shoulder.    Injury:  1) Toe: He reports pain in the right great toe and medial foot for 2 weeks. His pain increases with walking and standing. He has used OTC arch supports but this has not changed the pain.     Social History: grocery shopping for elderly.    Interim History - 2021  Since last visit on 20 patient has begun to have pain again in his right great toe .  Right foot 1st MTP joint injection completed on 20 provided good relief for 6 months.  No new injury in the interim.  He would be interested in a repeat injection today.      Interim History - December 10, 2021  Since last visit on 10/29/2021 patient has begun to have pain again in his right great toe.  Injection done on 21, provided relief from about 6 months.   No interim injury.       Interim History - May 27, 2022  Since last visit on 12/10/2021 patient has noticed an increase in right 1st MTP joint pain over the past 3 weeks. Right great MTP joint injection completed on 12/10/2021 provided great relief for 4.5 months. He notes that driving is not bothersome but walking is. Has been using ibuprofen. Is hopeful for a repeat corticosteroid injection today.  No new injury in the interim.      Review of Systems  Skin: no bruising, no swelling  Musculoskeletal: as above  Neurologic: no numbness, paresthesias  Remainder of review of systems is negative including constitutional, CV, pulmonary, GI, except as noted in HPI or  medical history.    Patient's current problem list, past medical and surgical history, and family history were reviewed.    Patient Active Problem List   Diagnosis     Esophageal reflux     Hyperlipidemia with target LDL less than 130     Reid esophagus     Memory changes     Insomnia, psychophysiological     Family history of Alzheimer's disease     Anxiety     Lumbar degenerative disc disease     Protrusion of lumbar intervertebral disc     Lumbar spinal stenosis     Alcohol use     LUCIAN (obstructive sleep apnea)- 'moderate' (AHI 6)     Hip pain, bilateral     Past Medical History:   Diagnosis Date     Anxiety 12/07/2015     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 It was difficult to discern between PLMS and hyponeas. Apnea/hypopnea index was 28.0 events per hour.  The REM AHI was 29.3 events per hour.  The supine AHI was 32.7 events per hour.  RDI was 28.0 events per hour. Lowest oxygen saturation was 84.0%.  Time spent less than or equal to 88% was 0.3 minutes. PLM index was 32.3 movements per hour     Past Surgical History:   Procedure Laterality Date     APPENDECTOMY  2000s     BLEPHAROPLASTY BILATERAL Bilateral 10/3/2018    Procedure: BLEPHAROPLASTY BILATERAL;;  Surgeon: Dany Berrios MD;  Location:  OR     CARPAL TUNNEL RELEASE RT/LT  1980s     COMBINED ESOPHAGOSCOPY, GASTROSCOPY, DUODENOSCOPY (EGD) WITH CO2 INSUFFLATION N/A 9/20/2019    Procedure: ESOPHAGOGASTRODUODENOSCOPY, WITH CO2 INSUFFLATION;  Surgeon: Filiberto Banuelos MD;  Location:  OR     ESOPHAGOSCOPY, GASTROSCOPY, DUODENOSCOPY (EGD), COMBINED N/A 9/20/2019    Procedure: Esophagogastroduodenoscopy, With Biopsy;  Surgeon:  Filiberto Banuelos MD;  Location: [facility] OR     REPAIR PTOSIS BILATERAL Bilateral 10/3/2018    Procedure: REPAIR PTOSIS BILATERAL;;  Surgeon: Dany Berrios MD;  Location: [facility] OR     REPAIR PTOSIS BROW BILATERAL Bilateral 10/3/2018    Procedure: REPAIR PTOSIS BROW BILATERAL;;  Surgeon: Dany Berrios MD;  Location: [facility] OR     SIGMOIDOSCOPY FLEXIBLE N/A 9/20/2019    Procedure: SIGMOIDOSCOPY, FLEXIBLE;  Surgeon: Filiberto Banuelos MD;  Location: [facility]     TONSILLECTOMY  1980s     Family History   Problem Relation Age of Onset     Hypertension Mother      Alzheimer Disease Mother      Diabetes Brother      Glaucoma No family hx of      Macular Degeneration No family hx of      Coronary Artery Disease No family hx of      Colon Cancer No family hx of      Retinal detachment No family hx of        Objective  /84   Wt 77.2 kg (170 lb 1.6 oz)   BMI 26.89 kg/m      GENERAL APPEARANCE: healthy, alert and no distress   GAIT: NORMAL  SKIN: no suspicious lesions or rashes  HEENT: Sclera clear, anicteric  CV: good peripheral pulses  RESP: Breathing not labored  NEURO: Normal strength and tone, mentation intact and speech normal  PSYCH:  mentation appears normal and affect normal/bright    Right Foot and Ankle Exam:  Inspection:       no visible ecchymosis       no visible edema or effusion    Foot inspection:       no deformity noted    Tender:       Great toe MTP joint     Non-Tender:       remainder of ankle and foot right    ROM:        Full active and passive ROM, ankle dorsiflexion, plantarflexion, inversion, eversion, great toe dorsiflexion, remainder of toes, midfoot and subtalar right  - some pain with great toe dorsiflexion and plantarflexion    Strength:       ankle dorsiflexion 5/5 right       plantarflexion 5/5 right       inversion 5/5 right       eversion 5/5 right       great toe dorsiflexion 5/5 right       great toe plantarflexion 5/5 right    Gait:       normal    Neurovascular:        2+ peripheral pulses bilaterally and brisk capillary refill       sensation grossly intact    Radiology  I visualized and reviewed these images with the patient  Xr Foot Right G/e 3 Views  Result Date: 9/28/2020  Examination:  XR FOOT RT G/E 3 VW Date:  9/28/2020 1:53 PM Clinical Information: Acute foot pain, right Additional Information: none Comparison: none   Impression: Right foot negative for fracture, erosion, or bone lesion. Moderate joint space narrowing at the first MTP joint with moderate hypertrophic spurring, particularly on the dorsal aspect of the metatarsal head. Normal joint alignment is maintained. Normal joint alignment and spacing in the right foot elsewhere. No significant soft tissue abnormality in the right foot. HAMILTON MARCANO MD    Small Joint Injection/Arthrocentesis: R great MTP    Date/Time: 5/27/2022 3:00 PM  Performed by: Danny Oleary DO  Authorized by: Danny Oleary DO   Indications:  Pain    Guidance: ultrasound     Approach:  Dorsal  Location:  Great toe    Site:  R great MTP                    Medications:  40 mg triamcinolone 40 MG/ML; 1 mL lidocaine 1 %        Outcome:  Tolerated well, no immediate complications  Procedure discussed: discussed risks, benefits, and alternatives    Consent Given by:  Patient  Timeout: timeout called immediately prior to procedure    Prep: patient was prepped and draped in usual sterile fashion       Ultrasound images of procedure were permanently stored.             Assessment:  1. 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 Great toe pain, right        Plan:  Reviewed goals/roles for inserts or Dynaflex  Repeat US guided injection to 1st MTP joint today  Reviewed low impact activity strategies and footwear options  XR images independently visualized and reviewed with patient today in clinic  Referral to foot/ankle surgeon available in the future prn for discussion of surgical options  PRP option  reviewed  Expectations and goals of CSI reviewed  Often 2-3 days for steroid effect, and can take up to two weeks for maximum effect  We discussed modified progressive pain-free activity as tolerated  Do not overuse in first two weeks if feeling better due to concern for vulnerability while steroid is working  Supportive care reviewed  All questions were answered today  Contact us with additional questions or concerns  Signs and sx of concern reviewed      Danny Oleary DO, ARCENIO  Sports Medicine Physician  [facility]          Again, thank you for allowing me to participate in the care of your patient.        Sincerely,        Danny Oleary DO

## 2022-06-02 ASSESSMENT — PATIENT HEALTH QUESTIONNAIRE - PHQ9
SUM OF ALL RESPONSES TO PHQ QUESTIONS 1-9: 16
SUM OF ALL RESPONSES TO PHQ QUESTIONS 1-9: 16
10. IF YOU CHECKED OFF ANY PROBLEMS, HOW DIFFICULT HAVE THESE PROBLEMS MADE IT FOR YOU TO DO YOUR WORK, TAKE CARE OF THINGS AT HOME, OR GET ALONG WITH OTHER PEOPLE: VERY DIFFICULT

## 2022-06-09 ENCOUNTER — OFFICE VISIT (OUTPATIENT)
Dept: FAMILY MEDICINE | Facility: CLINIC | Age: 62
End: 2022-06-09
Payer: COMMERCIAL

## 2022-06-09 VITALS
SYSTOLIC BLOOD PRESSURE: 110 MMHG | OXYGEN SATURATION: 96 % | BODY MASS INDEX: 26.71 KG/M2 | TEMPERATURE: 98.7 F | HEART RATE: 87 BPM | WEIGHT: 169 LBS | DIASTOLIC BLOOD PRESSURE: 70 MMHG

## 2022-06-09 DIAGNOSIS — F42.2 MIXED OBSESSIONAL THOUGHTS AND ACTS: ICD-10-CM

## 2022-06-09 DIAGNOSIS — F41.9 ANXIETY: Chronic | ICD-10-CM

## 2022-06-09 DIAGNOSIS — F32.1 MODERATE MAJOR DEPRESSION (H): ICD-10-CM

## 2022-06-09 DIAGNOSIS — R35.0 BENIGN PROSTATIC HYPERPLASIA WITH URINARY FREQUENCY: Primary | ICD-10-CM

## 2022-06-09 DIAGNOSIS — N40.1 BENIGN PROSTATIC HYPERPLASIA WITH URINARY FREQUENCY: Primary | ICD-10-CM

## 2022-06-09 LAB — PSA SERPL-MCNC: 1.29 UG/L (ref 0–4)

## 2022-06-09 PROCEDURE — 99214 OFFICE O/P EST MOD 30 MIN: CPT | Performed by: FAMILY MEDICINE

## 2022-06-09 PROCEDURE — 36415 COLL VENOUS BLD VENIPUNCTURE: CPT | Performed by: FAMILY MEDICINE

## 2022-06-09 PROCEDURE — G0103 PSA SCREENING: HCPCS | Performed by: FAMILY MEDICINE

## 2022-06-09 RX ORDER — TAMSULOSIN HYDROCHLORIDE 0.4 MG/1
0.4 CAPSULE ORAL DAILY
Qty: 90 CAPSULE | Refills: 3 | Status: SHIPPED | OUTPATIENT
Start: 2022-06-09 | End: 2023-05-24

## 2022-06-09 RX ORDER — ESCITALOPRAM OXALATE 10 MG/1
10 TABLET ORAL DAILY
Qty: 30 TABLET | Refills: 1 | Status: SHIPPED | OUTPATIENT
Start: 2022-06-09 | End: 2023-05-24

## 2022-06-09 ASSESSMENT — PAIN SCALES - GENERAL: PAINLEVEL: NO PAIN (0)

## 2022-06-09 ASSESSMENT — PATIENT HEALTH QUESTIONNAIRE - PHQ9
SUM OF ALL RESPONSES TO PHQ QUESTIONS 1-9: 16
10. IF YOU CHECKED OFF ANY PROBLEMS, HOW DIFFICULT HAVE THESE PROBLEMS MADE IT FOR YOU TO DO YOUR WORK, TAKE CARE OF THINGS AT HOME, OR GET ALONG WITH OTHER PEOPLE: VERY DIFFICULT

## 2022-06-09 NOTE — PROGRESS NOTES
Assessment & Plan       ICD-10-CM    1. Benign prostatic hyperplasia with urinary frequency  N40.1 tamsulosin (FLOMAX) 0.4 MG capsule    R35.0 PSA, screen   2. Moderate major depression (H)  F32.1 escitalopram (LEXAPRO) 10 MG tablet   3. Anxiety  F41.9 escitalopram (LEXAPRO) 10 MG tablet   4. Mixed obsessional thoughts and acts  F42.2 escitalopram (LEXAPRO) 10 MG tablet     His urinary obstructive symptoms appear to be due to BPH  We will check a PSA to make sure there has been no significant change in the last year and a half, but we will start him on tamsulosin as well  Discussed possible role for a medicine like finasteride and/or procedural intervention as well    Discussed his mental health and we will start him on Lexapro for that  I encouraged him to seek out a counselor from the local community, perhaps through [facility]    Return in about 6 weeks (around 7/21/2022) for follow up on depression and anxiety and OCD.    Johnathon Diaz MD  [facility] HEIDI Arellano is a 62 year old who presents for the following health issues:    History of Present Illness       Reason for visit:  Frequent Urination / Prostate  Symptom onset:  3-4 weeks ago  Symptoms include:  Frequent Urination. Weak urine stream.  Symptom intensity:  Severe  Symptom progression:  Staying the same  Had these symptoms before:  No    He eats 2-3 servings of fruits and vegetables daily.He consumes 5 sweetened beverage(s) daily.He exercises with enough effort to increase his heart rate 9 or less minutes per day.  He exercises with enough effort to increase his heart rate 3 or less days per week.   He is taking medications regularly.    Today's PHQ-9         PHQ-9 Total Score: 16    PHQ-9 Q9 Thoughts of better off dead/self-harm past 2 weeks :   Not at all    How difficult have these problems made it for you to do your work, take care of things at home, or get along with other people: Very difficult      He has been having some gradually worsening urinary obstructive symptoms for a number of months now.  His urine stream is weak and he has hesitancy and frequency.  He is worried about prostate cancer.  Review of his chart shows that he had a normal PSA in October 2020.    He is having a lot of anxiety and depressive symptoms.  He is poorly motivated.  He feels bad about himself.  He does not feel suicidal.  Has been frustrated because he is been trying to meet with a counselor, but he was told there were no therapist available until November in our [facility] system.  He is struggled with anxiety and depression in the past.  He has still has OCD tendencies.  He checks locks and doors frequently.  He was tried on Prozac many years ago and Zoloft back in 2014 and he did not like the way he felt on those medications.  He does not like to take medicine in general, but he is willing to try something since he feels so poorly and since he cannot get into see a counselor anytime soon.    Patient Active Problem List   Diagnosis     Esophageal reflux     Hyperlipidemia with target LDL less than 130     Reid esophagus     Memory changes     Insomnia, psychophysiological     Family history of Alzheimer's disease     Anxiety     Lumbar degenerative disc disease     Protrusion of lumbar intervertebral disc     Lumbar spinal stenosis     Alcohol use     LUCIAN (obstructive sleep apnea)- 'moderate' (AHI 6)     Hip pain, bilateral     Benign prostatic hyperplasia with urinary frequency     Current Outpatient Medications   Medication         esomeprazole (NEXIUM) 40 MG DR capsule     ibuprofen 200 MG capsule     rosuvastatin (CRESTOR) 10 MG tablet         Current Facility-Administered Medications   Medication     lidocaine 1 % injection 1 mL     triamcinolone (KENALOG-40) injection 40 mg           Review of Systems   Mainly significant for the above.      Objective    /70 (BP Location: Right arm, Patient Position:  Sitting, Cuff Size: Adult Regular)   Pulse 87   Temp 98.7  F (37.1  C) (Oral)   Wt 76.7 kg (169 lb)   SpO2 96%   BMI 26.71 kg/m    Body mass index is 26.71 kg/m .  Physical Exam   GENERAL: healthy, alert and no distress  RECTAL: Prostate is 2+ enlarged but smooth and soft  PSYCH: mentation appears normal, affect normal to flat.  He scored a 16 on his PHQ-9 a week ago.    His cardiology note was reviewed from last month, his communications with Darius were reviewed, and other chart information was reviewed as well.

## 2022-06-10 NOTE — RESULT ENCOUNTER NOTE
Adan,  Your PSA remains nice and normal, basically the same as 7 years ago, so no indication of any prostate cancer here.    Johnathon Diaz MD

## 2022-06-17 ENCOUNTER — TELEPHONE (OUTPATIENT)
Dept: GASTROENTEROLOGY | Facility: CLINIC | Age: 62
End: 2022-06-17
Payer: COMMERCIAL

## 2022-06-17 NOTE — TELEPHONE ENCOUNTER
Caller: Adan Oliver      Procedure: Doubles    Date, Location, and Surgeon of Procedure Cancelled: 7/13,[facility]    Ordering Provider:Lakisha    Reason for cancel (please be detailed, any staff messages or encounters to note?): Needs a different day        Rescheduled: y     If rescheduled:    Date: 7/27   Location:    Prep Resent: y(changes to prep?)   Covid Test Rescheduled: y   Note any change or update to original order/sedation:

## 2022-07-12 ENCOUNTER — MYC MEDICAL ADVICE (OUTPATIENT)
Dept: FAMILY MEDICINE | Facility: CLINIC | Age: 62
End: 2022-07-12

## 2022-07-12 DIAGNOSIS — D22.9 BENIGN SKIN MOLE: Primary | ICD-10-CM

## 2022-07-15 ENCOUNTER — TELEPHONE (OUTPATIENT)
Dept: ORTHOPEDICS | Facility: CLINIC | Age: 62
End: 2022-07-15

## 2022-07-15 NOTE — TELEPHONE ENCOUNTER
Adan SHI that he is looking for acces to the video he was given for his therapy exercises.   Misplaced the lync and would like to be able to return to the exercises.  Unsure of the PT's who he was seeing.  Patient call back number     Tara Ogden MS ATC

## 2022-07-18 RX ORDER — POLYETHYLENE GLYCOL 3350 17 G/17G
POWDER, FOR SOLUTION ORAL
Qty: 238 G | Refills: 0 | Status: SHIPPED | OUTPATIENT
Start: 2022-07-18 | End: 2023-05-24

## 2022-07-18 RX ORDER — BISACODYL 5 MG
TABLET, DELAYED RELEASE (ENTERIC COATED) ORAL
Qty: 4 TABLET | Refills: 0 | Status: SHIPPED | OUTPATIENT
Start: 2022-07-18 | End: 2023-05-24

## 2022-07-18 NOTE — TELEPHONE ENCOUNTER
PTrx information was received from physical therapy - [facility]  Replied to patient via [employer].    [email or web address]     Ralph Narayanan ATC

## 2022-07-21 ENCOUNTER — TRANSFERRED RECORDS (OUTPATIENT)
Dept: URGENT CARE | Facility: URGENT CARE | Age: 62
End: 2022-07-21

## 2022-07-25 ENCOUNTER — LAB (OUTPATIENT)
Dept: LAB | Facility: CLINIC | Age: 62
End: 2022-07-25
Payer: COMMERCIAL

## 2022-07-25 DIAGNOSIS — Z20.822 ENCOUNTER FOR LABORATORY TESTING FOR COVID-19 VIRUS: ICD-10-CM

## 2022-07-25 PROCEDURE — U0003 INFECTIOUS AGENT DETECTION BY NUCLEIC ACID (DNA OR RNA); SEVERE ACUTE RESPIRATORY SYNDROME CORONAVIRUS 2 (SARS-COV-2) (CORONAVIRUS DISEASE [COVID-19]), AMPLIFIED PROBE TECHNIQUE, MAKING USE OF HIGH THROUGHPUT TECHNOLOGIES AS DESCRIBED BY CMS-2020-01-R: HCPCS

## 2022-07-25 PROCEDURE — U0005 INFEC AGEN DETEC AMPLI PROBE: HCPCS

## 2022-07-26 LAB — SARS-COV-2 RNA RESP QL NAA+PROBE: NEGATIVE

## 2022-07-27 ENCOUNTER — HOSPITAL ENCOUNTER (OUTPATIENT)
Facility: AMBULATORY SURGERY CENTER | Age: 62
Discharge: HOME | End: 2022-07-27
Attending: INTERNAL MEDICINE | Admitting: INTERNAL MEDICINE
Payer: COMMERCIAL

## 2022-07-27 VITALS
TEMPERATURE: 98.4 F | RESPIRATION RATE: 16 BRPM | DIASTOLIC BLOOD PRESSURE: 67 MMHG | HEART RATE: 74 BPM | SYSTOLIC BLOOD PRESSURE: 112 MMHG | OXYGEN SATURATION: 99 %

## 2022-07-27 DIAGNOSIS — Z12.11 SCREENING FOR COLON CANCER: Primary | ICD-10-CM

## 2022-07-27 LAB
COLONOSCOPY: NORMAL
UPPER GI ENDOSCOPY: NORMAL

## 2022-07-27 PROCEDURE — 43239 EGD BIOPSY SINGLE/MULTIPLE: CPT

## 2022-07-27 PROCEDURE — G8907 PT DOC NO EVENTS ON DISCHARG: HCPCS

## 2022-07-27 PROCEDURE — 88305 TISSUE EXAM BY PATHOLOGIST: CPT | Performed by: PATHOLOGY

## 2022-07-27 PROCEDURE — 45385 COLONOSCOPY W/LESION REMOVAL: CPT

## 2022-07-27 PROCEDURE — G8918 PT W/O PREOP ORDER IV AB PRO: HCPCS

## 2022-07-27 RX ORDER — NALOXONE HYDROCHLORIDE 0.4 MG/ML
0.2 INJECTION, SOLUTION INTRAMUSCULAR; INTRAVENOUS; SUBCUTANEOUS
Status: DISCONTINUED | OUTPATIENT
Start: 2022-07-27 | End: 2022-07-28 | Stop reason: HOSPADM

## 2022-07-27 RX ORDER — ONDANSETRON 4 MG/1
4 TABLET, ORALLY DISINTEGRATING ORAL EVERY 6 HOURS PRN
Status: DISCONTINUED | OUTPATIENT
Start: 2022-07-27 | End: 2022-07-28 | Stop reason: HOSPADM

## 2022-07-27 RX ORDER — FENTANYL CITRATE 50 UG/ML
INJECTION, SOLUTION INTRAMUSCULAR; INTRAVENOUS PRN
Status: DISCONTINUED | OUTPATIENT
Start: 2022-07-27 | End: 2022-07-27 | Stop reason: HOSPADM

## 2022-07-27 RX ORDER — FLUMAZENIL 0.1 MG/ML
0.2 INJECTION, SOLUTION INTRAVENOUS
Status: DISCONTINUED | OUTPATIENT
Start: 2022-07-27 | End: 2022-07-28 | Stop reason: HOSPADM

## 2022-07-27 RX ORDER — LIDOCAINE 40 MG/G
CREAM TOPICAL
Status: DISCONTINUED | OUTPATIENT
Start: 2022-07-27 | End: 2022-07-28 | Stop reason: HOSPADM

## 2022-07-27 RX ORDER — ONDANSETRON 2 MG/ML
4 INJECTION INTRAMUSCULAR; INTRAVENOUS
Status: DISCONTINUED | OUTPATIENT
Start: 2022-07-27 | End: 2022-07-28 | Stop reason: HOSPADM

## 2022-07-27 RX ORDER — NALOXONE HYDROCHLORIDE 0.4 MG/ML
0.4 INJECTION, SOLUTION INTRAMUSCULAR; INTRAVENOUS; SUBCUTANEOUS
Status: DISCONTINUED | OUTPATIENT
Start: 2022-07-27 | End: 2022-07-28 | Stop reason: HOSPADM

## 2022-07-27 RX ORDER — ONDANSETRON 2 MG/ML
4 INJECTION INTRAMUSCULAR; INTRAVENOUS EVERY 6 HOURS PRN
Status: DISCONTINUED | OUTPATIENT
Start: 2022-07-27 | End: 2022-07-28 | Stop reason: HOSPADM

## 2022-07-27 RX ORDER — PROCHLORPERAZINE MALEATE 10 MG
10 TABLET ORAL EVERY 6 HOURS PRN
Status: DISCONTINUED | OUTPATIENT
Start: 2022-07-27 | End: 2022-07-28 | Stop reason: HOSPADM

## 2022-07-27 NOTE — H&P
ENDOSCOPY PRE-SEDATION H&P FOR OUTPATIENT PROCEDURES    Adan Oliver  [id number]  1960    Procedure: EGD/COL    Pre-procedure diagnosis: hx BE, hx colon polyps    Past medical history:   Past Medical History:   Diagnosis Date     Anxiety 12/07/2015     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 It was difficult to discern between PLMS and hyponeas. Apnea/hypopnea index was 28.0 events per hour.  The REM AHI was 29.3 events per hour.  The supine AHI was 32.7 events per hour.  RDI was 28.0 events per hour. Lowest oxygen saturation was 84.0%.  Time spent less than or equal to 88% was 0.3 minutes. PLM index was 32.3 movements per hour       Past surgical history:   Past Surgical History:   Procedure Laterality Date     APPENDECTOMY  2000s     BLEPHAROPLASTY BILATERAL Bilateral 10/3/2018    Procedure: BLEPHAROPLASTY BILATERAL;;  Surgeon: Dany Berrios MD;  Location:  OR     CARPAL TUNNEL RELEASE RT/LT  1980s     COMBINED ESOPHAGOSCOPY, GASTROSCOPY, DUODENOSCOPY (EGD) WITH CO2 INSUFFLATION N/A 9/20/2019    Procedure: ESOPHAGOGASTRODUODENOSCOPY, WITH CO2 INSUFFLATION;  Surgeon: Filiberto Banuelos MD;  Location:  OR     ESOPHAGOSCOPY, GASTROSCOPY, DUODENOSCOPY (EGD), COMBINED N/A 9/20/2019    Procedure: Esophagogastroduodenoscopy, With Biopsy;  Surgeon: Filiberto Banuelos MD;  Location: [facility] OR     REPAIR PTOSIS BILATERAL Bilateral 10/3/2018    Procedure: REPAIR PTOSIS BILATERAL;;  Surgeon: Dany Berrios MD;  Location: [facility]     REPAIR PTOSIS BROW BILATERAL Bilateral 10/3/2018    Procedure: REPAIR PTOSIS BROW BILATERAL;;  Surgeon: Dany Berrios MD;  Location: [facility] OR     SIGMOIDOSCOPY  FLEXIBLE N/A 9/20/2019    Procedure: SIGMOIDOSCOPY, FLEXIBLE;  Surgeon: Filiberto Banuelos MD;  Location: [facility] OR     TONSILLECTOMY  1980s       Current Outpatient Medications   Medication     bisacodyl (DULCOLAX) 5 MG EC tablet     escitalopram (LEXAPRO) 10 MG tablet     esomeprazole (NEXIUM) 40 MG DR capsule     ibuprofen 200 MG capsule     magnesium citrate solution     polyethylene glycol (MIRALAX) 17 GM/Dose powder     rosuvastatin (CRESTOR) 10 MG tablet     tamsulosin (FLOMAX) 0.4 MG capsule     Current Facility-Administered Medications   Medication     benzocaine 20% (HURRICAINE/TOPEX) 20 % spray     fentaNYL (PF) (SUBLIMAZE) injection     lidocaine (LMX4) kit     lidocaine 1 % 0.1-1 mL     lidocaine 1 % injection 1 mL     ondansetron (ZOFRAN) injection 4 mg     sodium chloride (PF) 0.9% PF flush 3 mL     sodium chloride (PF) 0.9% PF flush 3 mL     triamcinolone (KENALOG-40) injection 40 mg       No Known Allergies    History of Anesthesia/Sedation Problems: no    Physical Exam:    Mental status: alert  Heart: Normal  Lung: Normal  Assessment of patient's airway: Normal  Other as pertinent for procedure: None     ASA Score: See Provation note    Mallampati score:  II - Faucial pillars and soft palate may be seen, but uvula is masked by the base of the tongue    Assessment/Plan:     The patient is an appropriate candidate to receive sedation.    Informed consent was discussed with the patient/family, including the risks, benefits, potential complications and any alternative options associated with sedation.    Patient assessment completed just prior to sedation and while under constant observation by the provider. Condition determined to be adequate for proceeding with sedation.    The specific risks for the procedure were discussed with the patient at the time of informed consent and include but are not limited to perforation which could require surgery, missing significant neoplasm or lesion,  hemorrhage and adverse sedative complication.      Filiberto Banuelos MD

## 2022-07-29 LAB
PATH REPORT.COMMENTS IMP SPEC: NORMAL
PATH REPORT.COMMENTS IMP SPEC: NORMAL
PATH REPORT.FINAL DX SPEC: NORMAL
PATH REPORT.GROSS SPEC: NORMAL
PATH REPORT.MICROSCOPIC SPEC OTHER STN: NORMAL
PATH REPORT.RELEVANT HX SPEC: NORMAL
PHOTO IMAGE: NORMAL

## 2022-10-09 ENCOUNTER — HEALTH MAINTENANCE LETTER (OUTPATIENT)
Age: 62
End: 2022-10-09

## 2022-10-14 ENCOUNTER — TRANSFERRED RECORDS (OUTPATIENT)
Dept: HEALTH INFORMATION MANAGEMENT | Facility: CLINIC | Age: 62
End: 2022-10-14

## 2022-11-26 ENCOUNTER — HEALTH MAINTENANCE LETTER (OUTPATIENT)
Age: 62
End: 2022-11-26

## 2023-02-01 ENCOUNTER — OFFICE VISIT (OUTPATIENT)
Dept: ORTHOPEDICS | Facility: CLINIC | Age: 63
End: 2023-02-01
Payer: COMMERCIAL

## 2023-02-01 ENCOUNTER — ANCILLARY PROCEDURE (OUTPATIENT)
Dept: GENERAL RADIOLOGY | Facility: CLINIC | Age: 63
End: 2023-02-01
Attending: FAMILY MEDICINE
Payer: COMMERCIAL

## 2023-02-01 VITALS
HEIGHT: 67 IN | SYSTOLIC BLOOD PRESSURE: 119 MMHG | WEIGHT: 176 LBS | BODY MASS INDEX: 27.62 KG/M2 | DIASTOLIC BLOOD PRESSURE: 88 MMHG

## 2023-02-01 DIAGNOSIS — M16.11 OSTEOARTHRITIS OF RIGHT HIP, UNSPECIFIED OSTEOARTHRITIS TYPE: ICD-10-CM

## 2023-02-01 DIAGNOSIS — M25.551 CHRONIC RIGHT HIP PAIN: Primary | ICD-10-CM

## 2023-02-01 DIAGNOSIS — M25.859 HIP IMPINGEMENT SYNDROME, UNSPECIFIED LATERALITY: ICD-10-CM

## 2023-02-01 DIAGNOSIS — G89.29 CHRONIC RIGHT HIP PAIN: Primary | ICD-10-CM

## 2023-02-01 DIAGNOSIS — M25.571 PAIN IN JOINT INVOLVING RIGHT ANKLE AND FOOT: ICD-10-CM

## 2023-02-01 DIAGNOSIS — M19.079 ARTHRITIS OF GREAT TOE AT METATARSOPHALANGEAL JOINT: ICD-10-CM

## 2023-02-01 PROCEDURE — 99214 OFFICE O/P EST MOD 30 MIN: CPT | Mod: 25 | Performed by: FAMILY MEDICINE

## 2023-02-01 PROCEDURE — 73502 X-RAY EXAM HIP UNI 2-3 VIEWS: CPT | Mod: TC | Performed by: RADIOLOGY

## 2023-02-01 PROCEDURE — 73630 X-RAY EXAM OF FOOT: CPT | Mod: TC | Performed by: RADIOLOGY

## 2023-02-01 PROCEDURE — 20611 DRAIN/INJ JOINT/BURSA W/US: CPT | Mod: RT | Performed by: FAMILY MEDICINE

## 2023-02-01 RX ORDER — TRIAMCINOLONE ACETONIDE 40 MG/ML
40 INJECTION, SUSPENSION INTRA-ARTICULAR; INTRAMUSCULAR
Status: DISCONTINUED | OUTPATIENT
Start: 2023-02-01 | End: 2024-04-18

## 2023-02-01 RX ORDER — ROPIVACAINE HYDROCHLORIDE 5 MG/ML
3 INJECTION, SOLUTION EPIDURAL; INFILTRATION; PERINEURAL
Status: DISCONTINUED | OUTPATIENT
Start: 2023-02-01 | End: 2024-06-19

## 2023-02-01 RX ADMIN — TRIAMCINOLONE ACETONIDE 40 MG: 40 INJECTION, SUSPENSION INTRA-ARTICULAR; INTRAMUSCULAR at 14:00

## 2023-02-01 RX ADMIN — ROPIVACAINE HYDROCHLORIDE 3 ML: 5 INJECTION, SOLUTION EPIDURAL; INFILTRATION; PERINEURAL at 14:00

## 2023-02-01 NOTE — PROGRESS NOTES
Adan Oliver  :  1960  DOS: 2023  MRN: [id number]    Sports Medicine Clinic Visit    PCP: Johnathon Diaz    Adan Oliver is a 62 year old male who is seen in follow-up presenting with right hip and foot pain.    Injury: Gradual onset of worsening pain over the past 3 - 6 months, significantly worse over the past 2 weeks after slipping and falling onto that hip.  Pain located over right deep lateral hip, radiating to lateral thigh.  Reports intermittent radiating, pain with tightness to lateral thigh.  Additional Features:  Positive: weakness and joint stiffness.  Symptoms are better with Tylenol and Rest.  Symptoms are worse with: going from sit to stand position, walking, lying on right side, crossing legs.  Other evaluation and/or treatments so far consists of: Ice, Tylenol and Rest.  Recent imaging completed: X-rays completed 10/2021.  Prior History of related problems: history of bilateral hip & low back pain in , improved with physical therapy.    Second complaint - patient is being seen in follow up for chronic foot pain.  Majority of pain if located over dorsal medial midfoot.  Pain is worse with prolonged walking, standing, and driving.  Describes aching sensation with lying in bed.  Most recent right 1st MTP joint injection completed 2022 is providing relief of his great toe pain.    Social History: currently employed as contractor    Review of Systems  Musculoskeletal: as above  Remainder of review of systems is negative including constitutional, CV, pulmonary, GI, Skin and Neurologic except as noted in HPI or medical history.    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 It was difficult to discern between PLMS and hyponeas. Apnea/hypopnea index was 28.0 events per hour.  The REM AHI was 29.3 events per hour.  The supine AHI was 32.7 events per hour.  RDI was 28.0 events per hour. Lowest oxygen saturation was 84.0%.  Time spent less than or equal to 88% was 0.3 minutes. PLM index was 32.3 movements per hour     Past Surgical History:   Procedure Laterality Date     APPENDECTOMY  2000s     BLEPHAROPLASTY BILATERAL Bilateral 10/3/2018    Procedure: BLEPHAROPLASTY BILATERAL;;  Surgeon: Dany Berrios MD;  [facility]     CARPAL TUNNEL RELEASE RT/LT  1980s     COLONOSCOPY N/A 7/27/2022    Procedure: COLONOSCOPY, FLEXIBLE, WITH LESION REMOVAL USING SNARE;  Surgeon: Filiberto Banuelos MD;  [facility]     COLONOSCOPY WITH CO2 INSUFFLATION N/A 7/27/2022    Procedure: COLONOSCOPY, WITH CO2 INSUFFLATION;  Surgeon: Filiberto Banuelos MD;  [facility]     COMBINED ESOPHAGOSCOPY, GASTROSCOPY, DUODENOSCOPY (EGD) WITH CO2 INSUFFLATION N/A 9/20/2019    Procedure: ESOPHAGOGASTRODUODENOSCOPY, WITH CO2 INSUFFLATION;  Surgeon: Filiberto Banuelos MD;  [facility]     COMBINED ESOPHAGOSCOPY, GASTROSCOPY, DUODENOSCOPY (EGD) WITH CO2 INSUFFLATION N/A 7/27/2022    Procedure: ESOPHAGOGASTRODUODENOSCOPY, WITH CO2 INSUFFLATION;  Surgeon: Filiberto Banuelos MD;  [facility]     ESOPHAGOSCOPY, GASTROSCOPY, DUODENOSCOPY (EGD), COMBINED N/A 9/20/2019    Procedure: Esophagogastroduodenoscopy, With Biopsy;  Surgeon: Filiberto Banuelos MD;  [facility]     ESOPHAGOSCOPY, GASTROSCOPY, DUODENOSCOPY (EGD), COMBINED N/A 7/27/2022    Procedure: ESOPHAGOGASTRODUODENOSCOPY, WITH BIOPSY;  Surgeon: Filiberto Banuelos MD;  [facility]     REPAIR PTOSIS BILATERAL Bilateral 10/3/2018    Procedure: REPAIR PTOSIS BILATERAL;;  Surgeon: Dany Berrios MD;  [facility]     REPAIR  "PTOSIS BROW BILATERAL Bilateral 10/3/2018    Procedure: REPAIR PTOSIS BROW BILATERAL;;  Surgeon: Dany Berrios MD;  [facility]     SIGMOIDOSCOPY FLEXIBLE N/A 9/20/2019    Procedure: SIGMOIDOSCOPY, FLEXIBLE;  Surgeon: Filiberto Banuelos MD;  [facility]     TONSILLECTOMY  1980s     Family History   Problem Relation Age of Onset     Hypertension Mother      Alzheimer Disease Mother      Diabetes Brother      Glaucoma No family hx of      Macular Degeneration No family hx of      Coronary Artery Disease No family hx of      Colon Cancer No family hx of      Retinal detachment No family hx of        Objective  /88   Ht 1.694 m (5' 6.7\")   Wt 79.8 kg (176 lb)   BMI 27.81 kg/m        General: healthy, alert and in no distress      HEENT: no scleral icterus or conjunctival erythema     Skin: no suspicious lesions or rash. No jaundice.     CV: regular rhythm by palpation, 2+ distal pulses, no pedal edema      Resp: normal respiratory effort without conversational dyspnea     Psych: normal mood and affect      Gait: very mildly antalgic, appropriate coordination and balance     Neuro: normal light touch sensory exam of the extremities. Motor strength as noted below     Right hip exam    Inspection:        no edema or ecchymosis in hip area    ROM:       Flexion 100       internal rotation 20      external rotation 40      Range of motion limited by pain with IR and flexion    Strength:        flexion 5/5       extension 5/5       abduction 5/5       adduction 5/5    Tender:        greater trochanter    Non Tender:        remainder of hip area       illiac crest       ASIS       SI joint    Sensation:        grossly intact in hip and thigh    Skin:       well perfused       capillary refill brisk    Special Tests:        neg (-) ROSEMARIE       positive (+) FADIR       positive (+) scour       neg (-) Yazan    Right foot exam  Mild/moderate bunion, mild TTP of the 1st MTP joint, baseline 1st MTP " ROM, minmial TTP of AT and FHL tendons, minimal midfoot TTP, very mild TTP 1st MT base, mild pes planus    Radiology  PELVIS AND HIP BILATERAL ONE VIEW   10/29/2021 1:51 PM      HISTORY: Hip pain, bilateral     COMPARISON: None.                                                                   IMPRESSION: Moderate right hip degenerative changes. Mild left hip  degenerative changes. No evidence of acute fracture or AVN.    Examination:  XR FOOT RT G/E 3 VW     Date:  9/28/2020 1:53 PM      Clinical Information: Acute foot pain, right      Additional Information: none     Comparison: none                                                              Impression:     Right foot negative for fracture, erosion, or bone lesion. Moderate  joint space narrowing at the first MTP joint with moderate  hypertrophic spurring, particularly on the dorsal aspect of the  metatarsal head. Normal joint alignment is maintained. Normal joint  alignment and spacing in the right foot elsewhere. No significant soft  tissue abnormality in the right foot.    Large Joint Injection/Arthocentesis: R hip joint    Date/Time: 2/1/2023 2:00 PM  Performed by: Danny Oleary DO  Authorized by: Danny Oleary DO     Indications:  Pain, diagnostic evaluation and osteoarthritis  Needle Size:  22 G  Guidance: ultrasound    Approach:  Anterior  Location:  Hip      Site:  R hip joint  Medications:  3 mL ropivacaine 5 MG/ML; 40 mg triamcinolone 40 MG/ML  Outcome:  Tolerated well, no immediate complications  Procedure discussed: discussed risks, benefits, and alternatives    Consent Given by:  Patient  Timeout: timeout called immediately prior to procedure    Prep: patient was prepped and draped in usual sterile fashion     4 ml's of 1% lidocaine was used as local anesthetic prior to injection    Ultrasound images of procedure were permanently stored.             Assessment:  1. Chronic right hip pain    2. Osteoarthritis of right hip,  unspecified osteoarthritis type    3. Hip impingement syndrome, unspecified laterality    4. Pain in joint involving right ankle and foot    5. 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  Follow up: prn based on clinical progress  Worsening right hip pain, consistent with OA flare  XR images independently visualized and reviewed with patient today in clinic  Worsening/prgressing DJD changes in hip joint  1st MTP joint has progressed as well  Oral Tylenol and topical Voltaren gel reviewed as safe OTC options, reviewed safe dosing strategies  Trial of US guided CSI to right hip joint today, good initial relief from local anesthetic effect  XR images independently visualized and reviewed with patient today in clinic  PT options and low impact activity strategies reviewed  Known 1st MTP DJD on the right, suspect mild flare of generalized foot pain dorsal midfoot likely related to altered gait, advised good footwear and watchful waiting, sx may improve if hip pain is relieved and gait normalizes  Can consider additional interventions based on progress  Expectations and goals of CSI reviewed  Often 2-3 days for steroid effect, and can take up to two weeks for maximum effect  We discussed modified progressive pain-free activity as tolerated  Do not overuse in first two weeks if feeling better due to concern for vulnerability while steroid is working  Supportive care reviewed  All questions were answered today  Contact us with additional questions or concerns  Signs and sx of concern reviewed      Danny Oleary DO, ARCENIO  Sports Medicine Physician  [facility]      Time spent in chart review, one-on-one evaluation, discussion with patient regarding: nature of problem, clinical course, prior treatments, therapeutic options, shared-decision making, potential procedures and referrals, and charting related to the visit: 31  minutes.  If applicable, time does not include time spent performing any procedure.          Disclaimer: This note consists of symbols derived from keyboarding, dictation and/or voice recognition software. As a result, there may be errors in the script that have gone undetected. Please consider this when interpreting information found in this chart.

## 2023-02-01 NOTE — LETTER
2023         RE: Adan Oliver  [address]  [place] [address]        Dear Colleague,    Thank you for referring your patient, Adan Oliver, to the [facility]. Please see a copy of my visit note below.    Adan Oliver  :  1960  DOS: 2023  MRN: [id number]    Sports Medicine Clinic Visit    PCP: Johnathon Diaz    Adan Oliver is a 62 year old male who is seen in follow-up presenting with right hip and foot pain.    Injury: Gradual onset of worsening pain over the past 3 - 6 months, significantly worse over the past 2 weeks after slipping and falling onto that hip.  Pain located over right deep lateral hip, radiating to lateral thigh.  Reports intermittent radiating, pain with tightness to lateral thigh.  Additional Features:  Positive: weakness and joint stiffness.  Symptoms are better with Tylenol and Rest.  Symptoms are worse with: going from sit to stand position, walking, lying on right side, crossing legs.  Other evaluation and/or treatments so far consists of: Ice, Tylenol and Rest.  Recent imaging completed: X-rays completed 10/2021.  Prior History of related problems: history of bilateral hip & low back pain in , improved with physical therapy.    Second complaint - patient is being seen in follow up for chronic foot pain.  Majority of pain if located over dorsal medial midfoot.  Pain is worse with prolonged walking, standing, and driving.  Describes aching sensation with lying in bed.  Most recent right 1st MTP joint injection completed 2022 is providing relief of his great toe pain.    Social History: currently employed as contractor    Review of Systems  Musculoskeletal: as above  Remainder of review of systems is negative including constitutional, CV, pulmonary, GI, Skin and Neurologic except as noted in HPI or medical history.    Past Medical History:   Diagnosis Date     Anxiety 2015     Reid esophagus  10/30/2014     Esophageal reflux 10/01/2014     History of shingles 2012     Hyperlipidemia with target LDL less than 130 10/01/2014     Lumbar degenerative disc disease 05/17/2016     Nephrolithiasis 2009     LUCIAN (obstructive sleep apnea)- 'moderate' (AHI 6) 10/06/2017    Study Date: 10/2/2017- (200.0 lbs) Scored using 3% rules. It was difficult to discern between PLMS and hyponeas. Apnea/hypopnea index was 28.0 events per hour.  The REM AHI was 29.3 events per hour.  The supine AHI was 32.7 events per hour.  RDI was 28.0 events per hour. Lowest oxygen saturation was 84.0%.  Time spent less than or equal to 88% was 0.3 minutes. PLM index was 32.3 movements per hour     Past Surgical History:   Procedure Laterality Date     APPENDECTOMY  2000s     BLEPHAROPLASTY BILATERAL Bilateral 10/3/2018    Procedure: BLEPHAROPLASTY BILATERAL;;  Surgeon: Dany Berrios MD;  [facility]     CARPAL TUNNEL RELEASE RT/LT  1980s     COLONOSCOPY N/A 7/27/2022    Procedure: COLONOSCOPY, FLEXIBLE, WITH LESION REMOVAL USING SNARE;  Surgeon: Filiberto Banuelos MD;  [facility]     COLONOSCOPY WITH CO2 INSUFFLATION N/A 7/27/2022    Procedure: COLONOSCOPY, WITH CO2 INSUFFLATION;  Surgeon: Filiberto Banuelos MD;  [facility]     COMBINED ESOPHAGOSCOPY, GASTROSCOPY, DUODENOSCOPY (EGD) WITH CO2 INSUFFLATION N/A 9/20/2019    Procedure: ESOPHAGOGASTRODUODENOSCOPY, WITH CO2 INSUFFLATION;  Surgeon: Filiberto Banuelos MD;  [facility]     COMBINED ESOPHAGOSCOPY, GASTROSCOPY, DUODENOSCOPY (EGD) WITH CO2 INSUFFLATION N/A 7/27/2022    Procedure: ESOPHAGOGASTRODUODENOSCOPY, WITH CO2 INSUFFLATION;  Surgeon: Filiberto Banuelos MD;  [facility]     ESOPHAGOSCOPY, GASTROSCOPY, DUODENOSCOPY (EGD), COMBINED N/A 9/20/2019    Procedure: Esophagogastroduodenoscopy, With Biopsy;  Surgeon: Filiberto Banuelos MD;  [facility]     ESOPHAGOSCOPY, GASTROSCOPY, DUODENOSCOPY (EGD), COMBINED N/A  "7/27/2022    Procedure: ESOPHAGOGASTRODUODENOSCOPY, WITH BIOPSY;  Surgeon: Filiberto Banuelos MD;  [facility]     REPAIR PTOSIS BILATERAL Bilateral 10/3/2018    Procedure: REPAIR PTOSIS BILATERAL;;  Surgeon: Dany Berrios MD;  [facility]     REPAIR PTOSIS BROW BILATERAL Bilateral 10/3/2018    Procedure: REPAIR PTOSIS BROW BILATERAL;;  Surgeon: Dany Berrios MD;  [facility]     SIGMOIDOSCOPY FLEXIBLE N/A 9/20/2019    Procedure: SIGMOIDOSCOPY, FLEXIBLE;  Surgeon: Filiberto Banuelos MD;  [facility]     TONSILLECTOMY  1980s     Family History   Problem Relation Age of Onset     Hypertension Mother      Alzheimer Disease Mother      Diabetes Brother      Glaucoma No family hx of      Macular Degeneration No family hx of      Coronary Artery Disease No family hx of      Colon Cancer No family hx of      Retinal detachment No family hx of        Objective  /88   Ht 1.694 m (5' 6.7\")   Wt 79.8 kg (176 lb)   BMI 27.81 kg/m        General: healthy, alert and in no distress      HEENT: no scleral icterus or conjunctival erythema     Skin: no suspicious lesions or rash. No jaundice.     CV: regular rhythm by palpation, 2+ distal pulses, no pedal edema      Resp: normal respiratory effort without conversational dyspnea     Psych: normal mood and affect      Gait: very mildly antalgic, appropriate coordination and balance     Neuro: normal light touch sensory exam of the extremities. Motor strength as noted below     Right hip exam    Inspection:        no edema or ecchymosis in hip area    ROM:       Flexion 100       internal rotation 20      external rotation 40      Range of motion limited by pain with IR and flexion    Strength:        flexion 5/5       extension 5/5       abduction 5/5       adduction 5/5    Tender:        greater trochanter    Non Tender:        remainder of hip area       illiac crest       ASIS       SI joint    Sensation:        grossly intact in " hip and thigh    Skin:       well perfused       capillary refill brisk    Special Tests:        neg (-) ROSEMARIE       positive (+) FADIR       positive (+) scour       neg (-) Yazan    Right foot exam  Mild/moderate bunion, mild TTP of the 1st MTP joint, baseline 1st MTP ROM, minmial TTP of AT and FHL tendons, minimal midfoot TTP, very mild TTP 1st MT base, mild pes planus    Radiology  PELVIS AND HIP BILATERAL ONE VIEW   10/29/2021 1:51 PM      HISTORY: Hip pain, bilateral     COMPARISON: None.                                                                   IMPRESSION: Moderate right hip degenerative changes. Mild left hip  degenerative changes. No evidence of acute fracture or AVN.    Examination:  XR FOOT RT G/E 3 VW     Date:  9/28/2020 1:53 PM      Clinical Information: Acute foot pain, right      Additional Information: none     Comparison: none                                                              Impression:     Right foot negative for fracture, erosion, or bone lesion. Moderate  joint space narrowing at the first MTP joint with moderate  hypertrophic spurring, particularly on the dorsal aspect of the  metatarsal head. Normal joint alignment is maintained. Normal joint  alignment and spacing in the right foot elsewhere. No significant soft  tissue abnormality in the right foot.    Assessment:  1. Chronic right hip pain    2. Osteoarthritis of right hip, unspecified osteoarthritis type    3. Hip impingement syndrome, unspecified laterality    4. Pain in joint involving right ankle and foot    5. 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  Follow up: prn based on clinical progress  Worsening right hip pain, consistent with OA flare  XR images independently visualized and reviewed with patient today in clinic  Worsening/prgressing DJD changes in hip joint  1st MTP joint has progressed as well  Oral Tylenol and topical Voltaren gel reviewed  as safe OTC options, reviewed safe dosing strategies  Trial of US guided CSI to right hip joint today, good initial relief from local anesthetic effect  XR images independently visualized and reviewed with patient today in clinic  PT options and low impact activity strategies reviewed  Known 1st MTP DJD on the right, suspect mild flare of generalized foot pain dorsal midfoot likely related to altered gait, advised good footwear and watchful waiting, sx may improve if hip pain is relieved and gait normalizes  Can consider additional interventions based on progress  Expectations and goals of CSI reviewed  Often 2-3 days for steroid effect, and can take up to two weeks for maximum effect  We discussed modified progressive pain-free activity as tolerated  Do not overuse in first two weeks if feeling better due to concern for vulnerability while steroid is working  Supportive care reviewed  All questions were answered today  Contact us with additional questions or concerns  Signs and sx of concern reviewed      Danny Oleary DO, CAQ  Sports Medicine Physician  [facility]      Time spent in chart review, one-on-one evaluation, discussion with patient regarding: nature of problem, clinical course, prior treatments, therapeutic options, shared-decision making, potential procedures and referrals, and charting related to the visit: 31 minutes.  If applicable, time does not include time spent performing any procedure.          Disclaimer: This note consists of symbols derived from keyboarding, dictation and/or voice recognition software. As a result, there may be errors in the script that have gone undetected. Please consider this when interpreting information found in this chart.      Again, thank you for allowing me to participate in the care of your patient.        Sincerely,        Danny Oleary DO

## 2023-02-28 DIAGNOSIS — E78.5 HYPERLIPIDEMIA WITH TARGET LDL LESS THAN 130: Chronic | ICD-10-CM

## 2023-02-28 RX ORDER — ROSUVASTATIN CALCIUM 10 MG/1
10 TABLET, COATED ORAL DAILY
Qty: 90 TABLET | Refills: 1 | Status: SHIPPED | OUTPATIENT
Start: 2023-02-28 | End: 2023-07-13

## 2023-04-21 ENCOUNTER — NURSE TRIAGE (OUTPATIENT)
Dept: FAMILY MEDICINE | Facility: CLINIC | Age: 63
End: 2023-04-21
Payer: COMMERCIAL

## 2023-04-21 NOTE — TELEPHONE ENCOUNTER
Patient calling in, states that he has had URI illness after traveling outside of country. Has covid tested yeilding negative results. States that he was prescribed antibiotics for 5 days when traveling, but did not improve symptoms. States that he does not have any SOB or trouble taking deep breath, but does have increased sinus pain/congestion and sinus headache, states that he does have a low grade fever. No appointments within clinic 04/21/23, advised urgent care, patient agrees and will go to  and call back with any further questions.     locations sent via [employer] to patient per request.    Additional Information    Negative: Sounds like a life-threatening emergency to the triager    Negative: Difficulty breathing, and not from stuffy nose (e.g., not relieved by cleaning out the nose)    Negative: SEVERE headache and has fever    Negative: Patient sounds very sick or weak to the triager    Negative: SEVERE sinus pain    Negative: Severe headache    Negative: Redness or swelling on the cheek, forehead, or around the eye    Negative: Fever > 103 F (39.4 C)    Negative: Fever > 101 F (38.3 C) and over 60 years of age    Negative: Fever > 100.0 F (37.8 C) and has diabetes mellitus or a weak immune system (e.g., HIV positive, cancer chemotherapy, organ transplant, splenectomy, chronic steroids)    Negative: Fever > 100.0 F (37.8 C) and bedridden (e.g., nursing home patient, stroke, chronic illness, recovering from surgery)    Fever present > 3 days (72 hours)    Protocols used: SINUS PAIN AND CONGESTION-A-ZULEIKA Hayes RN  [facility]

## 2023-04-26 ENCOUNTER — VIRTUAL VISIT (OUTPATIENT)
Dept: FAMILY MEDICINE | Facility: CLINIC | Age: 63
End: 2023-04-26
Payer: COMMERCIAL

## 2023-04-26 DIAGNOSIS — T48.5X5A: Primary | ICD-10-CM

## 2023-04-26 PROCEDURE — 99442 PR PHYSICIAN TELEPHONE EVALUATION 11-20 MIN: CPT | Mod: 93 | Performed by: PHYSICIAN ASSISTANT

## 2023-04-26 RX ORDER — FLUTICASONE PROPIONATE 50 MCG
1-2 SPRAY, SUSPENSION (ML) NASAL DAILY
Qty: 18.2 ML | Refills: 0 | Status: SHIPPED | OUTPATIENT
Start: 2023-04-26 | End: 2023-07-13

## 2023-04-26 ASSESSMENT — PATIENT HEALTH QUESTIONNAIRE - PHQ9
SUM OF ALL RESPONSES TO PHQ QUESTIONS 1-9: 10
SUM OF ALL RESPONSES TO PHQ QUESTIONS 1-9: 10
10. IF YOU CHECKED OFF ANY PROBLEMS, HOW DIFFICULT HAVE THESE PROBLEMS MADE IT FOR YOU TO DO YOUR WORK, TAKE CARE OF THINGS AT HOME, OR GET ALONG WITH OTHER PEOPLE: SOMEWHAT DIFFICULT

## 2023-04-26 NOTE — PATIENT INSTRUCTIONS
Stop current nasal spray  Start fluticasone nasal spray 2 squirts daily x 2 wks  Optional neti pot

## 2023-04-26 NOTE — PROGRESS NOTES
Adan is a 63 year old who is being evaluated via a billable telephone visit.      What phone number would you like to be contacted at [phone]   How would you like to obtain your AVS? Kaiat  Distant Location (provider location):  On-site    Assessment & Plan     Adverse effect of oxymetazoline, initial encounter  Otravine appears to be med similar to oxymetazoline.  He has therefore had about 2 wks of oxymetazoline use.  He will contact me if symptoms not improving.    - fluticasone (FLONASE) 50 MCG/ACT nasal spray  Dispense: 18.2 mL; Refill: 0  - Sodium Chloride-Sodium Bicarb 1685-515 MG PACK  Dispense: 100 each; Refill: 0    Patient Instructions   Stop current nasal spray  Start fluticasone nasal spray 2 squirts daily x 2 wks  Optional neti [facility]   Adan is a 63 year old, presenting for the following health issues:  Nasal Problem and Headache         View : No data to display.               Headache     History of Present Illness     Reason for visit:  Sinuses, headache, body aches, lethargy, lung congestion & coughing. Not thinking clearly.  Symptom onset:  1-2 weeks ago  Symptoms include:  Sinuses, headache, body aches, lethargy, lung congestion & coughing. Not thinking clearly.  Symptom intensity:  Moderate  Symptom progression:  Improving  Had these symptoms before:  No    Was in [place].  2 wks ago woke with clogged sinuses.  Immediate sx bad headache, fever (felt hot and chilled, didn't have thermometer), body aches and very tired.  Symptoms then moved into lungs.  Used up a bottle of Otrivine then moved to zicam branded oxymetazoline nasal.  Body aches and coughing are now not as bad.  Sinuses and headache still prevalent but headache not as bad.  A bit of not thinking as clearly that he feels is probably more depression.    He eats 2-3 servings of fruits and vegetables daily.He consumes 0 sweetened beverage(s) daily.He  exercises with enough effort to increase his heart rate 9 or less minutes per day.  He exercises with enough effort to increase his heart rate 3 or less days per week.   He is taking medications regularly.    Today's PHQ-9         PHQ-9 Total Score: 10    PHQ-9 Q9 Thoughts of better off dead/self-harm past 2 weeks :   Not at all    How difficult have these problems made it for you to do your work, take care of things at home, or get along with other people: Somewhat difficult          Objective           Vitals:  No vitals were obtained today due to virtual visit.        Phone call duration: 16 minutes

## 2023-04-26 NOTE — NURSING NOTE
"Chief Complaint   Patient presents with     Nasal Problem     Was given a z-tana for ? Sinus infection and not helpful. Has felt hot since the beginning 2 weeks and chills. Now better ? If he had a fever.     Headache       Initial There were no vitals taken for this visit. Estimated body mass index is 27.81 kg/m  as calculated from the following:    Height as of 2/1/23: 1.694 m (5' 6.7\").    Weight as of 2/1/23: 79.8 kg (176 lb).    Patient presents to the clinic using     Is there anyone who you would like to be able to receive your results?   If yes have patient fill out [email or web address]    "

## 2023-05-24 ENCOUNTER — OFFICE VISIT (OUTPATIENT)
Dept: FAMILY MEDICINE | Facility: CLINIC | Age: 63
End: 2023-05-24
Payer: COMMERCIAL

## 2023-05-24 VITALS
BODY MASS INDEX: 29.19 KG/M2 | DIASTOLIC BLOOD PRESSURE: 81 MMHG | TEMPERATURE: 99.8 F | OXYGEN SATURATION: 98 % | HEIGHT: 67 IN | WEIGHT: 186 LBS | SYSTOLIC BLOOD PRESSURE: 121 MMHG | HEART RATE: 86 BPM

## 2023-05-24 DIAGNOSIS — M15.0 PRIMARY OSTEOARTHRITIS INVOLVING MULTIPLE JOINTS: ICD-10-CM

## 2023-05-24 DIAGNOSIS — R35.0 BENIGN PROSTATIC HYPERPLASIA WITH URINARY FREQUENCY: ICD-10-CM

## 2023-05-24 DIAGNOSIS — K22.70 BARRETT'S ESOPHAGUS WITHOUT DYSPLASIA: Chronic | ICD-10-CM

## 2023-05-24 DIAGNOSIS — J01.00 SUBACUTE MAXILLARY SINUSITIS: Primary | ICD-10-CM

## 2023-05-24 DIAGNOSIS — K21.9 GASTROESOPHAGEAL REFLUX DISEASE, UNSPECIFIED WHETHER ESOPHAGITIS PRESENT: Chronic | ICD-10-CM

## 2023-05-24 DIAGNOSIS — N40.1 BENIGN PROSTATIC HYPERPLASIA WITH URINARY FREQUENCY: ICD-10-CM

## 2023-05-24 DIAGNOSIS — E78.5 HYPERLIPIDEMIA WITH TARGET LDL LESS THAN 130: Chronic | ICD-10-CM

## 2023-05-24 DIAGNOSIS — F32.1 MAJOR DEPRESSIVE DISORDER, SINGLE EPISODE, MODERATE (H): ICD-10-CM

## 2023-05-24 PROCEDURE — 99215 OFFICE O/P EST HI 40 MIN: CPT | Performed by: FAMILY MEDICINE

## 2023-05-24 RX ORDER — CELECOXIB 200 MG/1
200 CAPSULE ORAL DAILY
Qty: 90 CAPSULE | Refills: 1 | Status: SHIPPED | OUTPATIENT
Start: 2023-05-24 | End: 2023-07-13

## 2023-05-24 ASSESSMENT — PAIN SCALES - GENERAL: PAINLEVEL: MODERATE PAIN (5)

## 2023-05-24 NOTE — PROGRESS NOTES
"  Assessment & Plan       ICD-10-CM    1. Subacute maxillary sinusitis  J01.00 amoxicillin-clavulanate (AUGMENTIN) 875-125 MG tablet      2. Reid's esophagus without dysplasia  K22.70       3. Gastroesophageal reflux disease, unspecified whether esophagitis present  K21.9       4. Primary osteoarthritis involving multiple joints  M15.9 celecoxib (CELEBREX) 200 MG capsule      5. Hyperlipidemia with target LDL less than 130  E78.5       6. Benign prostatic hyperplasia with urinary frequency  N40.1     R35.0       7. Major depressive disorder, single episode, moderate (H)  F32.1         He seems to have symptoms of persistent sinusitis, so we will treat that with Augmentin 875 mg twice a day for 10 days  He could continue with an oral decongestant or use Flonase nasal spray, but I advised him not to use a nasal spray decongestant on an ongoing basis  We discussed his reflux disease and Reid's esophagus and he will continue with the Nexium 40 mg daily and follow-up with Dr. Banuelos for ongoing care and upper endoscopy prn  For his arthritis, we will try him on celecoxib 200 mg daily  That should minimize GI upset compared to other NSAIDs, but he will also have some GI protection with the esomeprazole  He will go back on the rosuvastatin, but stay off the Lexapro and tamsulosin    Plan a tentative follow-up in a few months, preferably with a complete physical, to recheck fasting lab work and follow-up on the above    40 minutes spent by me on the date of the encounter doing chart review, history and exam, documentation and further activities per the note       BMI:   Estimated body mass index is 29.39 kg/m  as calculated from the following:    Height as of this encounter: 1.694 m (5' 6.7\").    Weight as of this encounter: 84.4 kg (186 lb).   Weight management plan: Discussed healthy diet and exercise guidelines        Johnathon Diaz MD  [facility]ANNA [facility] is a 63 year old, " presenting for the following health issues:  Sinus Problem        4/26/2023     1:53 PM   Additional Questions   Roomed by Saskia JASSI   Accompanied by self     Sinus Problem     History of Present Illness       Reason for visit:  Lingering apparent sinus infection. Loss of taste & smell. Headache. Body aches. Painful cramps. Weakness. Lethargic. Not focusing.  Symptom onset:  More than a month  Symptoms include:  Lingering apparent sinus infection. Loss of taste & smell. Headache. Body aches. Painful cramps. Weakness. Lethargic. Not focusing.  Symptom intensity:  Moderate  Symptom progression:  Staying the same  Had these symptoms before:  No    He eats 0-1 servings of fruits and vegetables daily.He consumes 0 sweetened beverage(s) daily.He exercises with enough effort to increase his heart rate 9 or less minutes per day.  He exercises with enough effort to increase his heart rate 3 or less days per week.   He is taking medications regularly.     He was in [place] last month.  He got sick.  He took a COVID test which was negative.  He was prescribed azithromycin for sinusitis.  He did not seem to do much.  He started taking an over-the-counter nasal spray decongestant.  He was using that on an ongoing basis.  He was seen in the urgent care setting a month ago and advised to stop that.  He is currently taking an oral decongestant.  He has some lingering sinus symptoms as above.  He has sinus pressure and lethargy and fatigue.  Some residual nasal congestion.  Not much drainage.    He also has other questions and concerns about his health.  He has been seeing sports medicine for arthritis and various joint problems.  He has had some injections.  He is having achiness in his hands and other areas and wonders if he could be prescribed an arthritis medication.  He does have a history of reflux disease and Reid's esophagus.  He is followed by Dr. Banuelos of GI for that.  He is on Nexium 40 mg daily for that.  At one  "point the dose was reduced down to 20 mg daily, but then his insurance would not pay for it.    I last saw him almost a year ago.  He was having some anxiety and depression then.  He does not really feel like he is depressed currently.  He never did take the Lexapro.  He did take tamsulosin for BPH symptoms and it seemed to be helpful, but when he was in [place] his diet and exercise habits changed where he cut back his alcohol use and he seemed to do fine without the tamsulosin.  He would like to stay off of it for now.    He stopped his rosuvastatin recently when he was feeling ill and is currently still off of it.    Patient Active Problem List   Diagnosis     Esophageal reflux     Hyperlipidemia with target LDL less than 130     Reid esophagus     Memory changes     Insomnia, psychophysiological     Family history of Alzheimer's disease     Anxiety     Lumbar degenerative disc disease     Protrusion of lumbar intervertebral disc     Lumbar spinal stenosis     Alcohol use     LUCIAN (obstructive sleep apnea)- 'moderate' (AHI 6)     Hip pain, bilateral     Benign prostatic hyperplasia with urinary frequency     Current Outpatient Medications   Medication             esomeprazole (NEXIUM) 40 MG DR capsule     fluticasone (FLONASE) 50 MCG/ACT nasal spray     rosuvastatin (CRESTOR) 10 MG tablet     Sodium Chloride-Sodium Bicarb 1685-515 MG PACK     Current Facility-Administered Medications   Medication     ropivacaine (NAROPIN) injection 3 mL     triamcinolone (KENALOG-40) injection 40 mg           Review of Systems   Mainly significant for the above.      Objective    /81 (BP Location: Left arm, Patient Position: Sitting, Cuff Size: Adult Regular)   Pulse 86   Temp 99.8  F (37.7  C) (Oral)   Ht 1.694 m (5' 6.7\")   Wt 84.4 kg (186 lb)   SpO2 98%   BMI 29.39 kg/m    Body mass index is 29.39 kg/m .  Physical Exam   GENERAL: healthy, alert and no distress  EYES: Eyes grossly normal to inspection, PERRL and " conjunctivae and sclerae normal  HENT: ear canals and TM's normal, nose and mouth without ulcers or lesions.  Nares are mildly congested without definite purulent drainage seen.  NECK: no adenopathy, no asymmetry, masses, or scars and thyroid normal to palpation  RESP: lungs clear to auscultation - no rales, rhonchi or wheezes  MS: no gross musculoskeletal defects noted, no edema    Previous chart notes and information from sports medicine and GI were reviewed.

## 2023-07-10 ENCOUNTER — TELEPHONE (OUTPATIENT)
Dept: GASTROENTEROLOGY | Facility: CLINIC | Age: 63
End: 2023-07-10
Payer: COMMERCIAL

## 2023-07-10 NOTE — TELEPHONE ENCOUNTER
[facility] Call Center    Phone Message    May a detailed message be left on voicemail: yes     Reason for Call: Other: Adan is calling in asking for a call back. Pt states that he would like to speak with his care team to discuss a prescription for his GERD. Please call back soon as possible to discuss.     Action Taken: Message routed to:  [facility] ([facility] GI    Travel Screening: Not Applicable

## 2023-07-10 NOTE — TELEPHONE ENCOUNTER
Call back to patient to let him know that if he would like to have this medication refilled by Dr. Banuelos he would need to make an appointment with a provider for follow up as it has been almost 3 years since he was last seen. Writer instructed that it would be best to have his primary care physician fill this medication as he will have to be on it indefinitely. Patient verbalized understanding and will reach back out to his Primary for this refill.    Anuradha Oden RN

## 2023-07-13 ENCOUNTER — VIRTUAL VISIT (OUTPATIENT)
Dept: FAMILY MEDICINE | Facility: CLINIC | Age: 63
End: 2023-07-13
Payer: COMMERCIAL

## 2023-07-13 DIAGNOSIS — H93.13 TINNITUS, BILATERAL: Primary | ICD-10-CM

## 2023-07-13 PROCEDURE — 99441 PR PHYSICIAN TELEPHONE EVALUATION 5-10 MIN: CPT | Mod: 93 | Performed by: STUDENT IN AN ORGANIZED HEALTH CARE EDUCATION/TRAINING PROGRAM

## 2023-07-13 RX ORDER — OMEGA-3 FATTY ACIDS/FISH OIL 300-1000MG
200 CAPSULE ORAL EVERY 4 HOURS PRN
COMMUNITY
End: 2023-09-26

## 2023-07-13 NOTE — PROGRESS NOTES
Adan is a 63 year old who is being evaluated via a billable telephone visit.      What phone number would you like to be contacted at? [phone]  How would you like to obtain your AVS? [facility]    Distant Location (provider location):  On-site    Assessment & Plan     (H93.13) Tinnitus, bilateral  (primary encounter diagnosis)  Comment: Acute onset, worsening. Pt requesting for evaluation for ringing in ears by a specialists due to acute onset and concern for worsening. Will provide referral   Plan: Adult ENT  Referral                           ZITA MARVIN MD  [facility]   Adan is a 63 year old, presenting for the following health issues:  Referral (ENT) and Tinnitus        5/24/2023    12:13 PM   Additional Questions   Roomed by cam   Accompanied by none     HPI     Ringing in his ears x 2 weeks and wants a referral for ENT      Patient reports that he noticed ringing in his over the last two weeks that originated in his right year however he states he has noticed it in his left as well. 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 Pt is requesting for a referral for evaluation with a specialist at this time         Review of Systems   CONSTITUTIONAL: NEGATIVE for fever, chills, change in weight  ENT/MOUTH: POSITIVE for tinnitus bilateral  RESP: NEGATIVE for significant cough or SOB  CV: NEGATIVE for chest pain, palpitations or peripheral edema      Objective           Vitals:  No vitals were obtained today due to virtual visit.    Physical Exam   healthy, alert and no distress  PSYCH: Alert and oriented times 3; coherent speech, normal   rate and volume, able to articulate logical thoughts, able   to abstract reason, no tangential thoughts, no hallucinations   or delusions  His affect is normal  RESP: No cough, no audible wheezing, able to talk in full sentences  Remainder of  exam unable to be completed due to telephone visits    No results found for any visits on 07/13/23.            Phone call duration: 8 minutes

## 2023-07-24 ENCOUNTER — OFFICE VISIT (OUTPATIENT)
Dept: FAMILY MEDICINE | Facility: CLINIC | Age: 63
End: 2023-07-24
Payer: COMMERCIAL

## 2023-07-24 VITALS
SYSTOLIC BLOOD PRESSURE: 116 MMHG | WEIGHT: 190 LBS | BODY MASS INDEX: 29.82 KG/M2 | HEIGHT: 67 IN | DIASTOLIC BLOOD PRESSURE: 74 MMHG | OXYGEN SATURATION: 99 % | HEART RATE: 74 BPM | TEMPERATURE: 98.3 F

## 2023-07-24 DIAGNOSIS — H93.13 TINNITUS, BILATERAL: Primary | ICD-10-CM

## 2023-07-24 DIAGNOSIS — J34.89 SINUS PRESSURE: ICD-10-CM

## 2023-07-24 DIAGNOSIS — R53.83 LETHARGY: ICD-10-CM

## 2023-07-24 DIAGNOSIS — R41.3 MEMORY DIFFICULTY: ICD-10-CM

## 2023-07-24 DIAGNOSIS — Z78.9 ALCOHOL USE: ICD-10-CM

## 2023-07-24 DIAGNOSIS — K22.70 BARRETT'S ESOPHAGUS WITHOUT DYSPLASIA: Chronic | ICD-10-CM

## 2023-07-24 DIAGNOSIS — F41.9 ANXIETY: Chronic | ICD-10-CM

## 2023-07-24 DIAGNOSIS — E78.5 HYPERLIPIDEMIA WITH TARGET LDL LESS THAN 130: ICD-10-CM

## 2023-07-24 PROCEDURE — 99215 OFFICE O/P EST HI 40 MIN: CPT | Performed by: FAMILY MEDICINE

## 2023-07-24 PROCEDURE — 96127 BRIEF EMOTIONAL/BEHAV ASSMT: CPT | Performed by: FAMILY MEDICINE

## 2023-07-24 ASSESSMENT — PATIENT HEALTH QUESTIONNAIRE - PHQ9
SUM OF ALL RESPONSES TO PHQ QUESTIONS 1-9: 6
SUM OF ALL RESPONSES TO PHQ QUESTIONS 1-9: 6
10. IF YOU CHECKED OFF ANY PROBLEMS, HOW DIFFICULT HAVE THESE PROBLEMS MADE IT FOR YOU TO DO YOUR WORK, TAKE CARE OF THINGS AT HOME, OR GET ALONG WITH OTHER PEOPLE: SOMEWHAT DIFFICULT

## 2023-07-24 ASSESSMENT — PAIN SCALES - GENERAL: PAINLEVEL: NO PAIN (0)

## 2023-07-24 NOTE — PROGRESS NOTES
Assessment & Plan       ICD-10-CM    1. Tinnitus, bilateral  H93.13       2. Sinus pressure  J34.89 CT Sinus w/o Contrast      3. Memory difficulty  R41.3 Occupational Therapy Referral     Adult Neurology  Referral      4. Lethargy  R53.83 Comprehensive metabolic panel     CBC with platelets     TSH with free T4 reflex      5. Hyperlipidemia with target LDL less than 130  E78.5 Lipid panel reflex to direct LDL Fasting      6. Anxiety  F41.9       7. Alcohol use  Z78.9 Comprehensive metabolic panel      8. Reid's esophagus without dysplasia  K22.70         His blood pressure and other vital signs look good  We discussed the difficulty in treating tinnitus  I advised him to follow through with his ENT consultation scheduled for the fall as planned  In the meantime, we will check a sinus CT scan to see if there is any further pathology that are identifiable  We discussed his memory concerns and we will refer him to OT for a CPT and also to neurology to discuss his concerns about memory impairment  We will have him return soon for some fasting lab work as ordered above  I encouraged him to cut back on his alcohol intake  Continue esomeprazole for the Reid's esophagus  I encouraged him to cut back on caffeine intake as well as alcohol intake for the sake of his esophagus    I do think his physical symptoms are overlaid by some underlying anxiety and I did discuss that with him    If new, worsening or persistent symptoms, the patient is to call or return for a recheck      40 minutes spent by me on the date of the encounter doing chart review, history and exam, documentation and further activities per the note      Johnathon Diaz MD  [facility] HEIDI Arellano is a 63 year old, presenting for the following health issues:  Patient Request (Wants a referral for ENT)      7/24/2023     1:08 PM   Additional Questions   Roomed by cam   Accompanied by none         7/24/2023     1:08  PM   Patient Reported Additional Medications   Patient reports taking the following new medications none     History of Present Illness       Reason for visit:  Ringing in ears. Sinuses. Headaches. Lethargy. Muscle cramps. Having trouble finishing a yawn. Like I can t get enough air in my lungs. Dyspnea?  Symptom onset:  3-4 weeks ago  Symptom intensity:  Moderate  Symptom progression:  Staying the same  Had these symptoms before:  Yes  Has tried/received treatment for these symptoms:  No  What makes it better:  Not so far.    He eats 0-1 servings of fruits and vegetables daily.He exercises with enough effort to increase his heart rate 9 or less minutes per day.  He exercises with enough effort to increase his heart rate 3 or less days per week.   He is taking medications regularly.     He has a list of a number of different health concerns.  He has been having ringing in his ears in recent weeks.  He had a virtual visit for that a week and a half ago with Dr. Gil and did get an ENT referral, but his appointment is not until the fall.  He still feels like he gets sinus pressure at times, especially on the right side.  He did take some antibiotics for that back in May and that seemed somewhat helpful at the time.  He does get headaches at times.  He feels tired much of the time.  Gets occasional muscle cramps.  He feels like he has trouble finishing a on, like he cannot get a deep breath.  He is not a smoker, but he does continue to drink alcohol on a regular basis, at least a couple of drinks per day and more on other days.  He is worried about his cognition.  He feels like his memory is not as good as it used to be.  His mother recently  of Alzheimer's disease and he worries about that.    He would like to have cognitive testing again.  He would like to meet with a neurologist in consultation.    He is using esomeprazole for his history of Reid's esophagus.    Patient Active Problem List   Diagnosis     "Esophageal reflux    Hyperlipidemia with target LDL less than 130    Reid esophagus    Memory changes    Insomnia, psychophysiological    Family history of Alzheimer's disease    Anxiety    Lumbar degenerative disc disease    Protrusion of lumbar intervertebral disc    Lumbar spinal stenosis    Alcohol use    LUCIAN (obstructive sleep apnea)- 'moderate' (AHI 6)    Hip pain, bilateral    Benign prostatic hyperplasia with urinary frequency     Current Outpatient Medications   Medication    esomeprazole (NEXIUM) 40 MG DR capsule    ibuprofen (ADVIL/MOTRIN) 200 MG capsule     Current Facility-Administered Medications   Medication    ropivacaine (NAROPIN) injection 3 mL    triamcinolone (KENALOG-40) injection 40 mg           Review of Systems   Mainly significant for the above.      Objective    /74 (BP Location: Left arm, Patient Position: Sitting, Cuff Size: Adult Regular)   Pulse 74   Temp 98.3  F (36.8  C) (Oral)   Ht 1.694 m (5' 6.7\")   Wt 86.2 kg (190 lb)   SpO2 99%   BMI 30.03 kg/m    Body mass index is 30.03 kg/m .  Physical Exam   GENERAL: healthy, alert and no distress  HEENT: Eyes appear normal.  Nares are minimally congested.  Ear canals are clear and TMs appear normal.  NECK: no adenopathy, no asymmetry, masses, or scars and thyroid normal to palpation  RESP: lungs clear to auscultation - no rales, rhonchi or wheezes  CV: regular rate and rhythm, normal S1 S2, no S3 or S4, no murmur, click or rub  MS: no gross musculoskeletal defects noted, no edema                  "

## 2023-07-25 NOTE — PROGRESS NOTES
"Adan Oliver  :  1960  DOS: 2022  MRN: [id number]      Sports Medicine Clinic Visit    PCP: Charly Castillo    Adan Oliver is a 60 year old male who is seen as a self referral presenting with right foot pain and right shoulder.    Injury:  1) Toe: He reports pain in the right great toe and medial foot for 2 weeks. His pain increases with walking and standing. He has used OTC arch supports but this has not changed the pain.     Social History: grocery shopping for elderly.    Interim History - 2021  Since last visit on 20 patient has begun to have pain again in his right great toe .  Right foot 1st MTP joint injection completed on 20 provided good relief for 6 months.  No new injury in the interim.  He would be interested in a repeat injection today.      Interim History - December 10, 2021  Since last visit on 10/29/2021 patient has begun to have pain again in his right great toe.  Injection done on 21, provided relief from about 6 months.   No interim injury.       Interim History - May 27, 2022  Since last visit on 12/10/2021 patient has noticed an increase in right 1st MTP joint pain over the past 3 weeks. Right great MTP joint injection completed on 12/10/2021 provided great relief for 4.5 months. He notes that driving is not bothersome but walking is. Has been using ibuprofen. Is hopeful for a repeat corticosteroid injection today.  No new injury in the interim.    Interim History - 2023  Since last visit on 2023 patient has reported a curvature to lateral side towards his other toes. Patient did purchase the brace Dr. Oleary advised on last visit and he reports no resolution. Patient also reports the same pain beginning in the left big toe. Patient also reports a possibility of losing his insurance soon and wonders if he should get his injections now. Patient would also like to discuss the \"systemic\" arthritis and if there is a solution. No interim " injury.           Review of Systems  Skin: no bruising, no swelling  Musculoskeletal: as above  Neurologic: no numbness, paresthesias  Remainder of review of systems is negative including constitutional, CV, pulmonary, GI, except as noted in HPI or medical history.    Patient's current problem list, past medical and surgical history, and family history were reviewed.    Patient Active Problem List   Diagnosis    Esophageal reflux    Hyperlipidemia with target LDL less than 130    Reid esophagus    Memory changes    Insomnia, psychophysiological    Family history of Alzheimer's disease    Anxiety    Lumbar degenerative disc disease    Protrusion of lumbar intervertebral disc    Lumbar spinal stenosis    Alcohol use    LUCIAN (obstructive sleep apnea)- 'moderate' (AHI 6)    Hip pain, bilateral    Benign prostatic hyperplasia with urinary frequency     Past Medical History:   Diagnosis Date    Anxiety 12/07/2015    Reid esophagus 10/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 It was difficult to discern between PLMS and hyponeas. Apnea/hypopnea index was 28.0 events per hour.  The REM AHI was 29.3 events per hour.  The supine AHI was 32.7 events per hour.  RDI was 28.0 events per hour. Lowest oxygen saturation was 84.0%.  Time spent less than or equal to 88% was 0.3 minutes. PLM index was 32.3 movements per hour     Past Surgical History:   Procedure Laterality Date    APPENDECTOMY  2000s    BLEPHAROPLASTY BILATERAL Bilateral 10/3/2018    Procedure: BLEPHAROPLASTY BILATERAL;;  Surgeon: Dany Berrios MD;  Location: [facility]    CARPAL TUNNEL RELEASE RT/LT  1980s    COLONOSCOPY N/A 7/27/2022    Procedure: COLONOSCOPY, FLEXIBLE, WITH LESION  "REMOVAL USING SNARE;  Surgeon: Filiberto Banuelos MD;  Location: [facility]    COLONOSCOPY WITH CO2 INSUFFLATION N/A 7/27/2022    Procedure: COLONOSCOPY, WITH CO2 INSUFFLATION;  Surgeon: Filiberto Banuelos MD;  [facility]    COMBINED ESOPHAGOSCOPY, GASTROSCOPY, DUODENOSCOPY (EGD) WITH CO2 INSUFFLATION N/A 9/20/2019    Procedure: ESOPHAGOGASTRODUODENOSCOPY, WITH CO2 INSUFFLATION;  Surgeon: Filiberto Banuelos MD;  [facility]    COMBINED ESOPHAGOSCOPY, GASTROSCOPY, DUODENOSCOPY (EGD) WITH CO2 INSUFFLATION N/A 7/27/2022    Procedure: ESOPHAGOGASTRODUODENOSCOPY, WITH CO2 INSUFFLATION;  Surgeon: Filiberto Banuelos MD;  [facility]    ESOPHAGOSCOPY, GASTROSCOPY, DUODENOSCOPY (EGD), COMBINED N/A 9/20/2019    Procedure: Esophagogastroduodenoscopy, With Biopsy;  Surgeon: Filiberto Baunelos MD;  [facility]    ESOPHAGOSCOPY, GASTROSCOPY, DUODENOSCOPY (EGD), COMBINED N/A 7/27/2022    Procedure: ESOPHAGOGASTRODUODENOSCOPY, WITH BIOPSY;  Surgeon: Filiberto Banuelos MD;  [facility]    REPAIR PTOSIS BILATERAL Bilateral 10/3/2018    Procedure: REPAIR PTOSIS BILATERAL;;  Surgeon: Dany Beriros MD;  [facility]    REPAIR PTOSIS BROW BILATERAL Bilateral 10/3/2018    Procedure: REPAIR PTOSIS BROW BILATERAL;;  Surgeon: Dany Berrios MD;  [facility]    SIGMOIDOSCOPY FLEXIBLE N/A 9/20/2019    Procedure: SIGMOIDOSCOPY, FLEXIBLE;  Surgeon: Filiberto Banuelos MD;  [facility]    TONSILLECTOMY  1980s     Family History   Problem Relation Age of Onset    Hypertension Mother     Alzheimer Disease Mother     Diabetes Brother     Glaucoma No family hx of     Macular Degeneration No family hx of     Coronary Artery Disease No family hx of     Colon Cancer No family hx of     Retinal detachment No family hx of        Objective  BP (!) 147/91   Ht 1.694 m (5' 6.7\")   Wt 86.2 kg (190 lb)   BMI 30.03 kg/m      GENERAL APPEARANCE: healthy, alert " and no distress   GAIT: NORMAL  SKIN: no suspicious lesions or rashes  HEENT: Sclera clear, anicteric  CV: good peripheral pulses  RESP: Breathing not labored  NEURO: Normal strength and tone, mentation intact and speech normal  PSYCH:  mentation appears normal and affect normal/bright    Right Foot and Ankle Exam:    Inspection:       no visible ecchymosis       no visible effusion, mild edema at the first MTP joint    Foot inspection:       no deformity noted    Tender:       Great toe MTP joint     Non-Tender:       remainder of ankle and foot right    ROM:        Decreased great toe dorsiflexion and plantarflexion    Strength:       ankle dorsiflexion 5/5 right       plantarflexion 5/5 right       inversion 5/5 right       eversion 5/5 right       great toe dorsiflexion 5/5 right       great toe plantarflexion 5/5 right    Gait:       normal    Neurovascular:       2+ peripheral pulses bilaterally and brisk capillary refill       sensation grossly intact    Radiology  I visualized and reviewed these images with the patient  Xr Foot Right G/e 3 Views  Result Date: 9/28/2020  Examination:  XR FOOT RT G/E 3 VW Date:  9/28/2020 1:53 PM Clinical Information: Acute foot pain, right Additional Information: none Comparison: none   Impression: Right foot negative for fracture, erosion, or bone lesion. Moderate joint space narrowing at the first MTP joint with moderate hypertrophic spurring, particularly on the dorsal aspect of the metatarsal head. Normal joint alignment is maintained. Normal joint alignment and spacing in the right foot elsewhere. No significant soft tissue abnormality in the right foot. HAMILTON MARCANO MD    Small Joint Injection/Arthrocentesis: R great MTP    Date/Time: 7/26/2023 4:26 PM    Performed by: Danny Oleary DO  Authorized by: Danny Oleary DO  Indications:  Pain  Needle Size:  25 G  Guidance: ultrasound     Approach:  Dorsal  Location:  Great toe    Site:  R great  MTP                    Medications:  40 mg triamcinolone 40 MG/ML; 1 mL lidocaine (PF) 1 %        Outcome:  Tolerated well, no immediate complications  Procedure discussed: discussed risks, benefits, and alternatives    Consent Given by:  Patient  Timeout: timeout called immediately prior to procedure    Prep: patient was prepped and draped in usual sterile fashion       Ultrasound images of procedure were permanently stored.         Assessment:  1. Great toe pain, right    2. 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 Bunion of great toe of right foot        Plan:  Reviewed goals/roles for inserts or Dynaflex, silicone toe spacers  Repeat US guided injection to 1st MTP joint today, reviewed goal and limitations  Reviewed low impact activity strategies and footwear options  XR images independently visualized and reviewed with patient today in clinic  Referral to foot/ankle surgeon available in the future prn for discussion of surgical options, especially with developing bunion  Expectations and goals of CSI reviewed  Often 2-3 days for steroid effect, and can take up to two weeks for maximum effect  We discussed modified progressive pain-free activity as tolerated  Do not overuse in first two weeks if feeling better due to concern for vulnerability while steroid is working  Supportive care reviewed  All questions were answered today  Contact us with additional questions or concerns  Signs and sx of concern reviewed      Danny Oleary DO, ARCENIO  Sports Medicine Physician  [facility]

## 2023-07-26 ENCOUNTER — OFFICE VISIT (OUTPATIENT)
Dept: ORTHOPEDICS | Facility: CLINIC | Age: 63
End: 2023-07-26
Payer: COMMERCIAL

## 2023-07-26 ENCOUNTER — ANCILLARY PROCEDURE (OUTPATIENT)
Dept: CT IMAGING | Facility: CLINIC | Age: 63
End: 2023-07-26
Attending: FAMILY MEDICINE
Payer: COMMERCIAL

## 2023-07-26 VITALS
DIASTOLIC BLOOD PRESSURE: 91 MMHG | BODY MASS INDEX: 29.82 KG/M2 | HEIGHT: 67 IN | SYSTOLIC BLOOD PRESSURE: 147 MMHG | WEIGHT: 190 LBS

## 2023-07-26 DIAGNOSIS — M79.674 GREAT TOE PAIN, RIGHT: Primary | ICD-10-CM

## 2023-07-26 DIAGNOSIS — M19.079 ARTHRITIS OF GREAT TOE AT METATARSOPHALANGEAL JOINT: ICD-10-CM

## 2023-07-26 DIAGNOSIS — J34.89 SINUS PRESSURE: ICD-10-CM

## 2023-07-26 DIAGNOSIS — M21.611 BUNION OF GREAT TOE OF RIGHT FOOT: ICD-10-CM

## 2023-07-26 PROCEDURE — 20604 DRAIN/INJ JOINT/BURSA W/US: CPT | Mod: RT | Performed by: FAMILY MEDICINE

## 2023-07-26 PROCEDURE — 70486 CT MAXILLOFACIAL W/O DYE: CPT | Mod: TC | Performed by: RADIOLOGY

## 2023-07-26 RX ORDER — TRIAMCINOLONE ACETONIDE 40 MG/ML
40 INJECTION, SUSPENSION INTRA-ARTICULAR; INTRAMUSCULAR
Status: DISCONTINUED | OUTPATIENT
Start: 2023-07-26 | End: 2024-04-18

## 2023-07-26 RX ORDER — LIDOCAINE HYDROCHLORIDE 10 MG/ML
1 INJECTION, SOLUTION EPIDURAL; INFILTRATION; INTRACAUDAL; PERINEURAL
Status: DISCONTINUED | OUTPATIENT
Start: 2023-07-26 | End: 2024-06-19

## 2023-07-26 RX ADMIN — TRIAMCINOLONE ACETONIDE 40 MG: 40 INJECTION, SUSPENSION INTRA-ARTICULAR; INTRAMUSCULAR at 16:26

## 2023-07-26 RX ADMIN — LIDOCAINE HYDROCHLORIDE 1 ML: 10 INJECTION, SOLUTION EPIDURAL; INFILTRATION; INTRACAUDAL; PERINEURAL at 16:26

## 2023-07-26 NOTE — LETTER
2023         RE: Adan Oliver  [address]  [place] [address]        Dear Colleague,    Thank you for referring your patient, Adan Oliver, to the [facility]. Please see a copy of my visit note below.    Adan Oliver  :  1960  DOS: 2022  MRN: [id number]      Sports Medicine Clinic Visit    PCP: Charly Castillo    Adan Oliver is a 60 year old male who is seen as a self referral presenting with right foot pain and right shoulder.    Injury:  1) Toe: He reports pain in the right great toe and medial foot for 2 weeks. His pain increases with walking and standing. He has used OTC arch supports but this has not changed the pain.     Social History: grocery shopping for elderly.    Interim History - 2021  Since last visit on 20 patient has begun to have pain again in his right great toe .  Right foot 1st MTP joint injection completed on 20 provided good relief for 6 months.  No new injury in the interim.  He would be interested in a repeat injection today.      Interim History - December 10, 2021  Since last visit on 10/29/2021 patient has begun to have pain again in his right great toe.  Injection done on 21, provided relief from about 6 months.   No interim injury.       Interim History - May 27, 2022  Since last visit on 12/10/2021 patient has noticed an increase in right 1st MTP joint pain over the past 3 weeks. Right great MTP joint injection completed on 12/10/2021 provided great relief for 4.5 months. He notes that driving is not bothersome but walking is. Has been using ibuprofen. Is hopeful for a repeat corticosteroid injection today.  No new injury in the interim.    Interim History - 2023  Since last visit on 2023 patient has reported a curvature to lateral side towards his other toes. Patient did purchase the brace Dr. Oleary advised on last visit and he reports no resolution. Patient  "also reports the same pain beginning in the left big toe. Patient also reports a possibility of losing his insurance soon and wonders if he should get his injections now. Patient would also like to discuss the \"systemic\" arthritis and if there is a solution. No interim injury.           Review of Systems  Skin: no bruising, no swelling  Musculoskeletal: as above  Neurologic: no numbness, paresthesias  Remainder of review of systems is negative including constitutional, CV, pulmonary, GI, except as noted in HPI or medical history.    Patient's current problem list, past medical and surgical history, and family history were reviewed.    Patient Active Problem List   Diagnosis     Esophageal reflux     Hyperlipidemia with target LDL less than 130     Reid esophagus     Memory changes     Insomnia, psychophysiological     Family history of Alzheimer's disease     Anxiety     Lumbar degenerative disc disease     Protrusion of lumbar intervertebral disc     Lumbar spinal stenosis     Alcohol use     LUCIAN (obstructive sleep apnea)- 'moderate' (AHI 6)     Hip pain, bilateral     Benign prostatic hyperplasia with urinary frequency     Past Medical History:   Diagnosis Date     Anxiety 12/07/2015     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 It was difficult to discern between PLMS and hyponeas. Apnea/hypopnea index was 28.0 events per hour.  The REM AHI was 29.3 events per hour.  The supine AHI was 32.7 events per hour.  RDI was 28.0 events per hour. Lowest oxygen saturation was 84.0%.  Time spent less than or equal to 88% was 0.3 minutes. PLM index was 32.3 movements per hour     Past Surgical History:   Procedure " Laterality Date     APPENDECTOMY  2000s     BLEPHAROPLASTY BILATERAL Bilateral 10/3/2018    Procedure: BLEPHAROPLASTY BILATERAL;;  Surgeon: Dany Brerios MD;  [facility]: [facility]     CARPAL TUNNEL RELEASE RT/LT  1980s     COLONOSCOPY N/A 7/27/2022    Procedure: COLONOSCOPY, FLEXIBLE, WITH LESION REMOVAL USING SNARE;  Surgeon: Filiberto Banuelos MD;  [facility]     COLONOSCOPY WITH CO2 INSUFFLATION N/A 7/27/2022    Procedure: COLONOSCOPY, WITH CO2 INSUFFLATION;  Surgeon: Filiberto Banuelos MD;  [facility]     COMBINED ESOPHAGOSCOPY, GASTROSCOPY, DUODENOSCOPY (EGD) WITH CO2 INSUFFLATION N/A 9/20/2019    Procedure: ESOPHAGOGASTRODUODENOSCOPY, WITH CO2 INSUFFLATION;  Surgeon: Filiberto Banuelos MD;  [facility]     COMBINED ESOPHAGOSCOPY, GASTROSCOPY, DUODENOSCOPY (EGD) WITH CO2 INSUFFLATION N/A 7/27/2022    Procedure: ESOPHAGOGASTRODUODENOSCOPY, WITH CO2 INSUFFLATION;  Surgeon: Filiberto Banuelos MD;  [facility]     ESOPHAGOSCOPY, GASTROSCOPY, DUODENOSCOPY (EGD), COMBINED N/A 9/20/2019    Procedure: Esophagogastroduodenoscopy, With Biopsy;  Surgeon: Filiberto Banuelos MD;  [facility]     ESOPHAGOSCOPY, GASTROSCOPY, DUODENOSCOPY (EGD), COMBINED N/A 7/27/2022    Procedure: ESOPHAGOGASTRODUODENOSCOPY, WITH BIOPSY;  Surgeon: Filiberto Banuelos MD;  [facility]     REPAIR PTOSIS BILATERAL Bilateral 10/3/2018    Procedure: REPAIR PTOSIS BILATERAL;;  Surgeon: Dany Berrios MD;  [facility]     REPAIR PTOSIS BROW BILATERAL Bilateral 10/3/2018    Procedure: REPAIR PTOSIS BROW BILATERAL;;  Surgeon: Dany Berrios MD;  [facility]     SIGMOIDOSCOPY FLEXIBLE N/A 9/20/2019    Procedure: SIGMOIDOSCOPY, FLEXIBLE;  Surgeon: Filiberto Banuelos MD;  Location: [facility]     TONSILLECTOMY  1980s     Family History   Problem Relation Age of Onset     Hypertension Mother      Alzheimer Disease Mother      Diabetes Brother      Glaucoma  "No family hx of      Macular Degeneration No family hx of      Coronary Artery Disease No family hx of      Colon Cancer No family hx of      Retinal detachment No family hx of        Objective  BP (!) 147/91   Ht 1.694 m (5' 6.7\")   Wt 86.2 kg (190 lb)   BMI 30.03 kg/m      GENERAL APPEARANCE: healthy, alert and no distress   GAIT: NORMAL  SKIN: no suspicious lesions or rashes  HEENT: Sclera clear, anicteric  CV: good peripheral pulses  RESP: Breathing not labored  NEURO: Normal strength and tone, mentation intact and speech normal  PSYCH:  mentation appears normal and affect normal/bright    Right Foot and Ankle Exam:    Inspection:       no visible ecchymosis       no visible effusion, mild edema at the first MTP joint    Foot inspection:       no deformity noted    Tender:       Great toe MTP joint     Non-Tender:       remainder of ankle and foot right    ROM:        Decreased great toe dorsiflexion and plantarflexion    Strength:       ankle dorsiflexion 5/5 right       plantarflexion 5/5 right       inversion 5/5 right       eversion 5/5 right       great toe dorsiflexion 5/5 right       great toe plantarflexion 5/5 right    Gait:       normal    Neurovascular:       2+ peripheral pulses bilaterally and brisk capillary refill       sensation grossly intact    Radiology  I visualized and reviewed these images with the patient  Xr Foot Right G/e 3 Views  Result Date: 9/28/2020  Examination:  XR FOOT RT G/E 3 VW Date:  9/28/2020 1:53 PM Clinical Information: Acute foot pain, right Additional Information: none Comparison: none   Impression: Right foot negative for fracture, erosion, or bone lesion. Moderate joint space narrowing at the first MTP joint with moderate hypertrophic spurring, particularly on the dorsal aspect of the metatarsal head. Normal joint alignment is maintained. Normal joint alignment and spacing in the right foot elsewhere. No significant soft tissue abnormality in the right foot. HAMILTON" ISAIAH MARCANO MD    Small Joint Injection/Arthrocentesis: R great MTP    Date/Time: 7/26/2023 4:26 PM    Performed by: Danny Oleary DO  Authorized by: Danny Oleary DO  Indications:  Pain  Needle Size:  25 G  Guidance: ultrasound     Approach:  Dorsal  Location:  Great toe    Site:  R great MTP                    Medications:  40 mg triamcinolone 40 MG/ML; 1 mL lidocaine (PF) 1 %        Outcome:  Tolerated well, no immediate complications  Procedure discussed: discussed risks, benefits, and alternatives    Consent Given by:  Patient  Timeout: timeout called immediately prior to procedure    Prep: patient was prepped and draped in usual sterile fashion       Ultrasound images of procedure were permanently stored.         Assessment:  1. Great toe pain, right    2. 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 Bunion of great toe of right foot        Plan:  Reviewed goals/roles for inserts or Dynaflex, silicone toe spacers  Repeat US guided injection to 1st MTP joint today, reviewed goal and limitations  Reviewed low impact activity strategies and footwear options  XR images independently visualized and reviewed with patient today in clinic  Referral to foot/ankle surgeon available in the future prn for discussion of surgical options, especially with developing bunion  Expectations and goals of CSI reviewed  Often 2-3 days for steroid effect, and can take up to two weeks for maximum effect  We discussed modified progressive pain-free activity as tolerated  Do not overuse in first two weeks if feeling better due to concern for vulnerability while steroid is working  Supportive care reviewed  All questions were answered today  Contact us with additional questions or concerns  Signs and sx of concern reviewed      Danny Oleary DO, CAQ  Sports Medicine Physician  [facility]        Again, thank you for allowing me to participate in the care of your  patient.        Sincerely,        Danny Oleary, DO

## 2023-07-27 NOTE — RESULT ENCOUNTER NOTE
Adan,  Your sinus CT scan is basically normal and reassuring.  No sign of any persistent sinusitis infection or any condition that would contribute to your tinnitus, either.    Johnathon Diaz MD

## 2023-08-01 ENCOUNTER — LAB (OUTPATIENT)
Dept: LAB | Facility: CLINIC | Age: 63
End: 2023-08-01
Payer: COMMERCIAL

## 2023-08-01 DIAGNOSIS — R53.83 LETHARGY: ICD-10-CM

## 2023-08-01 DIAGNOSIS — Z78.9 ALCOHOL USE: ICD-10-CM

## 2023-08-01 DIAGNOSIS — E78.5 HYPERLIPIDEMIA WITH TARGET LDL LESS THAN 130: ICD-10-CM

## 2023-08-01 LAB
ALBUMIN SERPL BCG-MCNC: 5 G/DL (ref 3.5–5.2)
ALP SERPL-CCNC: 69 U/L (ref 40–129)
ALT SERPL W P-5'-P-CCNC: 23 U/L (ref 0–70)
ANION GAP SERPL CALCULATED.3IONS-SCNC: 12 MMOL/L (ref 7–15)
AST SERPL W P-5'-P-CCNC: 20 U/L (ref 0–45)
BILIRUB SERPL-MCNC: 0.9 MG/DL
BUN SERPL-MCNC: 23.4 MG/DL (ref 8–23)
CALCIUM SERPL-MCNC: 9.7 MG/DL (ref 8.8–10.2)
CHLORIDE SERPL-SCNC: 105 MMOL/L (ref 98–107)
CHOLEST SERPL-MCNC: 256 MG/DL
CREAT SERPL-MCNC: 1.33 MG/DL (ref 0.67–1.17)
DEPRECATED HCO3 PLAS-SCNC: 22 MMOL/L (ref 22–29)
ERYTHROCYTE [DISTWIDTH] IN BLOOD BY AUTOMATED COUNT: 13.3 % (ref 10–15)
GFR SERPL CREATININE-BSD FRML MDRD: 60 ML/MIN/1.73M2
GLUCOSE SERPL-MCNC: 89 MG/DL (ref 70–99)
HCT VFR BLD AUTO: 47.6 % (ref 40–53)
HDLC SERPL-MCNC: 62 MG/DL
HGB BLD-MCNC: 15.7 G/DL (ref 13.3–17.7)
LDLC SERPL CALC-MCNC: 171 MG/DL
MCH RBC QN AUTO: 30.6 PG (ref 26.5–33)
MCHC RBC AUTO-ENTMCNC: 33 G/DL (ref 31.5–36.5)
MCV RBC AUTO: 93 FL (ref 78–100)
NONHDLC SERPL-MCNC: 194 MG/DL
PLATELET # BLD AUTO: 444 10E3/UL (ref 150–450)
POTASSIUM SERPL-SCNC: 4.5 MMOL/L (ref 3.4–5.3)
PROT SERPL-MCNC: 7.9 G/DL (ref 6.4–8.3)
RBC # BLD AUTO: 5.13 10E6/UL (ref 4.4–5.9)
SODIUM SERPL-SCNC: 139 MMOL/L (ref 136–145)
TRIGL SERPL-MCNC: 117 MG/DL
TSH SERPL DL<=0.005 MIU/L-ACNC: 0.51 UIU/ML (ref 0.3–4.2)
WBC # BLD AUTO: 10.8 10E3/UL (ref 4–11)

## 2023-08-01 PROCEDURE — 80061 LIPID PANEL: CPT

## 2023-08-01 PROCEDURE — 85027 COMPLETE CBC AUTOMATED: CPT

## 2023-08-01 PROCEDURE — 84443 ASSAY THYROID STIM HORMONE: CPT

## 2023-08-01 PROCEDURE — 80053 COMPREHEN METABOLIC PANEL: CPT

## 2023-08-01 PROCEDURE — 36415 COLL VENOUS BLD VENIPUNCTURE: CPT

## 2023-08-03 ENCOUNTER — TELEPHONE (OUTPATIENT)
Dept: OTOLARYNGOLOGY | Facility: CLINIC | Age: 63
End: 2023-08-03
Payer: COMMERCIAL

## 2023-08-03 ENCOUNTER — MYC MEDICAL ADVICE (OUTPATIENT)
Dept: FAMILY MEDICINE | Facility: CLINIC | Age: 63
End: 2023-08-03
Payer: COMMERCIAL

## 2023-08-03 NOTE — TELEPHONE ENCOUNTER
Called pt on request of management- triaged  Ringing bi lat as far as he can tell  Consistent  Worse at certain times- depends on background noise   States pt had his ear looked in and they were clear  Denied and roaring or buzzing  He was watching TV went to close glass door he thought the crickets and flies were load upon shutting door they did not get quieter.  No recent load noises. 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    ZULEIKA Fay RN 8/3/2023 11:31 AM

## 2023-08-03 NOTE — RESULT ENCOUNTER NOTE
Adan,  Your cholesterol values remain elevated, although they are slightly improved from the last couple of checks.  Kidney tests are fairly stable.  Electrolytes are normal.  Liver tests are normal.  Blood counts look good.  Thyroid test is normal.  Nothing here that would account for any fatigue, but you would be a candidate for cholesterol-lowering medicine if you are open to that.    Johnathon Diaz MD

## 2023-08-04 ENCOUNTER — OFFICE VISIT (OUTPATIENT)
Dept: AUDIOLOGY | Facility: CLINIC | Age: 63
End: 2023-08-04
Payer: COMMERCIAL

## 2023-08-04 DIAGNOSIS — H90.3 SENSORINEURAL HEARING LOSS, BILATERAL: Primary | ICD-10-CM

## 2023-08-04 DIAGNOSIS — H93.13 TINNITUS, BILATERAL: ICD-10-CM

## 2023-08-04 PROCEDURE — 92550 TYMPANOMETRY & REFLEX THRESH: CPT | Performed by: AUDIOLOGIST

## 2023-08-04 PROCEDURE — 92557 COMPREHENSIVE HEARING TEST: CPT | Performed by: AUDIOLOGIST

## 2023-08-04 NOTE — TELEPHONE ENCOUNTER
"Please see patient mychart message related to blood work results 8/1/23:    \"Adan,  Your cholesterol values remain elevated, although they are slightly improved from the last couple of checks.  Kidney tests are fairly stable.  Electrolytes are normal.  Liver tests are normal.  Blood counts look good.  Thyroid test is normal.  Nothing here that would account for any fatigue, but you would be a candidate for cholesterol-lowering medicine if you are open to that.     Johnathon Diaz MD\"    Thanks,  ZULEIKA Davis RN  [facility]    "

## 2023-08-04 NOTE — PROGRESS NOTES
AUDIOLOGY REPORT    SUBJECTIVE:  Adan Oliver is a 63 year old male who was seen in the Audiology Clinic [facility] on 8/04/23 for audiologic evaluation, referred by self.  The patient reports the onset of bilateral tinnitus about a month ago. 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  The patient denies  bilateral otalgia, bilateral drainage, bilateral aural fullness, family history of hearing loss, and dizziness. Patient was unaccompanied to today's visit.     Abuse Screening:  Do you feel unsafe at home or work/school? No  Do you feel threatened by someone? No  Does anyone try to keep you from having contact with others, or doing things outside of your home? No  Physical signs of abuse present? No     Fall Risk Screen:  1. Have you fallen two or more times in the past year? No  2. Have you fallen and had an injury in the past year? No    OBJECTIVE:    Otoscopic exam indicates ears are clear of cerumen bilaterally     Pure Tone Thresholds assessed using standard techniques  audiometry with good  reliability from 250-8000 Hz bilaterally using insert earphones and circumaural headphones     RIGHT:  normal hearing sensitivity through 1500 Hz then a mild to moderate  sensorineural hearing loss    LEFT:    normal hearing sensitivity through 1500 Hz then a mild to moderate  sensorineural hearing loss   NOTE: Change in transducers did not merit a change in thresholds.     Tympanogram:    RIGHT: hyper compliant     LEFT:   normal eardrum mobility    Reflexes (reported by stimulus ear): 1000 Hz  RIGHT: Ipsilateral is too noisy   RIGHT: Contralateral is present at normal levels  LEFT:   Ipsilateral is present at normal levels  LEFT:   Contralateral is tow noisy     Speech Reception Threshold:    RIGHT: 20 dB HL    LEFT:   15 dB HL    Word Recognition Score:     RIGHT: 100% at 60 dB HL using NU-6 recorded word list.    LEFT:   100% at 60 dB HL using NU-6 recorded word  list.    ASSESSMENT:   Bilateral sensorineural hearing loss and tinnitus     Today s results were discussed with the patient in detail.     PLAN:  Patient was counseled regarding hearing loss and impact on communication.  Patient is a good candidate for amplification at this time. It is recommended that the patient follow up with ENT.  Please call this clinic with questions regarding these results or recommendations.    Hugo Rivas CCC-A  Licensed Audiologist #[id number]  8/4/2023    CC: Dr. Jiménez

## 2023-08-07 ENCOUNTER — MYC MEDICAL ADVICE (OUTPATIENT)
Dept: GASTROENTEROLOGY | Facility: CLINIC | Age: 63
End: 2023-08-07
Payer: COMMERCIAL

## 2023-08-07 ENCOUNTER — TELEPHONE (OUTPATIENT)
Dept: GASTROENTEROLOGY | Facility: CLINIC | Age: 63
End: 2023-08-07
Payer: COMMERCIAL

## 2023-08-07 NOTE — TELEPHONE ENCOUNTER
Spoke with patient.   Patient states that people have told him that his voice has changed/sounds different.   Has trouble getting a full breath in, feels like there could be a blockage in his throat. Sxs have increased over the past couple months, especially with laying down. Also has complaints discomfort to the right of the sternum, bottom of the rib cage, feels like a hernia - painful knot. Patient denies being referred to general surgery. Patient was told by PCP he should contact GI.  Patient last seen for EGD 7/2022; last seen in clinic 2019.   Patient takes Nexium 40mg daily.   Patient states that he has been upset with the [employer] system- that he has made a couple complaints in the last couple months and that he been on the phone all morning (writer just received this call an hour ago).   Informed patient that summary of conversation will be sent to provider and colleague re: next steps (office visit or procedure?).          Breann Medrano RN

## 2023-08-07 NOTE — TELEPHONE ENCOUNTER
[facility] Call Center    Phone Message    May a detailed message be left on voicemail: yes     Reason for Call: Symptoms or Concerns     If patient has red-flag symptoms, warm transfer to triage line    Current symptom or concern: Changes in voice, build in ribcage possible hernia, feeling of a blockage in throat      Symptoms have been present for:  1 week(s)    Has patient previously been seen for this? Yes    By : Dr. Banuelos     Date: 08/07    Are there any new or worsening symptoms? Yes: Changes in voice, build in ribcage possible hernia, feeling of a blockage in throat      Action Taken: Message routed to:  [facility] GI    Travel Screening: Not Applicable

## 2023-08-07 NOTE — TELEPHONE ENCOUNTER
Called patient to set up ENT appt. No answer. Left message for patient to call back.     Keke WHITAKER RN, [facility] 08/07/23 9:52 AM

## 2023-08-08 ENCOUNTER — TELEPHONE (OUTPATIENT)
Dept: GASTROENTEROLOGY | Facility: CLINIC | Age: 63
End: 2023-08-08
Payer: COMMERCIAL

## 2023-08-08 NOTE — TELEPHONE ENCOUNTER
See alternative telephone encounter started today (8/8/23). Closing encounter.    Jeannette Osman LPN

## 2023-08-08 NOTE — TELEPHONE ENCOUNTER
[facility] Call [facility]    Phone Message    May a detailed message be left on voicemail: yes     Reason for Call: Other: Pt called back returning Jeannette's call. He stated he will take the appt tomorrow with Breann Lu. Writer unable to schedule for that specific time date, Please call pt to confirm. Sent high priority since appt is tomorrow. Thank you     Action Taken: Message routed to:  Adult Clinics: Gastroenterology (GI) p [id number]    Travel Screening: Not Applicable

## 2023-08-08 NOTE — TELEPHONE ENCOUNTER
Breann Lu, Breann Ball, ALVINA; Filiberto Banuelos MD  Caller: Unspecified (Yesterday,  2:05 PM)  A repeat EGD maybe indicated although I think it would be prudent to have him seen in clinic first to further discuss symptoms and appropriate work-up. He could also further discuss with his PCP.    27 Perryt message sent to patient with above message and clinic navigator will contact to schedule.    Breann Medrano RN

## 2023-08-08 NOTE — TELEPHONE ENCOUNTER
Received new TE that patient returned call. LPN returned call to patient and confirmed the appointment with Dr. Lu on 8/9/23 at 2 pm. Patient was thankful for the call back.     Jazmyn Wyatt33 minutes ago (10:59 AM)       [facility]     Phone Message     May a detailed message be left on voicemail: yes      Reason for Call: Other: Pt called back returning Jeannette's call. He stated he will take the appt tomorrow with Breann Lu. Writer unable to schedule for that specific time date, Please call pt to confirm. Sent high priority since appt is tomorrow. Thank you      Action Taken: Message routed to:  Adult Clinics: Gastroenterology (GI) p [id number]     Travel Screening: Not Applicable          Jeannette Osman LPN

## 2023-08-08 NOTE — TELEPHONE ENCOUNTER
LPN left message for patient requesting a return call to schedule. Appointment on hold with Dr. Lu on 8/9/23 at 2 pm.    Jeannette Osman LPN

## 2023-08-09 ENCOUNTER — OFFICE VISIT (OUTPATIENT)
Dept: GASTROENTEROLOGY | Facility: CLINIC | Age: 63
End: 2023-08-09
Payer: COMMERCIAL

## 2023-08-09 ENCOUNTER — TELEPHONE (OUTPATIENT)
Dept: OTOLARYNGOLOGY | Facility: CLINIC | Age: 63
End: 2023-08-09

## 2023-08-09 VITALS
BODY MASS INDEX: 28.8 KG/M2 | DIASTOLIC BLOOD PRESSURE: 87 MMHG | SYSTOLIC BLOOD PRESSURE: 132 MMHG | HEIGHT: 67 IN | HEART RATE: 85 BPM | OXYGEN SATURATION: 97 % | WEIGHT: 183.5 LBS

## 2023-08-09 DIAGNOSIS — J02.9 SORE THROAT: ICD-10-CM

## 2023-08-09 DIAGNOSIS — R10.11 RUQ ABDOMINAL PAIN: Primary | ICD-10-CM

## 2023-08-09 DIAGNOSIS — K22.70 BARRETT'S ESOPHAGUS WITHOUT DYSPLASIA: ICD-10-CM

## 2023-08-09 DIAGNOSIS — K21.9 GASTROESOPHAGEAL REFLUX DISEASE WITHOUT ESOPHAGITIS: Chronic | ICD-10-CM

## 2023-08-09 DIAGNOSIS — R49.9 CHANGE IN VOICE: ICD-10-CM

## 2023-08-09 PROCEDURE — 99204 OFFICE O/P NEW MOD 45 MIN: CPT | Performed by: INTERNAL MEDICINE

## 2023-08-09 RX ORDER — ROSUVASTATIN CALCIUM 10 MG/1
TABLET, COATED ORAL
COMMUNITY
Start: 2023-08-01 | End: 2023-08-22

## 2023-08-09 RX ORDER — ACETAMINOPHEN 325 MG/1
325-650 TABLET ORAL EVERY 6 HOURS PRN
COMMUNITY
End: 2024-08-21

## 2023-08-09 ASSESSMENT — PAIN SCALES - GENERAL: PAINLEVEL: NO PAIN (0)

## 2023-08-09 NOTE — PATIENT INSTRUCTIONS
Please have an ultrasound done.  Please see the ENT  Please start a fiber supplement to help with constipation while you are on a low carb diet

## 2023-08-09 NOTE — TELEPHONE ENCOUNTER
Please see telephone encounter from 8/3/23. Closing current encounter.     Melissa Beltran RN   [facility]

## 2023-08-09 NOTE — NURSING NOTE
"Chief Complaint   Patient presents with    Follow Up     Follow up for Hx of Reid esophagus, trouble breathing, complaints discomfort to the right of the sternum, bottom of the rib cage, feels like a hernia.     He requests these members of his care team be copied on today's visit information:  PCP: Johnathon Diaz    Vitals:    08/09/23 1354   BP: 132/87   BP Location: Left arm   Patient Position: Sitting   Cuff Size: Adult Regular   Pulse: 85   SpO2: 97%   Weight: 83.2 kg (183 lb 8 oz)   Height: 1.694 m (5' 6.7\")     Body mass index is 29 kg/m .    Medications were reconciled.    Does patient need any medication refills at today's visit? Not sure        Ivy Weston CMA    "

## 2023-08-09 NOTE — LETTER
"    8/9/2023         RE: Adan Oliver  [address]  [place] [address]        Dear Colleague,    Thank you for referring your patient, Adan Oliver, to the [facility]. Please see a copy of my visit note below.    GI CLINIC VISIT    CC/REFERRING MD:  No ref. provider found  REASON FOR CONSULTATION:   No ref. provider found for   Chief Complaint   Patient presents with     Follow Up     Follow up for Hx of Reid esophagus, trouble breathing, complaints discomfort to the right of the sternum, bottom of the rib cage, feels like a hernia.         HPI    Adan presents today to discuss multiple symptoms. Main concerns are right upper quadrant discomfort, sore throat and voice changes.  Also complaining of shortness of breath.  All of these have been going on for some time.  Has noticed his voice sounds more hoarse lately - reports in the past he had something cauterized in the back of the throat for these symptoms.  Was referred to ENT but cannot get in for a few months.    Has a history of Reid's esophagus.  Has been on nexium 40 mg daily - very rare breakthrough symptoms.  Did notice more when he tried cutting the 40 mg down to 20mg.  Will have symptoms occasionally if he eats late at night. No nausea or vomiting.  Currently dieting - low carb diet. No dysphagia - has struggled with that in the past but that was years ago.  Occasionally when he overeats he will feel like things get caught on a \"shelf\" in the back of his throat.     Does occasionally have a bulge on in the right upper quadrant - happens primarily when bearing down to have a BM.  Occurs right under the rib cage.  Unable to reproduce it.  Very painful when it happens.  Is wondering if it could be related to his hiatal hernia.     Doesn't normally have problems with constipation although since being on the atkins diet has had some more hard stools/straining    Shortness of breath has been going on for a " few years - present all the time. Feels like he can't take a deep breath after yawning. Feels like breathing is shallow - sometimes feels like he needs to remind himself to breathe deeply.  Has discussed this with his PCP.      ROS:    No fevers or chills  No weight loss  No blurry vision, double vision or change in vision  No sore throat  No lymphadenopathy  No headache, paraesthesias, or weakness in a limb  No shortness of breath or wheezing  No chest pain or pressure  No arthralgias or myalgias  No rashes or skin changes  No odynophagia or dysphagia  No BRBPR, hematochezia, melena  No dysuria, frequency or urgency  No hot/cold intolerance or polyria  No anxiety or depression    PROBLEM LIST  Patient Active Problem List    Diagnosis Date Noted     Benign prostatic hyperplasia with urinary frequency 06/09/2022     Priority: Medium     Hip pain, bilateral 11/19/2021     Priority: Medium     LUCIAN (obstructive sleep apnea)- 'moderate' (AHI 6) 10/06/2017     Priority: Medium     Study Date: 10/2/2017- (200.0 lbs) Scored using 3% rules. It was difficult to discern between PLMS and hyponeas. Apnea/hypopnea index was 28.0 events per hour.  The REM AHI was 29.3 events per hour.  The supine AHI was 32.7 events per hour.  RDI was 28.0 events per hour. Lowest oxygen saturation was 84.0%.  Time spent less than or equal to 88% was 0.3 minutes. PLM index was 32.3 movements per hour.  The PLM Arousal Index was 15.8 per hour.       Alcohol use 09/26/2016     Priority: Medium     Lumbar degenerative disc disease 05/17/2016     Priority: Medium     Protrusion of lumbar intervertebral disc 05/17/2016     Priority: Medium     Lumbar spinal stenosis 05/17/2016     Priority: Medium     Anxiety 12/07/2015     Priority: Medium     Family history of Alzheimer's disease 10/01/2015     Priority: Medium     Insomnia, psychophysiological 08/12/2015     Priority: Medium     Memory changes 11/24/2014     Priority: Medium     Reid esophagus  10/30/2014     Priority: Medium     Esophageal reflux 10/01/2014     Priority: Medium     Hyperlipidemia with target LDL less than 130 10/01/2014     Priority: Medium       PERTINENT PAST MEDICAL HISTORY:  Past Medical History:   Diagnosis Date     Anxiety 12/07/2015     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 It was difficult to discern between PLMS and hyponeas. Apnea/hypopnea index was 28.0 events per hour.  The REM AHI was 29.3 events per hour.  The supine AHI was 32.7 events per hour.  RDI was 28.0 events per hour. Lowest oxygen saturation was 84.0%.  Time spent less than or equal to 88% was 0.3 minutes. PLM index was 32.3 movements per hour       PREVIOUS SURGERIES:  Past Surgical History:   Procedure Laterality Date     APPENDECTOMY  2000s     BLEPHAROPLASTY BILATERAL Bilateral 10/3/2018    Procedure: BLEPHAROPLASTY BILATERAL;;  Surgeon: Dany Berrios MD;  Location: [facility]     CARPAL TUNNEL RELEASE RT/LT  1980s     COLONOSCOPY N/A 7/27/2022    Procedure: COLONOSCOPY, FLEXIBLE, WITH LESION REMOVAL USING SNARE;  Surgeon: Filiberto Banuelos MD;  Location: [facility]     COLONOSCOPY WITH CO2 INSUFFLATION N/A 7/27/2022    Procedure: COLONOSCOPY, WITH CO2 INSUFFLATION;  Surgeon: Filiberto Banuelos MD;  Location: [facility]     COMBINED ESOPHAGOSCOPY, GASTROSCOPY, DUODENOSCOPY (EGD) WITH CO2 INSUFFLATION N/A 9/20/2019    Procedure: ESOPHAGOGASTRODUODENOSCOPY, WITH CO2 INSUFFLATION;  Surgeon: Filiberto Banuelos MD;  Location: [facility]     COMBINED ESOPHAGOSCOPY, GASTROSCOPY, DUODENOSCOPY (EGD) WITH CO2 INSUFFLATION N/A 7/27/2022    Procedure: ESOPHAGOGASTRODUODENOSCOPY, WITH CO2 INSUFFLATION;   Surgeon: Filiberto Banuelos MD;  Location: [facility]     ESOPHAGOSCOPY, GASTROSCOPY, DUODENOSCOPY (EGD), COMBINED N/A 9/20/2019    Procedure: Esophagogastroduodenoscopy, With Biopsy;  Surgeon: Filiberto Banuelos MD;  Location: [facility]     ESOPHAGOSCOPY, GASTROSCOPY, DUODENOSCOPY (EGD), COMBINED N/A 7/27/2022    Procedure: ESOPHAGOGASTRODUODENOSCOPY, WITH BIOPSY;  Surgeon: Filiberto Banuelos MD;  Location: [facility]     REPAIR PTOSIS BILATERAL Bilateral 10/3/2018    Procedure: REPAIR PTOSIS BILATERAL;;  Surgeon: Dany Berrios MD;  Location: [facility]     REPAIR PTOSIS BROW BILATERAL Bilateral 10/3/2018    Procedure: REPAIR PTOSIS BROW BILATERAL;;  Surgeon: Dany Berrios MD;  Location: [facility]     SIGMOIDOSCOPY FLEXIBLE N/A 9/20/2019    Procedure: SIGMOIDOSCOPY, FLEXIBLE;  Surgeon: Filiberto Banuelos MD;  Location: [facility]     TONSILLECTOMY  1980s       PREVIOUS ENDOSCOPY:  EGD 2022 - non dysplastic barretts, hiatal hernia  C-scope 2022 - polyps - repeat 3 years    ALLERGIES:   No Known Allergies    PERTINENT MEDICATIONS:    Current Outpatient Medications:      acetaminophen (TYLENOL) 325 MG tablet, Take 325-650 mg by mouth every 6 hours as needed for mild pain, Disp: , Rfl:      esomeprazole (NEXIUM) 40 MG DR capsule, TAKE ONE CAPSULE BY MOUTH EVERY MORNING 30-60 MINUTES BEFORE BREAKFAST, Disp: 90 capsule, Rfl: 3     rosuvastatin (CRESTOR) 10 MG tablet, , Disp: , Rfl:      ibuprofen (ADVIL/MOTRIN) 200 MG capsule, Take 200 mg by mouth every 4 hours as needed for fever (Patient not taking: Reported on 7/24/2023), Disp: , Rfl:     Current Facility-Administered Medications:      lidocaine (PF) (XYLOCAINE) 1 % injection 1 mL, 1 mL, , , Danny Oleray DO, 1 mL at 07/26/23 1626     ropivacaine (NAROPIN) injection 3 mL, 3 mL, , , Danny Oleray DO, 3 mL at 02/01/23 1400     triamcinolone (KENALOG-40) injection 40 mg, 40 mg, , , Danny Oleary DO, 40 mg at 07/26/23  "1626     triamcinolone (KENALOG-40) injection 40 mg, 40 mg, , , Danny Oleary, , 40 mg at 02/01/23 1400    SOCIAL HISTORY:  Social History     Socioeconomic History     Marital status: Single     Spouse name: Not on file     Number of children: Not on file     Years of education: Not on file     Highest education level: Not on file   Occupational History     Occupation: /advertising design   Tobacco Use     Smoking status: Never     Smokeless tobacco: Never   Vaping Use     Vaping Use: Never used   Substance and Sexual Activity     Alcohol use: Yes     Alcohol/week: 14.0 - 21.0 standard drinks of alcohol     Types: 14 - 21 Standard drinks or equivalent per week     Comment: varies, a couple drinks a day     Drug use: No     Sexual activity: Not on file   Other Topics Concern     Parent/sibling w/ CABG, MI or angioplasty before 65F 55M? No   Social History Narrative     Not on file     Social Determinants of Health     Financial Resource Strain: Not on file   Food Insecurity: Not on file   Transportation Needs: Not on file   Physical Activity: Not on file   Stress: Not on file   Social Connections: Not on file   Intimate Partner Violence: Not on file   Housing Stability: Not on file       FAMILY HISTORY:  Family History   Problem Relation Age of Onset     Hypertension Mother      Alzheimer Disease Mother      Diabetes Brother      Glaucoma No family hx of      Macular Degeneration No family hx of      Coronary Artery Disease No family hx of      Colon Cancer No family hx of      Retinal detachment No family hx of        Past/family/social history reviewed and no changes    PHYSICAL EXAMINATION:  Constitutional: aaox3, cooperative, pleasant, not dyspneic/diaphoretic, no acute distress  Vitals reviewed: /87 (BP Location: Left arm, Patient Position: Sitting, Cuff Size: Adult Regular)   Pulse 85   Ht 1.694 m (5' 6.7\")   Wt 83.2 kg (183 lb 8 oz)   SpO2 97%   BMI 29.00 kg/m    Wt:   Wt " Readings from Last 2 Encounters:   08/09/23 83.2 kg (183 lb 8 oz)   07/26/23 86.2 kg (190 lb)      Eyes: Sclera anicteric/injected  Ears/nose/mouth/throat: Normal oropharynx without ulcers or exudate, mucus membranes moist, hearing intact  Neck: supple, thyroid normal size  CV: No edema  Respiratory: Unlabored breathing  Lymph: No axillary, submandibular, supraclavicular or inguinal lymphadenopathy  Abd: soft, Nondistended,  no hepatosplenomegaly, nontender, no peritoneal signs  Skin: warm, perfused, no jaundice  Psych: Normal affect  MSK: Normal gait      PERTINENT STUDIES:  Most recent CBC:  Recent Labs   Lab Test 08/01/23  1317 04/08/21  1155   WBC 10.8 10.2   HGB 15.7 14.6   HCT 47.6 42.9    434     Most recent hepatic panel:  Recent Labs   Lab Test 08/01/23  1317 11/04/21  1155   ALT 23 20   AST 20 7     Most recent creatinine:  Recent Labs   Lab Test 08/01/23  1317 11/04/21  1155   CR 1.33* 1.27*       ASSESSMENT/PLAN:    # voice changes - unclear if related to reflux or not - has appointment with ENT which will be helpful - did discuss trial of BID dosing of PPI in the event that this is LPR but would like to be evaluated by ENT first which is reasonable.  If ENT eval unremarkable, could consider BRAVO on therapy to see if having breakthrough reflux    # RUQ discomfort - no obvious abdominal wall defect on my exam today although asymptomatic.  ?if could be in part related to constipation as only occurs when straining.  Will get abdominal US.  Will also start fiber supplements    # shortness of breath - doesn't seem to be related to GI pathology - defer to primary care    # history of Barretts esophagus, hiatal hernia - no alarm symptoms - will plan for repeat EGD as previously recommended in 2025.  Did discuss minimizing risk factors for esophageal cancer - doesn't smoke, is working on weight loss.  Did advise that he cut back on alcohol as he does have a few cocktails daily.    # history of colon  polyps - repeat c-scope 2025    RTC as needed      Again, thank you for allowing me to participate in the care of your patient.        Sincerely,        Breann Lu, DO

## 2023-08-09 NOTE — PROGRESS NOTES
"GI CLINIC VISIT    CC/REFERRING MD:  No ref. provider found  REASON FOR CONSULTATION:   No ref. provider found for   Chief Complaint   Patient presents with    Follow Up     Follow up for Hx of Reid esophagus, trouble breathing, complaints discomfort to the right of the sternum, bottom of the rib cage, feels like a hernia.         HPI    Adan presents today to discuss multiple symptoms. Main concerns are right upper quadrant discomfort, sore throat and voice changes.  Also complaining of shortness of breath.  All of these have been going on for some time.  Has noticed his voice sounds more hoarse lately - reports in the past he had something cauterized in the back of the throat for these symptoms.  Was referred to ENT but cannot get in for a few months.    Has a history of Reid's esophagus.  Has been on nexium 40 mg daily - very rare breakthrough symptoms.  Did notice more when he tried cutting the 40 mg down to 20mg.  Will have symptoms occasionally if he eats late at night. No nausea or vomiting.  Currently dieting - low carb diet. No dysphagia - has struggled with that in the past but that was years ago.  Occasionally when he overeats he will feel like things get caught on a \"shelf\" in the back of his throat.     Does occasionally have a bulge on in the right upper quadrant - happens primarily when bearing down to have a BM.  Occurs right under the rib cage.  Unable to reproduce it.  Very painful when it happens.  Is wondering if it could be related to his hiatal hernia.     Doesn't normally have problems with constipation although since being on the atkins diet has had some more hard stools/straining    Shortness of breath has been going on for a few years - present all the time. Feels like he can't take a deep breath after yawning. Feels like breathing is shallow - sometimes feels like he needs to remind himself to breathe deeply.  Has discussed this with his PCP.      ROS:    No fevers or chills  No " weight loss  No blurry vision, double vision or change in vision  No sore throat  No lymphadenopathy  No headache, paraesthesias, or weakness in a limb  No shortness of breath or wheezing  No chest pain or pressure  No arthralgias or myalgias  No rashes or skin changes  No odynophagia or dysphagia  No BRBPR, hematochezia, melena  No dysuria, frequency or urgency  No hot/cold intolerance or polyria  No anxiety or depression    PROBLEM LIST  Patient Active Problem List    Diagnosis Date Noted    Benign prostatic hyperplasia with urinary frequency 06/09/2022     Priority: Medium    Hip pain, bilateral 11/19/2021     Priority: Medium    LUCIAN (obstructive sleep apnea)- 'moderate' (AHI 6) 10/06/2017     Priority: Medium     Study Date: 10/2/2017- (200.0 lbs) Scored using 3% rules. It was difficult to discern between PLMS and hyponeas. Apnea/hypopnea index was 28.0 events per hour.  The REM AHI was 29.3 events per hour.  The supine AHI was 32.7 events per hour.  RDI was 28.0 events per hour. Lowest oxygen saturation was 84.0%.  Time spent less than or equal to 88% was 0.3 minutes. PLM index was 32.3 movements per hour.  The PLM Arousal Index was 15.8 per hour.      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     Priority: Medium    Esophageal reflux 10/01/2014     Priority: Medium    Hyperlipidemia with target LDL less than 130 10/01/2014     Priority: Medium       PERTINENT PAST MEDICAL HISTORY:  Past Medical History:   Diagnosis Date    Anxiety 12/07/2015    Reid  esophagus 10/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 It was difficult to discern between PLMS and hyponeas. Apnea/hypopnea index was 28.0 events per hour.  The REM AHI was 29.3 events per hour.  The supine AHI was 32.7 events per hour.  RDI was 28.0 events per hour. Lowest oxygen saturation was 84.0%.  Time spent less than or equal to 88% was 0.3 minutes. PLM index was 32.3 movements per hour       PREVIOUS SURGERIES:  Past Surgical History:   Procedure Laterality Date    APPENDECTOMY  2000s    BLEPHAROPLASTY BILATERAL Bilateral 10/3/2018    Procedure: BLEPHAROPLASTY BILATERAL;;  Surgeon: Dany Berrios MD;  Location: [facility]    CARPAL TUNNEL RELEASE RT/LT  1980s    COLONOSCOPY N/A 7/27/2022    Procedure: COLONOSCOPY, FLEXIBLE, WITH LESION REMOVAL USING SNARE;  Surgeon: Filiberto Banuelos MD;  Location: [facility]    COLONOSCOPY WITH CO2 INSUFFLATION N/A 7/27/2022    Procedure: COLONOSCOPY, WITH CO2 INSUFFLATION;  Surgeon: Filiberto Banuelos MD;  Location: [facility]    COMBINED ESOPHAGOSCOPY, GASTROSCOPY, DUODENOSCOPY (EGD) WITH CO2 INSUFFLATION N/A 9/20/2019    Procedure: ESOPHAGOGASTRODUODENOSCOPY, WITH CO2 INSUFFLATION;  Surgeon: Filiberto Banuelos MD;  Location[facility]    COMBINED ESOPHAGOSCOPY, GASTROSCOPY, DUODENOSCOPY (EGD) WITH CO2 INSUFFLATION N/A 7/27/2022    Procedure: ESOPHAGOGASTRODUODENOSCOPY, WITH CO2 INSUFFLATION;  Surgeon: Filiberto Baunelos MD;  Location: [facility]    ESOPHAGOSCOPY, GASTROSCOPY, DUODENOSCOPY (EGD), COMBINED N/A 9/20/2019    Procedure: Esophagogastroduodenoscopy, With Biopsy;  Surgeon: Filiberto Banuelos MD;  Location: [facility]    ESOPHAGOSCOPY, GASTROSCOPY, DUODENOSCOPY  (EGD), COMBINED N/A 7/27/2022    Procedure: ESOPHAGOGASTRODUODENOSCOPY, WITH BIOPSY;  Surgeon: Filiberto Banuelos MD;  Location: [facility]    REPAIR PTOSIS BILATERAL Bilateral 10/3/2018    Procedure: REPAIR PTOSIS BILATERAL;;  Surgeon: Dany Berrios MD;  [facility]    REPAIR PTOSIS BROW BILATERAL Bilateral 10/3/2018    Procedure: REPAIR PTOSIS BROW BILATERAL;;  Surgeon: Dany Berrios MD;  [facility]    SIGMOIDOSCOPY FLEXIBLE N/A 9/20/2019    Procedure: SIGMOIDOSCOPY, FLEXIBLE;  Surgeon: Filiberto Banuelos MD;  Location: [facility]    TONSILLECTOMY  1980s       PREVIOUS ENDOSCOPY:  EGD 2022 - non dysplastic barretts, hiatal hernia  C-scope 2022 - polyps - repeat 3 years    ALLERGIES:   No Known Allergies    PERTINENT MEDICATIONS:    Current Outpatient Medications:     acetaminophen (TYLENOL) 325 MG tablet, Take 325-650 mg by mouth every 6 hours as needed for mild pain, Disp: , Rfl:     esomeprazole (NEXIUM) 40 MG DR capsule, TAKE ONE CAPSULE BY MOUTH EVERY MORNING 30-60 MINUTES BEFORE BREAKFAST, Disp: 90 capsule, Rfl: 3    rosuvastatin (CRESTOR) 10 MG tablet, , Disp: , Rfl:     ibuprofen (ADVIL/MOTRIN) 200 MG capsule, Take 200 mg by mouth every 4 hours as needed for fever (Patient not taking: Reported on 7/24/2023), Disp: , Rfl:     Current Facility-Administered Medications:     lidocaine (PF) (XYLOCAINE) 1 % injection 1 mL, 1 mL, , , Danny Oleary DO, 1 mL at 07/26/23 1626    ropivacaine (NAROPIN) injection 3 mL, 3 mL, , , Danny Oleary DO, 3 mL at 02/01/23 1400    triamcinolone (KENALOG-40) injection 40 mg, 40 mg, , , Danny Oleary DO, 40 mg at 07/26/23 1626    triamcinolone (KENALOG-40) injection 40 mg, 40 mg, , , Danny Oleary DO, 40 mg at 02/01/23 1400    SOCIAL HISTORY:  Social History     Socioeconomic History    Marital status: Single     Spouse name: Not on file    Number of children: Not on file    Years of education: Not on file     "Highest education level: Not on file   Occupational History    Occupation: /advertising design   Tobacco Use    Smoking status: Never    Smokeless tobacco: Never   Vaping Use    Vaping Use: Never used   Substance and Sexual Activity    Alcohol use: Yes     Alcohol/week: 14.0 - 21.0 standard drinks of alcohol     Types: 14 - 21 Standard drinks or equivalent per week     Comment: varies, a couple drinks a day    Drug use: No    Sexual activity: Not on file   Other Topics Concern    Parent/sibling w/ CABG, MI or angioplasty before 65F 55M? No   Social History Narrative    Not on file     Social Determinants of Health     Financial Resource Strain: Not on file   Food Insecurity: Not on file   Transportation Needs: Not on file   Physical Activity: Not on file   Stress: Not on file   Social Connections: Not on file   Intimate Partner Violence: Not on file   Housing Stability: Not on file       FAMILY HISTORY:  Family History   Problem Relation Age of Onset    Hypertension Mother     Alzheimer Disease Mother     Diabetes Brother     Glaucoma No family hx of     Macular Degeneration No family hx of     Coronary Artery Disease No family hx of     Colon Cancer No family hx of     Retinal detachment No family hx of        Past/family/social history reviewed and no changes    PHYSICAL EXAMINATION:  Constitutional: aaox3, cooperative, pleasant, not dyspneic/diaphoretic, no acute distress  Vitals reviewed: /87 (BP Location: Left arm, Patient Position: Sitting, Cuff Size: Adult Regular)   Pulse 85   Ht 1.694 m (5' 6.7\")   Wt 83.2 kg (183 lb 8 oz)   SpO2 97%   BMI 29.00 kg/m    Wt:   Wt Readings from Last 2 Encounters:   08/09/23 83.2 kg (183 lb 8 oz)   07/26/23 86.2 kg (190 lb)      Eyes: Sclera anicteric/injected  Ears/nose/mouth/throat: Normal oropharynx without ulcers or exudate, mucus membranes moist, hearing intact  Neck: supple, thyroid normal size  CV: No edema  Respiratory: Unlabored " breathing  Lymph: No axillary, submandibular, supraclavicular or inguinal lymphadenopathy  Abd: soft, Nondistended,  no hepatosplenomegaly, nontender, no peritoneal signs  Skin: warm, perfused, no jaundice  Psych: Normal affect  MSK: Normal gait      PERTINENT STUDIES:  Most recent CBC:  Recent Labs   Lab Test 08/01/23  1317 04/08/21  1155   WBC 10.8 10.2   HGB 15.7 14.6   HCT 47.6 42.9    434     Most recent hepatic panel:  Recent Labs   Lab Test 08/01/23  1317 11/04/21  1155   ALT 23 20   AST 20 7     Most recent creatinine:  Recent Labs   Lab Test 08/01/23  1317 11/04/21  1155   CR 1.33* 1.27*       ASSESSMENT/PLAN:    # voice changes - unclear if related to reflux or not - has appointment with ENT which will be helpful - did discuss trial of BID dosing of PPI in the event that this is LPR but would like to be evaluated by ENT first which is reasonable.  If ENT eval unremarkable, could consider BRAVO on therapy to see if having breakthrough reflux    # RUQ discomfort - no obvious abdominal wall defect on my exam today although asymptomatic.  ?if could be in part related to constipation as only occurs when straining.  Will get abdominal US.  Will also start fiber supplements    # shortness of breath - doesn't seem to be related to GI pathology - defer to primary care    # history of Barretts esophagus, hiatal hernia - no alarm symptoms - will plan for repeat EGD as previously recommended in 2025.  Did discuss minimizing risk factors for esophageal cancer - doesn't smoke, is working on weight loss.  Did advise that he cut back on alcohol as he does have a few cocktails daily.    # history of colon polyps - repeat c-scope 2025    RTC as needed

## 2023-08-09 NOTE — TELEPHONE ENCOUNTER
[facility]    Phone Message    May a detailed message be left on voicemail: yes     Reason for Call: Other: Pt received a call from Dr. Perales's care team and has been trying to call the direct number back all week and states he never gets anyone. He thinks it's regarding getting a sooner appt, but he isn't sure. Please reach back out to him to discuss. Thank you.      Action Taken: Message routed to:  Other: [facility]    Travel Screening: Not Applicable

## 2023-08-09 NOTE — TELEPHONE ENCOUNTER
"The following message was received from Dr. Jiménez:    \"Audiogram removed, and this appears to be age appropriate sensorineural hearing loss that is causing the tinnitus.  There is no medication, there is no surgery, there is no cure.  He has an appointment scheduled with Dr Perales in November, and should keep it for counseling on this.    Thank you  Ja Jiménez\"    Patient notified of provider's message as written. Patient verbalized frustration about not being seen sooner, but was ultimately understanding and has no further questions at this time.     Melissa Beltran RN   [facility]  "

## 2023-08-14 ENCOUNTER — THERAPY VISIT (OUTPATIENT)
Dept: OCCUPATIONAL THERAPY | Facility: CLINIC | Age: 63
End: 2023-08-14
Attending: FAMILY MEDICINE
Payer: COMMERCIAL

## 2023-08-14 DIAGNOSIS — R41.3 MEMORY DIFFICULTY: ICD-10-CM

## 2023-08-14 PROCEDURE — 96125 COGNITIVE TEST BY HC PRO: CPT | Mod: GO | Performed by: OCCUPATIONAL THERAPIST

## 2023-08-14 NOTE — PROGRESS NOTES
OCCUPATIONAL THERAPY EVALUATION  Type of Visit: Evaluation    See electronic medical record for Abuse and Falls Screening details.    Subjective : Pt reports having difficulty remembering what he needs to do the next day, this used to come easily. Now he makes a list and emails it to himself, but sometimes has to email 3 or 4 times because he has forgotten something. When taking a shower, he forgets what he washed and has to do it all over again. Also notices word finding difficulty when in conversations.       Presenting condition or subjective complaint: Memory issues. Not focusing. Confusion sometimes. Alzheimer's in family.  Date of onset: 07/24/23    Relevant medical history: Arthritis; Depression; Hearing problems; Heart problems; Migraines or headaches; Overweight; Pain at night or rest; Sleep disorder like apnea; Vision problems   Dates & types of surgery:  NA    Prior diagnostic imaging/testing results:     NA  Prior therapy history for the same diagnosis, illness or injury: No      Living Environment  Social support: Alone   Type of home: House   Stairs to enter the home: No       Ramp: No   Stairs inside the home: Yes 12 Is there a railing: Yes   Help at home: None  Equipment owned:   NA    Employment: Not Applicable    Hobbies/Interests:  photography    Patient goals for therapy: Function normally.    Pain assessment: NA     Objective     Cognitive Status Examination  Orientation: Oriented to person, place and time   Level of Consciousness: Alert  Follows Commands and Answers Questions: 100% of the time  Personal Safety and Judgement: Intact  Memory: Impaired for short term recall  Attention: No deficits identified  Organization/Problem Solving: No deficits identified  Executive Function: No deficits identified    FUNCTIONAL MOBILITY  Assistive Device(s): None  Ambulation: independent  Wheelchair: NA    BED MOBILITY: Independent    TRANSFERS: WNL, Independent    BATHING: WNL, Independent    UPPER BODY  DRESSING: WNL, Independent    LOWER BODY DRESSING: WNL, Independent    TOILETING: WNL, Independent    GROOMING: WNL, Independent    EATING/SELF FEEDING: WNL, Independent     ACTIVITY TOLERANCE: WNL    INSTRUMENTAL ACTIVITIES OF DAILY LIVING (IADL): Independent with all, no outside support  Meal Planning/Prep: Independent  Home/Financial Management: Independent  Communication/Computer Use: Independent  Community Mobility: Drives independently  Care of Others:- NA    MOCA: Scored 26/30, missing 3 points on short term memory recall.   Pt was given a  handout with tips to improve memory and strategies for memory compensation. He was advised to work on improving his memory now to keep his cognitive function high. Pt was open to the suggestions and stated that they made sense.       Cognitive Performance Test    SUMMARY OF TEST:    The Cognitive Performance Test (CPT) is a standardized performance-based assessment to measure working memory/executive function processing capacities that underlie functional performance. Subtasks include common basic and instrumental activities of daily living (ADL/IADL) which are rated based on the manner in which patients respond to task demands of varying complexity. The total CPT score describes a level of functioning that indicates how information is processed, implications for functional activities, potential safety risks and a recommended level of supervision or assist based on cognitive function. The highest total score on this test is in the range of 5.6 to 5.8.    DATE OF TESTIN23    RESULTS OF TESTING:                                                                                         CPT Subtest Results    MEDBOX:  SHOP/GLOVES:  PHONE:    WASH:   TRAVEL:  TOAST:    DRESS:    TOTAL CPT SCORE:  28/28     Average CPT Score  5.6/5.6    INTERPRETATION OF TEST RESULTS:    Based on the Cognitive Performance Test, this patient scored at CPT Level 6, which  is considered normal. He showed no deficits in any of the areas tested. According to this test, he seems safe and appropriate for independent living.      Summary of functional cognitive status:   His current functional cognitive status is within normal limits, per results of this test.  Also tested using MoCA, and scored 26/30, which is still in normal limits, but shows that he could improve his memory for higher functioning.     Factors affecting performance:  No additional problems noted    Recommendations:    Seems appropriate to continue independent living and current activities. Gave handout about improving memory and memory strategies and encouraged him to work on his short term memory, as noted with MoCA.                                                       TIME ADMINISTERING TEST: 60    TIME FOR INTERPRETATION AND PREPARATION OF REPORT: 30    TOTAL TIME: 90        Assessment & Plan   CLINICAL IMPRESSIONS  Medical Diagnosis: Memory difficulty    Treatment Diagnosis: Memory impairment    Impression/Assessment: Pt was given handout to work on memory at home.  No further OT needed at this time.     Clinical Decision Making (Complexity):  Assessment of Occupational Performance: 1-3 Performance Deficits  Occupational Performance Limitations: memory  Clinical Decision Making (Complexity): Low complexity    PLAN OF CARE  Treatment Interventions: No further OT needed at this time.    Long Term Goals NA     Frequency of Treatment: eval only  Duration of Treatment: eval only     Recommended Referrals to Other Professionals: NA  Education Assessment:  Pt was given handouts for improving memory. Seemed to understand and open to suggestions.     Risks and benefits of evaluation/treatment have been explained.   Patient/Family/caregiver agrees with Plan of Care.     Evaluation Time:         Signing Clinician: DARRON Kathleen      [facility]                                                                                    OUTPATIENT OCCUPATIONAL THERAPY

## 2023-08-15 ENCOUNTER — OFFICE VISIT (OUTPATIENT)
Dept: OPTOMETRY | Facility: CLINIC | Age: 63
End: 2023-08-15
Payer: COMMERCIAL

## 2023-08-15 DIAGNOSIS — H52.4 PRESBYOPIA: ICD-10-CM

## 2023-08-15 DIAGNOSIS — H52.223 REGULAR ASTIGMATISM OF BOTH EYES: ICD-10-CM

## 2023-08-15 DIAGNOSIS — G43.109 OCULAR MIGRAINE: ICD-10-CM

## 2023-08-15 DIAGNOSIS — Z01.01 ENCOUNTER FOR EXAMINATION OF EYES AND VISION WITH ABNORMAL FINDINGS: Primary | ICD-10-CM

## 2023-08-15 DIAGNOSIS — H52.13 MYOPIA OF BOTH EYES: ICD-10-CM

## 2023-08-15 DIAGNOSIS — H25.13 NUCLEAR AGE-RELATED CATARACT, BOTH EYES: ICD-10-CM

## 2023-08-15 PROCEDURE — 92015 DETERMINE REFRACTIVE STATE: CPT | Performed by: OPTOMETRIST

## 2023-08-15 PROCEDURE — 92014 COMPRE OPH EXAM EST PT 1/>: CPT | Performed by: OPTOMETRIST

## 2023-08-15 ASSESSMENT — REFRACTION_WEARINGRX
OD_SPHERE: +1.75
OD_AXIS: 113
OS_SPHERE: -1.00
SPECS_TYPE: SVL
OD_CYLINDER: SPHERE
OS_CYLINDER: SPHERE
SPECS_TYPE: OTC READERS
OS_SPHERE: +1.75
OD_SPHERE: -1.00
OD_CYLINDER: +0.75
OS_CYLINDER: +0.25
OS_AXIS: 052

## 2023-08-15 ASSESSMENT — VISUAL ACUITY
CORRECTION_TYPE: GLASSES
OD_SC+: -1
OD_CC: 20/25
OS_SC: 20/80+1
OD_SC: 20/20
OS_CC+: -1
OS_SC+: -1
OD_CC: 20/30-2
METHOD: SNELLEN - LINEAR
OS_CC: 20/20-2
OD_SC: 20/40-2
OS_CC: 20/20
OS_SC: 20/20
OD_CC+: -1

## 2023-08-15 ASSESSMENT — REFRACTION_MANIFEST
OS_ADD: +2.50
OD_SPHERE: -0.50
OS_AXIS: 034
OD_AXIS: 171
OS_SPHERE: -0.75
OD_CYLINDER: +0.75
OD_ADD: +2.50
OS_CYLINDER: +0.50

## 2023-08-15 ASSESSMENT — CUP TO DISC RATIO
OD_RATIO: 0.4
OS_RATIO: 0.4

## 2023-08-15 ASSESSMENT — CONF VISUAL FIELD
OS_INFERIOR_NASAL_RESTRICTION: 0
OS_SUPERIOR_NASAL_RESTRICTION: 0
OD_INFERIOR_NASAL_RESTRICTION: 0
OS_SUPERIOR_TEMPORAL_RESTRICTION: 0
METHOD: COUNTING FINGERS
OD_SUPERIOR_TEMPORAL_RESTRICTION: 0
OD_NORMAL: 1
OD_INFERIOR_TEMPORAL_RESTRICTION: 0
OS_INFERIOR_TEMPORAL_RESTRICTION: 0
OS_NORMAL: 1
OD_SUPERIOR_NASAL_RESTRICTION: 0

## 2023-08-15 ASSESSMENT — EXTERNAL EXAM - LEFT EYE: OS_EXAM: NORMAL

## 2023-08-15 ASSESSMENT — SLIT LAMP EXAM - LIDS
COMMENTS: NORMAL, S/P BLEPHAROPLASTY
COMMENTS: NORMAL, S/P BLEPHAROPLASTY

## 2023-08-15 ASSESSMENT — TONOMETRY
OS_IOP_MMHG: 11
IOP_METHOD: APPLANATION
OD_IOP_MMHG: 10

## 2023-08-15 NOTE — LETTER
"    8/15/2023         RE: Adan Oliver  [address]  [place] [address]        Dear Colleague,    Thank you for referring your patient, Adan Oliver, to the [facility]. Please see a copy of my visit note below.    Chief Complaint   Patient presents with     Annual Eye Exam     Ocular Migraine Follow Up      -Hx of ocular migraines - have been increasing lately - when he sits in front of the computer, he notices the start of one almost immediately      Last Eye Exam: 2022 - [facility] [place] - Online exam  Dilated Previously: Yes, side effects of dilation explained today    What are you currently using to see? +1.75 Readers (don't work well) and Glasses - SVL distance        Distance Vision Acuity: Noticed gradual change in both eyes - unsure if vision has changed or if the Rx wasn't accurate from online exam    Near Vision Acuity: Not satisfied - feels \"disoriented\" after wearing readers for a few mins     Eye Comfort: dry  Do you use eye drops? : Yes: Refresh few times per week   Occupation or Hobbies: Semi- retired     Kelly Gay  Optometry Assistant       Medical, surgical and family histories reviewed and updated 8/15/2023.       OBJECTIVE: See Ophthalmology exam    ASSESSMENT:    ICD-10-CM    1. Encounter for examination of eyes and vision with abnormal findings  Z01.01       2. Nuclear age-related cataract, both eyes  H25.13       3. Ocular migraine  G43.109       4. Myopia of both eyes  H52.13       5. Regular astigmatism of both eyes  H52.223       6. Presbyopia  H52.4           PLAN:     Patient Instructions   You have the start of mild cataracts.  You may notice some blurred vision or glare with night driving.  It is important that you wear good sunglasses to protect your eyes from the ultraviolet light from the sun.     Updated glasses prescription provided today.   Discussed option of bifocal, progressive, distance-only and reading-only prescription " glasses.   Allow 2 weeks to adapt to the new glasses.     Return for contact lens fitting, if desired.   Return in 1 year for a comprehensive eye exam, or sooner if needed.      The effects of the dilating drops last for 4- 6 hours.  You will be more sensitive to light and vision will be blurry up close.  Mydriatic sunglasses were given if needed.     Jair Garcia, NERISSA  [address]. [facility] [place]  [address]    [phone]         Again, thank you for allowing me to participate in the care of your patient.        Sincerely,        Jair Garcia OD

## 2023-08-15 NOTE — PATIENT INSTRUCTIONS
You have the start of mild cataracts.  You may notice some blurred vision or glare with night driving.  It is important that you wear good sunglasses to protect your eyes from the ultraviolet light from the sun.     Updated glasses prescription provided today.   Discussed option of bifocal, progressive, distance-only and reading-only prescription glasses.   Allow 2 weeks to adapt to the new glasses.     Return for contact lens fitting, if desired.   Return in 1 year for a comprehensive eye exam, or sooner if needed.      The effects of the dilating drops last for 4- 6 hours.  You will be more sensitive to light and vision will be blurry up close.  Mydriatic sunglasses were given if needed.     Jair Garcia, OD  [address] [place]  [id number] [phone]

## 2023-08-15 NOTE — PROGRESS NOTES
"Chief Complaint   Patient presents with    Annual Eye Exam    Ocular Migraine Follow Up      -Hx of ocular migraines - have been increasing lately - when he sits in front of the computer, he notices the start of one almost immediately      Last Eye Exam: 2022 - [facility] - Online exam  Dilated Previously: Yes, side effects of dilation explained today    What are you currently using to see? +1.75 Readers (don't work well) and Glasses - SVL distance        Distance Vision Acuity: Noticed gradual change in both eyes - unsure if vision has changed or if the Rx wasn't accurate from online exam    Near Vision Acuity: Not satisfied - feels \"disoriented\" after wearing readers for a few mins     Eye Comfort: dry  Do you use eye drops? : Yes: Refresh few times per week   Occupation or Hobbies: Semi- retired     Kelly Gay  Optometry Assistant       Medical, surgical and family histories reviewed and updated 8/15/2023.       OBJECTIVE: See Ophthalmology exam    ASSESSMENT:    ICD-10-CM    1. Encounter for examination of eyes and vision with abnormal findings  Z01.01       2. Nuclear age-related cataract, both eyes  H25.13       3. Ocular migraine  G43.109       4. Myopia of both eyes  H52.13       5. Regular astigmatism of both eyes  H52.223       6. Presbyopia  H52.4           PLAN:     Patient Instructions   You have the start of mild cataracts.  You may notice some blurred vision or glare with night driving.  It is important that you wear good sunglasses to protect your eyes from the ultraviolet light from the sun.     Updated glasses prescription provided today.   Discussed option of bifocal, progressive, distance-only and reading-only prescription glasses.   Allow 2 weeks to adapt to the new glasses.     Return for contact lens fitting, if desired.   Return in 1 year for a comprehensive eye exam, or sooner if needed.      The effects of the dilating drops last for 4- 6 hours.  You will be more sensitive to " light and vision will be blurry up close.  Mydriatic sunglasses were given if needed.     Jair Garcia, OD  [facility]  [address] [place] [address]. [place]  [place]  [address] [phone]

## 2023-08-22 DIAGNOSIS — E78.5 HYPERLIPIDEMIA WITH TARGET LDL LESS THAN 130: Primary | Chronic | ICD-10-CM

## 2023-08-23 RX ORDER — ROSUVASTATIN CALCIUM 10 MG/1
10 TABLET, COATED ORAL DAILY
Qty: 90 TABLET | Refills: 3 | Status: SHIPPED | OUTPATIENT
Start: 2023-08-23 | End: 2023-09-26

## 2023-08-25 ENCOUNTER — ANCILLARY PROCEDURE (OUTPATIENT)
Dept: ULTRASOUND IMAGING | Facility: CLINIC | Age: 63
End: 2023-08-25
Attending: INTERNAL MEDICINE
Payer: COMMERCIAL

## 2023-08-25 DIAGNOSIS — R10.11 RUQ ABDOMINAL PAIN: ICD-10-CM

## 2023-08-25 PROCEDURE — 76700 US EXAM ABDOM COMPLETE: CPT | Mod: TC | Performed by: RADIOLOGY

## 2023-09-18 ENCOUNTER — TELEPHONE (OUTPATIENT)
Dept: FAMILY MEDICINE | Facility: CLINIC | Age: 63
End: 2023-09-18
Payer: COMMERCIAL

## 2023-09-18 NOTE — TELEPHONE ENCOUNTER
Called patient and relayed message from Dr. Diaz. Patient verbalized understanding and had no further questions.     Elizabeth Carrasco RN   [facility]

## 2023-09-18 NOTE — TELEPHONE ENCOUNTER
Received call from patient. He reports needing Shingles vaccine; was told at his last OV that he could not get it due to getting steroid injections. He would like direction on timing of Shingrix / Prednisone. Writer informed him of 2-dose series with the second shot administered 2-6 mo after first shot. He verbalized understanding but would like Dr. Diaz's direction on timing of Prednisone in receiving this. He gets injections in his big toe and sometimes his hip.     Callback: [phone]     ZULEIKA Macias RN  [facility], [facility]

## 2023-09-18 NOTE — TELEPHONE ENCOUNTER
I think he could get the first shot anytime now.  I would try to avoid getting the vaccine within a month or so of a large joint injection such as the hip.  I toe injection probably would not be an issue area or barrier to having him get the Shingrix vaccine regardless of when that would be done (since that is such a small joint and typically would involve less steroid medication).    Johnathon Diaz MD

## 2023-09-25 NOTE — PROGRESS NOTES
[facility]   [facility]      Referring provider:Johnathon Diaz MD    HPI: Mr. Adan Oliver is a 62 year old  male with PMH significant for    -Obstructive sleep apnea  -Alcohol use  -Reid's esophagus    Patient reports feeling palpitations over the last 1-1/2-month.  So far it has happened only at night.  He has woken up 6 or 7 times over the last 1-1/2 months between 3 AM to 7 AM with chest pressure and palpitations.  He was actually able to capture the heart rate on blood pressure machine which showed 150 bpm (showed me pictures of that from his cell phone).  No EKG documentation so far.  The heart pounding sensation lasted for about 30 minutes.  Over the last 2 weeks he has not had any of these.  He tells me he was under extreme stress when he was having frequent nightly palpitations. Denies chest pain.  No shortness of breath with activity.  No dizziness, syncope or lower extremity edema.    No prior history of cardiac disease.    Lifetime non-smoker.  No hypertension or diabetes.  Family history is unremarkable.  He has hyperlipidemia with elevated LDL at 178 mg/deciliter.  He has been on Crestor 10 mg over the last 3 weeks. Patient tells me that he has been on low-carb keto diet to lose weight.    Patient had exercise his echocardiogram in 11/2020 which showed normal biventricular function with no valve disease.  Stress test was normal.  I have reviewed patient's EKG from 4/11/2022 which shows sinus rhythm otherwise unremarkable.  Labs from 2021 showed normal CBC and BMP.  Medications, personal, family, and social history reviewed with patient and revised.    Interval history 9/26/2023:  Patient is being seen today for multiple concerns that he has over the last few months.  He tells me that he feels his heart beating fast several times a day.  He has shown me his wrist monitor results where his heart rate goes up to 158 bpm at the time of palpitations.  Can last up to 10 to 15  minutes.  When his heart is racing fast he feels discomfort in his chest.  No syncope.  Reports some shortness of breath with exertion.  Can feel discomfort (describes as feels weak in the chest and fuzzy feeling) in his chest with or without exertion.  Over the last few years he has been taking ibuprofen 200 mg daily for back pain.  He does not use CPAP.  He drinks alcohol at least 2 cocktails every night.  He has been drinking alcohol all his life.  He tells me he has a lot of stress in his life.  He has trouble sleeping.  He is recommended CBD lozenges.  No history of tobacco use.    PAST MEDICAL HISTORY:  Past Medical History:   Diagnosis Date    Anxiety 12/07/2015    Reid esophagus 10/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 It was difficult to discern between PLMS and hyponeas. Apnea/hypopnea index was 28.0 events per hour.  The REM AHI was 29.3 events per hour.  The supine AHI was 32.7 events per hour.  RDI was 28.0 events per hour. Lowest oxygen saturation was 84.0%.  Time spent less than or equal to 88% was 0.3 minutes. PLM index was 32.3 movements per hour       CURRENT MEDICATIONS:  Current Outpatient Medications   Medication Sig Dispense Refill    acetaminophen (TYLENOL) 325 MG tablet Take 325-650 mg by mouth every 6 hours as needed for mild pain      esomeprazole (NEXIUM) 40 MG DR capsule TAKE ONE CAPSULE BY MOUTH EVERY MORNING 30-60 MINUTES BEFORE BREAKFAST 90 capsule 3    ibuprofen (ADVIL/MOTRIN) 200 MG capsule Take 200 mg by mouth every 4 hours as needed for fever (Patient not taking: Reported on 7/24/2023)      rosuvastatin (CRESTOR) 10 MG tablet Take 1 tablet (10 mg) by mouth daily 90 tablet 3       PAST SURGICAL  HISTORY:  Past Surgical History:   Procedure Laterality Date    APPENDECTOMY  2000s    BLEPHAROPLASTY BILATERAL Bilateral 10/3/2018    Procedure: BLEPHAROPLASTY BILATERAL;;  Surgeon: Dany Berrios MD;  Location: [facility]    CARPAL TUNNEL RELEASE RT/LT  1980s    COLONOSCOPY N/A 7/27/2022    Procedure: COLONOSCOPY, FLEXIBLE, WITH LESION REMOVAL USING SNARE;  Surgeon: Filiberto Banuelos MD;  Location: [facility]    COLONOSCOPY WITH CO2 INSUFFLATION N/A 7/27/2022    Procedure: COLONOSCOPY, WITH CO2 INSUFFLATION;  Surgeon: Filiberto Banuelos MD;  [facility]    COMBINED ESOPHAGOSCOPY, GASTROSCOPY, DUODENOSCOPY (EGD) WITH CO2 INSUFFLATION N/A 9/20/2019    Procedure: ESOPHAGOGASTRODUODENOSCOPY, WITH CO2 INSUFFLATION;  Surgeon: Filiberto Banuelos MD;  [facility]    COMBINED ESOPHAGOSCOPY, GASTROSCOPY, DUODENOSCOPY (EGD) WITH CO2 INSUFFLATION N/A 7/27/2022    Procedure: ESOPHAGOGASTRODUODENOSCOPY, WITH CO2 INSUFFLATION;  Surgeon: Filiberto Banuelos MD;  [facility]    ESOPHAGOSCOPY, GASTROSCOPY, DUODENOSCOPY (EGD), COMBINED N/A 9/20/2019    Procedure: Esophagogastroduodenoscopy, With Biopsy;  Surgeon: Filiberto Banuelos MD;  [facility]    ESOPHAGOSCOPY, GASTROSCOPY, DUODENOSCOPY (EGD), COMBINED N/A 7/27/2022    Procedure: ESOPHAGOGASTRODUODENOSCOPY, WITH BIOPSY;  Surgeon: Filiberto Banuelos MD;  [facility]    REPAIR PTOSIS BILATERAL Bilateral 10/3/2018    Procedure: REPAIR PTOSIS BILATERAL;;  Surgeon: Dany Berrios MD;  [facility]    REPAIR PTOSIS BROW BILATERAL Bilateral 10/3/2018    Procedure: REPAIR PTOSIS BROW BILATERAL;;  Surgeon: Dany Berrios MD;  [facility]    SIGMOIDOSCOPY FLEXIBLE N/A 9/20/2019    Procedure: SIGMOIDOSCOPY, FLEXIBLE;  Surgeon: Filiberto Banuelos MD;  [facility]    TONSILLECTOMY  1980s       ALLERGIES:   No Known Allergies    FAMILY HISTORY:  Family History   Problem Relation Age of Onset     Hypertension Mother     Alzheimer Disease Mother     Diabetes Brother     Glaucoma No family hx of     Macular Degeneration No family hx of     Coronary Artery Disease No family hx of     Colon Cancer No family hx of     Retinal detachment No family hx of          SOCIAL HISTORY:  Social History     Tobacco Use    Smoking status: Never    Smokeless tobacco: Never   Vaping Use    Vaping Use: Never used   Substance Use Topics    Alcohol use: Yes     Alcohol/week: 14.0 - 21.0 standard drinks of alcohol     Types: 14 - 21 Standard drinks or equivalent per week     Comment: varies, a couple drinks a day    Drug use: No       ROS:   Constitutional: No fever, chills, or sweats. Weight stable.   Cardiovascular: As per HPI.       Exam:  /77 (BP Location: Left arm, Patient Position: Chair, Cuff Size: Adult Large)   Pulse 72   Wt 83 kg (183 lb)   SpO2 98%   BMI 28.92 kg/m    GENERAL APPEARANCE: alert and no distress  HEENT: no icterus, no central cyanosis  LYMPH/NECK: no adenopathy, no asymmetry, JVP not elevated, no carotid bruits.  RESPIRATORY: lungs clear to auscultation - no rales, rhonchi or wheezes, no use of accessory muscles, no retractions, respirations are unlabored, normal respiratory rate  CARDIOVASCULAR: regular rhythm, normal S1, S2, no S3 or S4 and no murmur, click or rub, precordium quiet with normal PMI.  GI: soft, non tender  EXTREMITIES: no edema  NEURO: alert, normal speech,and affect  SKIN: no ecchymoses, no rashes     I have reviewed the labs and personally reviewed the imaging below and made my comment in the assessment and plan.    Labs:  CBC RESULTS:   Lab Results   Component Value Date    WBC 10.8 08/01/2023    WBC 10.2 04/08/2021    RBC 5.13 08/01/2023    RBC 4.62 04/08/2021    HGB 15.7 08/01/2023    HGB 14.6 04/08/2021    HCT 47.6 08/01/2023    HCT 42.9 04/08/2021    MCV 93 08/01/2023    MCV 93 04/08/2021    MCH 30.6 08/01/2023    MCH 31.6 04/08/2021    MCHC 33.0 08/01/2023    MCHC 34.0  04/08/2021    RDW 13.3 08/01/2023    RDW 13.9 04/08/2021     08/01/2023     04/08/2021       BMP RESULTS:  Lab Results   Component Value Date     08/01/2023     04/08/2021    POTASSIUM 4.5 08/01/2023    POTASSIUM 3.8 11/04/2021    POTASSIUM 4.2 04/08/2021    CHLORIDE 105 08/01/2023    CHLORIDE 105 11/04/2021    CHLORIDE 104 04/08/2021    CO2 22 08/01/2023    CO2 22 11/04/2021    CO2 23 04/08/2021    ANIONGAP 12 08/01/2023    ANIONGAP 10 11/04/2021    ANIONGAP 7 04/08/2021    GLC 89 08/01/2023    GLC 96 11/04/2021    GLC 85 04/08/2021    BUN 23.4 (H) 08/01/2023    BUN 22 11/04/2021    BUN 21 04/08/2021    CR 1.33 (H) 08/01/2023    CR 1.12 04/08/2021    GFRESTIMATED 60 (L) 08/01/2023    GFRESTIMATED 70 04/08/2021    GFRESTBLACK 81 04/08/2021    GALYA 9.7 08/01/2023    GAYLA 9.2 04/08/2021      Exercise stress echocardiogram 11/18/2020  Normal, low-risk exercise echocardiogram without evidence of ischemia at a  diagnostic workload (92% MPHR.)     The target heart rate was achieved. Normal heart rate and BP response to  exercise.  Normal biventricular size, thickness, and global systolic function at  baseline, LVEF=60-65%.  With exercise, LVEF increased to >70% and LV cavity size decreased  appropriately.  No regional wall motion abnormalities are present at rest or with exercise.  No angina was elicited.  No ECG evidence of ischemia. Occasional PVCs with exercise.  Functional capacity is normal for age.  No significant valvular abnormalities are noted on screening Doppler exam.  The aortic root and visualized ascending aorta are normal.    EKG 4/11/2022 shows sinus rhythm otherwise unremarkable.          Assessment and Plan:   Patient is being seen today for multiple concerns.    #Palpitations  -On and off over the last year or so.  Over the last few months almost every day several times.  Can last up to 10 to 15 minutes.  Heart rate can go up to 158 bpm without any significant exertion.  Sinus  rhythm in clinic today.  Recent lab test including CBC and TSH are normal.  -Recommended EKG monitor for 2 weeks    #Discomfort in the chest sometimes with exertion or palpitations  -Major risk factors are hyperlipidemia which is not well controlled, CKD, age and alcohol abuse  -Recommend exercise stress echocardiogram    #Hyperlipidemia  -He was on the Crestor 10 mg.  Off of it for a while but then started again few months ago.  LDL is still high at 171.  -Increase Crestor to 20 mg daily  -CT calcium screening    #CKD stage II  -Might be due to chronic ibuprofen use.  Recommend to avoid ibuprofen and use Tylenol instead.  -Recent abdominal ultrasound shows normal kidney size on both sides.  -Referral to nephrology    Return to clinic to be determined.  Follow-up as needed for now.    Total time spent today for this visit is 73 minutes including precharting, face-to-face clinic visit, review of labs/imaging and medical documentation.    Janice GRANT MD  [facility] Division of Cardiology  Pager [phone]

## 2023-09-26 ENCOUNTER — OFFICE VISIT (OUTPATIENT)
Dept: CARDIOLOGY | Facility: CLINIC | Age: 63
End: 2023-09-26
Payer: COMMERCIAL

## 2023-09-26 VITALS
WEIGHT: 183 LBS | SYSTOLIC BLOOD PRESSURE: 118 MMHG | HEART RATE: 72 BPM | DIASTOLIC BLOOD PRESSURE: 77 MMHG | OXYGEN SATURATION: 98 % | BODY MASS INDEX: 28.92 KG/M2

## 2023-09-26 DIAGNOSIS — E78.5 HYPERLIPIDEMIA WITH TARGET LDL LESS THAN 130: Chronic | ICD-10-CM

## 2023-09-26 DIAGNOSIS — E78.5 HYPERLIPIDEMIA LDL GOAL <100: ICD-10-CM

## 2023-09-26 DIAGNOSIS — N18.2 CKD (CHRONIC KIDNEY DISEASE) STAGE 2, GFR 60-89 ML/MIN: ICD-10-CM

## 2023-09-26 DIAGNOSIS — R00.2 PALPITATIONS: Primary | ICD-10-CM

## 2023-09-26 PROCEDURE — 99417 PROLNG OP E/M EACH 15 MIN: CPT | Performed by: INTERNAL MEDICINE

## 2023-09-26 PROCEDURE — 99215 OFFICE O/P EST HI 40 MIN: CPT | Performed by: INTERNAL MEDICINE

## 2023-09-26 PROCEDURE — 93228 REMOTE 30 DAY ECG REV/REPORT: CPT | Performed by: INTERNAL MEDICINE

## 2023-09-26 RX ORDER — ROSUVASTATIN CALCIUM 10 MG/1
20 TABLET, COATED ORAL DAILY
Qty: 90 TABLET | Refills: 3 | COMMUNITY
Start: 2023-09-26 | End: 2023-10-24

## 2023-09-26 NOTE — NURSING NOTE
"Chief Complaint   Patient presents with    RECHECK     Return General Cardiology for Palpitations. Patient reports heart racing, SOB, high heart rate, very low energy       Initial /77 (BP Location: Left arm, Patient Position: Chair, Cuff Size: Adult Large)   Pulse 72   Wt 83 kg (183 lb)   SpO2 98%   BMI 28.92 kg/m   Estimated body mass index is 28.92 kg/m  as calculated from the following:    Height as of 8/9/23: 1.694 m (5' 6.7\").    Weight as of this encounter: 83 kg (183 lb)..  BP completed using cuff size: FELICIANO Gonzales    "

## 2023-09-26 NOTE — LETTER
9/26/2023      RE: Adan Oliver  [address]  [place] [address]       Dear Colleague,    Thank you for the opportunity to participate in the care of your patient, Adan Oliver, at the [facility]. Please see a copy of my visit note below.     General Cardiology Clinic-[place]      Referring provider:Johnathon Diaz MD    HPI: Mr. Adan Oliver is a 62 year old  male with PMH significant for    -Obstructive sleep apnea  -Alcohol use  -Reid's esophagus    Patient reports feeling palpitations over the last 1-1/2-month.  So far it has happened only at night.  He has woken up 6 or 7 times over the last 1-1/2 months between 3 AM to 7 AM with chest pressure and palpitations.  He was actually able to capture the heart rate on blood pressure machine which showed 150 bpm (showed me pictures of that from his cell phone).  No EKG documentation so far.  The heart pounding sensation lasted for about 30 minutes.  Over the last 2 weeks he has not had any of these.  He tells me he was under extreme stress when he was having frequent nightly palpitations. Denies chest pain.  No shortness of breath with activity.  No dizziness, syncope or lower extremity edema.    No prior history of cardiac disease.    Lifetime non-smoker.  No hypertension or diabetes.  Family history is unremarkable.  He has hyperlipidemia with elevated LDL at 178 mg/deciliter.  He has been on Crestor 10 mg over the last 3 weeks. Patient tells me that he has been on low-carb keto diet to lose weight.    Patient had exercise his echocardiogram in 11/2020 which showed normal biventricular function with no valve disease.  Stress test was normal.  I have reviewed patient's EKG from 4/11/2022 which shows sinus rhythm otherwise unremarkable.  Labs from 2021 showed normal CBC and BMP.  Medications, personal, family, and social history reviewed with patient  and revised.    Interval history 9/26/2023:  Patient is being seen today for multiple concerns that he has over the last few months.  He tells me that he feels his heart beating fast several times a day.  He has shown me his wrist monitor results where his heart rate goes up to 158 bpm at the time of palpitations.  Can last up to 10 to 15 minutes.  When his heart is racing fast he feels discomfort in his chest.  No syncope.  Reports some shortness of breath with exertion.  Can feel discomfort (describes as feels weak in the chest and fuzzy feeling) in his chest with or without exertion.  Over the last few years he has been taking ibuprofen 200 mg daily for back pain.  He does not use CPAP.  He drinks alcohol at least 2 cocktails every night.  He has been drinking alcohol all his life.  He tells me he has a lot of stress in his life.  He has trouble sleeping.  He is recommended CBD lozenges.  No history of tobacco use.    PAST MEDICAL HISTORY:  Past Medical History:   Diagnosis Date    Anxiety 12/07/2015    Reid esophagus 10/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 It was difficult to discern between PLMS and hyponeas. Apnea/hypopnea index was 28.0 events per hour.  The REM AHI was 29.3 events per hour.  The supine AHI was 32.7 events per hour.  RDI was 28.0 events per hour. Lowest oxygen saturation was 84.0%.  Time spent less than or equal to 88% was 0.3 minutes. PLM index was 32.3 movements per hour       CURRENT MEDICATIONS:  Current Outpatient Medications   Medication Sig Dispense Refill    acetaminophen (TYLENOL) 325 MG tablet Take 325-650 mg by mouth every 6 hours as needed for mild pain      esomeprazole (NEXIUM) 40 MG DR capsule  TAKE ONE CAPSULE BY MOUTH EVERY MORNING 30-60 MINUTES BEFORE BREAKFAST 90 capsule 3    ibuprofen (ADVIL/MOTRIN) 200 MG capsule Take 200 mg by mouth every 4 hours as needed for fever (Patient not taking: Reported on 7/24/2023)      rosuvastatin (CRESTOR) 10 MG tablet Take 1 tablet (10 mg) by mouth daily 90 tablet 3       PAST SURGICAL HISTORY:  Past Surgical History:   Procedure Laterality Date    APPENDECTOMY  2000s    BLEPHAROPLASTY BILATERAL Bilateral 10/3/2018    Procedure: BLEPHAROPLASTY BILATERAL;;  Surgeon: Dany Berrios MD;  [facility]: [facility]    CARPAL TUNNEL RELEASE RT/LT  1980s    COLONOSCOPY N/A 7/27/2022    Procedure: COLONOSCOPY, FLEXIBLE, WITH LESION REMOVAL USING SNARE;  Surgeon: Filiberto Banuelos MD;  [facility]: [facility]    COLONOSCOPY WITH CO2 INSUFFLATION N/A 7/27/2022    Procedure: COLONOSCOPY, WITH CO2 INSUFFLATION;  Surgeon: Filiberto Banuelos MD;  [facility]    COMBINED ESOPHAGOSCOPY, GASTROSCOPY, DUODENOSCOPY (EGD) WITH CO2 INSUFFLATION N/A 9/20/2019    Procedure: ESOPHAGOGASTRODUODENOSCOPY, WITH CO2 INSUFFLATION;  Surgeon: Filiberto Banuelos MD;  [facility]    COMBINED ESOPHAGOSCOPY, GASTROSCOPY, DUODENOSCOPY (EGD) WITH CO2 INSUFFLATION N/A 7/27/2022    Procedure: ESOPHAGOGASTRODUODENOSCOPY, WITH CO2 INSUFFLATION;  Surgeon: Filiberto Banuelos MD;  [facility]    ESOPHAGOSCOPY, GASTROSCOPY, DUODENOSCOPY (EGD), COMBINED N/A 9/20/2019    Procedure: Esophagogastroduodenoscopy, With Biopsy;  Surgeon: Filiberto Banuelos MD;  [facility]    ESOPHAGOSCOPY, GASTROSCOPY, DUODENOSCOPY (EGD), COMBINED N/A 7/27/2022    Procedure: ESOPHAGOGASTRODUODENOSCOPY, WITH BIOPSY;  Surgeon: Filiberto Banuelos MD;  [facility]    REPAIR PTOSIS BILATERAL Bilateral 10/3/2018    Procedure: REPAIR PTOSIS BILATERAL;;  Surgeon: Dany Berrios MD;  [facility]    REPAIR PTOSIS BROW BILATERAL Bilateral 10/3/2018    Procedure: REPAIR PTOSIS  BROW BILATERAL;;  Surgeon: Dany Berrios MD;  [facility]    SIGMOIDOSCOPY FLEXIBLE N/A 9/20/2019    Procedure: SIGMOIDOSCOPY, FLEXIBLE;  Surgeon: Filiberto Banuelos MD;  [facility]    TONSILLECTOMY  1980s       ALLERGIES:   No Known Allergies    FAMILY HISTORY:  Family History   Problem Relation Age of Onset    Hypertension Mother     Alzheimer Disease Mother     Diabetes Brother     Glaucoma No family hx of     Macular Degeneration No family hx of     Coronary Artery Disease No family hx of     Colon Cancer No family hx of     Retinal detachment No family hx of          SOCIAL HISTORY:  Social History     Tobacco Use    Smoking status: Never    Smokeless tobacco: Never   Vaping Use    Vaping Use: Never used   Substance Use Topics    Alcohol use: Yes     Alcohol/week: 14.0 - 21.0 standard drinks of alcohol     Types: 14 - 21 Standard drinks or equivalent per week     Comment: varies, a couple drinks a day    Drug use: No       ROS:   Constitutional: No fever, chills, or sweats. Weight stable.   Cardiovascular: As per HPI.       Exam:  /77 (BP Location: Left arm, Patient Position: Chair, Cuff Size: Adult Large)   Pulse 72   Wt 83 kg (183 lb)   SpO2 98%   BMI 28.92 kg/m    GENERAL APPEARANCE: alert and no distress  HEENT: no icterus, no central cyanosis  LYMPH/NECK: no adenopathy, no asymmetry, JVP not elevated, no carotid bruits.  RESPIRATORY: lungs clear to auscultation - no rales, rhonchi or wheezes, no use of accessory muscles, no retractions, respirations are unlabored, normal respiratory rate  CARDIOVASCULAR: regular rhythm, normal S1, S2, no S3 or S4 and no murmur, click or rub, precordium quiet with normal PMI.  GI: soft, non tender  EXTREMITIES: no edema  NEURO: alert, normal speech,and affect  SKIN: no ecchymoses, no rashes     I have reviewed the labs and personally reviewed the imaging below and made my comment in the assessment and plan.    Labs:  CBC RESULTS:   Lab  Results   Component Value Date    WBC 10.8 08/01/2023    WBC 10.2 04/08/2021    RBC 5.13 08/01/2023    RBC 4.62 04/08/2021    HGB 15.7 08/01/2023    HGB 14.6 04/08/2021    HCT 47.6 08/01/2023    HCT 42.9 04/08/2021    MCV 93 08/01/2023    MCV 93 04/08/2021    MCH 30.6 08/01/2023    MCH 31.6 04/08/2021    MCHC 33.0 08/01/2023    MCHC 34.0 04/08/2021    RDW 13.3 08/01/2023    RDW 13.9 04/08/2021     08/01/2023     04/08/2021       BMP RESULTS:  Lab Results   Component Value Date     08/01/2023     04/08/2021    POTASSIUM 4.5 08/01/2023    POTASSIUM 3.8 11/04/2021    POTASSIUM 4.2 04/08/2021    CHLORIDE 105 08/01/2023    CHLORIDE 105 11/04/2021    CHLORIDE 104 04/08/2021    CO2 22 08/01/2023    CO2 22 11/04/2021    CO2 23 04/08/2021    ANIONGAP 12 08/01/2023    ANIONGAP 10 11/04/2021    ANIONGAP 7 04/08/2021    GLC 89 08/01/2023    GLC 96 11/04/2021    GLC 85 04/08/2021    BUN 23.4 (H) 08/01/2023    BUN 22 11/04/2021    BUN 21 04/08/2021    CR 1.33 (H) 08/01/2023    CR 1.12 04/08/2021    GFRESTIMATED 60 (L) 08/01/2023    GFRESTIMATED 70 04/08/2021    GFRESTBLACK 81 04/08/2021    GAYLA 9.7 08/01/2023    GAYLA 9.2 04/08/2021      Exercise stress echocardiogram 11/18/2020  Normal, low-risk exercise echocardiogram without evidence of ischemia at a  diagnostic workload (92% MPHR.)     The target heart rate was achieved. Normal heart rate and BP response to  exercise.  Normal biventricular size, thickness, and global systolic function at  baseline, LVEF=60-65%.  With exercise, LVEF increased to >70% and LV cavity size decreased  appropriately.  No regional wall motion abnormalities are present at rest or with exercise.  No angina was elicited.  No ECG evidence of ischemia. Occasional PVCs with exercise.  Functional capacity is normal for age.  No significant valvular abnormalities are noted on screening Doppler exam.  The aortic root and visualized ascending aorta are normal.    EKG 4/11/2022 shows  sinus rhythm otherwise unremarkable.          Assessment and Plan:   Patient is being seen today for multiple concerns.    #Palpitations  -On and off over the last year or so.  Over the last few months almost every day several times.  Can last up to 10 to 15 minutes.  Heart rate can go up to 158 bpm without any significant exertion.  Sinus rhythm in clinic today.  Recent lab test including CBC and TSH are normal.  -Recommended EKG monitor for 2 weeks    #Discomfort in the chest sometimes with exertion or palpitations  -Major risk factors are hyperlipidemia which is not well controlled, CKD, age and alcohol abuse  -Recommend exercise stress echocardiogram    #Hyperlipidemia  -He was on the Crestor 10 mg.  Off of it for a while but then started again few months ago.  LDL is still high at 171.  -Increase Crestor to 20 mg daily  -CT calcium screening    #CKD stage II  -Might be due to chronic ibuprofen use.  Recommend to avoid ibuprofen and use Tylenol instead.  -Recent abdominal ultrasound shows normal kidney size on both sides.  -Referral to nephrology    Return to clinic to be determined.  Follow-up as needed for now.    Total time spent today for this visit is 73 minutes including precharting, face-to-face clinic visit, review of labs/imaging and medical documentation.    Janice GRANT MD  [facility] Division of Cardiology  Pager [phone]

## 2023-09-26 NOTE — PATIENT INSTRUCTIONS
Thank you for coming to the [facility] @ [place]; please note the following instructions:    1. Biotel heart monitor for 2 weeks    2. INCREASE: Crestor to 20mg daily    3. Exercise stress echocardiogram    4. Referral to Nephrology    5. STOP: Ibuprofen take Tylenol for pain    6. CT Calcium Score Exam. This exam is $105 out of pocket and insurance may cover the exam. Please call your insurance prior to your exam and let the  know if the insurance will be paying or not. If your insurance is covering it, and you have already paid the $105, you will be reimbursed. Locations for this exam include [place], [facility], or [place]. Please call [phone] to schedule.     7. Cardiology follow up based on results of tests        If you have any questions regarding your visit please contact your care team:     Cardiology  Telephone Number   Abida SALINAS, RN  Yaz SALDIVAR, RN  Jeannette RAMOS, RMA  Britta BUCHANAN, RMA  Irene NYE, Visit Facilitator  Joselyn BONE [facility] [phone] (option 1)   For scheduling appts:     [phone] (select option 1)       For the Device Clinic (Pacemakers and ICD's)  RN's :  Aline Carty   During business hours: [phone]    *After business hours:  [phone] (select option 4)      Normal test result notifications will be released via [employer] or mailed within 7 business days.  All other test results, will be communicated via telephone once reviewed by your cardiologist.    If you need a medication refill please contact your pharmacy.  Please allow 3 business days for your refill to be completed.    As always, thank you for trusting us with your health care needs!

## 2023-09-28 ENCOUNTER — TELEPHONE (OUTPATIENT)
Dept: CARDIOLOGY | Facility: CLINIC | Age: 63
End: 2023-09-28
Payer: COMMERCIAL

## 2023-09-28 DIAGNOSIS — I47.10 SVT (SUPRAVENTRICULAR TACHYCARDIA) (H): Primary | ICD-10-CM

## 2023-09-28 RX ORDER — METOPROLOL SUCCINATE 50 MG/1
50 TABLET, EXTENDED RELEASE ORAL DAILY
Qty: 90 TABLET | Refills: 3 | Status: SHIPPED | OUTPATIENT
Start: 2023-09-28 | End: 2023-10-04

## 2023-09-28 NOTE — TELEPHONE ENCOUNTER
I reviewed the Biotel.  It appears he is in SVT rather than atrial fibrillation.  I started patient on metoprolol 50 mg daily.  Can you schedule an appointment with me please so that I can discuss how he is doing.    Alcohol can be a trigger.  Please let him know.    DR GRANT    Cardiology

## 2023-09-28 NOTE — TELEPHONE ENCOUNTER
"Sarah urgent call reporting abnormal EKG.  9/28/23 afib detected 154 bpm    Writer contacted patient, patient states his \"palpitations\" lasted 5 minutes and now subsided.  States he feels fine and his symptoms are not as strong since he has been wearing the monitor    RN review sarah report-showing afib.  Report also sent to Dr. Crawley to review and advise.    Abida Garcia, RN  Cardiology Care Coordinator  [facility]  [phone] option 1        "

## 2023-09-29 ENCOUNTER — MYC MEDICAL ADVICE (OUTPATIENT)
Dept: CARDIOLOGY | Facility: CLINIC | Age: 63
End: 2023-09-29
Payer: COMMERCIAL

## 2023-09-29 ENCOUNTER — TELEPHONE (OUTPATIENT)
Dept: CARDIOLOGY | Facility: CLINIC | Age: 63
End: 2023-09-29
Payer: COMMERCIAL

## 2023-09-29 NOTE — TELEPHONE ENCOUNTER
Janice Grant MD15 hours ago (5:54 PM)     IC  I reviewed the Biotel.  It appears he is in SVT rather than atrial fibrillation.  I started patient on metoprolol 50 mg daily.  Can you schedule an appointment with me please so that I can discuss how he is doing.     Alcohol can be a trigger.  Please let him know.     DR GRANT     Cardiology        Attempted to call patient. Left message for patient. Prescription for Metoprolol already sent in for patient.     Yaz Garrison RN, BSN  Cardiology RN Care Coordinator   [place]   Phone: [phone]  Fax: [phone] ([place]) [phone] ([place])

## 2023-09-29 NOTE — TELEPHONE ENCOUNTER
Janice Grant MD50 minutes ago (4:07 PM)     IC  Thank you. No urgency for nephro.     DR GRANT        Attempted to call patient that Nephrology referral is not urgent. Left message for patient.    Yaz Garrison, RN, BSN  Cardiology RN Care Coordinator   [place]   Phone: [phone]  Fax: [phone] ([place]) [phone] ([place])

## 2023-09-29 NOTE — TELEPHONE ENCOUNTER
[facility] Call Center    Phone Message    May a detailed message be left on voicemail: yes     Reason for Call: Other: Pt called in returning missed call from [place], please reach out to pt to further assist.     Action Taken: Other: Cardiology    Travel Screening: Not Applicable    Thank you!  [facility]

## 2023-09-29 NOTE — TELEPHONE ENCOUNTER
Called patient. See other telephone encounter for further information.    Yaz Garrison, RN, BSN  Cardiology RN Care Coordinator   [place]   Phone: [phone]  Fax: [phone] ([place]) [phone] ([place])

## 2023-09-29 NOTE — TELEPHONE ENCOUNTER
Janice Grant MD15 hours ago (5:54 PM)      IC  I reviewed the Biotel.  It appears he is in SVT rather than atrial fibrillation.  I started patient on metoprolol 50 mg daily.  Can you schedule an appointment with me please so that I can discuss how he is doing.     Alcohol can be a trigger.  Please let him know.     DR GRANT     Cardiology        See telephone encounter in regards to this information being relayed to patient. Patient was unable to schedule appointment with Dr. Grant. [employer] message sent as reminder.    Yaz Garrison RN, BSN  Cardiology RN Care Coordinator   [place]   Phone: [phone]  Fax: [phone] ([place]) [phone] ([place])

## 2023-09-29 NOTE — TELEPHONE ENCOUNTER
Called and spoke with patient. Relayed information to patient. Informed patient that he is to start metoprolol 50 mg daily. Prescription already sent in. Patient aware of prescription. Patient stated he will start this medication on Monday as he does not want to start the medication on the weekend due to having a very busy weekend scheduled.     Discussed with patient that Dr. Crawley would like to see patient in clinic. Patient unable to schedule at this time. [employer] message sent to patient as well as a reminder. See mychart encounter.     Patient did update that he is unable to get in with nephrology. He was informed that he would not be able to get in for several months.    Yaz Garrison, RN, BSN  Cardiology RN Care Coordinator   [place]   Phone: [phone]  Fax: [phone] ([place]) [phone] ([place])

## 2023-09-30 NOTE — TELEPHONE ENCOUNTER
I was paged by Hood Smith about an arrhythmia event this afternoon. At 14:32 on 9/29/2023, the patient had an approx 2 minute run of atrial fibrillation at 168 bpm. The patient did not press the symptom button.     I then called the patient to notify him of this event, and I recommended to the patient to go to his nearest emergency dept if he was experiencing shortness of breath, chest pain or dizziness. The patient verbalized understanding and confirmed that he will consider my recommendation.       Shaun Mullins MD, PhD  Cardiology Fellow  Click to text page [phone]

## 2023-10-02 ENCOUNTER — TELEPHONE (OUTPATIENT)
Dept: CARDIOLOGY | Facility: CLINIC | Age: 63
End: 2023-10-02
Payer: COMMERCIAL

## 2023-10-02 NOTE — TELEPHONE ENCOUNTER
[facility] Call Center    Phone Message    May a detailed message be left on voicemail: yes     Reason for Call: Other: Pt is requesting a call back today to discuss his care.  No further information was given.     Action Taken: Other: cardio    Travel Screening: Not Applicable    Thank you!  [facility]

## 2023-10-02 NOTE — TELEPHONE ENCOUNTER
"Spoke to patient.  Reviewed metoprolol.  He had concerns about starting a new medication.  Answered all his questions regarding new medication.    He is currently still wearing the biotel.  States that he is having difficulty at night due to feeling his heart beat \"strong\".  States it is not beating fast but just strong.  Discussed that the new medication may help with this symptoms.  Patient is happy to hear this.  Plans to start the metoprolol in the evening tonight.     Is scheduled for the CT Calcium score in 10/4 [facility] .  Stress echo scheduled for 10/18 [place].  Writer advised patient to hold metoprolol 48 hours prior to test.    Abida Garcia RN  Cardiology Care Coordinator  [facility]  [phone] option 1        "

## 2023-10-04 ENCOUNTER — ANCILLARY PROCEDURE (OUTPATIENT)
Dept: CARDIOLOGY | Facility: CLINIC | Age: 63
End: 2023-10-04
Attending: INTERNAL MEDICINE
Payer: COMMERCIAL

## 2023-10-04 ENCOUNTER — MYC MEDICAL ADVICE (OUTPATIENT)
Dept: CARDIOLOGY | Facility: CLINIC | Age: 63
End: 2023-10-04

## 2023-10-04 ENCOUNTER — TELEPHONE (OUTPATIENT)
Dept: CARDIOLOGY | Facility: CLINIC | Age: 63
End: 2023-10-04

## 2023-10-04 ENCOUNTER — ANCILLARY PROCEDURE (OUTPATIENT)
Dept: CT IMAGING | Facility: CLINIC | Age: 63
End: 2023-10-04
Attending: INTERNAL MEDICINE
Payer: COMMERCIAL

## 2023-10-04 DIAGNOSIS — E78.5 HYPERLIPIDEMIA LDL GOAL <100: ICD-10-CM

## 2023-10-04 DIAGNOSIS — R00.2 PALPITATIONS: ICD-10-CM

## 2023-10-04 DIAGNOSIS — I48.0 PAF (PAROXYSMAL ATRIAL FIBRILLATION) (H): Primary | ICD-10-CM

## 2023-10-04 DIAGNOSIS — I47.10 SVT (SUPRAVENTRICULAR TACHYCARDIA) (H): ICD-10-CM

## 2023-10-04 PROCEDURE — 75571 CT HRT W/O DYE W/CA TEST: CPT | Mod: GC | Performed by: INTERNAL MEDICINE

## 2023-10-04 RX ORDER — METOPROLOL SUCCINATE 100 MG/1
100 TABLET, EXTENDED RELEASE ORAL DAILY
Qty: 90 TABLET | Refills: 3 | Status: SHIPPED | OUTPATIENT
Start: 2023-10-04 | End: 2024-04-18

## 2023-10-04 RX ORDER — FLECAINIDE ACETATE 50 MG/1
50 TABLET ORAL 2 TIMES DAILY
Qty: 180 TABLET | Refills: 3 | Status: SHIPPED | OUTPATIENT
Start: 2023-10-04 | End: 2023-12-27

## 2023-10-04 NOTE — TELEPHONE ENCOUNTER
I received a page from [employer] monitoring service for Mr. Oliver. On 10/4/23 at 03:59 am he was in atrial fibrillation with a ventricular rate >190 bpm for 40 seconds with max heart rate of 198 bpm.     I attempted to call Mr. Oliver twice but he did not answer his phone.    Will forward to his primary cardiologist for further management.     Dusty Trejo MD  Cardiology Fellow  Pager: [phone]

## 2023-10-04 NOTE — TELEPHONE ENCOUNTER
Called patient back and confirmed nothing needed to be done with the monitor. The appointment was for administrative purposes only. Patient understood and had no further questions.     Joselyn Valdez CMA ([facility])

## 2023-10-04 NOTE — TELEPHONE ENCOUNTER
[facility] Call Center    Phone Message    May a detailed message be left on voicemail: yes     Reason for Call: Other: Pt called in said he does not understand his heart monitor and would like to speak with someone from care team, says he was not aware he was scheduled for appt for monitor, please call pt back for further discussion at [phone]     Action Taken: Other: cardiology    Travel Screening: Not Applicable    Thank you!  [facility]

## 2023-10-05 NOTE — TELEPHONE ENCOUNTER
Dr. Crawley, pt is scheduled for a SE on 10/18.  Since he has PAF should he have a different stress test?    I can reply to his message, just wanted to know this first.  Also, it is ok to get shingles shot, right?    Abida Garcia, RN  Cardiology Care Coordinator  [facility]  [phone] option 1

## 2023-10-05 NOTE — TELEPHONE ENCOUNTER
Can you convert the stress test to a regular echocardiogram?  Thank you for for bringing this up.  It will be difficult for him to come off metoprolol few days prior to stress test since it is going to be 100 mg.  We can plan a CT angiogram if he is still symptomatic and I can discuss this when he comes to clinic.  Yes he can get shingles shot.    Dr CAN

## 2023-10-05 NOTE — TELEPHONE ENCOUNTER
"LPN/MA schedule change-the patients stress echo scheduled for 10/18 needs to be changed to an echocardiogram.  Please help with this.  Please put down \"echo ok per Abida TINSLEY\".    Abida Garcia RN  Cardiology Care Coordinator  [facility]  [phone] option 1    "

## 2023-10-09 ENCOUNTER — TELEPHONE (OUTPATIENT)
Dept: OPTOMETRY | Facility: CLINIC | Age: 63
End: 2023-10-09

## 2023-10-09 ENCOUNTER — MYC MEDICAL ADVICE (OUTPATIENT)
Dept: FAMILY MEDICINE | Facility: CLINIC | Age: 63
End: 2023-10-09
Payer: COMMERCIAL

## 2023-10-09 NOTE — TELEPHONE ENCOUNTER
Writer spoke with patient.  Patient reports that he feels better on metoprolol.  Is concerned about starting flecainide at this time.  It was decided that I would inform Dr. Crawley and it can be discussed at the upcoming office visit 10/24.    DILLAN Garcia, RN  Cardiology Care Coordinator  [facility]  [phone] option 1

## 2023-10-09 NOTE — TELEPHONE ENCOUNTER
[facility] Call Center    Phone Message    May a detailed message be left on voicemail: yes     Reason for Call: Other: PT states that he recently came in clinic to see  and was given a new pair of eyeglasses. PT states that something feels off with the prescription of the glasses and wanted to ask some questions to the care team. States that he is still seeing blurr with the glasses on.  Please review and reach out to the PT,     Action Taken: Message routed to:  [facility] eye    Travel Screening: Not Applicable

## 2023-10-09 NOTE — TELEPHONE ENCOUNTER
Metoprolol controls the heart rate when you go into atrial fibrillation and flecainide actually prevents you to go into atrial fibrillation so that is the reason.  If you are feeling fine with metoprolol only we can wait to initiate flecainide.  Let me know what you think.    Dr Crawley

## 2023-10-10 ENCOUNTER — OFFICE VISIT (OUTPATIENT)
Dept: OPTOMETRY | Facility: CLINIC | Age: 63
End: 2023-10-10
Payer: COMMERCIAL

## 2023-10-10 DIAGNOSIS — H52.223 REGULAR ASTIGMATISM OF BOTH EYES: ICD-10-CM

## 2023-10-10 DIAGNOSIS — H52.4 PRESBYOPIA: ICD-10-CM

## 2023-10-10 DIAGNOSIS — H52.13 MYOPIA OF BOTH EYES: Primary | ICD-10-CM

## 2023-10-10 PROCEDURE — 99207 PR NO CHARGE LOS: CPT | Performed by: OPTOMETRIST

## 2023-10-10 ASSESSMENT — VISUAL ACUITY
OD_CC: 20/25
OS_CC+: -1
OS_CC: 20/20
OD_CC: 20/20
METHOD: SNELLEN - LINEAR
OS_CC: 20/20
OD_CC: 20/25
OD_CC+: -1
CORRECTION_TYPE: GLASSES
CORRECTION_TYPE: GLASSES
OD_CC+: -1
METHOD: SNELLEN - LINEAR
METHOD: SNELLEN - LINEAR
CORRECTION_TYPE: GLASSES
OS_CC: 20/20

## 2023-10-10 ASSESSMENT — REFRACTION_WEARINGRX
OS_CYLINDER: +0.25
SPECS_TYPE: BIFOCAL
OD_CYLINDER: +0.75
OS_SPHERE: -1.00
OD_SPHERE: -0.75
SPECS_TYPE: SVL
OS_CYLINDER: +0.50
OD_ADD: +2.50
OD_SPHERE: -0.50
OD_CYLINDER: +0.25
OS_SPHERE: -0.75
OD_AXIS: 113
OD_CYLINDER: +0.50
OS_ADD: +2.50
OS_CYLINDER: +0.50
SPECS_TYPE: SVL
OS_SPHERE: -0.75
OS_AXIS: 034
OD_SPHERE: -0.50
OS_AXIS: 019
OD_AXIS: 159
OS_AXIS: 051
OD_AXIS: 171

## 2023-10-10 ASSESSMENT — REFRACTION_MANIFEST
OS_CYLINDER: +0.25
OD_AXIS: 160
OS_AXIS: 034
OD_SPHERE: -0.50
OD_ADD: +2.50
OS_ADD: +2.50
OD_CYLINDER: +0.25
OS_SPHERE: -0.75

## 2023-10-10 ASSESSMENT — CONF VISUAL FIELD
METHOD: COUNTING FINGERS
OS_INFERIOR_TEMPORAL_RESTRICTION: 0
OS_SUPERIOR_NASAL_RESTRICTION: 0
OD_NORMAL: 1
OD_INFERIOR_TEMPORAL_RESTRICTION: 0
OD_SUPERIOR_NASAL_RESTRICTION: 0
OD_INFERIOR_NASAL_RESTRICTION: 0
OD_SUPERIOR_TEMPORAL_RESTRICTION: 0
OS_NORMAL: 1
OS_SUPERIOR_TEMPORAL_RESTRICTION: 0
OS_INFERIOR_NASAL_RESTRICTION: 0

## 2023-10-10 NOTE — PATIENT INSTRUCTIONS
New glasses prescription found today.   Remake new glasses with today's prescription.       Jair Garcia OD  [address]  [place]  [address]    [phone]

## 2023-10-10 NOTE — PROGRESS NOTES
Chief Complaint   Patient presents with    New Eval For Glasses    Blurred Vision Follow-Up       Source of glasses: [employer]   How long have you tried the glasses: ~1 week   What is not working with glasses: Trying FT BF for the first time - likes them overall but is not happy with DVA. Feels like eyes are fighting each other.     Was unable to wear while watching TV so he never tried them while driving or full time.     Has been relying more on distance only glasses    Kelly Gay  Optometry Assistant      OBJECTIVE: See Ophthalmology exam    ASSESSMENT:    ICD-10-CM    1. Myopia of both eyes  H52.13       2. Regular astigmatism of both eyes  H52.223       3. Presbyopia  H52.4         PLAN:  Patient Instructions   New glasses prescription found today.   Remake new glasses with today's prescription.       Jair Garcia, NERISSA  [address]  [address]    [phone]

## 2023-10-10 NOTE — LETTER
10/10/2023         RE: Adan Oliver  [address]  [place] [address]        Dear Colleague,    Thank you for referring your patient, Adan Oliver, to the [facility]. Please see a copy of my visit note below.    Chief Complaint   Patient presents with     New Eval For Glasses     Blurred Vision Follow-Up       Source of glasses: [employer]   How long have you tried the glasses: ~1 week   What is not working with glasses: Trying FT BF for the first time - likes them overall but is not happy with DVA. Feels like eyes are fighting each other.     Was unable to wear while watching TV so he never tried them while driving or full time.     Has been relying more on distance only glasses    Kelly Gay  Optometry Assistant      OBJECTIVE: See Ophthalmology exam    ASSESSMENT:    ICD-10-CM    1. Myopia of both eyes  H52.13       2. Regular astigmatism of both eyes  H52.223       3. Presbyopia  H52.4         PLAN:  Patient Instructions   New glasses prescription found today.   Remake new glasses with today's prescription.       Jair Garcia OD  [facility]  [address] [place] [address]. [place]  [address]    [phone]           Again, thank you for allowing me to participate in the care of your patient.        Sincerely,        Jair Garcia OD

## 2023-10-12 ENCOUNTER — TELEPHONE (OUTPATIENT)
Dept: NEUROLOGY | Facility: CLINIC | Age: 63
End: 2023-10-12
Payer: COMMERCIAL

## 2023-10-17 ENCOUNTER — ANCILLARY PROCEDURE (OUTPATIENT)
Dept: CARDIOLOGY | Facility: CLINIC | Age: 63
End: 2023-10-17
Attending: INTERNAL MEDICINE
Payer: COMMERCIAL

## 2023-10-17 DIAGNOSIS — I47.10 SVT (SUPRAVENTRICULAR TACHYCARDIA) (H): ICD-10-CM

## 2023-10-17 DIAGNOSIS — I48.0 PAF (PAROXYSMAL ATRIAL FIBRILLATION) (H): ICD-10-CM

## 2023-10-17 LAB — LVEF ECHO: NORMAL

## 2023-10-17 PROCEDURE — 93306 TTE W/DOPPLER COMPLETE: CPT | Performed by: INTERNAL MEDICINE

## 2023-10-18 ENCOUNTER — TELEPHONE (OUTPATIENT)
Dept: CARDIOLOGY | Facility: CLINIC | Age: 63
End: 2023-10-18

## 2023-10-18 NOTE — TELEPHONE ENCOUNTER
Spoke to patient.  Patient is frustrated stating he completed the mychart check in and was told his estimated cost he is responsible for is $471.  States it did not mention insurance coverage.  He is requesting more details onto what kind of bill he is going to receive.    Writer explain that this is something that will be brought to a supervisors attention and this will be addressed. Patient is happy and will keep his appointment at this time.    Abida Garcia RN  Cardiology Care Coordinator  [facility]  [phone] option 1

## 2023-10-18 NOTE — TELEPHONE ENCOUNTER
[facility] Call Center    Phone Message    May a detailed message be left on voicemail: yes     Reason for Call: Other: Per call from patient due to new changes to [facility] policy, he needs to know what he will be charged for the upcoming appointment with Dr Crawley in October 24th, next week. He spoke with Billing office already and the next step is speak with with the care team. Per patient this is important and he needs a call back today or else he is done with [facility] and will cancel appointment. Please reach out to patient.       Action Taken: Other: Cardio    Travel Screening: Not Applicable

## 2023-10-19 NOTE — PROGRESS NOTES
[facility]      Referring provider:Johnathon Diaz MD    HPI: Mr. Adan Oliver is a 62 year old  male with PMH significant for    -Obstructive sleep apnea  -Alcohol use  -Reid's esophagus    Patient reports feeling palpitations over the last 1-1/2-month.  So far it has happened only at night.  He has woken up 6 or 7 times over the last 1-1/2 months between 3 AM to 7 AM with chest pressure and palpitations.  He was actually able to capture the heart rate on blood pressure machine which showed 150 bpm (showed me pictures of that from his cell phone).  No EKG documentation so far.  The heart pounding sensation lasted for about 30 minutes.  Over the last 2 weeks he has not had any of these.  He tells me he was under extreme stress when he was having frequent nightly palpitations. Denies chest pain.  No shortness of breath with activity.  No dizziness, syncope or lower extremity edema.    No prior history of cardiac disease.    Lifetime non-smoker.  No hypertension or diabetes.  Family history is unremarkable.  He has hyperlipidemia with elevated LDL at 178 mg/deciliter.  He has been on Crestor 10 mg over the last 3 weeks. Patient tells me that he has been on low-carb keto diet to lose weight.    Patient had exercise his echocardiogram in 11/2020 which showed normal biventricular function with no valve disease.  Stress test was normal.  I have reviewed patient's EKG from 4/11/2022 which shows sinus rhythm otherwise unremarkable.  Labs from 2021 showed normal CBC and BMP.  Medications, personal, family, and social history reviewed with patient and revised.    Interval history 9/26/2023:  Patient is being seen today for multiple concerns that he has over the last few months.  He tells me that he feels his heart beating fast several times a day.  He has shown me his wrist monitor results where his heart rate goes up to 158 bpm at the time of palpitations.  Can last up to 10 to 15 minutes.   When his heart is racing fast he feels discomfort in his chest.  No syncope.  Reports some shortness of breath with exertion.  Can feel discomfort (describes as feels weak in the chest and fuzzy feeling) in his chest with or without exertion.  Over the last few years he has been taking ibuprofen 200 mg daily for back pain.  He does not use CPAP.  He drinks alcohol at least 2 cocktails every night.  He has been drinking alcohol all his life.  He tells me he has a lot of stress in his life.  He has trouble sleeping.  He is recommended CBD lozenges.  No history of tobacco use.    Interval history 10/24/2023:  Patient returns for follow-up.  Patient's heart monitor showed paroxysmal atrial fibrillation corresponding to his symptoms.  This is a new diagnosis for the patient.  When we have received A-fib information I have started patient on metoprolol initially 50 then increased to 100 mg.  Today he tells me that he still has palpitation episodes mostly at night sometimes interfering with his sleep.  Not as fast as he used to be.  Continues to drink couple of alcoholic drinks almost every day though he tells me that he has cut back on this.  He is not sure if he would benefit from sleep testing.  He tells me that [place] of this [place].  He is wondering if he needs to take flecainide.  Echocardiogram is unremarkable.  CT calcium score is 0.    PAST MEDICAL HISTORY:  Past Medical History:   Diagnosis Date    Anxiety 12/07/2015    Reid esophagus 10/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 It was difficult to discern between PLMS and hyponeas. Apnea/hypopnea index was 28.0 events per hour.  The REM AHI was 29.3  events per hour.  The supine AHI was 32.7 events per hour.  RDI was 28.0 events per hour. Lowest oxygen saturation was 84.0%.  Time spent less than or equal to 88% was 0.3 minutes. PLM index was 32.3 movements per hour       CURRENT MEDICATIONS:  Current Outpatient Medications   Medication Sig Dispense Refill    acetaminophen (TYLENOL) 325 MG tablet Take 325-650 mg by mouth every 6 hours as needed for mild pain      esomeprazole (NEXIUM) 40 MG DR capsule TAKE ONE CAPSULE BY MOUTH EVERY MORNING 30-60 MINUTES BEFORE BREAKFAST 90 capsule 3    flecainide (TAMBOCOR) 50 MG tablet Take 1 tablet (50 mg) by mouth 2 times daily 180 tablet 3    metoprolol succinate ER (TOPROL XL) 100 MG 24 hr tablet Take 1 tablet (100 mg) by mouth daily 90 tablet 3    rosuvastatin (CRESTOR) 10 MG tablet Take 2 tablets (20 mg) by mouth daily 90 tablet 3       PAST SURGICAL HISTORY:  Past Surgical History:   Procedure Laterality Date    APPENDECTOMY  2000s    BLEPHAROPLASTY BILATERAL Bilateral 10/3/2018    Procedure: BLEPHAROPLASTY BILATERAL;;  Surgeon: Dany Berrios MD;  Location: [facility]    CARPAL TUNNEL RELEASE RT/LT  1980s    COLONOSCOPY N/A 7/27/2022    Procedure: COLONOSCOPY, FLEXIBLE, WITH LESION REMOVAL USING SNARE;  Surgeon: Filiberto Banuelos MD;  Location: [facility]    COLONOSCOPY WITH CO2 INSUFFLATION N/A 7/27/2022    Procedure: COLONOSCOPY, WITH CO2 INSUFFLATION;  Surgeon: Filiberto Banuelos MD;  Location: [facility]    COMBINED ESOPHAGOSCOPY, GASTROSCOPY, DUODENOSCOPY (EGD) WITH CO2 INSUFFLATION N/A 9/20/2019    Procedure: ESOPHAGOGASTRODUODENOSCOPY, WITH CO2 INSUFFLATION;  Surgeon: Filiberto Banuelos MD;  Location: [facility]    COMBINED ESOPHAGOSCOPY, GASTROSCOPY, DUODENOSCOPY (EGD) WITH CO2 INSUFFLATION N/A 7/27/2022    Procedure: ESOPHAGOGASTRODUODENOSCOPY, WITH CO2 INSUFFLATION;  Surgeon: Filiberto Banuelos MD;  Location: [facility]    ESOPHAGOSCOPY, GASTROSCOPY, DUODENOSCOPY (EGD), COMBINED N/A 9/20/2019     Procedure: Esophagogastroduodenoscopy, With Biopsy;  Surgeon: Filiberto Banuelos MD;  Location: [facility]    ESOPHAGOSCOPY, GASTROSCOPY, DUODENOSCOPY (EGD), COMBINED N/A 7/27/2022    Procedure: ESOPHAGOGASTRODUODENOSCOPY, WITH BIOPSY;  Surgeon: Filiberto Banuelos MD;  Location: [facility]    REPAIR PTOSIS BILATERAL Bilateral 10/3/2018    Procedure: REPAIR PTOSIS BILATERAL;;  Surgeon: Dany Berrios MD;  Location: [facility]    REPAIR PTOSIS BROW BILATERAL Bilateral 10/3/2018    Procedure: REPAIR PTOSIS BROW BILATERAL;;  Surgeon: Dany Berrios MD;  Location: [facility]    SIGMOIDOSCOPY FLEXIBLE N/A 9/20/2019    Procedure: SIGMOIDOSCOPY, FLEXIBLE;  Surgeon: Filiberto Banuelos MD;  Location: [facility]    TONSILLECTOMY  1980s       ALLERGIES:   No Known Allergies    FAMILY HISTORY:  Family History   Problem Relation Age of Onset    Hypertension Mother     Alzheimer Disease Mother     Diabetes Brother     Glaucoma No family hx of     Macular Degeneration No family hx of     Coronary Artery Disease No family hx of     Colon Cancer No family hx of     Retinal detachment No family hx of          SOCIAL HISTORY:  Social History     Tobacco Use    Smoking status: Never    Smokeless tobacco: Never   Vaping Use    Vaping Use: Never used   Substance Use Topics    Alcohol use: Yes     Alcohol/week: 14.0 - 21.0 standard drinks of alcohol     Types: 14 - 21 Standard drinks or equivalent per week     Comment: varies, a couple drinks a day    Drug use: No       ROS:   Constitutional: No fever, chills, or sweats. Weight stable.   Cardiovascular: As per HPI.       Exam:  /84 (BP Location: Right arm, Patient Position: Sitting, Cuff Size: Adult Regular)   Pulse 56   Wt 85.9 kg (189 lb 6.4 oz)   SpO2 99%   BMI 29.93 kg/m    GENERAL APPEARANCE: alert and no distress  HEENT: no icterus, no central cyanosis  LYMPH/NECK: no adenopathy, no asymmetry, JVP not elevated, no carotid bruits.  RESPIRATORY: lungs clear to  auscultation - no rales, rhonchi or wheezes, no use of accessory muscles, no retractions, respirations are unlabored, normal respiratory rate  CARDIOVASCULAR: regular rhythm, normal S1, S2, no S3 or S4 and no murmur, click or rub, precordium quiet with normal PMI.  GI: soft, non tender  EXTREMITIES: no edema  NEURO: alert, normal speech,and affect  SKIN: no ecchymoses, no rashes     I have reviewed the labs and personally reviewed the imaging below and made my comment in the assessment and plan.    Labs:  CBC RESULTS:   Lab Results   Component Value Date    WBC 10.8 08/01/2023    WBC 10.2 04/08/2021    RBC 5.13 08/01/2023    RBC 4.62 04/08/2021    HGB 15.7 08/01/2023    HGB 14.6 04/08/2021    HCT 47.6 08/01/2023    HCT 42.9 04/08/2021    MCV 93 08/01/2023    MCV 93 04/08/2021    MCH 30.6 08/01/2023    MCH 31.6 04/08/2021    MCHC 33.0 08/01/2023    MCHC 34.0 04/08/2021    RDW 13.3 08/01/2023    RDW 13.9 04/08/2021     08/01/2023     04/08/2021       BMP RESULTS:  Lab Results   Component Value Date     08/01/2023     04/08/2021    POTASSIUM 4.5 08/01/2023    POTASSIUM 3.8 11/04/2021    POTASSIUM 4.2 04/08/2021    CHLORIDE 105 08/01/2023    CHLORIDE 105 11/04/2021    CHLORIDE 104 04/08/2021    CO2 22 08/01/2023    CO2 22 11/04/2021    CO2 23 04/08/2021    ANIONGAP 12 08/01/2023    ANIONGAP 10 11/04/2021    ANIONGAP 7 04/08/2021    GLC 89 08/01/2023    GLC 96 11/04/2021    GLC 85 04/08/2021    BUN 23.4 (H) 08/01/2023    BUN 22 11/04/2021    BUN 21 04/08/2021    CR 1.33 (H) 08/01/2023    CR 1.12 04/08/2021    GFRESTIMATED 60 (L) 08/01/2023    GFRESTIMATED 70 04/08/2021    GFRESTBLACK 81 04/08/2021    GAYLA 9.7 08/01/2023    GAYLA 9.2 04/08/2021     Cardiac mobile telemetry for a month (10/4/2023) showed multiple atrial fibrillation episodes with RVR.  Lasting up to 3 hours.    CT CORONARY CALCIUM 10/04/2023  1.  No coronary calcifications.  2.  The total Agatston calcium score is 0 placing the  patient in the  lowest percentile when compared to age and gender matched control  group.    TTE 10/17/2023  Global and regional left ventricular function is normal with an EF of 55-60%.  Global right ventricular function is normal.  Both atria appear normal.  No significant valvular abnormalities present.  IVC diameter <2.1 cm collapsing >50% with sniff suggests a normal RA pressure  of 3 mmHg.  No pericardial effusion is present.  There is no prior study for direct comparison.  Exercise stress echocardiogram 11/18/2020  Normal, low-risk exercise echocardiogram without evidence of ischemia at a  diagnostic workload (92% MPHR.)     The target heart rate was achieved. Normal heart rate and BP response to  exercise.  Normal biventricular size, thickness, and global systolic function at  baseline, LVEF=60-65%.  With exercise, LVEF increased to >70% and LV cavity size decreased  appropriately.  No regional wall motion abnormalities are present at rest or with exercise.  No angina was elicited.  No ECG evidence of ischemia. Occasional PVCs with exercise.  Functional capacity is normal for age.  No significant valvular abnormalities are noted on screening Doppler exam.  The aortic root and visualized ascending aorta are normal.    EKG 4/11/2022 shows sinus rhythm otherwise unremarkable.          Assessment and Plan:   Patient is being seen today for follow-up after his recent 1 month cardiac monitor showed paroxysmal atrial fibrillation.  I started patient on metoprolol and flecainide.  He tells me that he was hesitant about flecainide and has not started yet.  He is on metoprolol now.  He still continues to have A-fib episodes mostly at night which interferes with his daily sleep.  He drinks few alcoholic beverages almost every day.  I discussed possible triggers including alcohol, age and weight.    #Paroxysmal atrial fibrillation  -New diagnosis.  On metoprolol 100 mg daily.  I also started him on flecainide 50 mg  twice daily but the patient has not started yet.  He wanted to discuss with me today.  Since calcium score is 0 and echocardiogram is unremarkable I think he is at low risk with flecainide.    -Start flecainide 50 mg twice daily.  Continue metoprolol 100 mg daily.    -[id number] score is 0.  No need for anticoagulation at this time.   -Patient is interested in discussing catheter ablation with EP.  We will refer to EP.  He he will come back to [place] in April of next year.  -Recommend to stop alcohol  -He will follow-up on sleep study though he is hesitant.  He tells me that he would not use CPAP.    #Hyperlipidemia  -He was on the Crestor 10 mg.  Off of it for a while but then started again few months ago.  LDL is still high at 171.  -Increase Crestor to 20 mg daily  -CT calcium screening showed 0 calcium.    #CKD stage II  -Might be due to chronic ibuprofen use.  Recommend to avoid ibuprofen and use Tylenol instead.  -Recent abdominal ultrasound shows normal kidney size on both sides.  -Referral to nephrology    Follow-up 1 year or earlier as needed.    Total time spent today for this visit is 36 minutes including precharting, face-to-face clinic visit, review of labs/imaging and medical documentation.    Janice GRANT MD  [facility] Division of Cardiology  Pager [phone]

## 2023-10-24 ENCOUNTER — OFFICE VISIT (OUTPATIENT)
Dept: CARDIOLOGY | Facility: CLINIC | Age: 63
End: 2023-10-24
Payer: COMMERCIAL

## 2023-10-24 VITALS
OXYGEN SATURATION: 99 % | WEIGHT: 189.4 LBS | BODY MASS INDEX: 29.93 KG/M2 | HEART RATE: 56 BPM | SYSTOLIC BLOOD PRESSURE: 135 MMHG | DIASTOLIC BLOOD PRESSURE: 84 MMHG

## 2023-10-24 DIAGNOSIS — E78.5 HYPERLIPIDEMIA WITH TARGET LDL LESS THAN 130: Chronic | ICD-10-CM

## 2023-10-24 DIAGNOSIS — I48.0 PAF (PAROXYSMAL ATRIAL FIBRILLATION) (H): Primary | ICD-10-CM

## 2023-10-24 PROCEDURE — 99214 OFFICE O/P EST MOD 30 MIN: CPT | Performed by: INTERNAL MEDICINE

## 2023-10-24 RX ORDER — ROSUVASTATIN CALCIUM 20 MG/1
20 TABLET, COATED ORAL DAILY
Qty: 90 TABLET | Refills: 3 | Status: SHIPPED | OUTPATIENT
Start: 2023-10-24 | End: 2024-04-08

## 2023-10-24 NOTE — LETTER
10/24/2023      RE: Adan Oliver  [address]  [place] [address]       Dear Colleague,    Thank you for the opportunity to participate in the care of your patient, Adan Oliver, at the [facility]. Please see a copy of my visit note below.       [facility] Cardiology [facility]      Referring provider:Johnathon Diaz MD    HPI: Mr. Adan Oliver is a 62 year old  male with PMH significant for    -Obstructive sleep apnea  -Alcohol use  -Reid's esophagus    Patient reports feeling palpitations over the last 1-1/2-month.  So far it has happened only at night.  He has woken up 6 or 7 times over the last 1-1/2 months between 3 AM to 7 AM with chest pressure and palpitations.  He was actually able to capture the heart rate on blood pressure machine which showed 150 bpm (showed me pictures of that from his cell phone).  No EKG documentation so far.  The heart pounding sensation lasted for about 30 minutes.  Over the last 2 weeks he has not had any of these.  He tells me he was under extreme stress when he was having frequent nightly palpitations. Denies chest pain.  No shortness of breath with activity.  No dizziness, syncope or lower extremity edema.    No prior history of cardiac disease.    Lifetime non-smoker.  No hypertension or diabetes.  Family history is unremarkable.  He has hyperlipidemia with elevated LDL at 178 mg/deciliter.  He has been on Crestor 10 mg over the last 3 weeks. Patient tells me that he has been on low-carb keto diet to lose weight.    Patient had exercise his echocardiogram in 11/2020 which showed normal biventricular function with no valve disease.  Stress test was normal.  I have reviewed patient's EKG from 4/11/2022 which shows sinus rhythm otherwise unremarkable.  Labs from 2021 showed normal CBC and BMP.  Medications, personal, family, and social history reviewed with  patient and revised.    Interval history 9/26/2023:  Patient is being seen today for multiple concerns that he has over the last few months.  He tells me that he feels his heart beating fast several times a day.  He has shown me his wrist monitor results where his heart rate goes up to 158 bpm at the time of palpitations.  Can last up to 10 to 15 minutes.  When his heart is racing fast he feels discomfort in his chest.  No syncope.  Reports some shortness of breath with exertion.  Can feel discomfort (describes as feels weak in the chest and fuzzy feeling) in his chest with or without exertion.  Over the last few years he has been taking ibuprofen 200 mg daily for back pain.  He does not use CPAP.  He drinks alcohol at least 2 cocktails every night.  He has been drinking alcohol all his life.  He tells me he has a lot of stress in his life.  He has trouble sleeping.  He is recommended CBD lozenges.  No history of tobacco use.    Interval history 10/24/2023:  Patient returns for follow-up.  Patient's heart monitor showed paroxysmal atrial fibrillation corresponding to his symptoms.  This is a new diagnosis for the patient.  When we have received A-fib information I have started patient on metoprolol initially 50 then increased to 100 mg.  Today he tells me that he still has palpitation episodes mostly at night sometimes interfering with his sleep.  Not as fast as he used to be.  Continues to drink couple of alcoholic drinks almost every day though he tells me that he has cut back on this.  He is not sure if he would benefit from sleep testing.  He tells me that [place] of [place] of this [place].  He is wondering if he needs to take flecainide.  Echocardiogram is unremarkable.  CT calcium score is 0.    PAST MEDICAL HISTORY:  Past Medical History:   Diagnosis Date    Anxiety 12/07/2015    Reid esophagus 10/30/2014    Esophageal reflux 10/01/2014    History of shingles 2012    Hyperlipidemia  with target LDL less than 130 10/01/2014    Lumbar degenerative disc disease 05/17/2016    Nephrolithiasis 2009    LUCIAN (obstructive sleep apnea)- 'moderate' (AHI 6) 10/06/2017    Study Date: 10/2/2017- (200.0 lbs) Scored using 3% rules. It was difficult to discern between PLMS and hyponeas. Apnea/hypopnea index was 28.0 events per hour.  The REM AHI was 29.3 events per hour.  The supine AHI was 32.7 events per hour.  RDI was 28.0 events per hour. Lowest oxygen saturation was 84.0%.  Time spent less than or equal to 88% was 0.3 minutes. PLM index was 32.3 movements per hour       CURRENT MEDICATIONS:  Current Outpatient Medications   Medication Sig Dispense Refill    acetaminophen (TYLENOL) 325 MG tablet Take 325-650 mg by mouth every 6 hours as needed for mild pain      esomeprazole (NEXIUM) 40 MG DR capsule TAKE ONE CAPSULE BY MOUTH EVERY MORNING 30-60 MINUTES BEFORE BREAKFAST 90 capsule 3    flecainide (TAMBOCOR) 50 MG tablet Take 1 tablet (50 mg) by mouth 2 times daily 180 tablet 3    metoprolol succinate ER (TOPROL XL) 100 MG 24 hr tablet Take 1 tablet (100 mg) by mouth daily 90 tablet 3    rosuvastatin (CRESTOR) 10 MG tablet Take 2 tablets (20 mg) by mouth daily 90 tablet 3       PAST SURGICAL HISTORY:  Past Surgical History:   Procedure Laterality Date    APPENDECTOMY  2000s    BLEPHAROPLASTY BILATERAL Bilateral 10/3/2018    Procedure: BLEPHAROPLASTY BILATERAL;;  Surgeon: Dany Berrios MD;  Location: [facility]    CARPAL TUNNEL RELEASE RT/LT  1980s    COLONOSCOPY N/A 7/27/2022    Procedure: COLONOSCOPY, FLEXIBLE, WITH LESION REMOVAL USING SNARE;  Surgeon: Filiberto Banuelos MD;  Location: [facility]    COLONOSCOPY WITH CO2 INSUFFLATION N/A 7/27/2022    Procedure: COLONOSCOPY, WITH CO2 INSUFFLATION;  Surgeon: Filiberto Banuelos MD;  Location: [facility]    COMBINED ESOPHAGOSCOPY, GASTROSCOPY, DUODENOSCOPY (EGD) WITH CO2 INSUFFLATION N/A 9/20/2019    Procedure: ESOPHAGOGASTRODUODENOSCOPY, WITH CO2  INSUFFLATION;  Surgeon: Filiberto Banuelos MD;  Location: [facility]    COMBINED ESOPHAGOSCOPY, GASTROSCOPY, DUODENOSCOPY (EGD) WITH CO2 INSUFFLATION N/A 7/27/2022    Procedure: ESOPHAGOGASTRODUODENOSCOPY, WITH CO2 INSUFFLATION;  Surgeon: Filiberto Banuelos MD;  Location: [facility]    ESOPHAGOSCOPY, GASTROSCOPY, DUODENOSCOPY (EGD), COMBINED N/A 9/20/2019    Procedure: Esophagogastroduodenoscopy, With Biopsy;  Surgeon: Filiberto Banuelos MD;  Location: [facility]    ESOPHAGOSCOPY, GASTROSCOPY, DUODENOSCOPY (EGD), COMBINED N/A 7/27/2022    Procedure: ESOPHAGOGASTRODUODENOSCOPY, WITH BIOPSY;  Surgeon: Filiberto Banuelos MD;  Location: [facility]    REPAIR PTOSIS BILATERAL Bilateral 10/3/2018    Procedure: REPAIR PTOSIS BILATERAL;;  Surgeon: Dany Berrios MD;  Location: [facility]    REPAIR PTOSIS BROW BILATERAL Bilateral 10/3/2018    Procedure: REPAIR PTOSIS BROW BILATERAL;;  Surgeon: Dany Berrios MD;  Location: [facility]    SIGMOIDOSCOPY FLEXIBLE N/A 9/20/2019    Procedure: SIGMOIDOSCOPY, FLEXIBLE;  Surgeon: Filiberto Banuelos MD;  Location: [facility]    TONSILLECTOMY  1980s       ALLERGIES:   No Known Allergies    FAMILY HISTORY:  Family History   Problem Relation Age of Onset    Hypertension Mother     Alzheimer Disease Mother     Diabetes Brother     Glaucoma No family hx of     Macular Degeneration No family hx of     Coronary Artery Disease No family hx of     Colon Cancer No family hx of     Retinal detachment No family hx of          SOCIAL HISTORY:  Social History     Tobacco Use    Smoking status: Never    Smokeless tobacco: Never   Vaping Use    Vaping Use: Never used   Substance Use Topics    Alcohol use: Yes     Alcohol/week: 14.0 - 21.0 standard drinks of alcohol     Types: 14 - 21 Standard drinks or equivalent per week     Comment: varies, a couple drinks a day    Drug use: No       ROS:   Constitutional: No fever, chills, or sweats. Weight stable.   Cardiovascular: As per HPI.        Exam:  /84 (BP Location: Right arm, Patient Position: Sitting, Cuff Size: Adult Regular)   Pulse 56   Wt 85.9 kg (189 lb 6.4 oz)   SpO2 99%   BMI 29.93 kg/m    GENERAL APPEARANCE: alert and no distress  HEENT: no icterus, no central cyanosis  LYMPH/NECK: no adenopathy, no asymmetry, JVP not elevated, no carotid bruits.  RESPIRATORY: lungs clear to auscultation - no rales, rhonchi or wheezes, no use of accessory muscles, no retractions, respirations are unlabored, normal respiratory rate  CARDIOVASCULAR: regular rhythm, normal S1, S2, no S3 or S4 and no murmur, click or rub, precordium quiet with normal PMI.  GI: soft, non tender  EXTREMITIES: no edema  NEURO: alert, normal speech,and affect  SKIN: no ecchymoses, no rashes     I have reviewed the labs and personally reviewed the imaging below and made my comment in the assessment and plan.    Labs:  CBC RESULTS:   Lab Results   Component Value Date    WBC 10.8 08/01/2023    WBC 10.2 04/08/2021    RBC 5.13 08/01/2023    RBC 4.62 04/08/2021    HGB 15.7 08/01/2023    HGB 14.6 04/08/2021    HCT 47.6 08/01/2023    HCT 42.9 04/08/2021    MCV 93 08/01/2023    MCV 93 04/08/2021    MCH 30.6 08/01/2023    MCH 31.6 04/08/2021    MCHC 33.0 08/01/2023    MCHC 34.0 04/08/2021    RDW 13.3 08/01/2023    RDW 13.9 04/08/2021     08/01/2023     04/08/2021       BMP RESULTS:  Lab Results   Component Value Date     08/01/2023     04/08/2021    POTASSIUM 4.5 08/01/2023    POTASSIUM 3.8 11/04/2021    POTASSIUM 4.2 04/08/2021    CHLORIDE 105 08/01/2023    CHLORIDE 105 11/04/2021    CHLORIDE 104 04/08/2021    CO2 22 08/01/2023    CO2 22 11/04/2021    CO2 23 04/08/2021    ANIONGAP 12 08/01/2023    ANIONGAP 10 11/04/2021    ANIONGAP 7 04/08/2021    GLC 89 08/01/2023    GLC 96 11/04/2021    GLC 85 04/08/2021    BUN 23.4 (H) 08/01/2023    BUN 22 11/04/2021    BUN 21 04/08/2021    CR 1.33 (H) 08/01/2023    CR 1.12 04/08/2021    GFRESTIMATED 60 (L)  08/01/2023    GFRESTIMATED 70 04/08/2021    GFRESTBLACK 81 04/08/2021    GAYLA 9.7 08/01/2023    GAYLA 9.2 04/08/2021     Cardiac mobile telemetry for a month (10/4/2023) showed multiple atrial fibrillation episodes with RVR.  Lasting up to 3 hours.    CT CORONARY CALCIUM 10/04/2023  1.  No coronary calcifications.  2.  The total Agatston calcium score is 0 placing the patient in the  lowest percentile when compared to age and gender matched control  group.    TTE 10/17/2023  Global and regional left ventricular function is normal with an EF of 55-60%.  Global right ventricular function is normal.  Both atria appear normal.  No significant valvular abnormalities present.  IVC diameter <2.1 cm collapsing >50% with sniff suggests a normal RA pressure  of 3 mmHg.  No pericardial effusion is present.  There is no prior study for direct comparison.  Exercise stress echocardiogram 11/18/2020  Normal, low-risk exercise echocardiogram without evidence of ischemia at a  diagnostic workload (92% MPHR.)     The target heart rate was achieved. Normal heart rate and BP response to  exercise.  Normal biventricular size, thickness, and global systolic function at  baseline, LVEF=60-65%.  With exercise, LVEF increased to >70% and LV cavity size decreased  appropriately.  No regional wall motion abnormalities are present at rest or with exercise.  No angina was elicited.  No ECG evidence of ischemia. Occasional PVCs with exercise.  Functional capacity is normal for age.  No significant valvular abnormalities are noted on screening Doppler exam.  The aortic root and visualized ascending aorta are normal.    EKG 4/11/2022 shows sinus rhythm otherwise unremarkable.          Assessment and Plan:   Patient is being seen today for follow-up after his recent 1 month cardiac monitor showed paroxysmal atrial fibrillation.  I started patient on metoprolol and flecainide.  He tells me that he was hesitant about flecainide and has not started yet.   He is on metoprolol now.  He still continues to have A-fib episodes mostly at night which interferes with his daily sleep.  He drinks few alcoholic beverages almost every day.  I discussed possible triggers including alcohol, age and weight.    #Paroxysmal atrial fibrillation  -New diagnosis.  On metoprolol 100 mg daily.  I also started him on flecainide 50 mg twice daily but the patient has not started yet.  He wanted to discuss with me today.  Since calcium score is 0 and echocardiogram is unremarkable I think he is at low risk with flecainide.    -Start flecainide 50 mg twice daily.  Continue metoprolol 100 mg daily.    -[id number] score is 0.  No need for anticoagulation at this time.   -Patient is interested in discussing catheter ablation with EP.  We will refer to EP.  He he will come back to [place] in April of next year.  -Recommend to stop alcohol  -He will follow-up on sleep study though he is hesitant.  He tells me that he would not use CPAP.    #Hyperlipidemia  -He was on the Crestor 10 mg.  Off of it for a while but then started again few months ago.  LDL is still high at 171.  -Increase Crestor to 20 mg daily  -CT calcium screening showed 0 calcium.    #CKD stage II  -Might be due to chronic ibuprofen use.  Recommend to avoid ibuprofen and use Tylenol instead.  -Recent abdominal ultrasound shows normal kidney size on both sides.  -Referral to nephrology    Follow-up 1 year or earlier as needed.    Total time spent today for this visit is 36 minutes including precharting, face-to-face clinic visit, review of labs/imaging and medical documentation.    Janice GRANT MD  [facility] Division of Cardiology  Pager [phone]

## 2023-10-24 NOTE — PATIENT INSTRUCTIONS
Thank you for coming to the [facility] at [place]; please note the following instructions:    1. START flecainide    2. Schedule with Dr. Cheung    3. Continue metoprolol. If you feel comfortable with afib, you can reduce the metoprolol down to 50 mg.    4. STOP alcohol    5. Follow-up with Dr. Crawley in one year. A member of the cardiology team will contact you when it is closer to time to schedule.        If you have any questions regarding your visit, please contact your care team:     CARDIOLOGY  TELEPHONE NUMBER   Abida BYRNEJulia, Registered Nurse  Yaz SALDIVAR, Registered Nurse  Britta BUCHANAN, Registered Medical Assistant  Joselyn BONE, Certified Medical Assistant  Irene NYE, Clinic Assistant [phone] (select option 1)    *After hours: [phone]   For Scheduling Appts:     [phone] (select option 1)    *After hours: [phone]   For the Device Clinic (Pacemakers and ICD's)  Aline CHAPIN, Registered Nurse   During business hours: [phone]    *After business hours:  [phone] (select option 4)      Normal test result notifications will be released via Docbook[employer] or mailed within 7 business days.  All other test results, will be communicated via telephone once reviewed by your cardiologist.    If you need a medication refill, please contact your pharmacy.  Please allow 3 business days for your refill to be completed.    As always, thank you for trusting us with your health care needs!

## 2023-10-24 NOTE — NURSING NOTE
"Chief Complaint   Patient presents with    Follow Up     Cardiac testing since last visit: YES - [employer] Heart Monitor, Echo, CT Calcium       Initial /84 (BP Location: Right arm, Patient Position: Sitting, Cuff Size: Adult Regular)   Pulse 56   Wt 85.9 kg (189 lb 6.4 oz)   SpO2 99%   BMI 29.93 kg/m   Estimated body mass index is 29.93 kg/m  as calculated from the following:    Height as of 8/9/23: 1.694 m (5' 6.7\").    Weight as of this encounter: 85.9 kg (189 lb 6.4 oz)..  BP completed using cuff size: regular    LATONYA Méndez    "

## 2023-11-06 ENCOUNTER — TELEPHONE (OUTPATIENT)
Dept: NEUROLOGY | Facility: CLINIC | Age: 63
End: 2023-11-06
Payer: COMMERCIAL

## 2023-11-06 NOTE — PROGRESS NOTES
Adan Oliver  :  1960  DOS: 11/10/2023  MRN: [id number]    Sports Medicine Clinic Visit    PCP: Johnathon Diaz MD     Adan Oliver is a 63 year old Right hand dominant male who is seen as a self referral presenting with bilateral shoulder pain.     Injury: Reports insidious onset without acute precipitating event that patient first noticed approximately months. Pain located over bilateral shoulder, L>R, bilateral thumbs CMC joint, left big toe joint.  Additional Features:  Positive: right shoulder - crunchy sounds, popping.  Symptoms are better with Ibuprofen.  Symptoms are worse with: overhead motions: abduction, flexion, gripping.  Other evaluation and/or treatments so far consists of: Rest.  Recent imaging completed: No recent imaging completed.  Prior History of related problems: Has had previous shoulder issues - poss right rotator cuff    Social History: Currently employed as a contractor     Review of Systems  Musculoskeletal: as above  Remainder of review of systems is negative including constitutional, CV, pulmonary, GI, Skin and Neurologic except as noted in HPI or medical history.    Past Medical History:   Diagnosis Date    Anxiety 2015    Reid esophagus 10/30/2014    Esophageal reflux 10/01/2014    History of shingles 2012    Hyperlipidemia with target LDL less than 130 10/01/2014    Lumbar degenerative disc disease 2016    Nephrolithiasis     LUCIAN (obstructive sleep apnea)- 'moderate' (AHI 6) 10/06/2017    Study Date: 10/2/2017- (200.0 lbs) Scored using 3% rules. It was difficult to discern between PLMS and hyponeas. Apnea/hypopnea index was 28.0 events per hour.  The REM AHI was 29.3 events per hour.  The supine AHI was 32.7 events per hour.  RDI was 28.0 events per hour. Lowest oxygen saturation was 84.0%.  Time spent less than or equal to 88% was 0.3 minutes. PLM index was 32.3 movements per hour     Past Surgical History:   Procedure Laterality Date    APPENDECTOMY   2000s    BLEPHAROPLASTY BILATERAL Bilateral 10/3/2018    Procedure: BLEPHAROPLASTY BILATERAL;;  Surgeon: Dany Berrios MD;  Location: [facility]    CARPAL TUNNEL RELEASE RT/LT  1980s    COLONOSCOPY N/A 7/27/2022    Procedure: COLONOSCOPY, FLEXIBLE, WITH LESION REMOVAL USING SNARE;  Surgeon: Filiberto Banuelos MD;  Location: [facility]    COLONOSCOPY WITH CO2 INSUFFLATION N/A 7/27/2022    Procedure: COLONOSCOPY, WITH CO2 INSUFFLATION;  Surgeon: Filiberto Banuelos MD;  [facility]    COMBINED ESOPHAGOSCOPY, GASTROSCOPY, DUODENOSCOPY (EGD) WITH CO2 INSUFFLATION N/A 9/20/2019    Procedure: ESOPHAGOGASTRODUODENOSCOPY, WITH CO2 INSUFFLATION;  Surgeon: Filiberto Banuelos MD;  [facility]    COMBINED ESOPHAGOSCOPY, GASTROSCOPY, DUODENOSCOPY (EGD) WITH CO2 INSUFFLATION N/A 7/27/2022    Procedure: ESOPHAGOGASTRODUODENOSCOPY, WITH CO2 INSUFFLATION;  Surgeon: Filiberto Banuelos MD;  [facility]    ESOPHAGOSCOPY, GASTROSCOPY, DUODENOSCOPY (EGD), COMBINED N/A 9/20/2019    Procedure: Esophagogastroduodenoscopy, With Biopsy;  Surgeon: Filiberto Banuelos MD;  [facility]    ESOPHAGOSCOPY, GASTROSCOPY, DUODENOSCOPY (EGD), COMBINED N/A 7/27/2022    Procedure: ESOPHAGOGASTRODUODENOSCOPY, WITH BIOPSY;  Surgeon: Filiberto Banuelos MD;  [facility]    REPAIR PTOSIS BILATERAL Bilateral 10/3/2018    Procedure: REPAIR PTOSIS BILATERAL;;  Surgeon: Dany Berrios MD;  [facility]    REPAIR PTOSIS BROW BILATERAL Bilateral 10/3/2018    Procedure: REPAIR PTOSIS BROW BILATERAL;;  Surgeon: Dany Berrios MD;  [facility]    SIGMOIDOSCOPY FLEXIBLE N/A 9/20/2019    Procedure: SIGMOIDOSCOPY, FLEXIBLE;  Surgeon: Filiberto Banuelos MD;  [facility]: [facility]    TONSILLECTOMY  1980s     Family History   Problem Relation Age of Onset    Hypertension Mother     Alzheimer Disease Mother     Diabetes Brother     Glaucoma No family hx of     Macular Degeneration No family  hx of     Coronary Artery Disease No family hx of     Colon Cancer No family hx of     Retinal detachment No family hx of        Objective  /82   Pulse 62   Wt 85.7 kg (189 lb)   BMI 29.87 kg/m      General: healthy, alert and in no distress    HEENT: no scleral icterus or conjunctival erythema   Skin: no suspicious lesions or rash. No jaundice.   CV: regular rhythm by palpation, 2+ distal pulses, no pedal edema    Resp: normal respiratory effort without conversational dyspnea   Psych: normal mood and affect    Gait: nonantalgic, appropriate coordination and balance   Neuro: normal light touch sensory exam of the extremities. Motor strength as noted below     Bilateral Shoulder exam    ROM:        Full active and passive ROM with flexion, extension, abduction, internal and external rotation on the right, mildly limited on the left    Tender:        subacromial space L>>R       posterior shoulder left       Anterior GH joint left    Non Tender:       remainder of shoulder       sternoclavicular joint       acromioclavicular joint       periscapular region    Strength:        abduction 5/5       internal rotation 5/5       external rotation 5/5       adduction 5/5    Impingement testing:        neg (-) Neer       positive (+) Elena left       positive (+) empty can left > right       neg (-) crossover       positive (+) crank left, mild    Stability testing:       neg (-) anterior glide       neg (-) sulcus sign    Skin:       no visible deformities       well perfused       capillary refill brisk    Sensation:        normal sensation over shoulder and upper extremity     Bilateral Foot and Ankle Exam:     Inspection:       no visible ecchymosis       no visible effusion, mild edema at the first MTP joint left     Foot inspection:       no deformity noted     Tender:       Great toe MTP joint L>>R     Non-Tender:       remainder of ankle and foot      ROM:        Decreased great toe dorsiflexion and  plantarflexion L>R currently     Strength:       ankle dorsiflexion 5/5 right       plantarflexion 5/5 right       inversion 5/5 right       eversion 5/5 right       great toe dorsiflexion 5/5 right       great toe plantarflexion 5/5 right     Gait:       normal     Neurovascular:       2+ peripheral pulses bilaterally and brisk capillary refill       sensation grossly intact    Bilateral hand exam  Full strength and ROM bilaterally  Painful opposition of the thumb b/l, L>>R, localizing to 1st CMC joint  Mildly painful CMC grind on the left  Neg Tinel's and Phalen's, neg Finkelstein's    Radiology   Narrative    LEFT FOOT THREE OR MORE VIEWS  11/10/2023 1:44 PM     HISTORY: Left foot pain.    COMPARISON: None.       Impression    IMPRESSION:  Mild to moderate arthrosis first MTP joint. No evidence  of an acute fracture. No Lisfranc subluxation.    MUNA AMBROSE MD         SYSTEM ID:  [id number]   XR Hand Bilateral G/E 3 Views    Narrative    HAND BILATERAL THREE OR MORE VIEWS   11/10/2023 1:44 PM     HISTORY: Bilateral hand pain.    COMPARISON: None.       Impression    IMPRESSION: Mild to moderate scattered arthritic changes throughout  the hands most pronounced in the IP joints of the fingers, bilateral  thumb CMC joints and right middle finger MCP joint. There is no  erosive change or evidence for an inflammatory arthropathy. Normal  bone mineralization. No acute fracture.    MUNA AMBROSE MD         SYSTEM ID:  [id number]       Assessment:  1. Chronic pain of both shoulders    2. Left foot pain    3. Bilateral hand pain    4. Limited range of motion (ROM) of shoulder    5. Calcific tendinitis of left shoulder    6. 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 Osteoarthritis of carpometacarpal (CMC) joint of both thumbs        Plan:  Discussed the assessment with the patient.  Follow up: next month prn based on progress  Acute shoulder pain with mildly limited ROM on the left, in the setting of  more chronic, intermittent b/l shoulder pain  Sx consistent with subacromial impingement, signs of calcific tendinitis on the left on XR, also some signs of GH joint pain  Consider CSI next visit based on progression over time  Gentle exercise options reviewed, PT offered and available anytime  Oral Tylenol and topical Voltaren gel reviewed as safe OTC options, reviewed safe dosing strategies  XR images independently visualized and reviewed with patient today in clinic  Also with increasing b/l hand pain, centered over 1st CMC joints, L>>R currently  Consider CSI in the future based on progress  Bracing strategies and topical NSAID strategies reviewed  OT available if desired, declined for now  Known hx of right 1st MTP joint DJD, now worsening sx and similar pathology on XR seen on the left  Footwear options and injection options reviewed  Defer injections today given upcoming shingles vaccine  Will try to manage as many issues as we can before upcoming [place] trip, reviewed today, appts scheduled for December  We discussed modified progressive pain-free activity as tolerated  Home handouts provided and supportive care reviewed  All questions were answered today  Contact us with additional questions or concerns  Signs and sx of concern reviewed      Danny Oleary DO, [place]  Sports Medicine Physician  [facility]    Time spent in chart review, one-on-one evaluation, discussion with patient regarding: nature of problem, clinical course, prior treatments, therapeutic options, shared-decision making, potential procedures and referrals, and charting related to the visit: 38 minutes.  If applicable, time does not include time spent performing any procedure.            Disclaimer: This note consists of symbols derived from keyboarding, dictation and/or voice recognition software. As a result, there may be errors in the script that have gone undetected. Please consider this when  interpreting information found in this chart.

## 2023-11-07 ENCOUNTER — OFFICE VISIT (OUTPATIENT)
Dept: NEUROLOGY | Facility: CLINIC | Age: 63
End: 2023-11-07
Attending: FAMILY MEDICINE
Payer: COMMERCIAL

## 2023-11-07 VITALS
HEIGHT: 67 IN | DIASTOLIC BLOOD PRESSURE: 83 MMHG | SYSTOLIC BLOOD PRESSURE: 125 MMHG | HEART RATE: 58 BPM | OXYGEN SATURATION: 98 % | WEIGHT: 189.4 LBS | BODY MASS INDEX: 29.73 KG/M2

## 2023-11-07 DIAGNOSIS — Z78.9 ALCOHOL USE: ICD-10-CM

## 2023-11-07 DIAGNOSIS — R41.89 IMPAIRMENT OF COGNITIVE FUNCTION: ICD-10-CM

## 2023-11-07 DIAGNOSIS — R41.3 MEMORY DIFFICULTY: ICD-10-CM

## 2023-11-07 DIAGNOSIS — R47.89 WORD FINDING DIFFICULTY: ICD-10-CM

## 2023-11-07 DIAGNOSIS — Z82.0 FAMILY HISTORY OF ALZHEIMER'S DISEASE: ICD-10-CM

## 2023-11-07 DIAGNOSIS — R41.3 SHORT-TERM MEMORY LOSS: Primary | ICD-10-CM

## 2023-11-07 PROCEDURE — 99204 OFFICE O/P NEW MOD 45 MIN: CPT | Mod: GC

## 2023-11-07 NOTE — PROGRESS NOTES
[facility]/[place]  Section of General Neurology  New Patient Visit      Adan Oliver MRN# [id number]   Age: 63 year old YOB: 1960     Requesting physician: Johnathon Siegel     Reason for Consultation: Short term memory problems           Assessment and Plan:   Assessment:  Adan is a 63 year old male with PMH significant for afib, anxiety/depression, alcohol use, moderate sleep apnea not using CPAP and family history significant for alzheimer's disease in mother and grandfather, presenting with short term memory problems and word finding difficulties.      Overall impression is that Adan has multiple health concerns that are likely contributing to intermittent sub optimal cognitive function.  His current daily alcohol use, poor sleep likely due to LUCIAN and CPAP non-compliance as well as underlying psychiatric diagnoses put him at higher risk of poor cognitive function.  Today, he performed well on MoCA, scoring 28/30, only losing points within the delayed recall cognitive domain. At this point, underlying dementia is unlikely, but due to strong family history and lack of baseline detailed cognitive work up, would recommend MRI brain, serum tests, including amyloid as well as a sleep referral.  Untreated sleep apnea may very well be the main culprit as reduced amount of quality sleep can be a common cause of poor cognitive function.  Since he hasn't been tolerating CPAP, would be beneficial for him to meet again with sleep medicine to discuss potential additional treatment options.       Plan:  -MRI brain with and without contrast  -B1, B12, HIV and RPR testing  -Serum amyloid testing  -Sleep medicine referral - are there other methods available for treatment of LUCIAN that patient would tolerate better?  -Follow up in 3 months        PARRIS Garcia D.O.  Resident Physician of Neurology  [facility]    Patient discussed with my supervising physician  "Dr. Gaviria, who agrees with the critical findings, assessment, and plan as documented in the note above or as otherwise in their attestation.        History of Presenting Symptoms:   Adan Oliver is a 63 year old male who presents today for evaluation of short term memory loss and word finding difficulties.      He has a notable PMH of current daily alcohol use (2 rum based drinks/day with 4 sometimes on Wednesdays), LUCIAN not on CPAP due to not tolerating, anxiety/depression seeing a therapist.      Patient states that he has been having a shift in his overall cognitive function that is \"hard to put his finger on.\"  He just feels different.  For the last few years he has noticed more \"tip of the tongue\" moments where he is unable to think of the right word to say.  He also has been forgetting people's names and short term memory loss.  He states that he has had two other cognitive assessments with other providers at this point (not with neurology) that have been essentially normal except for the short term memory testing.  Of note, his mother recently passed away at the age of 93 from complications due to Alzheimer's dementia.  His grandfather on his mother's side also had Alzheimer's disease.  He is concerned about his risk of developing dementia due to this strong family history.      He reports no new behavioral issues or hallucinations.  No other neurological complaints including falls or gait instability.  He is able to live on his own and has no trouble with ADLs.          Past Medical History:     Patient Active Problem List   Diagnosis    Esophageal reflux    Hyperlipidemia with target LDL less than 130    Reid esophagus    Memory changes    Insomnia, psychophysiological    Family history of Alzheimer's disease    Anxiety    Lumbar degenerative disc disease    Protrusion of lumbar intervertebral disc    Lumbar spinal stenosis    Alcohol use    LUCIAN (obstructive sleep apnea)- 'moderate' (AHI 6)    Hip " pain, bilateral    Benign prostatic hyperplasia with urinary frequency     Past Medical History:   Diagnosis Date    Anxiety 12/07/2015    Reid esophagus 10/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 It was difficult to discern between PLMS and hyponeas. Apnea/hypopnea index was 28.0 events per hour.  The REM AHI was 29.3 events per hour.  The supine AHI was 32.7 events per hour.  RDI was 28.0 events per hour. Lowest oxygen saturation was 84.0%.  Time spent less than or equal to 88% was 0.3 minutes. PLM index was 32.3 movements per hour        Past Surgical History:     Past Surgical History:   Procedure Laterality Date    APPENDECTOMY  2000s    BLEPHAROPLASTY BILATERAL Bilateral 10/3/2018    Procedure: BLEPHAROPLASTY BILATERAL;;  Surgeon: Dany Berrios MD;  Location: [facility]    CARPAL TUNNEL RELEASE RT/LT  1980s    COLONOSCOPY N/A 7/27/2022    Procedure: COLONOSCOPY, FLEXIBLE, WITH LESION REMOVAL USING SNARE;  Surgeon: Filiberto Banuelos MD;  Location: [facility]    COLONOSCOPY WITH CO2 INSUFFLATION N/A 7/27/2022    Procedure: COLONOSCOPY, WITH CO2 INSUFFLATION;  Surgeon: Filiberto Banuelos MD;  Location: [facility]    COMBINED ESOPHAGOSCOPY, GASTROSCOPY, DUODENOSCOPY (EGD) WITH CO2 INSUFFLATION N/A 9/20/2019    Procedure: ESOPHAGOGASTRODUODENOSCOPY, WITH CO2 INSUFFLATION;  Surgeon: Filiberto Banuelos MD;  [facility]: [facility]    COMBINED ESOPHAGOSCOPY, GASTROSCOPY, DUODENOSCOPY (EGD) WITH CO2 INSUFFLATION N/A 7/27/2022    Procedure: ESOPHAGOGASTRODUODENOSCOPY, WITH CO2 INSUFFLATION;  Surgeon: Filiberto Banuelos MD;  Location: [facility]    ESOPHAGOSCOPY, GASTROSCOPY, DUODENOSCOPY (EGD), COMBINED N/A 9/20/2019     Procedure: Esophagogastroduodenoscopy, With Biopsy;  Surgeon: Filiberto Banuelos MD;  Location: [facility]    ESOPHAGOSCOPY, GASTROSCOPY, DUODENOSCOPY (EGD), COMBINED N/A 7/27/2022    Procedure: ESOPHAGOGASTRODUODENOSCOPY, WITH BIOPSY;  Surgeon: Filiberto Banuelos MD;  [facility]: [facility]    REPAIR PTOSIS BILATERAL Bilateral 10/3/2018    Procedure: REPAIR PTOSIS BILATERAL;;  Surgeon: Dany Berrios MD;  [facility]: [facility]    REPAIR PTOSIS BROW BILATERAL Bilateral 10/3/2018    Procedure: REPAIR PTOSIS BROW BILATERAL;;  Surgeon: Dany Berrios MD;  [facility]: [facility]    SIGMOIDOSCOPY FLEXIBLE N/A 9/20/2019    Procedure: SIGMOIDOSCOPY, FLEXIBLE;  Surgeon: Filiberto Banuelos MD;  Location: [facility]    TONSILLECTOMY  1980s        Social History:     Social History     Tobacco Use    Smoking status: Never    Smokeless tobacco: Never   Vaping Use    Vaping Use: Never used   Substance Use Topics    Alcohol use: Yes     Alcohol/week: 14.0 - 21.0 standard drinks of alcohol     Types: 14 - 21 Standard drinks or equivalent per week     Comment: varies, a couple drinks a day    Drug use: No        Family History:     Family History   Problem Relation Age of Onset    Hypertension Mother     Alzheimer Disease Mother     Diabetes Brother     Glaucoma No family hx of     Macular Degeneration No family hx of     Coronary Artery Disease No family hx of     Colon Cancer No family hx of     Retinal detachment No family hx of         Medications:     Current Outpatient Medications   Medication Sig    acetaminophen (TYLENOL) 325 MG tablet Take 325-650 mg by mouth every 6 hours as needed for mild pain    esomeprazole (NEXIUM) 40 MG DR capsule TAKE ONE CAPSULE BY MOUTH EVERY MORNING 30-60 MINUTES BEFORE BREAKFAST    flecainide (TAMBOCOR) 50 MG tablet Take 1 tablet (50 mg) by mouth 2 times daily    metoprolol succinate ER (TOPROL XL) 100 MG 24 hr tablet Take 1 tablet (100 mg) by mouth daily    rosuvastatin (CRESTOR) 20 MG  "tablet Take 1 tablet (20 mg) by mouth daily    UNABLE TO FIND MEDICATION NAME: low-dose THC lozenge     Current Facility-Administered Medications   Medication    lidocaine (PF) (XYLOCAINE) 1 % injection 1 mL    ropivacaine (NAROPIN) injection 3 mL    triamcinolone (KENALOG-40) injection 40 mg    triamcinolone (KENALOG-40) injection 40 mg        Allergies:   No Known Allergies     Review of Systems:   As noted above     Physical Exam:   Vitals: /83   Pulse 58   Ht 1.694 m (5' 6.7\")   Wt 85.9 kg (189 lb 6.4 oz)   SpO2 98%   BMI 29.93 kg/m     CV: peripheral pulse appreciated  Lungs: breathing comfortably  Extremities: no edema  Skin: No rashes    Neuro:   General Appearance: No apparent distress, well-nourished, well-groomed, pleasant     Mental Status: Alert and oriented to person, place, and time. Speech fluent and comprehension intact. No dysarthria. Normal memory, fund of knowledge, attention/concentration, and language    MoCA: 28/30 - scored 3/5 on delayed recall.     Cranial Nerves:   II: Visual fields: normal  III: Pupils: 3 mm, equal, round, reactive to light   III,IV,VI: Extraocular Movements: intact   V: Facial sensation: intact to light touch  VII: Facial strength: intact without asymmetry  VIII: Hearing: intact grossly  IX: Palate: intact   XI: Shoulder shrug: intact  XII: Tongue movement: normal     Motor Exam:   5/5 Diffusely    No drift is present. No abnormal movements. Tone is normal throughout.    Sensory: intact to light touch throughout     Coordination: no dysmetria with finger-to-nose bilaterally    Reflexes: biceps, triceps, brachioradialis, patellar, and ankle jerks 2+ and symmetric.    Gait: normal casual gait, normal stride length           Data: Pertinent prior to visit   Imaging:  Reviewed    Procedures:  Reviewed    Laboratory:  TSH 0.51    Creatinine 1.33  BUN 23.4  GFR 60         "

## 2023-11-07 NOTE — NURSING NOTE
"Adan Oliver is a 63 year old male who presents for:  Chief Complaint   Patient presents with    Memory Loss     Short term memory. Ringing in ears and sinus problems. Mom had alzheimer. Sleeping problems        Initial Vitals:  /83   Pulse 58   Ht 1.694 m (5' 6.7\")   Wt 85.9 kg (189 lb 6.4 oz)   SpO2 98%   BMI 29.93 kg/m   Estimated body mass index is 29.93 kg/m  as calculated from the following:    Height as of this encounter: 1.694 m (5' 6.7\").    Weight as of this encounter: 85.9 kg (189 lb 6.4 oz).. Body surface area is 2.01 meters squared. BP completed using cuff size: regular    Lien Rathai   "

## 2023-11-07 NOTE — LETTER
11/7/2023         RE: Adan Oliver  [address]  [place] [address]        Dear Colleague,    Thank you for referring your patient, Adan Oliver, to the [facility] NEUROLOG[facility]. Please see a copy of my visit note below.    [facility]  Section of General Neurology  New Patient Visit      Adan Oliver MRN# [id number]   Age: 63 year old YOB: 1960     Requesting physician: Johnathon Siegel     Reason for Consultation: Short term memory problems           Assessment and Plan:   Assessment:  Adan is a 63 year old male with PMH significant for afib, anxiety/depression, alcohol use, moderate sleep apnea not using CPAP and family history significant for alzheimer's disease in mother and grandfather, presenting with short term memory problems and word finding difficulties.      Overall impression is that Adan has multiple health concerns that are likely contributing to intermittent sub optimal cognitive function.  His current daily alcohol use, poor sleep likely due to LUCIAN and CPAP non-compliance as well as underlying psychiatric diagnoses put him at higher risk of poor cognitive function.  Today, he performed well on MoCA, scoring 28/30, only losing points within the delayed recall cognitive domain. At this point, underlying dementia is unlikely, but due to strong family history and lack of baseline detailed cognitive work up, would recommend MRI brain, serum tests, including amyloid as well as a sleep referral.  Untreated sleep apnea may very well be the main culprit as reduced amount of quality sleep can be a common cause of poor cognitive function.  Since he hasn't been tolerating CPAP, would be beneficial for him to meet again with sleep medicine to discuss potential additional treatment options.       Plan:  -MRI brain with and without contrast  -B1, B12, HIV and RPR testing  -Serum amyloid testing  -Sleep medicine  "referral - are there other methods available for treatment of LUCIAN that patient would tolerate better?  -Follow up in 3 months        PARRIS Garcia D.O.  Resident Physician of Neurology  [facility]    Patient discussed with my supervising physician Dr. Gaviria, who agrees with the critical findings, assessment, and plan as documented in the note above or as otherwise in their attestation.        History of Presenting Symptoms:   Adan Oliver is a 63 year old male who presents today for evaluation of short term memory loss and word finding difficulties.      He has a notable PMH of current daily alcohol use (2 rum based drinks/day with 4 sometimes on Wednesdays), LUCIAN not on CPAP due to not tolerating, anxiety/depression seeing a therapist.      Patient states that he has been having a shift in his overall cognitive function that is \"hard to put his finger on.\"  He just feels different.  For the last few years he has noticed more \"tip of the tongue\" moments where he is unable to think of the right word to say.  He also has been forgetting people's names and short term memory loss.  He states that he has had two other cognitive assessments with other providers at this point (not with neurology) that have been essentially normal except for the short term memory testing.  Of note, his mother recently passed away at the age of 93 from complications due to Alzheimer's dementia.  His grandfather on his mother's side also had Alzheimer's disease.  He is concerned about his risk of developing dementia due to this strong family history.      He reports no new behavioral issues or hallucinations.  No other neurological complaints including falls or gait instability.  He is able to live on his own and has no trouble with ADLs.          Past Medical History:     Patient Active Problem List   Diagnosis     Esophageal reflux     Hyperlipidemia with target LDL less than 130     Reid esophagus     " Memory changes     Insomnia, psychophysiological     Family history of Alzheimer's disease     Anxiety     Lumbar degenerative disc disease     Protrusion of lumbar intervertebral disc     Lumbar spinal stenosis     Alcohol use     LUCIAN (obstructive sleep apnea)- 'moderate' (AHI 6)     Hip pain, bilateral     Benign prostatic hyperplasia with urinary frequency     Past Medical History:   Diagnosis Date     Anxiety 12/07/2015     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 It was difficult to discern between PLMS and hyponeas. Apnea/hypopnea index was 28.0 events per hour.  The REM AHI was 29.3 events per hour.  The supine AHI was 32.7 events per hour.  RDI was 28.0 events per hour. Lowest oxygen saturation was 84.0%.  Time spent less than or equal to 88% was 0.3 minutes. PLM index was 32.3 movements per hour        Past Surgical History:     Past Surgical History:   Procedure Laterality Date     APPENDECTOMY  2000s     BLEPHAROPLASTY BILATERAL Bilateral 10/3/2018    Procedure: BLEPHAROPLASTY BILATERAL;;  Surgeon: Dany Berrios MD;  Location:  OR     CARPAL TUNNEL RELEASE RT/LT  1980s     COLONOSCOPY N/A 7/27/2022    Procedure: COLONOSCOPY, FLEXIBLE, WITH LESION REMOVAL USING SNARE;  Surgeon: Filiberto Banuelos MD;  Location:  OR     COLONOSCOPY WITH CO2 INSUFFLATION N/A 7/27/2022    Procedure: COLONOSCOPY, WITH CO2 INSUFFLATION;  Surgeon: Filiberto Banuelos MD;  Location:  OR     COMBINED ESOPHAGOSCOPY, GASTROSCOPY, DUODENOSCOPY (EGD) WITH CO2 INSUFFLATION N/A 9/20/2019    Procedure: ESOPHAGOGASTRODUODENOSCOPY, WITH CO2 INSUFFLATION;  Surgeon: Filiberto Banuelos MD;  Location:   OR     COMBINED ESOPHAGOSCOPY, GASTROSCOPY, DUODENOSCOPY (EGD) WITH CO2 INSUFFLATION N/A 7/27/2022    Procedure: ESOPHAGOGASTRODUODENOSCOPY, WITH CO2 INSUFFLATION;  Surgeon: Filiberto Banuelos MD;  Location: [facility] OR     ESOPHAGOSCOPY, GASTROSCOPY, DUODENOSCOPY (EGD), COMBINED N/A 9/20/2019    Procedure: Esophagogastroduodenoscopy, With Biopsy;  Surgeon: Filiberto Banuelos MD;  Location: [facility] OR     ESOPHAGOSCOPY, GASTROSCOPY, DUODENOSCOPY (EGD), COMBINED N/A 7/27/2022    Procedure: ESOPHAGOGASTRODUODENOSCOPY, WITH BIOPSY;  Surgeon: Filiberto Banuelos MD;  Location: [facility] OR     REPAIR PTOSIS BILATERAL Bilateral 10/3/2018    Procedure: REPAIR PTOSIS BILATERAL;;  Surgeon: Dany Berrios MD;  Location: [facility]     REPAIR PTOSIS BROW BILATERAL Bilateral 10/3/2018    Procedure: REPAIR PTOSIS BROW BILATERAL;;  Surgeon: Dany Berrios MD;  Location: [facility]     SIGMOIDOSCOPY FLEXIBLE N/A 9/20/2019    Procedure: SIGMOIDOSCOPY, FLEXIBLE;  Surgeon: Filiberto Banuelos MD;  Location: [facility]     TONSILLECTOMY  1980s        Social History:     Social History     Tobacco Use     Smoking status: Never     Smokeless tobacco: Never   Vaping Use     Vaping Use: Never used   Substance Use Topics     Alcohol use: Yes     Alcohol/week: 14.0 - 21.0 standard drinks of alcohol     Types: 14 - 21 Standard drinks or equivalent per week     Comment: varies, a couple drinks a day     Drug use: No        Family History:     Family History   Problem Relation Age of Onset     Hypertension Mother      Alzheimer Disease Mother      Diabetes Brother      Glaucoma No family hx of      Macular Degeneration No family hx of      Coronary Artery Disease No family hx of      Colon Cancer No family hx of      Retinal detachment No family hx of         Medications:     Current Outpatient Medications   Medication Sig     acetaminophen (TYLENOL) 325 MG tablet Take 325-650 mg by mouth every 6 hours as needed for mild pain      "esomeprazole (NEXIUM) 40 MG DR capsule TAKE ONE CAPSULE BY MOUTH EVERY MORNING 30-60 MINUTES BEFORE BREAKFAST     flecainide (TAMBOCOR) 50 MG tablet Take 1 tablet (50 mg) by mouth 2 times daily     metoprolol succinate ER (TOPROL XL) 100 MG 24 hr tablet Take 1 tablet (100 mg) by mouth daily     rosuvastatin (CRESTOR) 20 MG tablet Take 1 tablet (20 mg) by mouth daily     UNABLE TO FIND MEDICATION NAME: low-dose THC lozenge     Current Facility-Administered Medications   Medication     lidocaine (PF) (XYLOCAINE) 1 % injection 1 mL     ropivacaine (NAROPIN) injection 3 mL     triamcinolone (KENALOG-40) injection 40 mg     triamcinolone (KENALOG-40) injection 40 mg        Allergies:   No Known Allergies     Review of Systems:   As noted above     Physical Exam:   Vitals: /83   Pulse 58   Ht 1.694 m (5' 6.7\")   Wt 85.9 kg (189 lb 6.4 oz)   SpO2 98%   BMI 29.93 kg/m     CV: peripheral pulse appreciated  Lungs: breathing comfortably  Extremities: no edema  Skin: No rashes    Neuro:   General Appearance: No apparent distress, well-nourished, well-groomed, pleasant     Mental Status: Alert and oriented to person, place, and time. Speech fluent and comprehension intact. No dysarthria. Normal memory, fund of knowledge, attention/concentration, and language    MoCA: 28/30 - scored 3/5 on delayed recall.     Cranial Nerves:   II: Visual fields: normal  III: Pupils: 3 mm, equal, round, reactive to light   III,IV,VI: Extraocular Movements: intact   V: Facial sensation: intact to light touch  VII: Facial strength: intact without asymmetry  VIII: Hearing: intact grossly  IX: Palate: intact   XI: Shoulder shrug: intact  XII: Tongue movement: normal     Motor Exam:   5/5 Diffusely    No drift is present. No abnormal movements. Tone is normal throughout.    Sensory: intact to light touch throughout     Coordination: no dysmetria with finger-to-nose bilaterally    Reflexes: biceps, triceps, brachioradialis, patellar, and " ankle jerks 2+ and symmetric.    Gait: normal casual gait, normal stride length           Data: Pertinent prior to visit   Imaging:  Reviewed    Procedures:  Reviewed    Laboratory:  TSH 0.51    Creatinine 1.33  BUN 23.4  GFR 60           Attestation signed by Cam Gaviria MD at 11/8/2023  1:35 PM:  Attending Attestation    I saw and evaluated the patient on 11/7/2023 and agree with the findings and the plan of care as documented in the resident's note.       I personally reviewed vital signs, medications, labs and pertinent imaging.    Discussed that optimizing mood and sleep are most common ways to improve memory.  In his case I think LUCIAN not being treated is a confounding variable in his memory loss.  I do think further work up in MRI, serological testing as noted (including send out Alzheimer amyloid markers given family history) is a good idea.  Also discussed what neuropsychological testing entails and what we can hope to learn from such testing if he becomes interested in this testing in the future.  We will plan to follow up to review blood work and MRI data and see how he is doing as noted in Dr. Garcia's note.  He will reach out with issues questions or changes in the meanwhile       Dash Gaviria MD   of Neurology  [facility]      Again, thank you for allowing me to participate in the care of your patient.        Sincerely,        Primitivo Garcia MD

## 2023-11-10 ENCOUNTER — ANCILLARY PROCEDURE (OUTPATIENT)
Dept: GENERAL RADIOLOGY | Facility: CLINIC | Age: 63
End: 2023-11-10
Attending: FAMILY MEDICINE
Payer: COMMERCIAL

## 2023-11-10 ENCOUNTER — MYC MEDICAL ADVICE (OUTPATIENT)
Dept: CARDIOLOGY | Facility: CLINIC | Age: 63
End: 2023-11-10

## 2023-11-10 ENCOUNTER — OFFICE VISIT (OUTPATIENT)
Dept: ORTHOPEDICS | Facility: CLINIC | Age: 63
End: 2023-11-10
Payer: COMMERCIAL

## 2023-11-10 VITALS
HEART RATE: 62 BPM | WEIGHT: 189 LBS | BODY MASS INDEX: 29.87 KG/M2 | DIASTOLIC BLOOD PRESSURE: 82 MMHG | SYSTOLIC BLOOD PRESSURE: 124 MMHG

## 2023-11-10 DIAGNOSIS — M25.511 BILATERAL SHOULDER PAIN: ICD-10-CM

## 2023-11-10 DIAGNOSIS — M79.672 LEFT FOOT PAIN: ICD-10-CM

## 2023-11-10 DIAGNOSIS — M19.079 ARTHRITIS OF GREAT TOE AT METATARSOPHALANGEAL JOINT: ICD-10-CM

## 2023-11-10 DIAGNOSIS — M25.619 LIMITED RANGE OF MOTION (ROM) OF SHOULDER: ICD-10-CM

## 2023-11-10 DIAGNOSIS — M18.0 OSTEOARTHRITIS OF CARPOMETACARPAL (CMC) JOINT OF BOTH THUMBS: ICD-10-CM

## 2023-11-10 DIAGNOSIS — M79.641 BILATERAL HAND PAIN: ICD-10-CM

## 2023-11-10 DIAGNOSIS — M75.32 CALCIFIC TENDINITIS OF LEFT SHOULDER: ICD-10-CM

## 2023-11-10 DIAGNOSIS — M25.512 BILATERAL SHOULDER PAIN: ICD-10-CM

## 2023-11-10 DIAGNOSIS — M25.512 CHRONIC PAIN OF BOTH SHOULDERS: Primary | ICD-10-CM

## 2023-11-10 DIAGNOSIS — M79.642 BILATERAL HAND PAIN: ICD-10-CM

## 2023-11-10 DIAGNOSIS — G89.29 CHRONIC PAIN OF BOTH SHOULDERS: Primary | ICD-10-CM

## 2023-11-10 DIAGNOSIS — M25.511 CHRONIC PAIN OF BOTH SHOULDERS: Primary | ICD-10-CM

## 2023-11-10 PROCEDURE — 73630 X-RAY EXAM OF FOOT: CPT | Mod: TC | Performed by: RADIOLOGY

## 2023-11-10 PROCEDURE — 73130 X-RAY EXAM OF HAND: CPT | Mod: TC | Performed by: RADIOLOGY

## 2023-11-10 PROCEDURE — 73030 X-RAY EXAM OF SHOULDER: CPT | Mod: TC | Performed by: RADIOLOGY

## 2023-11-10 PROCEDURE — 99214 OFFICE O/P EST MOD 30 MIN: CPT | Performed by: FAMILY MEDICINE

## 2023-11-10 NOTE — LETTER
11/10/2023         RE: Adan Oliver  [address]  [place] [address]        Dear Colleague,    Thank you for referring your patient, Adan Oliver, to the [facility] [place]. Please see a copy of my visit note below.    Adan Oliver  :  1960  DOS: 11/10/2023  MRN: [id number]    Sports Medicine Clinic Visit    PCP: Johnathon Diaz MD     Adan Oliver is a 63 year old Right hand dominant male who is seen as a self referral presenting with bilateral shoulder pain.     Injury: Reports insidious onset without acute precipitating event that patient first noticed approximately months. Pain located over bilateral shoulder, L>R, bilateral thumbs CMC joint, left big toe joint.  Additional Features:  Positive: right shoulder - crunchy sounds, popping.  Symptoms are better with Ibuprofen.  Symptoms are worse with: overhead motions: abduction, flexion, gripping.  Other evaluation and/or treatments so far consists of: Rest.  Recent imaging completed: No recent imaging completed.  Prior History of related problems: Has had previous shoulder issues - poss right rotator cuff    Social History: Currently employed as a contractor     Review of Systems  Musculoskeletal: as above  Remainder of review of systems is negative including constitutional, CV, pulmonary, GI, Skin and Neurologic except as noted in HPI or medical history.    Past Medical History:   Diagnosis Date     Anxiety 2015     Reid esophagus 10/30/2014     Esophageal reflux 10/01/2014     History of shingles      Hyperlipidemia with target LDL less than 130 10/01/2014     Lumbar degenerative disc disease 2016     Nephrolithiasis      LUCIAN (obstructive sleep apnea)- 'moderate' (AHI 6) 10/06/2017    Study Date: 10/2/2017- (200.0 lbs) Scored using 3% rules. It was difficult to discern between PLMS and hyponeas. Apnea/hypopnea index was 28.0 events per hour.  The REM AHI was 29.3 events per  hour.  The supine AHI was 32.7 events per hour.  RDI was 28.0 events per hour. Lowest oxygen saturation was 84.0%.  Time spent less than or equal to 88% was 0.3 minutes. PLM index was 32.3 movements per hour     Past Surgical History:   Procedure Laterality Date     APPENDECTOMY  2000s     BLEPHAROPLASTY BILATERAL Bilateral 10/3/2018    Procedure: BLEPHAROPLASTY BILATERAL;;  Surgeon: Dany Berrios MD;  Location: [facility]     CARPAL TUNNEL RELEASE RT/LT  1980s     COLONOSCOPY N/A 7/27/2022    Procedure: COLONOSCOPY, FLEXIBLE, WITH LESION REMOVAL USING SNARE;  Surgeon: Filiberto Banuelos MD;  Location: [facility]     COLONOSCOPY WITH CO2 INSUFFLATION N/A 7/27/2022    Procedure: COLONOSCOPY, WITH CO2 INSUFFLATION;  Surgeon: Filiberto Banuelos MD;  [facility]     COMBINED ESOPHAGOSCOPY, GASTROSCOPY, DUODENOSCOPY (EGD) WITH CO2 INSUFFLATION N/A 9/20/2019    Procedure: ESOPHAGOGASTRODUODENOSCOPY, WITH CO2 INSUFFLATION;  Surgeon: Filiberto Banuelos MD;  [facility]     COMBINED ESOPHAGOSCOPY, GASTROSCOPY, DUODENOSCOPY (EGD) WITH CO2 INSUFFLATION N/A 7/27/2022    Procedure: ESOPHAGOGASTRODUODENOSCOPY, WITH CO2 INSUFFLATION;  Surgeon: Filiberto Banuelos MD;  [facility]: [facility]     ESOPHAGOSCOPY, GASTROSCOPY, DUODENOSCOPY (EGD), COMBINED N/A 9/20/2019    Procedure: Esophagogastroduodenoscopy, With Biopsy;  Surgeon: Filiberto Banuelos MD;  [facility]: [facility]     ESOPHAGOSCOPY, GASTROSCOPY, DUODENOSCOPY (EGD), COMBINED N/A 7/27/2022    Procedure: ESOPHAGOGASTRODUODENOSCOPY, WITH BIOPSY;  Surgeon: Filiberto Banuelos MD;  Location: [facility]     REPAIR PTOSIS BILATERAL Bilateral 10/3/2018    Procedure: REPAIR PTOSIS BILATERAL;;  Surgeon: Dany Berrios MD;  [facility]: [facility]     REPAIR PTOSIS BROW BILATERAL Bilateral 10/3/2018    Procedure: REPAIR PTOSIS BROW BILATERAL;;  Surgeon: Dany Berrios MD;  [facility]: [facility]     SIGMOIDOSCOPY FLEXIBLE N/A 9/20/2019    Procedure:  SIGMOIDOSCOPY, FLEXIBLE;  Surgeon: Filiberto Banuelos MD;  Location: [facility]     TONSILLECTOMY  1980s     Family History   Problem Relation Age of Onset     Hypertension Mother      Alzheimer Disease Mother      Diabetes Brother      Glaucoma No family hx of      Macular Degeneration No family hx of      Coronary Artery Disease No family hx of      Colon Cancer No family hx of      Retinal detachment No family hx of        Objective  /82   Pulse 62   Wt 85.7 kg (189 lb)   BMI 29.87 kg/m      General: healthy, alert and in no distress    HEENT: no scleral icterus or conjunctival erythema   Skin: no suspicious lesions or rash. No jaundice.   CV: regular rhythm by palpation, 2+ distal pulses, no pedal edema    Resp: normal respiratory effort without conversational dyspnea   Psych: normal mood and affect    Gait: nonantalgic, appropriate coordination and balance   Neuro: normal light touch sensory exam of the extremities. Motor strength as noted below     Bilateral Shoulder exam    ROM:        Full active and passive ROM with flexion, extension, abduction, internal and external rotation on the right, mildly limited on the left    Tender:        subacromial space L>>R       posterior shoulder left       Anterior GH joint left    Non Tender:       remainder of shoulder       sternoclavicular joint       acromioclavicular joint       periscapular region    Strength:        abduction 5/5       internal rotation 5/5       external rotation 5/5       adduction 5/5    Impingement testing:        neg (-) Neer       positive (+) Elena left       positive (+) empty can left > right       neg (-) crossover       positive (+) crank left, mild    Stability testing:       neg (-) anterior glide       neg (-) sulcus sign    Skin:       no visible deformities       well perfused       capillary refill brisk    Sensation:        normal sensation over shoulder and upper extremity     Bilateral Foot and Ankle Exam:      Inspection:       no visible ecchymosis       no visible effusion, mild edema at the first MTP joint left     Foot inspection:       no deformity noted     Tender:       Great toe MTP joint L>>R     Non-Tender:       remainder of ankle and foot      ROM:        Decreased great toe dorsiflexion and plantarflexion L>R currently     Strength:       ankle dorsiflexion 5/5 right       plantarflexion 5/5 right       inversion 5/5 right       eversion 5/5 right       great toe dorsiflexion 5/5 right       great toe plantarflexion 5/5 right     Gait:       normal     Neurovascular:       2+ peripheral pulses bilaterally and brisk capillary refill       sensation grossly intact    Bilateral hand exam  Full strength and ROM bilaterally  Painful opposition of the thumb b/l, L>>R, localizing to 1st CMC joint  Mildly painful CMC grind on the left  Neg Tinel's and Phalen's, neg Finkelstein's    Radiology   Narrative    LEFT FOOT THREE OR MORE VIEWS  11/10/2023 1:44 PM     HISTORY: Left foot pain.    COMPARISON: None.       Impression    IMPRESSION:  Mild to moderate arthrosis first MTP joint. No evidence  of an acute fracture. No Lisfranc subluxation.    MUNA AMBROSE MD         SYSTEM ID:  [id number]   XR Hand Bilateral G/E 3 Views    Narrative    HAND BILATERAL THREE OR MORE VIEWS   11/10/2023 1:44 PM     HISTORY: Bilateral hand pain.    COMPARISON: None.       Impression    IMPRESSION: Mild to moderate scattered arthritic changes throughout  the hands most pronounced in the IP joints of the fingers, bilateral  thumb CMC joints and right middle finger MCP joint. There is no  erosive change or evidence for an inflammatory arthropathy. Normal  bone mineralization. No acute fracture.    MUNA AMBROSE MD         SYSTEM ID:  [id number]       Assessment:  1. Chronic pain of both shoulders    2. Left foot pain    3. Bilateral hand pain    4. Limited range of motion (ROM) of shoulder    5. Calcific tendinitis of left shoulder     6. 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 Osteoarthritis of carpometacarpal (CMC) joint of both thumbs        Plan:  Discussed the assessment with the patient.  Follow up: next month prn based on progress  Acute shoulder pain with mildly limited ROM on the left, in the setting of more chronic, intermittent b/l shoulder pain  Sx consistent with subacromial impingement, signs of calcific tendinitis on the left on XR, also some signs of GH joint pain  Consider CSI next visit based on progression over time  Gentle exercise options reviewed, PT offered and available anytime  Oral Tylenol and topical Voltaren gel reviewed as safe OTC options, reviewed safe dosing strategies  XR images independently visualized and reviewed with patient today in clinic  Also with increasing b/l hand pain, centered over 1st CMC joints, L>>R currently  Consider CSI in the future based on progress  Bracing strategies and topical NSAID strategies reviewed  OT available if desired, declined for now  Known hx of right 1st MTP joint DJD, now worsening sx and similar pathology on XR seen on the left  Footwear options and injection options reviewed  Defer injections today given upcoming shingles vaccine  Will try to manage as many issues as we can before upcoming [place] trip, reviewed today, appts scheduled for December  We discussed modified progressive pain-free activity as tolerated  Home handouts provided and supportive care reviewed  All questions were answered today  Contact us with additional questions or concerns  Signs and sx of concern reviewed      Danny Oleary DO, ARCENIO  Sports Medicine Physician  [facility]    Time spent in chart review, one-on-one evaluation, discussion with patient regarding: nature of problem, clinical course, prior treatments, therapeutic options, shared-decision making, potential procedures and referrals, and charting related to the visit: 38 minutes.  If  applicable, time does not include time spent performing any procedure.            Disclaimer: This note consists of symbols derived from keyboarding, dictation and/or voice recognition software. As a result, there may be errors in the script that have gone undetected. Please consider this when interpreting information found in this chart.      Again, thank you for allowing me to participate in the care of your patient.        Sincerely,        Danny Oleary, DO

## 2023-11-12 NOTE — PROGRESS NOTES
Chief Complaint - Tinnitus; nasal obstruction    History of Present Illness - Adan Oliver is a 63 year old male who presents to me today with ringing in the ears.  It has been present and noticeable for approximately 5 months.  It was sudden in onset. No head trauma. He has had more stress. The patient has noticed increased difficulty hearing, especially in noisy or crowded situations.  There is no history of chronic ear disease or ear surgery. With regards to recreational, , and work-related noise exposure he has some years ago. 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     He has some sinus concerns. He [place] upper respiratory [place] when [place]. This was 4/2023. He had sore throat, nasal congestion. He used afrin for weeks. He switched to flonase. He weaned himself off afrin. He has afib. He still can't breathe through nose. He has some taste changes, but smell is okay.     Tests personally reviewed today for this visit:   1.) audiogram was performed today as part of the evaluation and personally reviewed. The audiogram showed a significant symmetric high frequency sensorineural hearing loss.  Audiogram 8/4/23 showed a moderately severe sensorineural hearing loss.   2.) tympanograms were performed today and were normal Type A curves, with normal canal volume and middle ear pressure.  Type A tymps 8/4/23.   3.) CBC 8/1/23 was normal  4.) Cr 8/1/23 was 1.33     Past Medical History -   Patient Active Problem List   Diagnosis    Esophageal reflux    Hyperlipidemia with target LDL less than 130    Reid esophagus    Memory changes    Insomnia, psychophysiological    Family history of Alzheimer's disease    Anxiety    Lumbar degenerative disc disease    Protrusion of lumbar intervertebral disc    Lumbar spinal stenosis    Alcohol use    LUCIAN (obstructive sleep apnea)- 'moderate' (AHI 6)    Hip pain, bilateral    Benign prostatic hyperplasia with urinary  frequency       Current Medications -   Current Outpatient Medications:     acetaminophen (TYLENOL) 325 MG tablet, Take 325-650 mg by mouth every 6 hours as needed for mild pain, Disp: , Rfl:     esomeprazole (NEXIUM) 40 MG DR capsule, TAKE ONE CAPSULE BY MOUTH EVERY MORNING 30-60 MINUTES BEFORE BREAKFAST, Disp: 90 capsule, Rfl: 3    flecainide (TAMBOCOR) 50 MG tablet, Take 1 tablet (50 mg) by mouth 2 times daily, Disp: 180 tablet, Rfl: 3    metoprolol succinate ER (TOPROL XL) 100 MG 24 hr tablet, Take 1 tablet (100 mg) by mouth daily, Disp: 90 tablet, Rfl: 3    rosuvastatin (CRESTOR) 20 MG tablet, Take 1 tablet (20 mg) by mouth daily, Disp: 90 tablet, Rfl: 3    UNABLE TO FIND, MEDICATION NAME: low-dose THC lozenge, Disp: , Rfl:     Current Facility-Administered Medications:     lidocaine (PF) (XYLOCAINE) 1 % injection 1 mL, 1 mL, , , Danny Oleary DO, 1 mL at 07/26/23 1626    ropivacaine (NAROPIN) injection 3 mL, 3 mL, , , Danny Oleary DO, 3 mL at 02/01/23 1400    triamcinolone (KENALOG-40) injection 40 mg, 40 mg, , , Danny Oleary DO, 40 mg at 07/26/23 1626    triamcinolone (KENALOG-40) injection 40 mg, 40 mg, , , Danny Oleary DO, 40 mg at 02/01/23 1400    Allergies - No Known Allergies    Social History -   Social History     Socioeconomic History    Marital status: Single   Occupational History    Occupation: /advertising design   Tobacco Use    Smoking status: Never    Smokeless tobacco: Never   Vaping Use    Vaping Use: Never used   Substance and Sexual Activity    Alcohol use: Yes     Alcohol/week: 14.0 - 21.0 standard drinks of alcohol     Types: 14 - 21 Standard drinks or equivalent per week     Comment: varies, a couple drinks a day    Drug use: No   Other Topics Concern    Parent/sibling w/ CABG, MI or angioplasty before 65F 55M? No       Family History -   Family History   Problem Relation Age of Onset    Hypertension Mother     Alzheimer Disease  Mother     Diabetes Brother     Glaucoma No family hx of     Macular Degeneration No family hx of     Coronary Artery Disease No family hx of     Colon Cancer No family hx of     Retinal detachment No family hx of      Physical Exam  General - The patient is in no distress. Alert, answers questions and cooperates with examination appropriately.   Neurologic - CN II-XII are grossly intact. No focal neurologic deficits.   Voice and Breathing - The patient was breathing comfortably without the use of accessory muscles. There was no wheezing, stridor, or stertor.  The patients voice was clear and strong.  Ears - clean canals. The tympanic membranes are normal in appearance, bony landmarks are intact.  No retraction, perforation, or masses. No fluid or purulence was seen in the external canal or the middle ear. No evidence of infection of the middle ear or external canal, cerumen was normal in appearance.  Eyes - Extraocular movements intact, and the pupils were reactive to light.  Sclera were not icteric or injected, conjunctiva were pink and moist.  Neck - Soft, non-tender. Palpation of the occipital, submental, submandibular, internal jugular chain, and supraclavicular nodes did not demonstrate any abnormal lymph nodes or masses. No parotid masses. Palpation of the thyroid was soft and smooth, with no nodules or goiter appreciated.  The trachea was mobile and midline.  Nose - septum deviates to the left, turbinates hypertrophic, severely.     Assessment and Plan -    ICD-10-CM    1. Tinnitus, bilateral  H93.13 Adult Audiology  Referral     Adult ENT  Referral      2. Sensorineural hearing loss (SNHL) of both ears  H90.3 Adult Audiology  Referral      3. Nasal turbinate hypertrophy  J34.3 azelastine (ASTELIN) 0.1 % nasal spray     cetirizine (ZYRTEC) 10 MG tablet      4. Nasal obstruction  J34.89 azelastine (ASTELIN) 0.1 % nasal spray     cetirizine (ZYRTEC) 10 MG tablet      5. Nasal septal  deviation  J34.2            Adan Oliver is a 63 year old male who presents to me today with subjective tinnitus, likely due to sensorineural hearing loss. However, he feels it started after a viral upper respiratory infection. The virus maybe caused a sudden sensorineural hearing loss and tinnitus as he felt his hearing worsened.  I can find no evidence of serious CNS disorders or other complicating factors that could be causing this.  We spent the remainder of today's visit on education. Discussed were steps that can be taken to mask the noise, such as a low volume de-tuned radio, a fan in the background, and/or hearing aids.  Correlation with stress, anxiety, and depression were also discussed.  The patient was also cautioned on the numerous expensive non-pharmaceutical options that are advertised, and have no proven benefit.    He has nasal obstruction due to septal deviation, turbinate hypertrophy, and possibly chronic rhinosinusitis and/or rebound from afrin. I recommend Astelin, zyrtec, and return if this fails. We can discuss nasal endoscopy or CT sinus to rule-out chronic rhinosinusitis and if he doesn't have chronic rhinosinusitis, I recommend septoplasty and turbinate reduction. We did discuss surgery some.     Huy Perales MD  Otolaryngology  [facility]

## 2023-11-14 ENCOUNTER — OFFICE VISIT (OUTPATIENT)
Dept: AUDIOLOGY | Facility: CLINIC | Age: 63
End: 2023-11-14
Payer: COMMERCIAL

## 2023-11-14 ENCOUNTER — OFFICE VISIT (OUTPATIENT)
Dept: OTOLARYNGOLOGY | Facility: CLINIC | Age: 63
End: 2023-11-14
Payer: COMMERCIAL

## 2023-11-14 VITALS
SYSTOLIC BLOOD PRESSURE: 107 MMHG | HEART RATE: 64 BPM | WEIGHT: 184 LBS | DIASTOLIC BLOOD PRESSURE: 70 MMHG | BODY MASS INDEX: 29.08 KG/M2 | OXYGEN SATURATION: 96 %

## 2023-11-14 DIAGNOSIS — H90.3 SENSORINEURAL HEARING LOSS (SNHL) OF BOTH EARS: ICD-10-CM

## 2023-11-14 DIAGNOSIS — J34.2 NASAL SEPTAL DEVIATION: ICD-10-CM

## 2023-11-14 DIAGNOSIS — J34.3 NASAL TURBINATE HYPERTROPHY: ICD-10-CM

## 2023-11-14 DIAGNOSIS — J34.89 NASAL OBSTRUCTION: ICD-10-CM

## 2023-11-14 DIAGNOSIS — H93.13 TINNITUS, BILATERAL: ICD-10-CM

## 2023-11-14 DIAGNOSIS — H93.13 TINNITUS, BILATERAL: Primary | ICD-10-CM

## 2023-11-14 PROCEDURE — 99204 OFFICE O/P NEW MOD 45 MIN: CPT | Performed by: OTOLARYNGOLOGY

## 2023-11-14 RX ORDER — AZELASTINE 1 MG/ML
1 SPRAY, METERED NASAL 2 TIMES DAILY
Qty: 30 ML | Refills: 3 | Status: SHIPPED | OUTPATIENT
Start: 2023-11-14 | End: 2024-04-08

## 2023-11-14 RX ORDER — CETIRIZINE HYDROCHLORIDE 10 MG/1
10 TABLET ORAL DAILY
Qty: 30 TABLET | Refills: 11 | Status: SHIPPED | OUTPATIENT
Start: 2023-11-14 | End: 2024-04-08

## 2023-11-14 NOTE — LETTER
11/14/2023         RE: Adan Oliver  [address]  [place] [address]        Dear Colleague,    Thank you for referring your patient, Adan Oliver, to the [facility]. Please see a copy of my visit note below.    Chief Complaint - Tinnitus; nasal obstruction    History of Present Illness - Adan Oliver is a 63 year old male who presents to me today with ringing in the ears.  It has been present and noticeable for approximately 5 months.  It was sudden in onset. No head trauma. He has had more stress. The patient has noticed increased difficulty hearing, especially in noisy or crowded situations.  There is no history of chronic ear disease or ear surgery. With regards to recreational, , and work-related noise exposure he has some years ago. 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     He has some sinus concerns. He caught an upper respiratory infection when [place] the country. This was 4/2023. He had sore throat, nasal congestion. He used afrin for weeks. He switched to flonase. He weaned himself off afrin. He has afib. He still can't breathe through nose. He has some taste changes, but smell is okay.     Tests personally reviewed today for this visit:   1.) audiogram was performed today as part of the evaluation and personally reviewed. The audiogram showed a significant symmetric high frequency sensorineural hearing loss.  Audiogram 8/4/23 showed a moderately severe sensorineural hearing loss.   2.) tympanograms were performed today and were normal Type A curves, with normal canal volume and middle ear pressure.  Type A tymps 8/4/23.   3.) CBC 8/1/23 was normal  4.) Cr 8/1/23 was 1.33     Past Medical History -   Patient Active Problem List   Diagnosis     Esophageal reflux     Hyperlipidemia with target LDL less than 130     Reid esophagus     Memory changes     Insomnia, psychophysiological     Family history of  Alzheimer's disease     Anxiety     Lumbar degenerative disc disease     Protrusion of lumbar intervertebral disc     Lumbar spinal stenosis     Alcohol use     LUCIAN (obstructive sleep apnea)- 'moderate' (AHI 6)     Hip pain, bilateral     Benign prostatic hyperplasia with urinary frequency       Current Medications -   Current Outpatient Medications:      acetaminophen (TYLENOL) 325 MG tablet, Take 325-650 mg by mouth every 6 hours as needed for mild pain, Disp: , Rfl:      esomeprazole (NEXIUM) 40 MG DR capsule, TAKE ONE CAPSULE BY MOUTH EVERY MORNING 30-60 MINUTES BEFORE BREAKFAST, Disp: 90 capsule, Rfl: 3     flecainide (TAMBOCOR) 50 MG tablet, Take 1 tablet (50 mg) by mouth 2 times daily, Disp: 180 tablet, Rfl: 3     metoprolol succinate ER (TOPROL XL) 100 MG 24 hr tablet, Take 1 tablet (100 mg) by mouth daily, Disp: 90 tablet, Rfl: 3     rosuvastatin (CRESTOR) 20 MG tablet, Take 1 tablet (20 mg) by mouth daily, Disp: 90 tablet, Rfl: 3     UNABLE TO FIND, MEDICATION NAME: low-dose THC lozenge, Disp: , Rfl:     Current Facility-Administered Medications:      lidocaine (PF) (XYLOCAINE) 1 % injection 1 mL, 1 mL, , , Danny Oleary DO, 1 mL at 07/26/23 1626     ropivacaine (NAROPIN) injection 3 mL, 3 mL, , , Danny Oleary DO, 3 mL at 02/01/23 1400     triamcinolone (KENALOG-40) injection 40 mg, 40 mg, , , Danny Oleary DO, 40 mg at 07/26/23 1626     triamcinolone (KENALOG-40) injection 40 mg, 40 mg, , , Danny Oleary DO, 40 mg at 02/01/23 1400    Allergies - No Known Allergies    Social History -   Social History     Socioeconomic History     Marital status: Single   Occupational History     Occupation: /advertising design   Tobacco Use     Smoking status: Never     Smokeless tobacco: Never   Vaping Use     Vaping Use: Never used   Substance and Sexual Activity     Alcohol use: Yes     Alcohol/week: 14.0 - 21.0 standard drinks of alcohol     Types: 14 - 21  Standard drinks or equivalent per week     Comment: varies, a couple drinks a day     Drug use: No   Other Topics Concern     Parent/sibling w/ CABG, MI or angioplasty before 65F 55M? No       Family History -   Family History   Problem Relation Age of Onset     Hypertension Mother      Alzheimer Disease Mother      Diabetes Brother      Glaucoma No family hx of      Macular Degeneration No family hx of      Coronary Artery Disease No family hx of      Colon Cancer No family hx of      Retinal detachment No family hx of      Physical Exam  General - The patient is in no distress. Alert, answers questions and cooperates with examination appropriately.   Neurologic - CN II-XII are grossly intact. No focal neurologic deficits.   Voice and Breathing - The patient was breathing comfortably without the use of accessory muscles. There was no wheezing, stridor, or stertor.  The patients voice was clear and strong.  Ears - clean canals. The tympanic membranes are normal in appearance, bony landmarks are intact.  No retraction, perforation, or masses. No fluid or purulence was seen in the external canal or the middle ear. No evidence of infection of the middle ear or external canal, cerumen was normal in appearance.  Eyes - Extraocular movements intact, and the pupils were reactive to light.  Sclera were not icteric or injected, conjunctiva were pink and moist.  Neck - Soft, non-tender. Palpation of the occipital, submental, submandibular, internal jugular chain, and supraclavicular nodes did not demonstrate any abnormal lymph nodes or masses. No parotid masses. Palpation of the thyroid was soft and smooth, with no nodules or goiter appreciated.  The trachea was mobile and midline.  Nose - septum deviates to the left, turbinates hypertrophic, severely.     Assessment and Plan -    ICD-10-CM    1. Tinnitus, bilateral  H93.13 Adult Audiology  Referral     Adult ENT  Referral      2. Sensorineural hearing loss  (SNHL) of both ears  H90.3 Adult Audiology  Referral      3. Nasal turbinate hypertrophy  J34.3 azelastine (ASTELIN) 0.1 % nasal spray     cetirizine (ZYRTEC) 10 MG tablet      4. Nasal obstruction  J34.89 azelastine (ASTELIN) 0.1 % nasal spray     cetirizine (ZYRTEC) 10 MG tablet      5. Nasal septal deviation  J34.2            Adan Oliver is a 63 year old male who presents to me today with subjective tinnitus, likely due to sensorineural hearing loss. However, he feels it started after a viral upper respiratory infection. The virus maybe caused a sudden sensorineural hearing loss and tinnitus as he felt his hearing worsened.  I can find no evidence of serious CNS disorders or other complicating factors that could be causing this.  We spent the remainder of today's visit on education. Discussed were steps that can be taken to mask the noise, such as a low volume de-tuned radio, a fan in the background, and/or hearing aids.  Correlation with stress, anxiety, and depression were also discussed.  The patient was also cautioned on the numerous expensive non-pharmaceutical options that are advertised, and have no proven benefit.    He has nasal obstruction due to septal deviation, turbinate hypertrophy, and possibly chronic rhinosinusitis and/or rebound from afrin. I recommend Astelin, zyrtec, and return if this fails. We can discuss nasal endoscopy or CT sinus to rule-out chronic rhinosinusitis and if he doesn't have chronic rhinosinusitis, I recommend septoplasty and turbinate reduction. We did discuss surgery some.     Huy Perales MD  Otolaryngology  [facility]        Again, thank you for allowing me to participate in the care of your patient.        Sincerely,        Huy Perales MD

## 2023-11-14 NOTE — TELEPHONE ENCOUNTER
Provider sent [employer] message. Patient saw [employer] message. No questions or response at this time.    Yaz Garrison, RN, BSN  Cardiology RN Care Coordinator   [place]   Phone: [phone]  Fax: [phone] ([place]) [phone] ([place])

## 2023-11-14 NOTE — PROGRESS NOTES
AUDIOLOGY REPORT:    Patient was most recently seen on 8/4/2023 for a hearing evaluation. Spoke to patient about doing an updated audiogram; he refused as insurance coverage is unknown. He denies any significant changes to his hearing.    Patient was returned to ENT for follow up.     Sanna Garcia, CCC-A  Licensed Audiologist  [place] #[id number]    11/14/23

## 2023-11-22 ENCOUNTER — ANCILLARY PROCEDURE (OUTPATIENT)
Dept: MRI IMAGING | Facility: CLINIC | Age: 63
End: 2023-11-22
Payer: COMMERCIAL

## 2023-11-22 DIAGNOSIS — R41.3 SHORT-TERM MEMORY LOSS: ICD-10-CM

## 2023-11-22 DIAGNOSIS — R41.3 MEMORY DIFFICULTY: ICD-10-CM

## 2023-11-22 DIAGNOSIS — R41.89 IMPAIRMENT OF COGNITIVE FUNCTION: ICD-10-CM

## 2023-11-22 PROCEDURE — A9585 GADOBUTROL INJECTION: HCPCS | Mod: JZ | Performed by: RADIOLOGY

## 2023-11-22 PROCEDURE — 70553 MRI BRAIN STEM W/O & W/DYE: CPT | Performed by: RADIOLOGY

## 2023-11-22 RX ORDER — GADOBUTROL 604.72 MG/ML
8.5 INJECTION INTRAVENOUS ONCE
Status: COMPLETED | OUTPATIENT
Start: 2023-11-22 | End: 2023-11-22

## 2023-11-22 RX ADMIN — GADOBUTROL 8.5 ML: 604.72 INJECTION INTRAVENOUS at 14:02

## 2023-11-28 ENCOUNTER — LAB (OUTPATIENT)
Dept: LAB | Facility: CLINIC | Age: 63
End: 2023-11-28
Payer: COMMERCIAL

## 2023-11-28 ENCOUNTER — E-VISIT (OUTPATIENT)
Dept: URGENT CARE | Facility: CLINIC | Age: 63
End: 2023-11-28
Payer: COMMERCIAL

## 2023-11-28 DIAGNOSIS — N30.00 ACUTE CYSTITIS WITHOUT HEMATURIA: Primary | ICD-10-CM

## 2023-11-28 DIAGNOSIS — R30.0 DYSURIA: Primary | ICD-10-CM

## 2023-11-28 DIAGNOSIS — R30.0 DYSURIA: ICD-10-CM

## 2023-11-28 DIAGNOSIS — R41.3 SHORT-TERM MEMORY LOSS: ICD-10-CM

## 2023-11-28 DIAGNOSIS — R41.3 MEMORY DIFFICULTY: ICD-10-CM

## 2023-11-28 DIAGNOSIS — R41.89 IMPAIRMENT OF COGNITIVE FUNCTION: ICD-10-CM

## 2023-11-28 LAB
ALBUMIN UR-MCNC: 30 MG/DL
APPEARANCE UR: ABNORMAL
BACTERIA #/AREA URNS HPF: ABNORMAL /HPF
BILIRUB UR QL STRIP: NEGATIVE
COLOR UR AUTO: YELLOW
GLUCOSE UR STRIP-MCNC: NEGATIVE MG/DL
HGB UR QL STRIP: ABNORMAL
KETONES UR STRIP-MCNC: 15 MG/DL
LEUKOCYTE ESTERASE UR QL STRIP: ABNORMAL
NITRATE UR QL: NEGATIVE
PH UR STRIP: 5.5 [PH] (ref 5–7)
RBC #/AREA URNS AUTO: ABNORMAL /HPF
SP GR UR STRIP: 1.02 (ref 1–1.03)
SQUAMOUS #/AREA URNS AUTO: ABNORMAL /LPF
UROBILINOGEN UR STRIP-ACNC: 0.2 E.U./DL
VIT B12 SERPL-MCNC: 446 PG/ML (ref 232–1245)
WBC #/AREA URNS AUTO: ABNORMAL /HPF
YEAST #/AREA URNS HPF: ABNORMAL /HPF

## 2023-11-28 PROCEDURE — 86780 TREPONEMA PALLIDUM: CPT

## 2023-11-28 PROCEDURE — 99207 PR NON-BILLABLE SERV PER CHARTING: CPT | Performed by: PREVENTIVE MEDICINE

## 2023-11-28 PROCEDURE — 83520 IMMUNOASSAY QUANT NOS NONAB: CPT | Mod: 90

## 2023-11-28 PROCEDURE — 82607 VITAMIN B-12: CPT

## 2023-11-28 PROCEDURE — 81001 URINALYSIS AUTO W/SCOPE: CPT

## 2023-11-28 PROCEDURE — 87086 URINE CULTURE/COLONY COUNT: CPT

## 2023-11-28 PROCEDURE — 87186 SC STD MICRODIL/AGAR DIL: CPT

## 2023-11-28 PROCEDURE — 87389 HIV-1 AG W/HIV-1&-2 AB AG IA: CPT

## 2023-11-28 PROCEDURE — 99000 SPECIMEN HANDLING OFFICE-LAB: CPT

## 2023-11-28 PROCEDURE — 84425 ASSAY OF VITAMIN B-1: CPT | Mod: 90

## 2023-11-28 PROCEDURE — 36415 COLL VENOUS BLD VENIPUNCTURE: CPT

## 2023-11-28 RX ORDER — NITROFURANTOIN 25; 75 MG/1; MG/1
100 CAPSULE ORAL 2 TIMES DAILY
Qty: 14 CAPSULE | Refills: 0 | Status: SHIPPED | OUTPATIENT
Start: 2023-11-28 | End: 2023-12-05

## 2023-11-28 RX ORDER — CIPROFLOXACIN 500 MG/1
500 TABLET, FILM COATED ORAL 2 TIMES DAILY
Qty: 20 TABLET | Refills: 0 | Status: SHIPPED | OUTPATIENT
Start: 2023-11-28 | End: 2023-12-08

## 2023-11-28 NOTE — PATIENT INSTRUCTIONS
Dear Adan Oliver,     After reviewing your responses, I would like you to come in for a urine test to make sure we treat you correctly. This urine test is to evaluate you for a possible urinary tract infection, and should be scheduled for today or tomorrow. Schedule a Lab Only appointment here.     Lab appointments are not available at most locations on the weekends. If no Lab Only appointment is available, you should be seen in any of our convenient Walk-in or Urgent Care Centers, which can be found on our website here.     You will receive instructions with your results in [employer] once they are available.     If your symptoms worsen, you develop pain in your back or stomach, develop fevers, or are not improving in 5 days, please contact your primary care provider for an appointment or visit a Walk-in or Urgent Care Center to be seen.     Thanks again for choosing us as your health care partner,     Rasheed Murillo MD, MD  Urinary Tract Infections (UTI) in Men: Care Instructions  Overview     A urinary tract infection, or UTI, is a term for an infection anywhere between the kidneys and the urethra. (The urethra is the tube that carries urine from the bladder to outside the body.) Most UTIs are bladder infections. They often cause pain or burning when you urinate.  UTIs are caused by bacteria. This means they can be cured with antibiotics. Be sure to complete your treatment so that the infection does not get worse.  Follow-up care is a key part of your treatment and safety. Be sure to make and go to all appointments, and call your doctor if you are having problems. It's also a good idea to know your test results and keep a list of the medicines you take.  How can you care for yourself at home?  Take your antibiotics as prescribed. Do not stop taking them just because you feel better. You need to take the full course of antibiotics.  Take your medicines exactly as prescribed. Your doctor may have  prescribed a medicine, such as phenazopyridine (Pyridium), to help relieve pain when you urinate. This turns your urine orange. You may stop taking it when your symptoms get better. But be sure to take all of your antibiotics, which treat the infection.  Drink extra water for the next day or two. This will help make the urine less concentrated and help wash out the bacteria causing the infection. (If you have kidney, heart, or liver disease and have to limit your fluids, talk with your doctor before you increase your fluid intake.)  Avoid drinks that are carbonated or have caffeine. They can irritate the bladder.  Urinate often. Try to empty your bladder each time.  To relieve pain, take a hot bath or lay a heating pad (set on low) over your lower belly or genital area. Never go to sleep with a heating pad in place.  To help prevent UTIs  Drink plenty of fluids. If you have kidney, heart, or liver disease and have to limit fluids, talk with your doctor before you increase the amount of fluids you drink.  Urinate when you have the urge. Do not hold your urine for a long time. Urinate before you go to sleep.  Keep your penis clean.  Catheter care  If you have a drainage tube (catheter) in place, the following steps will help you care for it.  Always wash your hands before and after touching your catheter.  Check the area around the urethra for inflammation or signs of infection. Signs of infection include irritated, swollen, red, or tender skin, or pus around the catheter.  Clean the area around the catheter with soap and water two times a day. Dry with a clean towel afterward.  Do not apply powder or lotion to the skin around the catheter.  To empty the urine collection bag   Wash your hands with soap and water.  Without touching the drain spout, remove the spout from its sleeve at the bottom of the collection bag. Open the valve on the spout.  Let the urine flow out of the bag and into the toilet or a container. Do  "not let the tubing or drain spout touch anything.  After you empty the bag, clean the end of the drain spout with tissue and water. Close the valve and put the drain spout back into its sleeve at the bottom of the collection bag.  Wash your hands with soap and water.  When should you call for help?   Call your doctor now or seek immediate medical care if:    Symptoms such as a fever, chills, nausea, or vomiting get worse or happen for the first time.     You have new pain in your back just below your rib cage. This is called flank pain.     There is new blood or pus in your urine.     You are not able to take or keep down your antibiotics.   Watch closely for changes in your health, and be sure to contact your doctor if:    You are not getting better after taking an antibiotic for 2 days.     Your symptoms go away but then come back.   Where can you learn more?  Go to https://www.[employer].net/patiented  Enter S351 in the search box to learn more about \"Urinary Tract Infections (UTI) in Men: Care Instructions.\"  Current as of: February 28, 2023               Content Version: 13.8    2439-0314 [employer].   Care instructions adapted under license by your healthcare professional. If you have questions about a medical condition or this instruction, always ask your healthcare professional. [employer] disclaims any warranty or liability for your use of this information.      "

## 2023-11-29 LAB
HIV 1+2 AB+HIV1 P24 AG SERPL QL IA: NONREACTIVE
T PALLIDUM AB SER QL: NONREACTIVE

## 2023-11-30 LAB — BACTERIA UR CULT: ABNORMAL

## 2023-12-01 ENCOUNTER — MYC MEDICAL ADVICE (OUTPATIENT)
Dept: CARDIOLOGY | Facility: CLINIC | Age: 63
End: 2023-12-01
Payer: COMMERCIAL

## 2023-12-01 DIAGNOSIS — R00.2 PALPITATIONS: ICD-10-CM

## 2023-12-01 DIAGNOSIS — I47.10 SVT (SUPRAVENTRICULAR TACHYCARDIA) (H): ICD-10-CM

## 2023-12-01 DIAGNOSIS — I48.0 PAF (PAROXYSMAL ATRIAL FIBRILLATION) (H): Primary | ICD-10-CM

## 2023-12-01 LAB — VIT B1 PYROPHOSHATE BLD-SCNC: 191 NMOL/L

## 2023-12-01 NOTE — TELEPHONE ENCOUNTER
Janice Grant MD3 minutes ago (4:43 PM)     IC  Lets schedule him for a bio tel (LIVE MONITOR)  for 2 weeks ?     DR GRANT        Order placed for Biotel for 2 weeks. [employer] message sent to patient. Plan to schedule placement of biotel monitor.    Yaz Garrison, RN, BSN  Cardiology RN Care Coordinator   [place]   Phone: [phone]  Fax: [phone] ([place]) [phone] ([place])

## 2023-12-04 ENCOUNTER — TELEPHONE (OUTPATIENT)
Dept: CARDIOLOGY | Facility: CLINIC | Age: 63
End: 2023-12-04

## 2023-12-04 ENCOUNTER — ANCILLARY PROCEDURE (OUTPATIENT)
Dept: CARDIOLOGY | Facility: CLINIC | Age: 63
End: 2023-12-04
Attending: INTERNAL MEDICINE
Payer: COMMERCIAL

## 2023-12-04 DIAGNOSIS — I47.10 SVT (SUPRAVENTRICULAR TACHYCARDIA) (H): ICD-10-CM

## 2023-12-04 DIAGNOSIS — R00.2 PALPITATIONS: ICD-10-CM

## 2023-12-04 DIAGNOSIS — I48.0 PAF (PAROXYSMAL ATRIAL FIBRILLATION) (H): ICD-10-CM

## 2023-12-04 LAB — SCANNED LAB RESULT: NORMAL

## 2023-12-04 NOTE — PROGRESS NOTES
We have applied a heart monitor (MCOT) for you to wear for 14 days.  You may remove the heart monitor on 12/18/23 at 3:00 pm.  Please see the instruction booklet for further information, as well as instructions for removal of the heart monitor and return mailing directions. We will contact you with your results (please see result notification details at bottom of summary).    *PLEASE DO NOT SHOWER OR INDUCE EXCESSIVE SWEATING IN THE FIRST 24 HOURS OF WEARING*

## 2023-12-04 NOTE — TELEPHONE ENCOUNTER
"Spoke to patient who reports that yesterday his heart rate was 150 bpm for most of the day with lightheadedness and dizziness-lasted most of the day but last evening his heart rate went down to 93 bpm and today it has been less than 100 bpm and he feels better.  Reports that he also has been diagnosed with a UTI and was started on Macrobid, has 1 day left.  States he feels his UTI symptoms have improved slightly but he feels weak, tired dizzy and sometimes feels \"like my heat is too high at home\".  He is able to urinate but still has discomfort with urination.  Writer advised the importance of seeing primary care for follow-up on UTI symptoms, sounding like infection has not resolved.  Patient then states that he feels his symptoms are better and wants to give it one more day.  Also reports that he does not have a primary care physician and states that this is delaying an appointment.  Writer advised for him to see any primary care or urgent care.  At this time he refused.    Writer discussed that since he is currently fighting an infection this can effect his afib.  Discussed importance of follow up regarding his UTI and then his afib may be better controlled.      Patient requesting the live Biotel, he would prefer this monitor versus zio heart monitor.  Offered patient to come into clinic today for the monitor and patient accepted.    DILLAN Crawley.    Abida Garcia, ALVINA  Cardiology Care Coordinator  [facility]  [phone] option 1    "

## 2023-12-04 NOTE — TELEPHONE ENCOUNTER
Health Call Center    Phone Message    May a detailed message be left on voicemail: yes     Reason for Call: Symptoms or Concerns     If patient has red-flag symptoms, warm transfer to triage line    Current symptom or concern: High heart rate and dizziness    Patient called stating they have been exchanging messages via [employer] with care team in regards to symptoms they are experiencing. Patient stated their heart rate was high, and feeling dizzy. Heart rate has dropped down a little and still experiencing dizziness, but declined to speak with triage nurse. Patient stated they were supposed to hear back from the care team last week about possibly being put on a heart monitor, but have not heard from anyone. Please call patient back to further discuss.    Action Taken: Other: Cardiology    Travel Screening: Not Applicable    Thank you!  [facility]

## 2023-12-05 NOTE — TELEPHONE ENCOUNTER
Spoke to patient.  He is already scheduled with Dr. Cheung in April.  He wants to keep this appt.  States he is doing better with his afib.    Abida Garcia RN  Cardiology Care Coordinator  [facility]  [phone] option 1

## 2023-12-06 ENCOUNTER — TELEPHONE (OUTPATIENT)
Dept: CARDIOLOGY | Facility: CLINIC | Age: 63
End: 2023-12-06
Payer: COMMERCIAL

## 2023-12-06 NOTE — TELEPHONE ENCOUNTER
I received a page from [employer] regarding MCOT readings. There was a patient reported event at 6:09pm last night with afib RVR (rates 150s), associated with shortness of breath and chest pain. There was also an auto-recorded event at 6:30pm on 12/5, showing afib with rates as high as 190s. The most recent reading is from 3am this morning (12/6), showing patient is in flutter with rates 140-160s. Will forward to ordering team for recommendations.

## 2023-12-09 ENCOUNTER — TELEPHONE (OUTPATIENT)
Dept: CARDIOLOGY | Facility: CLINIC | Age: 63
End: 2023-12-09
Payer: COMMERCIAL

## 2023-12-09 NOTE — CONFIDENTIAL NOTE
Notified by [employer] that there was an auto triggered event for AF with RVR at 851pm on 12/8/23. Patient has known AF and is following with Dr. Crawley who has also referred him to EP already.   RASHEL Epperson CNP  Electrophysiology Consult Service  Pager: Text Page

## 2023-12-11 ENCOUNTER — TELEPHONE (OUTPATIENT)
Dept: NEUROLOGY | Facility: CLINIC | Age: 63
End: 2023-12-11
Payer: COMMERCIAL

## 2023-12-11 NOTE — TELEPHONE ENCOUNTER
Called patient to advise on results, pt did not answer. Left message for them to view [email or web address] messages for more information. Advised patient I would be sending results in mail as well for them to view.       Citlali Diaz MA

## 2023-12-13 ENCOUNTER — OFFICE VISIT (OUTPATIENT)
Dept: ORTHOPEDICS | Facility: CLINIC | Age: 63
End: 2023-12-13
Payer: COMMERCIAL

## 2023-12-13 VITALS — BODY MASS INDEX: 29.08 KG/M2 | OXYGEN SATURATION: 96 % | HEART RATE: 55 BPM | WEIGHT: 184 LBS

## 2023-12-13 DIAGNOSIS — M79.672 LEFT FOOT PAIN: ICD-10-CM

## 2023-12-13 DIAGNOSIS — M25.511 CHRONIC PAIN OF BOTH SHOULDERS: Primary | ICD-10-CM

## 2023-12-13 DIAGNOSIS — G89.29 CHRONIC PAIN OF BOTH SHOULDERS: Primary | ICD-10-CM

## 2023-12-13 DIAGNOSIS — M79.641 BILATERAL HAND PAIN: ICD-10-CM

## 2023-12-13 DIAGNOSIS — M79.642 BILATERAL HAND PAIN: ICD-10-CM

## 2023-12-13 DIAGNOSIS — M18.0 OSTEOARTHRITIS OF CARPOMETACARPAL (CMC) JOINT OF BOTH THUMBS: ICD-10-CM

## 2023-12-13 DIAGNOSIS — M19.079 ARTHRITIS OF GREAT TOE AT METATARSOPHALANGEAL JOINT: ICD-10-CM

## 2023-12-13 DIAGNOSIS — M25.512 CHRONIC PAIN OF BOTH SHOULDERS: Primary | ICD-10-CM

## 2023-12-13 PROCEDURE — 20604 DRAIN/INJ JOINT/BURSA W/US: CPT | Mod: TA | Performed by: FAMILY MEDICINE

## 2023-12-13 PROCEDURE — 20604 DRAIN/INJ JOINT/BURSA W/US: CPT | Mod: FA | Performed by: FAMILY MEDICINE

## 2023-12-13 PROCEDURE — 99214 OFFICE O/P EST MOD 30 MIN: CPT | Mod: 25 | Performed by: FAMILY MEDICINE

## 2023-12-13 RX ADMIN — ROPIVACAINE HYDROCHLORIDE 0.5 ML: 5 INJECTION, SOLUTION EPIDURAL; INFILTRATION; PERINEURAL at 13:56

## 2023-12-13 RX ADMIN — TRIAMCINOLONE ACETONIDE 20 MG: 40 INJECTION, SUSPENSION INTRA-ARTICULAR; INTRAMUSCULAR at 13:56

## 2023-12-13 RX ADMIN — ROPIVACAINE HYDROCHLORIDE 1 ML: 5 INJECTION, SOLUTION EPIDURAL; INFILTRATION; PERINEURAL at 13:56

## 2023-12-13 RX ADMIN — TRIAMCINOLONE ACETONIDE 40 MG: 40 INJECTION, SUSPENSION INTRA-ARTICULAR; INTRAMUSCULAR at 13:56

## 2023-12-13 NOTE — LETTER
2023         RE: Adan Oliver  [address]  [place] [address]        Dear Colleague,    Thank you for referring your patient, Adan Oliver, to the [facility]. Please see a copy of my visit note below.    Adan Oliver  :  1960  DOS: 23  MRN: [id number]    Sports Medicine Clinic Visit    PCP: Johnathon Diaz MD     Adan Oliver is a 63 year old Right hand dominant male who is seen as a self referral presenting with bilateral shoulder pain.     Injury: Reports insidious onset without acute precipitating event that patient first noticed approximately months. Pain located over bilateral shoulder, L>R, bilateral thumbs CMC joint, left big toe joint.  Additional Features:  Positive: right shoulder - crunchy sounds, popping.  Symptoms are better with Ibuprofen.  Symptoms are worse with: overhead motions: abduction, flexion, gripping.  Other evaluation and/or treatments so far consists of: Rest.  Recent imaging completed: No recent imaging completed.  Prior History of related problems: Has had previous shoulder issues - poss right rotator cuff    Social History: Currently employed as a contractor     Interim History - 2023  Since last visit on 11/10/2023 patient is here for left great toe and left thumb injection. Will follow up next Thursday to completed shoulder injection. No interim injury.       Review of Systems  Musculoskeletal: as above  Remainder of review of systems is negative including constitutional, CV, pulmonary, GI, Skin and Neurologic except as noted in HPI or medical history.    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 It was difficult to discern between PLMS and hyponeas. Apnea/hypopnea index was 28.0 events per hour.  The REM AHI was 29.3 events per hour.  The supine AHI was 32.7 events per hour.  RDI was 28.0 events per hour. Lowest oxygen saturation was 84.0%.  Time spent less than or equal to 88% was 0.3 minutes. PLM index was 32.3 movements per hour     Past Surgical History:   Procedure Laterality Date     APPENDECTOMY  2000s     BLEPHAROPLASTY BILATERAL Bilateral 10/3/2018    Procedure: BLEPHAROPLASTY BILATERAL;;  Surgeon: Dany Berrios MD;  Location: [facility]     CARPAL TUNNEL RELEASE RT/LT  1980s     COLONOSCOPY N/A 7/27/2022    Procedure: COLONOSCOPY, FLEXIBLE, WITH LESION REMOVAL USING SNARE;  Surgeon: Filiberto Banuelos MD;  Location: [facility]     COLONOSCOPY WITH CO2 INSUFFLATION N/A 7/27/2022    Procedure: COLONOSCOPY, WITH CO2 INSUFFLATION;  Surgeon: Filiberto Banuelos MD;  Location: [facility]     COMBINED ESOPHAGOSCOPY, GASTROSCOPY, DUODENOSCOPY (EGD) WITH CO2 INSUFFLATION N/A 9/20/2019    Procedure: ESOPHAGOGASTRODUODENOSCOPY, WITH CO2 INSUFFLATION;  Surgeon: Filiberto Banuelos MD;  Location: [facility]     COMBINED ESOPHAGOSCOPY, GASTROSCOPY, DUODENOSCOPY (EGD) WITH CO2 INSUFFLATION N/A 7/27/2022    Procedure: ESOPHAGOGASTRODUODENOSCOPY, WITH CO2 INSUFFLATION;  Surgeon: Filiberto Banuelos MD;  Location: [facility]     ESOPHAGOSCOPY, GASTROSCOPY, DUODENOSCOPY (EGD), COMBINED N/A 9/20/2019    Procedure: Esophagogastroduodenoscopy, With Biopsy;  Surgeon: Filiberto Banuelos MD;  Location: [facility]     ESOPHAGOSCOPY, GASTROSCOPY, DUODENOSCOPY (EGD), COMBINED N/A 7/27/2022    Procedure: ESOPHAGOGASTRODUODENOSCOPY, WITH BIOPSY;  Surgeon: Filiberto Banuelos MD;  Location: [facility]     REPAIR PTOSIS BILATERAL Bilateral 10/3/2018    Procedure: REPAIR PTOSIS BILATERAL;;  Surgeon: Dany Berrios MD;   Location: [facility]     REPAIR PTOSIS BROW BILATERAL Bilateral 10/3/2018    Procedure: REPAIR PTOSIS BROW BILATERAL;;  Surgeon: Dany Berrios MD;  [facility]: [facility]     SIGMOIDOSCOPY FLEXIBLE N/A 9/20/2019    Procedure: SIGMOIDOSCOPY, FLEXIBLE;  Surgeon: Filiberto Banuelos MD;  Location: [facility]     TONSILLECTOMY  1980s     Family History   Problem Relation Age of Onset     Hypertension Mother      Alzheimer Disease Mother      Diabetes Brother      Glaucoma No family hx of      Macular Degeneration No family hx of      Coronary Artery Disease No family hx of      Colon Cancer No family hx of      Retinal detachment No family hx of        Objective  Pulse 55   Wt 83.5 kg (184 lb)   SpO2 96%   BMI 29.08 kg/m      General: healthy, alert and in no distress    HEENT: no scleral icterus or conjunctival erythema   Skin: no suspicious lesions or rash. No jaundice.   CV: regular rhythm by palpation, 2+ distal pulses, no pedal edema    Resp: normal respiratory effort without conversational dyspnea   Psych: normal mood and affect    Gait: nonantalgic, appropriate coordination and balance   Neuro: normal light touch sensory exam of the extremities. Motor strength as noted below     Bilateral Shoulder exam    ROM:        Full active and passive ROM with flexion, extension, abduction, internal and external rotation on the right, mildly limited on the left    Tender:        subacromial space L>>R       posterior shoulder left       Anterior GH joint left    Non Tender:       remainder of shoulder       sternoclavicular joint       acromioclavicular joint       periscapular region    Strength:        abduction 5/5       internal rotation 5/5       external rotation 5/5       adduction 5/5    Impingement testing:        neg (-) Neer       positive (+) Elena left       positive (+) empty can left > right       neg (-) crossover       positive (+) crank left, mild    Stability testing:       neg (-) anterior glide        neg (-) sulcus sign    Skin:       no visible deformities       well perfused       capillary refill brisk    Sensation:        normal sensation over shoulder and upper extremity     Bilateral Foot and Ankle Exam:     Inspection:       no visible ecchymosis       no visible effusion, mild edema at the first MTP joint left     Foot inspection:       no deformity noted     Tender:       Great toe MTP joint L>>R     Non-Tender:       remainder of ankle and foot      ROM:        Decreased great toe dorsiflexion and plantarflexion L>R currently     Strength:       ankle dorsiflexion 5/5 right       plantarflexion 5/5 right       inversion 5/5 right       eversion 5/5 right       great toe dorsiflexion 5/5 right       great toe plantarflexion 5/5 right     Gait:       normal     Neurovascular:       2+ peripheral pulses bilaterally and brisk capillary refill       sensation grossly intact    Bilateral hand exam  Full strength and ROM bilaterally  Painful opposition of the thumb b/l, L>>R, localizing to 1st CMC joint  Moderately painful CMC grind on the left  Neg Tinel's and Phalen's, neg Finkelstein's    Radiology   Narrative    LEFT FOOT THREE OR MORE VIEWS  11/10/2023 1:44 PM     HISTORY: Left foot pain.    COMPARISON: None.       Impression    IMPRESSION:  Mild to moderate arthrosis first MTP joint. No evidence  of an acute fracture. No Lisfranc subluxation.    MUNA AMBROSE MD         SYSTEM ID:  [id number]   XR Hand Bilateral G/E 3 Views    Narrative    HAND BILATERAL THREE OR MORE VIEWS   11/10/2023 1:44 PM     HISTORY: Bilateral hand pain.    COMPARISON: None.       Impression    IMPRESSION: Mild to moderate scattered arthritic changes throughout  the hands most pronounced in the IP joints of the fingers, bilateral  thumb CMC joints and right middle finger MCP joint. There is no  erosive change or evidence for an inflammatory arthropathy. Normal  bone mineralization. No acute fracture.    MUNA AMBROSE MD          SYSTEM ID:  [id number]     Hand / Upper Extremity Injection/Arthrocentesis: L thumb CMC    Date/Time: 12/13/2023 1:56 PM    Performed by: Danny Oleary DO  Authorized by: Danny Oleary DO    Indications:  Pain  Needle Size:  25 G  Guidance: ultrasound    Approach:  Radial  Condition: osteoarthritis    Location:  Thumb  Site:  L thumb CMC    Medications:  20 mg triamcinolone 40 MG/ML; 0.5 mL ROPivacaine 5 MG/ML  Outcome:  Tolerated well, no immediate complications  Procedure discussed: discussed risks, benefits, and alternatives    Consent Given by:  Patient  Timeout: timeout called immediately prior to procedure    Prep: patient was prepped and draped in usual sterile fashion     Ultrasound images of procedure were permanently stored.   Small Joint Injection/Arthrocentesis: L great MTP    Date/Time: 12/13/2023 1:56 PM    Performed by: Danny Oleary DO  Authorized by: Danny Oleary DO  Indications:  Pain and diagnostic evaluation  Needle Size:  25 G  Guidance: ultrasound       Location:  Great toe    Site:  L great MTP                    Medications:  40 mg triamcinolone 40 MG/ML; 1 mL ROPivacaine 5 MG/ML        Outcome:  Tolerated well, no immediate complications  Procedure discussed: discussed risks, benefits, and alternatives      Timeout: timeout called immediately prior to procedure    Prep: patient was prepped and draped in usual sterile fashion       Ultrasound images of procedure were permanently stored.           Assessment:  1. Chronic pain of both shoulders    2. Bilateral hand pain    3. 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 Left foot pain    5. Osteoarthritis of carpometacarpal (CMC) joint of both thumbs          Plan:  Discussed the assessment with the patient.  Follow up: prn based on progress  Acute shoulder pain with mildly limited ROM on the left, in the setting of more chronic, intermittent b/l shoulder pain, relatively stable and  seems to wax and wane  Sx consistent with subacromial impingement, signs of calcific tendinitis on the left on XR, also some signs of GH joint pain  Consider CSI at subsequent visit based on progression over time  Gentle exercise options reviewed, PT offered and available anytime  Oral Tylenol and topical Voltaren gel reviewed as safe OTC options, reviewed safe dosing strategies  XR images independently visualized and reviewed with patient today in clinic  Also with ongoing b/l hand pain, centered over 1st CMC joints, L>>R currently  Ultrasound-guided corticosteroid injection to the left first CMC joint performed today  Bracing strategies and topical NSAID strategies reviewed  OT available if desired, declined for now  Known hx of right 1st MTP joint DJD, now worsening sx and similar pathology on XR seen on the left  Footwear options and injection options reviewed  Ultrasound-guided corticosteroid injection to the left first MTP joint today  Will try to manage as many issues as we can before upcoming [place] trip, reviewed today, appts scheduled for additional follow-up if needed  Expectations and goals of CSI reviewed  Often 2-3 days for steroid effect, and can take up to two weeks for maximum effect  We discussed modified progressive pain-free activity as tolerated  Do not overuse in first two weeks if feeling better due to concern for vulnerability while steroid is working  Supportive care reviewed  All questions were answered today  Contact us with additional questions or concerns  Signs and sx of concern reviewed      Danny Oleary DO, ARCENIO  Sports Medicine Physician  [facility]    Time spent in chart review, one-on-one evaluation, discussion with patient regarding: nature of problem, clinical course, prior treatments, therapeutic options, shared-decision making, potential procedures and referrals, and charting related to the visit: 32 minutes.  If applicable, time does not  include time spent performing any procedure.            Disclaimer: This note consists of symbols derived from keyboarding, dictation and/or voice recognition software. As a result, there may be errors in the script that have gone undetected. Please consider this when interpreting information found in this chart.      Again, thank you for allowing me to participate in the care of your patient.        Sincerely,        Danny Oleary, DO

## 2023-12-13 NOTE — PROGRESS NOTES
Adan Oliver  :  1960  DOS: 23  MRN: [id number]    Sports Medicine Clinic Visit    PCP: Johnathon Diaz MD     Adan Oliver is a 63 year old Right hand dominant male who is seen as a self referral presenting with bilateral shoulder pain.     Injury: Reports insidious onset without acute precipitating event that patient first noticed approximately months. Pain located over bilateral shoulder, L>R, bilateral thumbs CMC joint, left big toe joint.  Additional Features:  Positive: right shoulder - crunchy sounds, popping.  Symptoms are better with Ibuprofen.  Symptoms are worse with: overhead motions: abduction, flexion, gripping.  Other evaluation and/or treatments so far consists of: Rest.  Recent imaging completed: No recent imaging completed.  Prior History of related problems: Has had previous shoulder issues - poss right rotator cuff    Social History: Currently employed as a contractor     Interim History - 2023  Since last visit on 11/10/2023 patient is here for left great toe and left thumb injection. Will follow up next Thursday to completed shoulder injection. No interim injury.       Review of Systems  Musculoskeletal: as above  Remainder of review of systems is negative including constitutional, CV, pulmonary, GI, Skin and Neurologic except as noted in HPI or medical history.    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 It was difficult to discern between PLMS and hyponeas. Apnea/hypopnea index was 28.0 events per hour.  The REM AHI was 29.3 events per hour.  The supine AHI was 32.7 events per hour.  RDI was 28.0 events  per hour. Lowest oxygen saturation was 84.0%.  Time spent less than or equal to 88% was 0.3 minutes. PLM index was 32.3 movements per hour     Past Surgical History:   Procedure Laterality Date    APPENDECTOMY  2000s    BLEPHAROPLASTY BILATERAL Bilateral 10/3/2018    Procedure: BLEPHAROPLASTY BILATERAL;;  Surgeon: Dany Berrios MD;  Location: [facility]    CARPAL TUNNEL RELEASE RT/LT  1980s    COLONOSCOPY N/A 7/27/2022    Procedure: COLONOSCOPY, FLEXIBLE, WITH LESION REMOVAL USING SNARE;  Surgeon: Filiberto Banuelos MD;  [facility]: [facility]    COLONOSCOPY WITH CO2 INSUFFLATION N/A 7/27/2022    Procedure: COLONOSCOPY, WITH CO2 INSUFFLATION;  Surgeon: Filiberto Banuelos MD;  [facility]    COMBINED ESOPHAGOSCOPY, GASTROSCOPY, DUODENOSCOPY (EGD) WITH CO2 INSUFFLATION N/A 9/20/2019    Procedure: ESOPHAGOGASTRODUODENOSCOPY, WITH CO2 INSUFFLATION;  Surgeon: Filiberto Banuelos MD;  [facility]    COMBINED ESOPHAGOSCOPY, GASTROSCOPY, DUODENOSCOPY (EGD) WITH CO2 INSUFFLATION N/A 7/27/2022    Procedure: ESOPHAGOGASTRODUODENOSCOPY, WITH CO2 INSUFFLATION;  Surgeon: Filiberto Banuelos MD;  [facility]    ESOPHAGOSCOPY, GASTROSCOPY, DUODENOSCOPY (EGD), COMBINED N/A 9/20/2019    Procedure: Esophagogastroduodenoscopy, With Biopsy;  Surgeon: Filiberto Banuelos MD;  [facility]    ESOPHAGOSCOPY, GASTROSCOPY, DUODENOSCOPY (EGD), COMBINED N/A 7/27/2022    Procedure: ESOPHAGOGASTRODUODENOSCOPY, WITH BIOPSY;  Surgeon: Filiberto Bnauelos MD;  [facility]    REPAIR PTOSIS BILATERAL Bilateral 10/3/2018    Procedure: REPAIR PTOSIS BILATERAL;;  Surgeon: Dany Berrios MD;  [facility]    REPAIR PTOSIS BROW BILATERAL Bilateral 10/3/2018    Procedure: REPAIR PTOSIS BROW BILATERAL;;  Surgeon: Dany Berrios MD;  [facility]    SIGMOIDOSCOPY FLEXIBLE N/A 9/20/2019    Procedure: SIGMOIDOSCOPY, FLEXIBLE;  Surgeon: Filiberto Banuelos MD;  Location:  OR     TONSILLECTOMY  1980s     Family History   Problem Relation Age of Onset    Hypertension Mother     Alzheimer Disease Mother     Diabetes Brother     Glaucoma No family hx of     Macular Degeneration No family hx of     Coronary Artery Disease No family hx of     Colon Cancer No family hx of     Retinal detachment No family hx of        Objective  Pulse 55   Wt 83.5 kg (184 lb)   SpO2 96%   BMI 29.08 kg/m      General: healthy, alert and in no distress    HEENT: no scleral icterus or conjunctival erythema   Skin: no suspicious lesions or rash. No jaundice.   CV: regular rhythm by palpation, 2+ distal pulses, no pedal edema    Resp: normal respiratory effort without conversational dyspnea   Psych: normal mood and affect    Gait: nonantalgic, appropriate coordination and balance   Neuro: normal light touch sensory exam of the extremities. Motor strength as noted below     Bilateral Shoulder exam    ROM:        Full active and passive ROM with flexion, extension, abduction, internal and external rotation on the right, mildly limited on the left    Tender:        subacromial space L>>R       posterior shoulder left       Anterior GH joint left    Non Tender:       remainder of shoulder       sternoclavicular joint       acromioclavicular joint       periscapular region    Strength:        abduction 5/5       internal rotation 5/5       external rotation 5/5       adduction 5/5    Impingement testing:        neg (-) Neer       positive (+) Elena left       positive (+) empty can left > right       neg (-) crossover       positive (+) crank left, mild    Stability testing:       neg (-) anterior glide       neg (-) sulcus sign    Skin:       no visible deformities       well perfused       capillary refill brisk    Sensation:        normal sensation over shoulder and upper extremity     Bilateral Foot and Ankle Exam:     Inspection:       no visible ecchymosis       no visible effusion, mild edema at the first MTP  joint left     Foot inspection:       no deformity noted     Tender:       Great toe MTP joint L>>R     Non-Tender:       remainder of ankle and foot      ROM:        Decreased great toe dorsiflexion and plantarflexion L>R currently     Strength:       ankle dorsiflexion 5/5 right       plantarflexion 5/5 right       inversion 5/5 right       eversion 5/5 right       great toe dorsiflexion 5/5 right       great toe plantarflexion 5/5 right     Gait:       normal     Neurovascular:       2+ peripheral pulses bilaterally and brisk capillary refill       sensation grossly intact    Bilateral hand exam  Full strength and ROM bilaterally  Painful opposition of the thumb b/l, L>>R, localizing to 1st CMC joint  Moderately painful CMC grind on the left  Neg Tinel's and Phalen's, neg Finkelstein's    Radiology   Narrative    LEFT FOOT THREE OR MORE VIEWS  11/10/2023 1:44 PM     HISTORY: Left foot pain.    COMPARISON: None.       Impression    IMPRESSION:  Mild to moderate arthrosis first MTP joint. No evidence  of an acute fracture. No Lisfranc subluxation.    MUNA AMBROSE MD         SYSTEM ID:  [id number]   XR Hand Bilateral G/E 3 Views    Narrative    HAND BILATERAL THREE OR MORE VIEWS   11/10/2023 1:44 PM     HISTORY: Bilateral hand pain.    COMPARISON: None.       Impression    IMPRESSION: Mild to moderate scattered arthritic changes throughout  the hands most pronounced in the IP joints of the fingers, bilateral  thumb CMC joints and right middle finger MCP joint. There is no  erosive change or evidence for an inflammatory arthropathy. Normal  bone mineralization. No acute fracture.    MUNA AMBROSE MD         SYSTEM ID:  [id number]     Hand / Upper Extremity Injection/Arthrocentesis: L thumb CMC    Date/Time: 12/13/2023 1:56 PM    Performed by: Danny Oleary DO  Authorized by: Danny Oleary DO    Indications:  Pain  Needle Size:  25 G  Guidance: ultrasound    Approach:  Radial  Condition:  osteoarthritis    Location:  Thumb  Site:  L thumb CMC    Medications:  20 mg triamcinolone 40 MG/ML; 0.5 mL ROPivacaine 5 MG/ML  Outcome:  Tolerated well, no immediate complications  Procedure discussed: discussed risks, benefits, and alternatives    Consent Given by:  Patient  Timeout: timeout called immediately prior to procedure    Prep: patient was prepped and draped in usual sterile fashion     Ultrasound images of procedure were permanently stored.   Small Joint Injection/Arthrocentesis: L great MTP    Date/Time: 12/13/2023 1:56 PM    Performed by: Danny Oleary DO  Authorized by: Danny Oleary DO  Indications:  Pain and diagnostic evaluation  Needle Size:  25 G  Guidance: ultrasound       Location:  Great toe    Site:  L great MTP                    Medications:  40 mg triamcinolone 40 MG/ML; 1 mL ROPivacaine 5 MG/ML        Outcome:  Tolerated well, no immediate complications  Procedure discussed: discussed risks, benefits, and alternatives      Timeout: timeout called immediately prior to procedure    Prep: patient was prepped and draped in usual sterile fashion       Ultrasound images of procedure were permanently stored.           Assessment:  1. Chronic pain of both shoulders    2. Bilateral hand pain    3. 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 Left foot pain    5. Osteoarthritis of carpometacarpal (CMC) joint of both thumbs          Plan:  Discussed the assessment with the patient.  Follow up: prn based on progress  Acute shoulder pain with mildly limited ROM on the left, in the setting of more chronic, intermittent b/l shoulder pain, relatively stable and seems to wax and wane  Sx consistent with subacromial impingement, signs of calcific tendinitis on the left on XR, also some signs of GH joint pain  Consider CSI at subsequent visit based on progression over time  Gentle exercise options reviewed, PT offered and available anytime  Oral Tylenol and topical  Voltaren gel reviewed as safe OTC options, reviewed safe dosing strategies  XR images independently visualized and reviewed with patient today in clinic  Also with ongoing b/l hand pain, centered over 1st CMC joints, L>>R currently  Ultrasound-guided corticosteroid injection to the left first CMC joint performed today  Bracing strategies and topical NSAID strategies reviewed  OT available if desired, declined for now  Known hx of right 1st MTP joint DJD, now worsening sx and similar pathology on XR seen on the left  Footwear options and injection options reviewed  Ultrasound-guided corticosteroid injection to the left first MTP joint today  Will try to manage as many issues as we can before upcoming [place] trip, reviewed today, appts scheduled for additional follow-up if needed  Expectations and goals of CSI reviewed  Often 2-3 days for steroid effect, and can take up to two weeks for maximum effect  We discussed modified progressive pain-free activity as tolerated  Do not overuse in first two weeks if feeling better due to concern for vulnerability while steroid is working  Supportive care reviewed  All questions were answered today  Contact us with additional questions or concerns  Signs and sx of concern reviewed      Danny Oleary DO, ARCENIO  Sports Medicine Physician  [facility]    Time spent in chart review, one-on-one evaluation, discussion with patient regarding: nature of problem, clinical course, prior treatments, therapeutic options, shared-decision making, potential procedures and referrals, and charting related to the visit: 32 minutes.  If applicable, time does not include time spent performing any procedure.            Disclaimer: This note consists of symbols derived from keyboarding, dictation and/or voice recognition software. As a result, there may be errors in the script that have gone undetected. Please consider this when interpreting information found in this  chart.

## 2023-12-18 ENCOUNTER — LAB (OUTPATIENT)
Dept: LAB | Facility: CLINIC | Age: 63
End: 2023-12-18
Payer: COMMERCIAL

## 2023-12-18 ENCOUNTER — E-VISIT (OUTPATIENT)
Dept: URGENT CARE | Facility: CLINIC | Age: 63
End: 2023-12-18
Payer: COMMERCIAL

## 2023-12-18 DIAGNOSIS — R30.0 DIFFICULT OR PAINFUL URINATION: Primary | ICD-10-CM

## 2023-12-18 DIAGNOSIS — R30.0 DIFFICULT OR PAINFUL URINATION: ICD-10-CM

## 2023-12-18 DIAGNOSIS — N30.00 ACUTE CYSTITIS WITHOUT HEMATURIA: ICD-10-CM

## 2023-12-18 LAB
ALBUMIN UR-MCNC: NEGATIVE MG/DL
APPEARANCE UR: CLEAR
BILIRUB UR QL STRIP: NEGATIVE
COLOR UR AUTO: YELLOW
GLUCOSE UR STRIP-MCNC: NEGATIVE MG/DL
HGB UR QL STRIP: NEGATIVE
KETONES UR STRIP-MCNC: NEGATIVE MG/DL
LEUKOCYTE ESTERASE UR QL STRIP: NEGATIVE
NITRATE UR QL: NEGATIVE
PH UR STRIP: 6 [PH] (ref 5–7)
SP GR UR STRIP: 1.02 (ref 1–1.03)
UROBILINOGEN UR STRIP-ACNC: 0.2 E.U./DL

## 2023-12-18 PROCEDURE — 99207 PR NON-BILLABLE SERV PER CHARTING: CPT | Performed by: EMERGENCY MEDICINE

## 2023-12-18 PROCEDURE — 81003 URINALYSIS AUTO W/O SCOPE: CPT

## 2023-12-18 RX ORDER — CIPROFLOXACIN 500 MG/1
500 TABLET, FILM COATED ORAL 2 TIMES DAILY
Qty: 28 TABLET | Refills: 0 | Status: SHIPPED | OUTPATIENT
Start: 2023-12-18 | End: 2023-12-20

## 2023-12-18 NOTE — PATIENT INSTRUCTIONS
Dear Adan Oliver,     After reviewing your responses, I would like you to come in for a urine test to make sure we treat you correctly. This urine test is to evaluate you for a possible urinary tract infection, and should be scheduled for today or tomorrow. Schedule a Lab Only appointment here.     Lab appointments are not available at most locations on the weekends. If no Lab Only appointment is available, you should be seen in any of our convenient Walk-in or Urgent Care Centers, which can be found on our website here.     You will receive instructions with your results in [email or web address] once they are available.     If your symptoms worsen, you develop pain in your back or stomach, develop fevers, or are not improving in 5 days, please contact your primary care provider for an appointment or visit a Walk-in or Urgent Care Center to be seen.     Thanks again for choosing us as your health care partner,     Haroon Peterson MD

## 2023-12-19 ENCOUNTER — MYC MEDICAL ADVICE (OUTPATIENT)
Dept: CARDIOLOGY | Facility: CLINIC | Age: 63
End: 2023-12-19
Payer: COMMERCIAL

## 2023-12-19 DIAGNOSIS — Z51.81 ENCOUNTER FOR MONITORING FLECAINIDE THERAPY: Primary | ICD-10-CM

## 2023-12-19 DIAGNOSIS — Z79.899 ENCOUNTER FOR MONITORING FLECAINIDE THERAPY: Primary | ICD-10-CM

## 2023-12-19 DIAGNOSIS — I48.0 PAF (PAROXYSMAL ATRIAL FIBRILLATION) (H): ICD-10-CM

## 2023-12-19 RX ORDER — ROPIVACAINE HYDROCHLORIDE 5 MG/ML
0.5 INJECTION, SOLUTION EPIDURAL; INFILTRATION; PERINEURAL
Status: DISCONTINUED | OUTPATIENT
Start: 2023-12-13 | End: 2024-06-19

## 2023-12-19 RX ORDER — TRIAMCINOLONE ACETONIDE 40 MG/ML
40 INJECTION, SUSPENSION INTRA-ARTICULAR; INTRAMUSCULAR
Status: DISCONTINUED | OUTPATIENT
Start: 2023-12-13 | End: 2024-06-19

## 2023-12-19 RX ORDER — ROPIVACAINE HYDROCHLORIDE 5 MG/ML
1 INJECTION, SOLUTION EPIDURAL; INFILTRATION; PERINEURAL
Status: DISCONTINUED | OUTPATIENT
Start: 2023-12-13 | End: 2024-06-19

## 2023-12-19 RX ORDER — TRIAMCINOLONE ACETONIDE 40 MG/ML
20 INJECTION, SUSPENSION INTRA-ARTICULAR; INTRAMUSCULAR
Status: DISCONTINUED | OUTPATIENT
Start: 2023-12-13 | End: 2024-06-19

## 2023-12-19 NOTE — TELEPHONE ENCOUNTER
Pt reviewed MDs response.    Abida Garcia, RN  Cardiology Care Coordinator  [facility]  [phone] option 1

## 2023-12-20 ENCOUNTER — OFFICE VISIT (OUTPATIENT)
Dept: ORTHOPEDICS | Facility: CLINIC | Age: 63
End: 2023-12-20
Payer: COMMERCIAL

## 2023-12-20 VITALS — BODY MASS INDEX: 29.08 KG/M2 | WEIGHT: 184 LBS

## 2023-12-20 DIAGNOSIS — M75.32 CALCIFIC TENDINITIS OF LEFT SHOULDER: Primary | ICD-10-CM

## 2023-12-20 DIAGNOSIS — N39.0 ACUTE UTI: Primary | ICD-10-CM

## 2023-12-20 PROCEDURE — 20611 DRAIN/INJ JOINT/BURSA W/US: CPT | Mod: LT | Performed by: FAMILY MEDICINE

## 2023-12-20 RX ORDER — TRIAMCINOLONE ACETONIDE 40 MG/ML
40 INJECTION, SUSPENSION INTRA-ARTICULAR; INTRAMUSCULAR
Status: DISCONTINUED | OUTPATIENT
Start: 2023-12-20 | End: 2024-06-19

## 2023-12-20 RX ORDER — SULFAMETHOXAZOLE/TRIMETHOPRIM 800-160 MG
1 TABLET ORAL 2 TIMES DAILY
Qty: 28 TABLET | Refills: 0 | Status: SHIPPED | OUTPATIENT
Start: 2023-12-20 | End: 2024-01-03

## 2023-12-20 RX ORDER — ROPIVACAINE HYDROCHLORIDE 5 MG/ML
3 INJECTION, SOLUTION EPIDURAL; INFILTRATION; PERINEURAL
Status: DISCONTINUED | OUTPATIENT
Start: 2023-12-20 | End: 2024-06-19

## 2023-12-20 RX ADMIN — ROPIVACAINE HYDROCHLORIDE 3 ML: 5 INJECTION, SOLUTION EPIDURAL; INFILTRATION; PERINEURAL at 13:47

## 2023-12-20 RX ADMIN — TRIAMCINOLONE ACETONIDE 40 MG: 40 INJECTION, SUSPENSION INTRA-ARTICULAR; INTRAMUSCULAR at 13:47

## 2023-12-20 NOTE — PROGRESS NOTES
Adan Oliver  :  1960  DOS: 23  MRN: [id number]    Sports Medicine Clinic Visit    PCP: Johnathon Diaz MD     Adan Oliver is a 63 year old Right hand dominant male who is seen as a self referral presenting with bilateral shoulder pain.     Injury: Reports insidious onset without acute precipitating event that patient first noticed approximately months. Pain located over bilateral shoulder, L>R, bilateral thumbs CMC joint, left big toe joint.  Additional Features:  Positive: right shoulder - crunchy sounds, popping.  Symptoms are better with Ibuprofen.  Symptoms are worse with: overhead motions: abduction, flexion, gripping.  Other evaluation and/or treatments so far consists of: Rest.  Recent imaging completed: No recent imaging completed.  Prior History of related problems: Has had previous shoulder issues - poss right rotator cuff    Social History: Currently employed as a contractor     Interim History - 2023  Since last visit on 11/10/2023 patient is here for left great toe and left thumb injection. Will follow up next Thursday to completed shoulder injection. No interim injury.       Interim History - 2023  Since last visit on 2023 patient  No interim injury.  Left thumb somewhat improved after CSI last week, left 1st MTP joint is much improved.    Review of Systems  Musculoskeletal: as above  Remainder of review of systems is negative including constitutional, CV, pulmonary, GI, Skin and Neurologic except as noted in HPI or medical history.    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 It was difficult  to discern between PLMS and hyponeas. Apnea/hypopnea index was 28.0 events per hour.  The REM AHI was 29.3 events per hour.  The supine AHI was 32.7 events per hour.  RDI was 28.0 events per hour. Lowest oxygen saturation was 84.0%.  Time spent less than or equal to 88% was 0.3 minutes. PLM index was 32.3 movements per hour     Past Surgical History:   Procedure Laterality Date    APPENDECTOMY  2000s    BLEPHAROPLASTY BILATERAL Bilateral 10/3/2018    Procedure: BLEPHAROPLASTY BILATERAL;;  Surgeon: Dany Berrios MD;  [facility]: [facility]    CARPAL TUNNEL RELEASE RT/LT  1980s    COLONOSCOPY N/A 7/27/2022    Procedure: COLONOSCOPY, FLEXIBLE, WITH LESION REMOVAL USING SNARE;  Surgeon: Filiberto Banuelos MD;  [facility]    COLONOSCOPY WITH CO2 INSUFFLATION N/A 7/27/2022    Procedure: COLONOSCOPY, WITH CO2 INSUFFLATION;  Surgeon: Filiberto Banuelos MD;  [facility]    COMBINED ESOPHAGOSCOPY, GASTROSCOPY, DUODENOSCOPY (EGD) WITH CO2 INSUFFLATION N/A 9/20/2019    Procedure: ESOPHAGOGASTRODUODENOSCOPY, WITH CO2 INSUFFLATION;  Surgeon: Filiberto Banuelos MD;  [facility]    COMBINED ESOPHAGOSCOPY, GASTROSCOPY, DUODENOSCOPY (EGD) WITH CO2 INSUFFLATION N/A 7/27/2022    Procedure: ESOPHAGOGASTRODUODENOSCOPY, WITH CO2 INSUFFLATION;  Surgeon: Filiberto Banuelos MD;  [facility]    ESOPHAGOSCOPY, GASTROSCOPY, DUODENOSCOPY (EGD), COMBINED N/A 9/20/2019    Procedure: Esophagogastroduodenoscopy, With Biopsy;  Surgeon: Filiberto Banuelos MD;  [facility]    ESOPHAGOSCOPY, GASTROSCOPY, DUODENOSCOPY (EGD), COMBINED N/A 7/27/2022    Procedure: ESOPHAGOGASTRODUODENOSCOPY, WITH BIOPSY;  Surgeon: Filiberto Banuelos MD;  [facility]    REPAIR PTOSIS BILATERAL Bilateral 10/3/2018    Procedure: REPAIR PTOSIS BILATERAL;;  Surgeon: Dany Berrios MD;  [facility]    REPAIR PTOSIS BROW BILATERAL Bilateral 10/3/2018    Procedure: REPAIR PTOSIS BROW BILATERAL;;   Surgeon: Dany Berrios MD;  [facility]    SIGMOIDOSCOPY FLEXIBLE N/A 9/20/2019    Procedure: SIGMOIDOSCOPY, FLEXIBLE;  Surgeon: Filiberto Banuelos MD;  [facility]    TONSILLECTOMY  1980s     Family History   Problem Relation Age of Onset    Hypertension Mother     Alzheimer Disease Mother     Diabetes Brother     Glaucoma No family hx of     Macular Degeneration No family hx of     Coronary Artery Disease No family hx of     Colon Cancer No family hx of     Retinal detachment No family hx of        Objective  Wt 83.5 kg (184 lb)   BMI 29.08 kg/m      General: healthy, alert and in no distress    HEENT: no scleral icterus or conjunctival erythema   Skin: no suspicious lesions or rash. No jaundice.   CV: regular rhythm by palpation, 2+ distal pulses, no pedal edema    Resp: normal respiratory effort without conversational dyspnea   Psych: normal mood and affect    Gait: nonantalgic, appropriate coordination and balance   Neuro: normal light touch sensory exam of the extremities. Motor strength as noted below     Radiology   Narrative    LEFT FOOT THREE OR MORE VIEWS  11/10/2023 1:44 PM     HISTORY: Left foot pain.    COMPARISON: None.       Impression    IMPRESSION:  Mild to moderate arthrosis first MTP joint. No evidence  of an acute fracture. No Lisfranc subluxation.    MUNA AMBROSE MD         SYSTEM ID:  [id number]   XR Hand Bilateral G/E 3 Views    Narrative    HAND BILATERAL THREE OR MORE VIEWS   11/10/2023 1:44 PM     HISTORY: Bilateral hand pain.    COMPARISON: None.       Impression    IMPRESSION: Mild to moderate scattered arthritic changes throughout  the hands most pronounced in the IP joints of the fingers, bilateral  thumb CMC joints and right middle finger MCP joint. There is no  erosive change or evidence for an inflammatory arthropathy. Normal  bone mineralization. No acute fracture.    MUNA AMBROSE MD         SYSTEM ID:  [id number]     Large Joint  Injection/Arthocentesis: L subacromial bursa    Date/Time: 12/20/2023 1:47 PM    Performed by: Danny Oleary DO  Authorized by: Danny Oleary DO    Indications:  Pain  Needle Size:  22 G  Guidance: ultrasound    Approach:  Anterolateral  Location:  Shoulder      Site:  L subacromial bursa  Medications:  40 mg triamcinolone 40 MG/ML; 3 mL ROPivacaine 5 MG/ML  Outcome:  Tolerated well, no immediate complications  Procedure discussed: discussed risks, benefits, and alternatives    Consent Given by:  Patient  Timeout: timeout called immediately prior to procedure    Prep: patient was prepped and draped in usual sterile fashion     Ultrasound images of procedure were permanently stored.         Assessment:  1. Calcific tendinitis of left shoulder        Plan:  Discussed the assessment with the patient.  Follow up: prn based on progress  Acute shoulder pain with mildly limited ROM on the left, in the setting of more chronic, intermittent b/l shoulder pain, relatively stable and seems to wax and wane  Sx consistent with subacromial impingement, signs of calcific tendinitis on the left on XR  US guided CSI today as previously discussed  Gentle exercise options reviewed, PT offered and available anytime  Oral Tylenol and topical Voltaren gel reviewed as safe OTC options, reviewed safe dosing strategies  Expectations and goals of CSI reviewed  Often 2-3 days for steroid effect, and can take up to two weeks for maximum effect  We discussed modified progressive pain-free activity as tolerated  Do not overuse in first two weeks if feeling better due to concern for vulnerability while steroid is working  Supportive care reviewed  All questions were answered today  Contact us with additional questions or concerns  Signs and sx of concern reviewed      Danny Oleary DO, ARCENIO  Sports Medicine Physician  [facility]          Disclaimer: This note consists of symbols derived from  keyboarding, dictation and/or voice recognition software. As a result, there may be errors in the script that have gone undetected. Please consider this when interpreting information found in this chart.

## 2023-12-20 NOTE — LETTER
2023         RE: Adan Oliver  [address]  [place] [address]        Dear Colleague,    Thank you for referring your patient, Adan Oliver, to the [facility]. Please see a copy of my visit note below.    Adan Oliver  :  1960  DOS: 23  MRN: [id number]    Sports Medicine Clinic Visit    PCP: Johnathon Diaz MD     Adan Oliver is a 63 year old Right hand dominant male who is seen as a self referral presenting with bilateral shoulder pain.     Injury: Reports insidious onset without acute precipitating event that patient first noticed approximately months. Pain located over bilateral shoulder, L>R, bilateral thumbs CMC joint, left big toe joint.  Additional Features:  Positive: right shoulder - crunchy sounds, popping.  Symptoms are better with Ibuprofen.  Symptoms are worse with: overhead motions: abduction, flexion, gripping.  Other evaluation and/or treatments so far consists of: Rest.  Recent imaging completed: No recent imaging completed.  Prior History of related problems: Has had previous shoulder issues - poss right rotator cuff    Social History: Currently employed as a contractor     Interim History - 2023  Since last visit on 11/10/2023 patient is here for left great toe and left thumb injection. Will follow up next Thursday to completed shoulder injection. No interim injury.       Interim History - 2023  Since last visit on 2023 patient  No interim injury.  Left thumb somewhat improved after CSI last week, left 1st MTP joint is much improved.    Review of Systems  Musculoskeletal: as above  Remainder of review of systems is negative including constitutional, CV, pulmonary, GI, Skin and Neurologic except as noted in HPI or medical history.    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 It was difficult to discern between PLMS and hyponeas. Apnea/hypopnea index was 28.0 events per hour.  The REM AHI was 29.3 events per hour.  The supine AHI was 32.7 events per hour.  RDI was 28.0 events per hour. Lowest oxygen saturation was 84.0%.  Time spent less than or equal to 88% was 0.3 minutes. PLM index was 32.3 movements per hour     Past Surgical History:   Procedure Laterality Date     APPENDECTOMY  2000s     BLEPHAROPLASTY BILATERAL Bilateral 10/3/2018    Procedure: BLEPHAROPLASTY BILATERAL;;  Surgeon: Dany Berrios MD;  Location: [facility]     CARPAL TUNNEL RELEASE RT/LT  1980s     COLONOSCOPY N/A 7/27/2022    Procedure: COLONOSCOPY, FLEXIBLE, WITH LESION REMOVAL USING SNARE;  Surgeon: Filiberto Banuelos MD;  Location: [facility]     COLONOSCOPY WITH CO2 INSUFFLATION N/A 7/27/2022    Procedure: COLONOSCOPY, WITH CO2 INSUFFLATION;  Surgeon: Filiberto Banuelos MD;  Location: [facility]     COMBINED ESOPHAGOSCOPY, GASTROSCOPY, DUODENOSCOPY (EGD) WITH CO2 INSUFFLATION N/A 9/20/2019    Procedure: ESOPHAGOGASTRODUODENOSCOPY, WITH CO2 INSUFFLATION;  Surgeon: Filiberto Banuelos MD;  Location: [facility]     COMBINED ESOPHAGOSCOPY, GASTROSCOPY, DUODENOSCOPY (EGD) WITH CO2 INSUFFLATION N/A 7/27/2022    Procedure: ESOPHAGOGASTRODUODENOSCOPY, WITH CO2 INSUFFLATION;  Surgeon: Filiberto Banuelos MD;  Location: [facility]     ESOPHAGOSCOPY, GASTROSCOPY, DUODENOSCOPY (EGD), COMBINED N/A 9/20/2019    Procedure: Esophagogastroduodenoscopy, With Biopsy;  Surgeon: Filiberto Banuelos MD;  Location: [facility]     ESOPHAGOSCOPY, GASTROSCOPY, DUODENOSCOPY (EGD), COMBINED N/A 7/27/2022    Procedure: ESOPHAGOGASTRODUODENOSCOPY, WITH  BIOPSY;  Surgeon: Filiberto Banuelos MD;  Location: [facility]     REPAIR PTOSIS BILATERAL Bilateral 10/3/2018    Procedure: REPAIR PTOSIS BILATERAL;;  Surgeon: Dany Berrios MD;  [facility]: [facility]     REPAIR PTOSIS BROW BILATERAL Bilateral 10/3/2018    Procedure: REPAIR PTOSIS BROW BILATERAL;;  Surgeon: Dany Berrios MD;  [facility]: [facility]     SIGMOIDOSCOPY FLEXIBLE N/A 9/20/2019    Procedure: SIGMOIDOSCOPY, FLEXIBLE;  Surgeon: Filiberto Banuelos MD;  Location: [facility]     TONSILLECTOMY  1980s     Family History   Problem Relation Age of Onset     Hypertension Mother      Alzheimer Disease Mother      Diabetes Brother      Glaucoma No family hx of      Macular Degeneration No family hx of      Coronary Artery Disease No family hx of      Colon Cancer No family hx of      Retinal detachment No family hx of        Objective  Wt 83.5 kg (184 lb)   BMI 29.08 kg/m      General: healthy, alert and in no distress    HEENT: no scleral icterus or conjunctival erythema   Skin: no suspicious lesions or rash. No jaundice.   CV: regular rhythm by palpation, 2+ distal pulses, no pedal edema    Resp: normal respiratory effort without conversational dyspnea   Psych: normal mood and affect    Gait: nonantalgic, appropriate coordination and balance   Neuro: normal light touch sensory exam of the extremities. Motor strength as noted below     Radiology   Narrative    LEFT FOOT THREE OR MORE VIEWS  11/10/2023 1:44 PM     HISTORY: Left foot pain.    COMPARISON: None.       Impression    IMPRESSION:  Mild to moderate arthrosis first MTP joint. No evidence  of an acute fracture. No Lisfranc subluxation.    MUNA AMBROSE MD         SYSTEM ID:  [id number]   XR Hand Bilateral G/E 3 Views    Narrative    HAND BILATERAL THREE OR MORE VIEWS   11/10/2023 1:44 PM     HISTORY: Bilateral hand pain.    COMPARISON: None.       Impression    IMPRESSION: Mild to moderate scattered arthritic changes throughout  the hands most  pronounced in the IP joints of the fingers, bilateral  thumb CMC joints and right middle finger MCP joint. There is no  erosive change or evidence for an inflammatory arthropathy. Normal  bone mineralization. No acute fracture.    MUNA AMBROSE MD         SYSTEM ID:  [id number]     Large Joint Injection/Arthocentesis: L subacromial bursa    Date/Time: 12/20/2023 1:47 PM    Performed by: Danny Oleary DO  Authorized by: Danny Oleary DO    Indications:  Pain  Needle Size:  22 G  Guidance: ultrasound    Approach:  Anterolateral  Location:  Shoulder      Site:  L subacromial bursa  Medications:  40 mg triamcinolone 40 MG/ML; 3 mL ROPivacaine 5 MG/ML  Outcome:  Tolerated well, no immediate complications  Procedure discussed: discussed risks, benefits, and alternatives    Consent Given by:  Patient  Timeout: timeout called immediately prior to procedure    Prep: patient was prepped and draped in usual sterile fashion     Ultrasound images of procedure were permanently stored.         Assessment:  1. Calcific tendinitis of left shoulder        Plan:  Discussed the assessment with the patient.  Follow up: prn based on progress  Acute shoulder pain with mildly limited ROM on the left, in the setting of more chronic, intermittent b/l shoulder pain, relatively stable and seems to wax and wane  Sx consistent with subacromial impingement, signs of calcific tendinitis on the left on XR  US guided CSI today as previously discussed  Gentle exercise options reviewed, PT offered and available anytime  Oral Tylenol and topical Voltaren gel reviewed as safe OTC options, reviewed safe dosing strategies  Expectations and goals of CSI reviewed  Often 2-3 days for steroid effect, and can take up to two weeks for maximum effect  We discussed modified progressive pain-free activity as tolerated  Do not overuse in first two weeks if feeling better due to concern for vulnerability while steroid is working  Supportive  care reviewed  All questions were answered today  Contact us with additional questions or concerns  Signs and sx of concern reviewed      Danny Oleary DO, ARCENIO  Sports Medicine Physician  [facility]          Disclaimer: This note consists of symbols derived from keyboarding, dictation and/or voice recognition software. As a result, there may be errors in the script that have gone undetected. Please consider this when interpreting information found in this chart.      Again, thank you for allowing me to participate in the care of your patient.        Sincerely,        Danny Oleary DO

## 2023-12-20 NOTE — TELEPHONE ENCOUNTER
Micromedix showing Major drug to drug medication contraindication between flecainide and Bactrim.  Will route to Dr. Crawley.  Abida Garcia RN

## 2023-12-27 RX ORDER — METOPROLOL SUCCINATE 25 MG/1
25 TABLET, EXTENDED RELEASE ORAL DAILY
Qty: 90 TABLET | Refills: 3 | Status: SHIPPED | OUTPATIENT
Start: 2023-12-27 | End: 2024-04-05 | Stop reason: DRUGHIGH

## 2023-12-27 RX ORDER — FLECAINIDE ACETATE 100 MG/1
100 TABLET ORAL 2 TIMES DAILY
Qty: 180 TABLET | Refills: 3 | Status: SHIPPED | OUTPATIENT
Start: 2023-12-27

## 2023-12-28 NOTE — TELEPHONE ENCOUNTER
Janice rCawley MD  Fk [id number] minutes ago (10:39 AM)     IC  I am not sure about the antibiotic. If there a particular one that he needs to be on we can test drug drug interfrance and let him know.     StudyEdge message sent to patient.     Yaz Garrison, RN, BSN  Cardiology RN Care Coordinator   [place]   Phone: [phone]  Fax: [phone] ([place]) [phone] ([place])

## 2023-12-29 NOTE — TELEPHONE ENCOUNTER
Patient saw Digital Message Display message. No questions or response at this time.    Yaz Garrison, RN, BSN  Cardiology RN Care Coordinator   [place]   Phone: [phone]  Fax: [phone] ([place]) [phone] ([place])

## 2024-01-06 ENCOUNTER — HEALTH MAINTENANCE LETTER (OUTPATIENT)
Age: 64
End: 2024-01-06

## 2024-01-17 ENCOUNTER — MYC MEDICAL ADVICE (OUTPATIENT)
Dept: CARDIOLOGY | Facility: CLINIC | Age: 64
End: 2024-01-17
Payer: COMMERCIAL

## 2024-01-18 NOTE — TELEPHONE ENCOUNTER
Franky message reviewed by patient.  Ok to close this encounter.    Abida Garcia, RN  Cardiology Care Coordinator  [facility]  [phone] option 1

## 2024-01-18 NOTE — TELEPHONE ENCOUNTER
Dr. Crawley,    Pt has not started Bactrim for his UTI.  He thinks he can not start the a/b due to being on flecainide.

## 2024-01-18 NOTE — TELEPHONE ENCOUNTER
Pt is scheduled with Dr. Cheung 4/17.  Patient is currently out of country for 3 months.  This is the soonest he can be seen by Dr. Jonatan BYRNE RN

## 2024-01-19 NOTE — TELEPHONE ENCOUNTER
RECORDS RECEIVED FROM:    DATE RECEIVED:    NOTES STATUS DETAILS   OFFICE NOTE from referring provider  Internal    OFFICE NOTE from other cardiologists  Internal    RECORDS from hospital/ED N/A    MEDICATION LIST Internal    GENERAL CARDIO RECORDS   (ALL APPOINTMENT TYPES)     LABS (CBC,BMP,CMP, TSH) Internal    EKG (STRIPS & REPORTS) Internal 4-11-22   MONITORS (STRIPS & REPORTS) In process Placed 12-4-23   ECHOS (IMAGES AND REPORTS) Internal 10-17-23   STRESS TESTS (IMAGES AND REPORTS) N/A    cMRI (IMAGES AND REPORTS) N/A    CT/CTA (IMAGES AND REPORTS) Internal 10-4-23 calcium screening   NEW EP     ICD/PACEMAKER IMPLANT No    CARDIOVERSION N/A    TILT TABLE STUDIES N/A

## 2024-02-12 ENCOUNTER — TELEPHONE (OUTPATIENT)
Dept: CARDIOLOGY | Facility: CLINIC | Age: 64
End: 2024-02-12
Payer: COMMERCIAL

## 2024-02-12 NOTE — TELEPHONE ENCOUNTER
BUSY SINGAL UNABLE for the patient to call back and schedule the following:    Appointment type: NEW EP  Provider: MEGAN  Return date: 04/17/24  Specialty phone number: [phone] OPT 1   Additional appointment(s) needed: N/A   Additonal Notes: N/A

## 2024-02-15 ENCOUNTER — TELEPHONE (OUTPATIENT)
Dept: CARDIOLOGY | Facility: CLINIC | Age: 64
End: 2024-02-15
Payer: COMMERCIAL

## 2024-02-15 NOTE — TELEPHONE ENCOUNTER
MAX ATTEMPTS REACHED- Unable to LVM- busy signal (2nd attempt), MYC was sent on 1st attempt (2/12 per Vira Adams), Sent letter for the patient to call back and reschedule the following:    Appointment type: New EP  Provider: Dr. Cheung  Return date: Next available  Specialty phone number: [phone] option 1  Additional appointment(s) needed: NA  Additonal Notes: 2/15 MAX ATTEMPTS REACHED- Busy signal- unable to LVM. MYC was sent on 1st attempt. Sent letter to pt to reschedule New EP w/ Dr. Cheung, first available. MJ

## 2024-03-07 ENCOUNTER — PATIENT OUTREACH (OUTPATIENT)
Dept: GERIATRIC MEDICINE | Facility: CLINIC | Age: 64
End: 2024-03-07
Payer: COMMERCIAL

## 2024-03-07 NOTE — PROGRESS NOTES
[facility] Care Coordination Contact    Member became effective with [facility] on 3/1/2024 with Ramon MSC+.  Previous Health Plan: MA/Fee For Service  Previous Care System:  N/A  Previous care coordinators name and number: N/A  Waiver Type: N/A  Services Listed in MMIS: No MMIS entries  UTF received: No UTF to request  Mailed welcome letter and Ramon When to Contact Your Care Coordinator  Address/Phone discrepancy: N/A  MnChoices: N/A    Mela Blandon  Case Management Specialist   [facility]  [phone]

## 2024-03-07 NOTE — LETTER
March 7, 2024    ANA MARIA BURRELL  [address]  [place] [address]    Dear  Ana Maria,    Welcome to [facility] s MSC+ health program. My name is Abby Ham RN. I am your MSC+ care coordinator. You are eligible for Care Coordination through [facility] MSC+ plan.    As your care coordinator, we ll:  Meet to go over your care coordination benefits  Talk about your physical and mental health care needs   Review your preventative care needs  Create a plan that meets your needs with the services you choose    What happens next?  I ll call you soon to introduce myself and tell you more about my role. We ll then plan time to go over your health and safety needs. Our goal is to keep you as healthy and independent as possible.    Soon, you will receive a new MSC+ member identification (ID) card from [facility]. When you receive it, please use this card along with your [place] [facility] card and Prescription Drug Coverage Program card. When you receive, it please use this card where you get your health services. If you have Medicare, you will need to show your Medicare card when you get health services.    The MSC+ care coordination program is voluntary and offered to you at no cost. If you wish to stop being in the care coordination program or have questions, call me at [phone]. If you reach my voicemail, leave a message and your phone number. TTY users, call the [place] Relay at 014 or [phone] (nrgale-pq-zirnqp relay service).    Sincerely,      Abby Ham RN    E-mail: [email or web address]  Phone: [phone]    Care Manager  [facility]    [id number] accepted   [id number]       [id number] (07/2022)

## 2024-03-12 ENCOUNTER — PATIENT OUTREACH (OUTPATIENT)
Dept: GERIATRIC MEDICINE | Facility: CLINIC | Age: 64
End: 2024-03-12
Payer: COMMERCIAL

## 2024-03-12 NOTE — PROGRESS NOTES
3/12/24 CC received member as new [facility] MSC+ client.   CC called members home number [phone] and a voice message states The person you called is not available to receive calls at this time.    CC researched members epic record and found several recent contacts from member in December with his cardiologist and this is the same number used.  CC also did read a note from Adan in My Chart to his physician and stating Adan will be out of the country starting December 30,2023 and will be gone for 3 months.  CC will have an [facility] letter mailed to members home.  Imelda Ham RN PHN  [facility] Coordinator  [phone]

## 2024-03-13 ENCOUNTER — PATIENT OUTREACH (OUTPATIENT)
Dept: GERIATRIC MEDICINE | Facility: CLINIC | Age: 64
End: 2024-03-13
Payer: COMMERCIAL

## 2024-03-13 NOTE — LETTER
March 19, 2024    ANA MARIA BURRELL  [address]  [place] [address]    Dear Ana Maria:     I m your care coordinator. I ve been unable to reach you by phone. I am writing to ask you or your authorized representative to call me at [phone]. If you reach my voicemail, leave a message with your daytime phone number. Include a date and time that I can call you. If you are hearing impaired, call the [place] Relay at 665 or [phone] (fckztq-ut-xymyxe relay service).    The reason I am trying to reach you is:     [] To schedule an assessment  [] For your six (6)-month check-in  [] Other:  Introduce myself    Please call me as soon as you receive this letter. I look forward to speaking with you.    Sincerely,    Abby Ham RN    E-mail: [email or web address]  Phone: [phone]    Care Manager  [facility]        [id number] accepted  [id number]                                                                        B  (08/2022)

## 2024-03-13 NOTE — LETTER
March 13, 2024    ANA MARIA BURRELL  [address]  [place] [address]    Dear Ana Maria:     I m your care coordinator. I ve been unable to reach you by phone. I am writing to ask you or your authorized representative to call me at [phone]. If you reach my voicemail, leave a message with your daytime phone number. Include a date and time that I can call you. If you are hearing impaired, call the [place] Relay at 456 or [phone] (zuaqrl-pp-bzvidj relay service).    The reason I am trying to reach you is:     [] To schedule an assessment  [] For your six (6)-month check-in  [x] Other:  Introduce myself    Please call me as soon as you receive this letter. I look forward to speaking with you.    Sincerely,    Abby Ham RN    E-mail: [email or web address]  Phone: [phone]    Care Manager  [facility]        [id number] accepted  [id number]                                                                        B  (08/2022)

## 2024-03-13 NOTE — PROGRESS NOTES
"[facility] Care Coordination Contact    Per CC, mailed client an \"Unable to Contact\" letter.    Mela Blandon  Case Management Specialist   [facility]  [phone]      "

## 2024-03-16 ENCOUNTER — HEALTH MAINTENANCE LETTER (OUTPATIENT)
Age: 64
End: 2024-03-16

## 2024-03-19 ENCOUNTER — PATIENT OUTREACH (OUTPATIENT)
Dept: GERIATRIC MEDICINE | Facility: CLINIC | Age: 64
End: 2024-03-19
Payer: COMMERCIAL

## 2024-03-19 NOTE — PROGRESS NOTES
[facility] Refusal Telephone Assessment    Member refused home visit HRA on 3/19/24 (reason: Member is out of the country until April).    ER visits: No  Hospitalizations: No  Health concerns: no  Falls/Injuries: No  ADL/IADL Dependencies:        Member currently receiving the following home care services:     Member currently receiving the following community resources:    Informal support(s):      Advanced Care Planning discussion, complete code section.    [facility] Health Plan sponsored benefits: Shared information re: Silver Sneakers/gym memberships, ASA, Calcium +D.    Follow-Up Plan: Member informed of future contact, plan to f/u with member with a 6 month telephone assessment and offer a home visit.  Contact information shared with member and family, encouraged member to call with any questions or concerns at any time.    This CC note routed to PCP, Johnathon Diaz sent an email to Adan responding back to his email. He is out of the country and will not return until April 1. He will contact CC once he returns home to follow up about the MSC+ program through [facility]. He was not aware he was put on this program and states he has insurance outside of [facility].  Imelda Ham RN PHN  [facility] Care Coordinator  [phone]

## 2024-03-19 NOTE — PROGRESS NOTES
"Per CC, mailed client an \"Unable to Contact\" letter.    Mela Blandon  Case Management Specialist   [facility]  [phone]    "

## 2024-03-19 NOTE — PROGRESS NOTES
JOSE received an email from Adan:  Benedict Segura,  I just received a letter from you on My Chart.   I'm a little confused on who exactly you are.   Also not sure why there is a [facility] header on your letter.   My Medicare started in February & I don't believe I'm with [facility] any longer.   I'm currently traveling overseas but will be back April 1st. That's why my phone is not working.   My contact info hasn't changed & will be working again when I return.   Let me know how you want to go forward.     Adan Oliver  [phone]  E-Mail -[email or web address]    JOSE responded to Adan letting him know about the [facility] MSC+ program and the care coordinator's position in this program.  Since he is overseas until April 1st and we need to enter a disposition by March 31 CC asked if he wants to refuse the visit since he is out of the country and when he returns in April he can contact CC and we can talk about the program more.  Imelda Ham RN PHN  [facility] Care Coordinator  [phone]

## 2024-03-19 NOTE — LETTER
March 20, 2024    ANA MARIA BURRELL  [address]  [place] [address]        Dear Ana Maria:    As a member of [facility]'s MSC+ program, we offer a health risk assessment at no cost to you. I know you don't want to have the assessment right now. If you change your mind, please call me at the number below.    Who performs the health risk assessment?  A [facility] Care Coordinator performs the assessment. Our Care Coordinators can also help you understand your benefits. They can tell you about services to aid you at home, such as managing your care with your doctors if your health worsens.    Our Care Coordinators are here for you if you need:  Support for activities you used to do by yourself (including making meals, bathing and paying bills)  Equipment for bathroom or home safety  Help finding a new place to live  Information on staying healthy, preventing falls and immunizations    Questions?  If you have questions, or you would like to do he assessment, call me at [phone]. TTY users call [phone]. I'm here from 8am to 5pm. I may reach out to you again soon.       Sincerely,         Abby Ham RN    E-mail: [email or web address]  Phone: [phone]    Care Manager  [facility]      \<[id number] accepted  [id number]>    [id number] (21/2021)

## 2024-03-20 NOTE — PROGRESS NOTES
"Per CC, mailed client a \"Refusal of Home Visit\" letter.    Meal Blandon  Case Management Specialist   [facility]  [phone]    "

## 2024-04-01 ENCOUNTER — MYC MEDICAL ADVICE (OUTPATIENT)
Dept: CARDIOLOGY | Facility: CLINIC | Age: 64
End: 2024-04-01
Payer: COMMERCIAL

## 2024-04-01 ENCOUNTER — PATIENT OUTREACH (OUTPATIENT)
Dept: GERIATRIC MEDICINE | Facility: CLINIC | Age: 64
End: 2024-04-01
Payer: COMMERCIAL

## 2024-04-01 NOTE — PROGRESS NOTES
CC received a call from Adan. He does not even know what this program is and why he is on it. He already has a Medicare Advantage Plan through [employer].  CC explained the MSC+ program and what benefits he has. CC also explained the M[employer]O program and some of the benefits that are different. He does not think he needs this program. CC explained how the [facility] will put people with Medical Assistance on one of these 2 programs when they turn age 65. It often happens behind the scenes at the [facility]. He should have literature from [facility] as well as a new insurance card and a separate card for his pharmacy medications. He states he just returned from being out of the country and has not yet looked at all his mail. He will look for this in his mail.  CC strongly suggested he should call the [employer] line and gave him the phone number. They are Medicare insurance experts and can help him sort out if he should be on  the  Advantage plan or the [employer] program would be best for him. If he decides to stop the MSC+ that needs to be done through the [facility]. He will need to contact his county financial worker.    Adan does not want a home visit. He is independent and does not want or need any services.   CC let him know she will call in another 5-6 months and see how he is doing if he continues on this program. He can call CC any time with questions.  Imelda Ham RN N  [facility] Coordinator  [phone]

## 2024-04-02 NOTE — TELEPHONE ENCOUNTER
Called and spoke with patient. Patient wanting to get in to see Cardiology as soon as possible due to symptoms of lightheadedness, tiredness, increase in weight, and shortness of breath with activity.     Patient having shortness of breath and reporting weight gain. Advised patient to go to ER for further evaluation due to symptoms. Discussed with patient concern that heart may not be pumping effectively and going to ER would allow immediate evaluation and workup. Patient declining to go to ER. Patient states that he will monitor his symptoms.     Patient feels that symptoms are related to his medications. Patient is interested in switching metoprolol for diltiazem as previously suggested in January. Scheduled patient with next available provider. Patient is scheduled for appointment on 4/11/24 in [place].     Informed patient that provider would be updated on patient status and to seek advise on patient's medications. Continued to advise patient to go to ER for further workup. Patient verbalized understanding but declined ER visit. Patient is scheduled for follow up on 4/11/24 with Cardiology.    Yaz Garrison RN, BSN  Cardiology RN Care Coordinator   [facility] [place]   Phone: [phone]  Fax: [phone] ([place]) [phone] ([place])

## 2024-04-03 ENCOUNTER — TELEPHONE (OUTPATIENT)
Dept: OTOLARYNGOLOGY | Facility: CLINIC | Age: 64
End: 2024-04-03
Payer: COMMERCIAL

## 2024-04-03 ASSESSMENT — SLEEP AND FATIGUE QUESTIONNAIRES
HOW LIKELY ARE YOU TO NOD OFF OR FALL ASLEEP WHILE SITTING QUIETLY AFTER LUNCH WITHOUT ALCOHOL: SLIGHT CHANCE OF DOZING
HOW LIKELY ARE YOU TO NOD OFF OR FALL ASLEEP WHILE SITTING AND READING: SLIGHT CHANCE OF DOZING
HOW LIKELY ARE YOU TO NOD OFF OR FALL ASLEEP WHILE WATCHING TV: SLIGHT CHANCE OF DOZING
HOW LIKELY ARE YOU TO NOD OFF OR FALL ASLEEP WHILE SITTING AND TALKING TO SOMEONE: WOULD NEVER DOZE
HOW LIKELY ARE YOU TO NOD OFF OR FALL ASLEEP WHILE LYING DOWN TO REST IN THE AFTERNOON WHEN CIRCUMSTANCES PERMIT: SLIGHT CHANCE OF DOZING
HOW LIKELY ARE YOU TO NOD OFF OR FALL ASLEEP WHILE SITTING INACTIVE IN A PUBLIC PLACE: WOULD NEVER DOZE
HOW LIKELY ARE YOU TO NOD OFF OR FALL ASLEEP IN A CAR, WHILE STOPPED FOR A FEW MINUTES IN TRAFFIC: WOULD NEVER DOZE
HOW LIKELY ARE YOU TO NOD OFF OR FALL ASLEEP WHEN YOU ARE A PASSENGER IN A CAR FOR AN HOUR WITHOUT A BREAK: SLIGHT CHANCE OF DOZING

## 2024-04-03 NOTE — TELEPHONE ENCOUNTER
Spoke with patient, he asked to cancel his upcoming appointment with Dr. Perales.  Patient will call back to reschedule.    Antonieta Hurtado RN Care Coordinator, [facility] 04/03/24 12:13 PM     Cigarettes

## 2024-04-03 NOTE — TELEPHONE ENCOUNTER
[facility] Call Center    Phone Message    May a detailed message be left on voicemail: yes     Reason for Call: Other: Pt would like a call to discuss his upcoming appt with Dr Perales. Please call. Thanks.     Action Taken: Other: ENT    Travel Screening: Not Applicable

## 2024-04-05 ENCOUNTER — VIRTUAL VISIT (OUTPATIENT)
Dept: SLEEP MEDICINE | Facility: CLINIC | Age: 64
End: 2024-04-05
Payer: COMMERCIAL

## 2024-04-05 VITALS — WEIGHT: 200 LBS | HEIGHT: 68 IN | BODY MASS INDEX: 30.31 KG/M2

## 2024-04-05 DIAGNOSIS — G47.33 OSA (OBSTRUCTIVE SLEEP APNEA): Primary | ICD-10-CM

## 2024-04-05 DIAGNOSIS — G47.00 INSOMNIA, UNSPECIFIED TYPE: ICD-10-CM

## 2024-04-05 DIAGNOSIS — R41.3 MEMORY DIFFICULTY: ICD-10-CM

## 2024-04-05 DIAGNOSIS — R41.3 SHORT-TERM MEMORY LOSS: ICD-10-CM

## 2024-04-05 DIAGNOSIS — R41.89 IMPAIRMENT OF COGNITIVE FUNCTION: ICD-10-CM

## 2024-04-05 DIAGNOSIS — I48.0 PAROXYSMAL ATRIAL FIBRILLATION (H): ICD-10-CM

## 2024-04-05 DIAGNOSIS — E66.811 OBESITY (BMI 30.0-34.9): ICD-10-CM

## 2024-04-05 PROCEDURE — 99205 OFFICE O/P NEW HI 60 MIN: CPT | Mod: 95 | Performed by: NURSE PRACTITIONER

## 2024-04-05 RX ORDER — ZOLPIDEM TARTRATE 10 MG/1
TABLET ORAL
Qty: 1 TABLET | Refills: 0 | Status: SHIPPED | OUTPATIENT
Start: 2024-04-05 | End: 2024-04-18

## 2024-04-05 ASSESSMENT — PAIN SCALES - GENERAL: PAINLEVEL: MODERATE PAIN (4)

## 2024-04-05 NOTE — NURSING NOTE
Has patient had flu shot for current/most recent flu season? If so, when? Yes: 12/26/23        Is the patient currently in the state of [place]? YES    Visit mode:VIDEO    If the visit is dropped, the patient can be reconnected by: VIDEO VISIT: Text to cell phone:   Telephone Information:   Mobile [phone]       Will anyone else be joining the visit? NO  (If patient encounters technical issues they should call [phone]    How would you like to obtain your AVS? MyChart    Are changes needed to the allergy or medication list? No      Reason for visit: Consult    Yumiko JARRETT

## 2024-04-05 NOTE — PROGRESS NOTES
Virtual Visit Details    Type of service:  Video Visit   Video Start Time: 1:41 PM  Video End Time:  2:24 PM    Originating Location (pt. Location): Home    Distant Location (provider location):  Off-site  Platform used for Video Visit: [facility]      Outpatient Sleep Medicine Consultation:      Name: Adan Oliver MRN# [id number]   Age: 64 year old YOB: 1960     Date of Consultation: April 5, 2024  Consultation is requested by: Primitivo Garcia MD  [address] [place] [address] Primitivo Garcia  Primary care provider: Johnathon Diaz       Reason for Sleep Consult:     Adan Oliver is sent by Primitivo Garcia for a sleep consultation regarding LUCIAN, untreated.    Patient s Reason for visit  Adan Oliver main reason for visit: Not sleeping well or feeling rested the bext day.  Patient states problem(s) started: Years ago.  Adan Oliver's goals for this visit: To sleep well & feel rested.           Assessment and Plan:     Summary Sleep Diagnoses:  1. LUCIAN (obstructive sleep apnea)  2. Paroxysmal atrial fibrillation (H)  3. Memory difficulty  4. Short-term memory loss  5. Impairment of cognitive function  6. Insomnia, unspecified type  7. Obesity (BMI 30.0-34.9)  - Adult Sleep Eval & Management  Referral  - Comprehensive Sleep Study; Future  - zolpidem (AMBIEN) 10 MG tablet; Take tablet by mouth 15 minutes prior to sleep, for Sleep Study  Dispense: 1 tablet; Refill: 0      Comorbid Diagnoses:  1.  Esophageal reflux  2.  Lumbar DDD  3.  BPH  4.  Alcohol use  5.  Anxiety      Summary Recommendations:  1.  Reestablish care visit for history of moderate LUCIAN, untreated.  Patient has had previous visits and sleep testing, however, he has a new diagnosis of paroxysmal atrial fibrillation and has had some short-term memory loss/memory difficulty, and increased fatigue/tiredness, as well as difficulty staying asleep and waking up too early.  He would like to consider reevaluation and discuss his  treatment options at this point.  Given his new diagnosis of paroxysmal atrial fibrillation I have recommended reevaluation with diagnostic in-lab polysomnography (PSG) with TCM for current LUCIAN severity, including possible central sleep apnea, and/or hypoventilation/hypoxemia.  He was recently evaluated by neurology and cardiology and further evaluation/management of LUCIAN was strongly recommended for his symptoms noted above.  He has previously not tolerated PAP therapy very well/noncompliant due to the whole issue of wearing a mask on his face and frequent positional changes due to lower back problems.  He is willing to consider PAP therapy if this is needed given the worsening of his symptoms.  2.  We discussed the pathophysiology of obstructive sleep apnea and the risks associated with untreated LUCIAN.  We also discussed the pros and cons of in lab polysomnography versus home sleep testing.  The patient has elected to undergo in lab polysomnography (PSG) to further evaluate his symptoms of possible obstructive sleep apnea.  3.  All potential therapeutic options including positive airway pressure, mandibular advancing oral appliances, positional therapy, and surgical options, including Inspire hypoglossal neurostimulator, were discussed. Also counseled about impact of weight loss on LUCIAN.   4.  His insomnia severity index is 23 today which is consistent with severe clinical insomnia.  This appears to be multifactorial and is associated with elements of poor sleep hygiene, frequent travel, having an active mind at bedtime and/or awakening and having trouble falling back to sleep, and likely sleep disordered breathing contributing.  We discussed having him take a sleep aid on the night of the sleep study.  Subsequent to this conversation, a Rx for zolpidem 10 mg by mouth at bedtime x 1 for the night of the sleep study was sent to his pharmacy today.  He was instructed to bring this with him on the night of the sleep  test.  5.  Follow-up in approximately 2 weeks after the sleep study to review the results and to determine next steps.    Orders Placed This Encounter   Procedures    Comprehensive Sleep Study         Summary Counseling:    Previous Sleep Testing Reviewed  Obstructive Sleep Apnea Reviewed  Complications of Untreated Sleep Apnea Reviewed  Previous recent chart notes, lab, imaging, cardiology results reviewed    Medical Decision-making:   Educational materials provided in instructions    Total time spent reviewing medical records, history and physical examination, review of previous testing and interpretation as well as documentation on this date: 66 minutes    CC: Primitivo Garcia          History of Present Illness:     Adan villalobos is a 64-year-old male with a PMH pertinent for newer diagnosis of atrial fibrillation, esophageal reflux, HLD, lumbar DDD, BPH, alcohol use, anxiety, psychophysiological insomnia, LUCIAN, and obesity who presents today with symptoms of snoring, difficulty staying asleep and difficulty getting back to sleep.  He has previously followed with our clinic having seen Dr. Ansari, Dr. Bojorquez, Dr. Ford, and most recently Skye Ledbetter PA-C with his last visit on 10/15/2018.  He was referred by neurology for further evaluation/management of LUCIAN.    Past Sleep Evaluations: Yes  He has previously been seen in 2014 and had a negative sleep study.      Sleep study 7/6/2015 at the [facility] for snoring insomnia, poor quality sleep. Light sleeper. Weight 198#.   -Sleep latency 16.5 minutes without Ambien. REM latency 189.5 minutes.  Sleep efficiency 64.4%. Total sleep time 266.5 minutes.  -Sleep architecture: Stage 1, 11.8% (5%), stage 2, 63.0% (45-55%), stage 3, 11.4% (15-20%), stage REM, 13.7% (20-25%).    -Supine AHI was 6.3 (9.5 minutes), REM AHI was 16.4, total AHI was 4.1, lowest oxygen saturation was 85.7%.  RDI 15.8.  Periodic Limb Movement Index 71.4/hour. The PLM  arousal index was 10.4 per hour.       He was seen again in 9/2016 after his PCP put him on robinoprole. He tried requip 1 pill for periodic limb movement without change in symptoms.    Sleep study  10/02/2017 [facility] (200.0 lbs) - AHI 28.0, RDI 28.0, Supine AHI 32.7 REM AHI 29.3 Low O2 84%, Time Spent =88% 0.3 minutes / Time Spent =89% 1.2 minutes. PLMI: 32.3/hr. PLMAI: 15.8/hr.       SLEEP-WAKE SCHEDULE:     Work/School Days: Patient goes to school/work: No   Usually gets into bed at 1am  Takes patient about Varies. to fall asleep  Has trouble falling asleep 4 to 7 (varies). nights per week  Wakes up in the middle of the night 4 or 5 times.  Wakes up due to Use the bathroom;Uncertain  He has trouble falling back asleep 5-7 (varies). times a week.   It usually takes Usually I don t. to get back to sleep  Patient is usually up at 9am  Uses alarm: Yes    Weekends/Non-work Days/All Other Days:  Usually gets into bed at 1am   Takes patient about Varies to fall asleep  Patient is usually up at 9am - 9:30am  Uses alarm: Yes    Sleep Need  Patient gets  4 to 6 hours sleep on average   Patient thinks he needs about 8 hours sleep    Adan EMERSON Benito prefers to sleep in this position(s): Side   Patient states they do the following activities in bed: Watch TV;Use phone, computer, or tablet    Naps  Patient takes a purposeful nap Rarely times a week and naps are usually   in duration  He feels better after a nap:    He dozes off unintentionally 2 to 3 days days per week  Patient has had a driving accident or near-miss due to sleepiness/drowsiness: No      SLEEP DISRUPTIONS:    Breathing/Snoring  Patient snores:Yes  Other people complain about his snoring: No  Patient has been told he stops breathing in his sleep:No  He has issues with the following: Morning headaches;Morning mouth dryness;Stuffy nose when you wake up;Getting up to urinate more than once    Movement:  Patient gets pain, discomfort, with an  urge to move:     It happens when he is resting:     It happens more at night:     Patient has been told he kicks his legs at night:  Yes     Behaviors in Sleep:  Adan Oliver has experienced the following behaviors while sleeping: Teeth grinding;Kicking or punching  He has experienced sudden muscle weakness during the day: Yes      Is there anything else you would like your sleep provider to know:        CAFFEINE AND OTHER SUBSTANCES:    Patient consumes caffeinated beverages per day:  3 sodas  Last caffeine use is usually: 6:30pm  List of any prescribed or over the counter stimulants that patient takes: None  List of any prescribed or over the counter sleep medication patient takes: None  List of previous sleep medications that patient has tried:    Patient drinks alcohol to help them sleep: No  Patient drinks alcohol near bedtime: Yes    Family History:  Patient has a family member been diagnosed with a sleep disorder: No            SCALES:    EPWORTH SLEEPINESS SCALE         4/3/2024     3:17 PM    [place] Sleepiness Scale ( TONYA Luna  [id number]<br>ESS - [place]/English - Final version - 21 Nov 07 - [facility] Research [place].)   Sitting and reading Slight chance of dozing   Watching TV Slight chance of dozing   Sitting, inactive in a public place (e.g. a theatre or a meeting) Would never doze   As a passenger in a car for an hour without a break Slight chance of dozing   Lying down to rest in the afternoon when circumstances permit Slight chance of dozing   Sitting and talking to someone Would never doze   Sitting quietly after a lunch without alcohol Slight chance of dozing   In a car, while stopped for a few minutes in traffic Would never doze   [place] Score (MC) 5   [place] Score (Sleep) 5         INSOMNIA SEVERITY INDEX (KIM)          4/3/2024     3:02 PM   Insomnia Severity Index (KIM)   Difficulty falling asleep 2   Difficulty staying asleep 3   Problems waking up too early 2   How SATISFIED/DISSATISFIED are  "you with your CURRENT sleep pattern? 4   How NOTICEABLE to others do you think your sleep problem is in terms of impairing the quality of your life? 4   How WORRIED/DISTRESSED are you about your current sleep problem? 4   To what extent do you consider your sleep problem to INTERFERE with your daily functioning (e.g. daytime fatigue, mood, ability to function at work/daily chores, concentration, memory, mood, etc.) CURRENTLY? 4   KIM Total Score 23       Guidelines for Scoring/Interpretation:  Total score categories:  0-7 = No clinically significant insomnia   8-14 = Subthreshold insomnia   15-21 = Clinical insomnia (moderate severity)  22-28 = Clinical insomnia (severe)  Used via courtesy of www.Drive.SGth.va.gov with permission from Adelso Hayes PhD., [facility]      STOP BANG         4/5/2024     1:22 PM   STOP BANG Questionnaire (  2008, the American Society of Anesthesiologists, Inc. [employer].)   BMI Clinic: 30.41         GAD7        3/25/2022    12:19 PM   TALITA-7    1. Feeling nervous, anxious, or on edge 3   2. Not being able to stop or control worrying 3   3. Worrying too much about different things 3   4. Trouble relaxing 3   5. Being so restless that it is hard to sit still 2   6. Becoming easily annoyed or irritable 2   7. Feeling afraid, as if something awful might happen 1   TALITA-7 Total Score 17         CAGE-AID         No data to display                CAGE-AID reprinted with permission from the [place] Medical Journal, OSCAR Millan. and ESVIN Bradshaw, \"Conjoint screening questionnaires for alcohol and drug abuse\" [place] Medical Journal 94: 135-140, 1995.      PATIENT HEALTH QUESTIONNAIRE-9 (PHQ - 9)        7/24/2023    12:53 PM   PHQ-9 ([employer])   1.  Little interest or pleasure in doing things 1   2.  Feeling down, depressed, or hopeless 1   3.  Trouble falling or staying asleep, or sleeping too much 1   4.  Feeling tired or having little energy 1   5.  Poor appetite " or overeating 0   6.  Feeling bad about yourself - or that you are a failure or have let yourself or your family down 0   7.  Trouble concentrating on things, such as reading the newspaper or watching television 1   8.  Moving or speaking so slowly that other people could have noticed. Or the opposite - being so fidgety or restless that you have been moving around a lot more than usual 1   9.  Thoughts that you would be better off dead, or of hurting yourself in some way 0   PHQ-9 Total Score 6   6.  Feeling bad about yourself 0   7.  Trouble concentrating 1   8.  Moving slowly or restless 1   9.  Suicidal or self-harm thoughts 0   1.  Little interest or pleasure in doing things Several days   2.  Feeling down, depressed, or hopeless Several days   3.  Trouble falling or staying asleep, or sleeping too much Several days   4.  Feeling tired or having little energy Several days   5.  Poor appetite or overeating Not at all   6.  Feeling bad about yourself Not at all   7.  Trouble concentrating Several days   8.  Moving slowly or restless Several days   9.  Suicidal or self-harm thoughts Not at all   PHQ-9 via [facility] TOTAL SCORE-----> 6 (Mild depression)   Difficulty at work, home, or with people Somewhat difficult       Developed by Maribel Little, Jo Page, Nakul Pham and colleagues, with an educational magdalena from [employer] No permission required to reproduce, translate, display or distribute.        Allergies:    No Known Allergies    Medications:    Current Outpatient Medications   Medication Sig Dispense Refill    acetaminophen (TYLENOL) 325 MG tablet Take 325-650 mg by mouth every 6 hours as needed for mild pain      esomeprazole (NEXIUM) 40 MG DR capsule TAKE ONE CAPSULE BY MOUTH EVERY MORNING 30-60 MINUTES BEFORE BREAKFAST 90 capsule 3    flecainide (TAMBOCOR) 100 MG tablet Take 1 tablet (100 mg) by mouth 2 times daily 180 tablet 3    metoprolol succinate ER (TOPROL XL) 100 MG 24 hr  tablet Take 1 tablet (100 mg) by mouth daily 90 tablet 3    rosuvastatin (CRESTOR) 20 MG tablet Take 1 tablet (20 mg) by mouth daily 90 tablet 3    zolpidem (AMBIEN) 10 MG tablet Take tablet by mouth 15 minutes prior to sleep, for Sleep Study 1 tablet 0    azelastine (ASTELIN) 0.1 % nasal spray Spray 1 spray into both nostrils 2 times daily (Patient not taking: Reported on 4/5/2024) 30 mL 3    cetirizine (ZYRTEC) 10 MG tablet Take 1 tablet (10 mg) by mouth daily (Patient not taking: Reported on 4/5/2024) 30 tablet 11    UNABLE TO FIND MEDICATION NAME: low-dose THC lozenge (Patient not taking: Reported on 4/5/2024)         Problem List:  Patient Active Problem List    Diagnosis Date Noted    Benign prostatic hyperplasia with urinary frequency 06/09/2022     Priority: Medium    Hip pain, bilateral 11/19/2021     Priority: Medium    LUCIAN (obstructive sleep apnea)- 'moderate' (AHI 6) 10/06/2017     Priority: Medium     Study Date: 10/2/2017- (200.0 lbs) Scored using 3% rules. It was difficult to discern between PLMS and hyponeas. Apnea/hypopnea index was 28.0 events per hour.  The REM AHI was 29.3 events per hour.  The supine AHI was 32.7 events per hour.  RDI was 28.0 events per hour. Lowest oxygen saturation was 84.0%.  Time spent less than or equal to 88% was 0.3 minutes. PLM index was 32.3 movements per hour.  The PLM Arousal Index was 15.8 per hour.      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     Priority: Medium    Esophageal reflux 10/01/2014      Priority: Medium    Hyperlipidemia with target LDL less than 130 10/01/2014     Priority: Medium        Past Medical/Surgical History:  Past Medical History:   Diagnosis Date    Anxiety 12/07/2015    Reid esophagus 10/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 It was difficult to discern between PLMS and hyponeas. Apnea/hypopnea index was 28.0 events per hour.  The REM AHI was 29.3 events per hour.  The supine AHI was 32.7 events per hour.  RDI was 28.0 events per hour. Lowest oxygen saturation was 84.0%.  Time spent less than or equal to 88% was 0.3 minutes. PLM index was 32.3 movements per hour     Past Surgical History:   Procedure Laterality Date    APPENDECTOMY  2000s    BLEPHAROPLASTY BILATERAL Bilateral 10/3/2018    Procedure: BLEPHAROPLASTY BILATERAL;;  Surgeon: Dany Berrios MD;  Location: [facility]    CARPAL TUNNEL RELEASE RT/LT  1980s    COLONOSCOPY N/A 7/27/2022    Procedure: COLONOSCOPY, FLEXIBLE, WITH LESION REMOVAL USING SNARE;  Surgeon: Filiberto Banuelos MD;  Location: [facility]    COLONOSCOPY WITH CO2 INSUFFLATION N/A 7/27/2022    Procedure: COLONOSCOPY, WITH CO2 INSUFFLATION;  Surgeon: Filiberto Banuelos MD;  Location: [facility]    COMBINED ESOPHAGOSCOPY, GASTROSCOPY, DUODENOSCOPY (EGD) WITH CO2 INSUFFLATION N/A 9/20/2019    Procedure: ESOPHAGOGASTRODUODENOSCOPY, WITH CO2 INSUFFLATION;  Surgeon: Filiberto Banuelos MD;  Location: [facility]    COMBINED ESOPHAGOSCOPY, GASTROSCOPY, DUODENOSCOPY (EGD) WITH CO2 INSUFFLATION N/A 7/27/2022    Procedure: ESOPHAGOGASTRODUODENOSCOPY, WITH CO2 INSUFFLATION;  Surgeon: Filiberto Banuelos MD;  Location: [facility]    ESOPHAGOSCOPY, GASTROSCOPY, DUODENOSCOPY (EGD),  COMBINED N/A 9/20/2019    Procedure: Esophagogastroduodenoscopy, With Biopsy;  Surgeon: Filiberto Banuelos MD;  Location: [facility]    ESOPHAGOSCOPY, GASTROSCOPY, DUODENOSCOPY (EGD), COMBINED N/A 7/27/2022    Procedure: ESOPHAGOGASTRODUODENOSCOPY, WITH BIOPSY;  Surgeon: Filiberto Banuelos MD;  Location: [facility]    REPAIR PTOSIS BILATERAL Bilateral 10/3/2018    Procedure: REPAIR PTOSIS BILATERAL;;  Surgeon: Dany Berrios MD;  [facility]    REPAIR PTOSIS BROW BILATERAL Bilateral 10/3/2018    Procedure: REPAIR PTOSIS BROW BILATERAL;;  Surgeon: Dany Berrios MD;  Location: [facility]    SIGMOIDOSCOPY FLEXIBLE N/A 9/20/2019    Procedure: SIGMOIDOSCOPY, FLEXIBLE;  Surgeon: Filiberto Banuelos MD;  Location: [facility]    TONSILLECTOMY  1980s       Social History:  Social History     Socioeconomic History    Marital status: Single     Spouse name: Not on file    Number of children: Not on file    Years of education: Not on file    Highest education level: Not on file   Occupational History    Occupation: /advertising design   Tobacco Use    Smoking status: Never     Passive exposure: Past    Smokeless tobacco: Never   Vaping Use    Vaping Use: Never used   Substance and Sexual Activity    Alcohol use: Yes     Alcohol/week: 14.0 - 21.0 standard drinks of alcohol     Types: 14 - 21 Standard drinks or equivalent per week     Comment: varies, a couple drinks a day    Drug use: No    Sexual activity: Not on file   Other Topics Concern    Parent/sibling w/ CABG, MI or angioplasty before 65F 55M? No   Social History Narrative    Not on file     Social Determinants of Health     Financial Resource Strain: Not on file   Food Insecurity: Not on file   Transportation Needs: Not on file   Physical Activity: Not on file   Stress: Not on file   Social Connections: Not on file   Interpersonal Safety: Not on file   Housing Stability: Not on file       Family History:  Family History   Problem Relation  "Age of Onset    Hypertension Mother     Alzheimer Disease Mother     Diabetes Brother     Glaucoma No family hx of     Macular Degeneration No family hx of     Coronary Artery Disease No family hx of     Colon Cancer No family hx of     Retinal detachment No family hx of        Review of Systems:  A complete review of systems reviewed by me is negative with the exeption of what has been mentioned in the history of present illness.        Physical Examination:  Vitals: Ht 1.727 m (5' 8\")   Wt 90.7 kg (200 lb)   BMI 30.41 kg/m    BMI= Body mass index is 30.41 kg/m .           GENERAL APPEARANCE: healthy, alert, no distress, and cooperative  EYES: Eyes grossly normal to inspection  RESP: Unlabored, easy breathing with normal conversational speech  CV: color normal  NEURO: Alert and oriented x 3, normal strength and tone, mentation intact, and speech normal  PSYCH: mentation appears normal and affect normal/bright  Mallampati Class: Unable to examine  Tonsillar Stage: Unable to examine         Data: All pertinent previous laboratory data reviewed     Recent Labs   Lab Test 08/01/23  1317 11/04/21  1155    137   POTASSIUM 4.5 3.8   CHLORIDE 105 105   CO2 22 22   ANIONGAP 12 10   GLC 89 96   BUN 23.4* 22   CR 1.33* 1.27*   GAYLA 9.7 8.7       Recent Labs   Lab Test 08/01/23  1317   WBC 10.8   RBC 5.13   HGB 15.7   HCT 47.6   MCV 93   MCH 30.6   MCHC 33.0   RDW 13.3          Recent Labs   Lab Test 08/01/23  1317   PROTTOTAL 7.9   ALBUMIN 5.0   BILITOTAL 0.9   ALKPHOS 69   AST 20   ALT 23       TSH   Date Value   08/01/2023 0.51 uIU/mL   08/04/2016 0.52 mU/L       No results found for: \"UAMP\", \"UBARB\", \"BENZODIAZEUR\", \"UCANN\", \"UCOC\", \"OPIT\", \"UPCP\"    Ferritin   Date/Time Value Ref Range Status   07/23/2015 01:54  26 - 388 ng/mL Final       No results found for: \"PH\", \"PHARTERIAL\", \"PO2\", \"BB1TRAQEBZC\", \"SAT\", \"PCO2\", \"HCO3\", \"BASEEXCESS\", \"SJ\", " "\"BEB\"    @LABRCNTIPR(phv:4,pco2v:4,po2v:4,hco3v:4,blaise:4,o2per:4)@    Echocardiology: No results found for this or any previous visit (from the past 4320 hour(s)).  10/17/2023 Echocardiogram Complete  Interpretation Summary  Global and regional left ventricular function is normal with an EF of 55-60%.  Global right ventricular function is normal.  Both atria appear normal.  No significant valvular abnormalities present.  IVC diameter <2.1 cm collapsing >50% with sniff suggests a normal RA pressure of 3 mmHg.  No pericardial effusion is present.  There is no prior study for direct comparison.    Chest x-ray: No results found for this or any previous visit from the past 365 days.      Chest CT: No results found for this or any previous visit from the past 365 days.      PFT: Most Recent [facility] Pulmonary Function Testing    No results found for: \"20001\"  No results found for: \"20002\"  No results found for: \"20003\"  No results found for: \"20015\"  No results found for: \"20016\"  No results found for: \"20027\"  No results found for: \"20028\"  No results found for: \"20029\"  No results found for: \"20079\"  No results found for: \"20080\"  No results found for: \"20081\"  No results found for: \"20335\"  No results found for: \"20105\"  No results found for: \"20053\"  No results found for: \"20054\"  No results found for: \"20055\"      RASHEL Vasquez CNP 4/5/2024   Sleep Medicine      This note was written with the assistance of the Dragon voice-dictation technology software. The final document, although reviewed, may contain errors. For corrections, please contact the office.          "

## 2024-04-05 NOTE — PATIENT INSTRUCTIONS
"          MY TREATMENT INFORMATION FOR SLEEP APNEA-  Adan Oliver    DOCTOR : RASHEL Vasquez CNP    Am I having a sleep study at a sleep center?  --->Due to normal delays, you will be contacted within 2-4 weeks to schedule    Am I having a home sleep study?  --->Watch the video for the device you are using:    -/drop off device-   https://www.Atlassian.com/watch?v=yGGFBdELGhk    -Disposable device sent out require phone/computer application-   https://www.Atlassian.com/watch?v=BCce_vbiwxE      Frequently asked questions:  1. What is Obstructive Sleep Apnea (LUCIAN)? LUCIAN is the most common type of sleep apnea. Apnea means, \"without breath.\"  Apnea is most often caused by narrowing or collapse of the upper airway as muscles relax during sleep.   Almost everyone has occasional apneas. Most people with sleep apnea have had brief interruptions at night frequently for many years.  The severity of sleep apnea is related to how frequent and severe the events are.   2. What are the consequences of LUCIAN? Symptoms include: feeling sleepy during the day, snoring loudly, gasping or stopping of breathing, trouble sleeping, and occasionally morning headaches or heartburn at night.  Sleepiness can be serious and even increase the risk of falling asleep while driving. Other health consequences may include development of high blood pressure and other cardiovascular disease in persons who are susceptible. Untreated LUCIAN  can contribute to heart disease, stroke and diabetes.   3. What are the treatment options? In most situations, sleep apnea is a lifelong disease that must be managed with daily therapy. Medications are not effective for sleep apnea and surgery is generally not considered until other therapies have been tried. Your treatment is your choice . Continuous Positive Airway (CPAP) works right away and is the therapy that is effective in nearly everyone. An oral device to hold your jaw forward is usually the next most " reliable option. Other options include postioning devices (to keep you off your back), weight loss, and surgery including a tongue pacing device. There is more detail about some of these options below.  4. Are my sleep studies covered by insurance? Although we will request verification of coverage, we advise you also check in advance of the study to ensure there is coverage.    Important tips for those choosing CPAP and similar devices  REMEMBER-IF YOU RECEIVE A CALL FROM  [phone]-->IT IS TO SETUP A DEVICE  For new devices, sign up for device PREMA to monitor your device for your followup visits  We encourage you to utilize the [employer] prema or website ( [email or web address].C[employer]dSYNC/ ) to monitor your therapy progress and share the data with your healthcare team when you discuss your sleep apnea.                                                    Know your equipment:  CPAP is continuous positive airway pressure that prevents obstructive sleep apnea by keeping the throat from collapsing while you are sleeping. In most cases, the device is  smart  and can slowly self-adjusts if your throat collapses and keeps a record every day of how well you are treated-this information is available to you and your care team.  BPAP is bilevel positive airway pressure that keeps your throat open and also assists each breath with a pressure boost to maintain adequate breathing.  Special kinds of BPAP are used in patients who have inadequate breathing from lung or heart disease. In most cases, the device is  smart  and can slowly self-adjusts to assist breathing. Like CPAP, the device keeps a record of how well you are treated.  Your mask is your connection to the device. You get to choose what feels most comfortable and the staff will help to make sure if fits. Here: are some examples of the different masks that are available: Magnetic mask aids may assist with use but there are safety issues that should be addressed when  considering with magnets* ( see end of discussion).       Key points to remember on your journey with sleep apnea:  Sleep study.  PAP devices often need to be adjusted during a sleep study to show that they are effective and adjusted right.  Good tips to remember: Try wearing just the mask during a quiet time during the day so your body adapts to wearing it. A humidifier is recommended for comfort in most cases to prevent drying of your nose and throat. Allergy medication from your provider may help you if you are having nasal congestion.  Getting settled-in. It takes more than one night for most of us to get used to wearing a mask. Try wearing just the mask during a quiet time during the day so your body adapts to wearing it. A humidifier is recommended for comfort in most cases. Our team will work with you carefully on the first day and will be in contact within 4 days and again at 2 and 4 weeks for advice and remote device adjustments. Your therapy is evaluated by the device each day.   Use it every night. The more you are able to sleep naturally for 7-8 hours, the more likely you will have good sleep and to prevent health risks or symptoms from sleep apnea. Even if you use it 4 hours it helps. Occasionally all of us are unable to use a medical therapy, in sleep apnea, it is not dangerous to miss one night.   Communicate. Call our skilled team on the number provided on the first day if your visit for problems that make it difficult to wear the device. Over 2 out of 3 patients can learn to wear the device long-term with help from our team. Remember to call our team or your sleep providers if you are unable to wear the device as we may have other solutions for those who cannot adapt to mask CPAP therapy. It is recommended that you sleep your sleep provider within the first 3 months and yearly after that if you are not having problems.   Use it for your health. We encourage use of CPAP masks during daytime quiet  periods to allow your face and brain to adapt to the sensation of CPAP so that it will be a more natural sensation to awaken to at night or during naps. This can be very useful during the first few weeks or months of adapting to CPAP though it does not help medically to wear CPAP during wakefulness and  should not be used as a strategy just to meet guidelines.  Take care of your equipment. Make sure you clean your mask and tubing using directions every day and that your filter and mask are replaced as recommended or if they are not working.     *Masks with magnets:  Updated Contraindications  Masks with magnetic components are contraindicated for use by patients where they, or anyone in close physical contact while using the mask, have the following:   Active medical implants that interact with magnets (i.e., pacemakers, implantable cardioverter defibrillators (ICD), neurostimulators, cerebrospinal fluid (CSF) shunts, insulin/infusion pumps)   Metallic implants/objects containing ferromagnetic material (i.e., aneurysm clips/flow disruption devices, embolic coils, stents, valves, electrodes, implants to restore hearing or balance with implanted magnets, ocular implants, metallic splinters in the eye)  Updated Warning  Keep the mask magnets at a safe distance of at least 6 inches (150 mm) away from implants or medical devices that may be adversely affected by magnetic interference. This warning applies to you or anyone in close physical contact with your mask. The magnets are in the frame and lower headgear clips, with a magnetic field strength of up to 400mT. When worn, they connect to secure the mask but may inadvertently detach while asleep.  Implants/medical devices, including those listed within contraindications, may be adversely affected if they change function under external magnetic fields or contain ferromagnetic materials that attract/repel to magnetic fields (some metallic implants, e.g., contact lenses  with metal, dental implants, metallic cranial plates, screws, alec hole covers, and bone substitute devices). Consult your physician and  of your implant / other medical device for information on the potential adverse effects of magnetic fields.    BESIDES CPAP, WHAT OTHER THERAPIES ARE THERE?    Positioning Device  Positioning devices are generally used when sleep apnea is mild and only occurs on your back.This example shows a pillow that straps around the waist. It may be appropriate for those whose sleep study shows milder sleep apnea that occurs primarily when lying flat on one's back. Preliminary studies have shown benefit but effectiveness at home may need to be verified by a home sleep test. These devices are generally not covered by medical insurance.  Examples of devices that maintain sleeping on the back to prevent snoring and mild sleep apnea.    Belt type body positioner  http://[employer]/    Electronic reminder  http://nightshifttherapy.[employer]/            Oral Appliance  What is oral appliance therapy?  An oral appliance device fits on your teeth at night like a retainer used after having braces. The device is made by a specialized dentist and requires several visits over 1-2 months before a manufactured device is made to fit your teeth and is adjusted to prevent your sleep apnea. Once an oral device is working properly, snoring should be improved. A home sleep test may be recommended at that time if to determine whether the sleep apnea is adequately treated.       Some things to remember:  -Oral devices are often, but not always, covered by your medical insurance. Be sure to check with your insurance provider.   -If you are referred for oral therapy, you will be given a list of specialized dentists to consider or you may choose to visit the Web site of the American Academy of Dental Sleep Medicine  -Oral devices are less likely to work if you have severe sleep apnea or are extremely  overweight.     More detailed information  An oral appliance is a small acrylic device that fits over the upper and lower teeth  (similar to a retainer or a mouth guard). This device slightly moves jaw forward, which moves the base of the tongue forward, opens the airway, improves breathing for effective treat snoring and obstructive sleep apnea in perhaps 7 out of 10 people .  The best working devices are custom-made by a dental device  after a mold is made of the teeth 1, 2, 3.  When is an oral appliance indicated?  Oral appliance therapy is recommended as a first-line treatment for patients with primary snoring, mild sleep apnea, and for patients with moderate sleep apnea who prefer appliance therapy to use of CPAP4, 5. Severity of sleep apnea is determined by sleep testing and is based on the number of respiratory events per hour of sleep.   How successful is oral appliance therapy?  The success rate of oral appliance therapy in patients with mild sleep apnea is 75-80% while in patients with moderate sleep apnea it is 50-70%. The chance of success in patients with severe sleep apnea is 40-50%. The research also shows that oral appliances have a beneficial effect on the cardiovascular health of LUCIAN patients at the same magnitude as CPAP therapy7.  Oral appliances should be a second-line treatment in cases of severe sleep apnea, but if not completely successful then a combination therapy utilizing CPAP plus oral appliance therapy may be effective. Oral appliances tend to be effective in a broad range of patients although studies show that the patients who have the highest success are females, younger patients, those with milder disease, and less severe obesity. 3, 6.   Finding a dentist that practices dental sleep medicine  Specific training is available through the American Academy of Dental Sleep Medicine for dentists interested in working in the field of sleep. To find a dentist who is educated in  the field of sleep and the use of oral appliances, near you, visit the Web site of the American Academy of Dental Sleep Medicine.    References  1. Marcelo, et al. Objectively measured vs self-reported compliance during oral appliance therapy for sleep-disordered breathing. Chest 2013; 144(5): 7770-5599.  2. Pernell et al. Objective measurement of compliance during oral appliance therapy for sleep-disordered breathing. Thorax 2013; 68(1): 91-96.  3. Brielle et al. Mandibular advancement devices in 620 men and women with LUCIAN and snoring: tolerability and predictors of treatment success. Chest 2004; 125: 4635-1984.  4. Agustina, et al. Oral appliances for snoring and LUCIAN: a review. Sleep 2006; 29: 244-262.  5. Mariana et al. Oral appliance treatment for LUCIAN: an update. J Clin Sleep Med 2014; 10(2): 215-227.  6. Kayleigh et al. Predictors of OSAH treatment outcome. J Dent Res 2007; 86: 0177-1868.      Weight Loss:   Your Body mass index is 30.41 kg/m .    Being overweight does not necessarily mean you will have health consequences.  Those who have BMI over 35 or over 27 with existing medical conditions carries greater risk.   Weight loss decreases severity of sleep apnea in most people with obesity. For those with mild obesity who have developed snoring with weight gain, even 15-30 pound weight loss can improve and occasionally milder eliminate sleep apnea.  Structured and life-long dietary and health habits are necessary to lose weight and keep healthier weight levels.     The Comprehensive Weight loss program offers all aspects of weight loss strategies including two Non-Surgical Weight Loss Programs: Medical Weight Management and our 24 Week Healthy Lifestyle Program:    Medical Weight Management: You will meet with a Medical Weight Management Provider, as well as a Registered Dietician. The program may include medication therapy, dietary education, recommended exercise and physical therapy programs,  monthly support group meetings, and possible psychological counseling. Follow up visits with the provider or dietician are scheduled based on your progress and needs.    24 Week Healthy Lifestyle Program: This unique program is designed to give you the support of weekly appointments and activities thru a 24-week period. It may include all of the components of the basic program (above), with the addition of 11 individual Health  Visits, 24-week access to the [employer] website for over 700 online classes, and monthly support group meetings. This program has an out-of-pocket expense of $499 to cover the items that can not be billed to insurance (health coaches and [employer] access), and is non-refundable/non-transferable (you may be able to use a Health Savings Account; ask your HSA provider). There may be an optional meal replacement plan prescribed as well.   Surgical management achieves meaningful long-term weight loss and improvement in health risks in most patients with more severe obesity.      Sleep Apnea Surgery:    Surgery for obstructive sleep apnea is considered generally only when other therapies fail to work. Surgery may be discussed with you if you are having a difficult time tolerating CPAP and or when there is an abnormal structure that requires surgical correction.  Nose and throat surgeries often enlarge the airway to prevent collapse.  Most of these surgeries create pain for 1-2 weeks and up to half of the most common surgeries are not effective throughout life.  You should carefully discuss the benefits and drawbacks to surgery with your sleep provider and surgeon to determine if it is the best solution for you.   More information  Surgery for LUCIAN is directed at areas that are responsible for narrowing or complete obstruction of the airway during sleep.  There are a wide range of procedures available to enlarge and/or stabilize the airway to prevent blockage of breathing in the three major  areas where it can occur: the palate, tongue, and nasal regions.  Successful surgical treatment depends on the accurate identification of the factors responsible for obstructive sleep apnea in each person.  A personalized approach is required because there is no single treatment that works well for everyone.  Because of anatomic variation, consultation with an examination by a sleep surgeon is a critical first step in determining what surgical options are best for each patient.  In some cases, examination during sedation may be recommended in order to guide the selection of procedures.  Patients will be counseled about risks and benefits as well as the typical recovery course after surgery. Surgery is typically not a cure for a person s LUCIAN.  However, surgery will often significantly improve one s LUCIAN severity (termed  success rate ).  Even in the absence of a cure, surgery will decrease the cardiovascular risk associated with OSA7; improve overall quality of life8 (sleepiness, functionality, sleep quality, etc).      Palate Procedures:  Patients with LUCIAN often have narrowing of their airway in the region of their tonsils and uvula.  The goals of palate procedures are to widen the airway in this region as well as to help the tissues resist collapse.  Modern palate procedure techniques focus on tissue conservation and soft tissue rearrangement, rather than tissue removal.  Often the uvula is preserved in this procedure. Residual sleep apnea is common in patient after pharyngoplasty with an average reduction in sleep apnea events of 33%2.      Tongue Procedures:  ExamWhile patients are awake, the muscles that surround the throat are active and keep this region open for breathing. These muscles relax during sleep, allowing the tongue and other structures to collapse and block breathing.  There are several different tongue procedures available.  Selection of a tongue base procedure depends on characteristics seen on  physical exam.  Generally, procedures are aimed at removing bulky tissues in this area or preventing the back of the tongue from falling back during sleep.  Success rates for tongue surgery range from 50-62%3.    Hypoglossal Nerve Stimulation:  Hypoglossal nerve stimulation has recently received approval from the [place] Food and Drug Administration for the treatment of obstructive sleep apnea.  This is based on research showing that the system was safe and effective in treating sleep apnea6.  Results showed that the median AHI score decreased 68%, from 29.3 to 9.0. This therapy uses an implant system that senses breathing patterns and delivers mild stimulation to airway muscles, which keeps the airway open during sleep.  The system consists of three fully implanted components: a small generator (similar in size to a pacemaker), a breathing sensor, and a stimulation lead.  Using a small handheld remote, a patient turns the therapy on before bed and off upon awakening.    Candidates for this device must be greater than 18 years of age, have moderate to severe obstructive sleep apnea with less than 25% central events  (AHI between 15-65), BMI less than 35, have tried CPAP/oral appliance for at least 8 weeks without success, and have appropriate upper airway anatomy (determined by a sleep endoscopy performed by Dr. Petar Flood or Dr. Devonte Payton).    Nasal Procedures:  Nasal obstruction can interfere with nasal breathing during the day and night.  Studies have shown that relief of nasal obstruction can improve the ability of some patients to tolerate positive airway pressure therapy for obstructive sleep apnea1.  Treatment options include medications such as nasal saline, topical corticosteroid and antihistamine sprays, and oral medications such as antihistamines or decongestants. Non-surgical treatments can include external nasal dilators for selected patients. If these are not successful by themselves,  surgery can improve the nasal airway either alone or in combination with these other options.        Combination Procedures:  Combination of surgical procedures and other treatments may be recommended, particularly if patients have more than one area of narrowing or persistent positional disease.  The success rate of combination surgery ranges from 66-80%2,3.    References  Sulma CASTELLON. The Role of the Nose in Snoring and Obstructive Sleep Apnoea: An Update.  Eur Arch Otorhinolaryngol. 2011; 268: 1365-73.   Yasemin SM; Marcelo JA; Marcia JR; Pallanch JF; Wyatt MB; Dereje SG; Abbi ZAMORA. Surgical modifications of the upper airway for obstructive sleep apnea in adults: a systematic review and meta-analysis. SLEEP 2010;33(10):4464-9550. Tesfaye WEST. Hypopharyngeal surgery in obstructive sleep apnea: an evidence-based medicine review.  Arch Otolaryngol Head Neck Surg. 2006 Feb;132(2):206-13.  Brennen YH1, Khalif Y, Sheng LORA. The efficacy of anatomically based multilevel surgery for obstructive sleep apnea. Otolaryngol Head Neck Surg. 2003 Oct;129(4):327-35.  Tesfaye WEST, Goldberg A. Hypopharyngeal Surgery in Obstructive Sleep Apnea: An Evidence-Based Medicine Review. Arch Otolaryngol Head Neck Surg. 2006 Feb;132(2):206-13.  Jennie ROCK et al. Upper-Airway Stimulation for Obstructive Sleep Apnea.  N Engl J Med. 2014 Jan 9;370(2):139-49.  Estevan Y et al. Increased Incidence of Cardiovascular Disease in Middle-aged Men with Obstructive Sleep Apnea. Am J Respir Crit Care Med; 2002 166: 159-165  Dong EM et al. Studying Life Effects and Effectiveness of Palatopharyngoplasty (SLEEP) study: Subjective Outcomes of Isolated Uvulopalatopharyngoplasty. Otolaryngol Head Neck Surg. 2011; 144: 623-631.        WHAT IF I ONLY HAVE SNORING?    Mandibular advancement devices, lateral sleep positioning, long-term weight loss and treatment of nasal allergies have been shown to improve snoring.  Exercising tongue muscles with a game  (https://[employer]/us/prema/soundly-reduce-snoring/mf3789296660) or stimulating the tongue during the day with a device (https://doi.org/10.3390/jlx85235883) have improved snoring in some individuals.  https://www.[employer]/  https://www.sleepfoundation.org/best-anti-snoring-mouthpieces-and-mouthguards    Remember to Drive Safe... Drive Alive     Sleep health profoundly affects your health, mood, and your safety.  Thirty three percent of the population (one in three of us) is not getting enough sleep and many have a sleep disorder. Not getting enough sleep or having an untreated / undertreated sleep condition may make us sleepy without even knowing it. In fact, our driving could be dramatically impaired due to our sleep health. As your provider, here are some things I would like you to know about driving:     Here are some warning signs for impairment and dangerous drowsy driving:              -Having been awake more than 16 hours               -Looking tired               -Eyelid drooping              -Head nodding (it could be too late at this point)              -Driving for more than 30 minutes     Some things you could do to make the driving safer if you are experiencing some drowsiness:              -Stop driving and rest              -Call for transportation              -Make sure your sleep disorder is adequately treated     Some things that have been shown NOT to work when experiencing drowsiness while driving:              -Turning on the radio              -Opening windows              -Eating any  distracting  /  entertaining  foods (e.g., sunflower seeds, candy, or any other)              -Talking on the phone      Your decision may not only impact your life, but also the life of others. Please, remember to drive safe for yourself and all of us.

## 2024-04-07 SDOH — HEALTH STABILITY: PHYSICAL HEALTH: ON AVERAGE, HOW MANY DAYS PER WEEK DO YOU ENGAGE IN MODERATE TO STRENUOUS EXERCISE (LIKE A BRISK WALK)?: 0 DAYS

## 2024-04-07 SDOH — HEALTH STABILITY: PHYSICAL HEALTH: ON AVERAGE, HOW MANY MINUTES DO YOU ENGAGE IN EXERCISE AT THIS LEVEL?: 0 MIN

## 2024-04-07 ASSESSMENT — PATIENT HEALTH QUESTIONNAIRE - PHQ9
SUM OF ALL RESPONSES TO PHQ QUESTIONS 1-9: 8
SUM OF ALL RESPONSES TO PHQ QUESTIONS 1-9: 8
10. IF YOU CHECKED OFF ANY PROBLEMS, HOW DIFFICULT HAVE THESE PROBLEMS MADE IT FOR YOU TO DO YOUR WORK, TAKE CARE OF THINGS AT HOME, OR GET ALONG WITH OTHER PEOPLE: SOMEWHAT DIFFICULT

## 2024-04-07 ASSESSMENT — SOCIAL DETERMINANTS OF HEALTH (SDOH): HOW OFTEN DO YOU GET TOGETHER WITH FRIENDS OR RELATIVES?: ONCE A WEEK

## 2024-04-07 NOTE — COMMUNITY RESOURCES LIST (ENGLISH)
April 7, 2024           YOUR PERSONALIZED LIST OF SERVICES & PROGRAMS           & RECREATION    Sports      Park [employer] Recreation Board - Sports clubs and recreational activities - [place] [facility] - [facility]  [address] [place] [address] (Distance: 3.9 miles)  Phone: [phone]  Language: English, Egyptian  Fee: Free, Self pay  Accessibility: Ada accessible      [facility] - Adult Enrichment  Phone: [phone]  Website: https://docplanner/adults-seniors/adult-enrichment/  Language: English  Hours: Mon 7:30 AM - 4:00 PM Tue 7:30 AM - 4:00 PM Wed 7:30 AM - 4:00 PM Thu 7:30 AM - 4:00 PM Fri 7:30 AM - 4:00 PM      [employer] - LITTLE [employer] BASEBALL AND SOFTBALL  Website: http://www.[place].org    Classes/Groups      [employer] - [facility]  Formerly [facility] [place] [address] (Distance: 5.9 miles)  Phone: [phone]  Website: https://www.[employer]/ADS.html  Language: English  Fee: Free  Accessibility: Ada accessible, Translation services  Transportation Options: Free transportation      [employer] - [employer]  [address] [place] [address] (Distance: 5.9 miles)  Phone: [phone]  Website: https://www.[employer]aiChi.html  Language: English  Fee: Free  Accessibility: Ada accessible, Translation services  Transportation Options: Free transportation      - Online and Local Fitness Classes  Phone: [phone]  Website: https://[employer].iMedia.fm/Search/OnlineClasses  Language: English  Fee: Free               IMPORTANT NUMBERS & WEBSITES        Emergency Services  911  .   United Way  211 http://211unitedway.org  .   Poison Control  (809) 352-8366 http://[place]poison.org http://wis[place]poison.org  .     Suicide and Crisis Lifeline  988 http://988lifeline.org  .   [facility] National  Child Abuse Hotline  767.537.2884 http://Childhelphotline.org   .   National Sexual Assault Hotline  (401) 425-6459 (HOPE) http://Rainn.org   .     National Runaway Safeline  (217) 746-3743 (RUNAWAY) http://[employer] [place]  .   Pregnancy & Postpartum Support  Call/text 778-985-2261  [place]: http://ppsupportmn.org  [place]: http://psichapters.com/wi  .   Substance Abuse National Helpline ([facility])  624-932-HELP (3245) http://Findtreatment.gov   .                DISCLAIMER: These resources have been generated via the [employer] Platform. [employer] does not endorse any service providers mentioned in this resource list. [employer] does not guarantee that the services mentioned in this resource list will be available to you or will improve your health or wellness.    [facility]

## 2024-04-08 ENCOUNTER — OFFICE VISIT (OUTPATIENT)
Dept: FAMILY MEDICINE | Facility: CLINIC | Age: 64
End: 2024-04-08
Payer: COMMERCIAL

## 2024-04-08 VITALS
RESPIRATION RATE: 16 BRPM | HEART RATE: 50 BPM | HEIGHT: 68 IN | BODY MASS INDEX: 30.77 KG/M2 | SYSTOLIC BLOOD PRESSURE: 131 MMHG | TEMPERATURE: 97.7 F | DIASTOLIC BLOOD PRESSURE: 77 MMHG | OXYGEN SATURATION: 98 % | WEIGHT: 203 LBS

## 2024-04-08 DIAGNOSIS — I48.0 PAROXYSMAL ATRIAL FIBRILLATION (H): ICD-10-CM

## 2024-04-08 DIAGNOSIS — F32.1 MAJOR DEPRESSIVE DISORDER, SINGLE EPISODE, MODERATE (H): ICD-10-CM

## 2024-04-08 DIAGNOSIS — Z00.00 WELCOME TO MEDICARE PREVENTIVE VISIT: Primary | ICD-10-CM

## 2024-04-08 DIAGNOSIS — R35.0 FREQUENT URINATION: ICD-10-CM

## 2024-04-08 DIAGNOSIS — E78.5 HYPERLIPIDEMIA WITH TARGET LDL LESS THAN 130: Chronic | ICD-10-CM

## 2024-04-08 DIAGNOSIS — H93.13 TINNITUS, BILATERAL: ICD-10-CM

## 2024-04-08 DIAGNOSIS — K22.70 BARRETT'S ESOPHAGUS WITHOUT DYSPLASIA: Chronic | ICD-10-CM

## 2024-04-08 PROBLEM — I48.91 ATRIAL FIBRILLATION (H): Status: ACTIVE | Noted: 2024-04-08

## 2024-04-08 LAB
ALBUMIN UR-MCNC: NEGATIVE MG/DL
APPEARANCE UR: CLEAR
BILIRUB UR QL STRIP: NEGATIVE
COLOR UR AUTO: YELLOW
GLUCOSE UR STRIP-MCNC: NEGATIVE MG/DL
HGB UR QL STRIP: ABNORMAL
KETONES UR STRIP-MCNC: ABNORMAL MG/DL
LEUKOCYTE ESTERASE UR QL STRIP: NEGATIVE
NITRATE UR QL: NEGATIVE
PH UR STRIP: 6 [PH] (ref 5–7)
RBC #/AREA URNS AUTO: ABNORMAL /HPF
SP GR UR STRIP: 1.01 (ref 1–1.03)
SQUAMOUS #/AREA URNS AUTO: ABNORMAL /LPF
UROBILINOGEN UR STRIP-ACNC: 0.2 E.U./DL
WBC #/AREA URNS AUTO: ABNORMAL /HPF

## 2024-04-08 PROCEDURE — 99213 OFFICE O/P EST LOW 20 MIN: CPT | Mod: 25 | Performed by: FAMILY MEDICINE

## 2024-04-08 PROCEDURE — 87086 URINE CULTURE/COLONY COUNT: CPT | Performed by: FAMILY MEDICINE

## 2024-04-08 PROCEDURE — 81001 URINALYSIS AUTO W/SCOPE: CPT | Performed by: FAMILY MEDICINE

## 2024-04-08 PROCEDURE — G0402 INITIAL PREVENTIVE EXAM: HCPCS | Performed by: FAMILY MEDICINE

## 2024-04-08 RX ORDER — ROSUVASTATIN CALCIUM 20 MG/1
20 TABLET, COATED ORAL DAILY
Qty: 90 TABLET | Refills: 3 | Status: SHIPPED | OUTPATIENT
Start: 2024-04-08

## 2024-04-08 RX ORDER — ESOMEPRAZOLE MAGNESIUM 40 MG/1
CAPSULE, DELAYED RELEASE ORAL
Qty: 90 CAPSULE | Refills: 3 | Status: SHIPPED | OUTPATIENT
Start: 2024-04-08

## 2024-04-08 NOTE — TELEPHONE ENCOUNTER
Pt reviewed [employer] message sent by Dr. Crawley.  Does not look like he went to the ED from what I can view.  Pt had appt with primary care today medicare enrollment.    Abida Garcia RN  Cardiology Care Coordinator  [facility]  [phone] option 1

## 2024-04-08 NOTE — PROGRESS NOTES
"Preventive Care Visit  [facility]  JEFFREY BERRY DO, Family Medicine  Apr 8, 2024      Assessment & Plan   Problem List Items Addressed This Visit       Atrial fibrillation (H)    Major depressive disorder, single episode, moderate (H)     Other Visit Diagnoses       Welcome to Medicare preventive visit    -  Primary    Tinnitus, bilateral        Frequent urination        Relevant Orders    UA Macroscopic with reflex to Microscopic and Culture - Lab Collect    Urine Culture Aerobic Bacterial - lab collect           Cardiology on the 11th  ENT soon  Pending sleep study  If UA normal, consider urology referral    Patient has been advised of split billing requirements and indicates understanding: Yes       BMI  Estimated body mass index is 30.87 kg/m  as calculated from the following:    Height as of this encounter: 1.727 m (5' 8\").    Weight as of this encounter: 92.1 kg (203 lb).       Counseling  Appropriate preventive services were discussed with this patient, including applicable screening as appropriate for fall prevention, nutrition, physical activity, Tobacco-use cessation, weight loss and cognition.  Checklist reviewing preventive services available has been given to the patient.  Reviewed patient's diet, addressing concerns and/or questions.   Discussed possible causes of fatigue. The patient reports drinking more than one alcoholic drink per day and sometimes engages in binge or excessive drinking. The patient was counseled and given information about possible harmful effects of excessive alcohol intake as well as where to get help for alcohol problems. The patient was provided with written information regarding signs of hearing loss.   Information on urinary incontinence and treatment options given to patient.   The patient's PHQ-9 score is consistent with mild depression. He was provided with information regarding depression.           Ruy Arellano is a 64 year old, presenting " for the following:  Physical        4/8/2024     1:30 PM   Additional Questions   Roomed by Adelita FONTAINE CMA        Health Care Directive  Patient does not have a Health Care Directive or Living Will: Discussed advance care planning with patient; information given to patient to review.    HPI    Afib helped with metoprolol but feels sluggish      4/7/2024   General Health   How would you rate your overall physical health? (!) POOR   Feel stress (tense, anxious, or unable to sleep) To some extent   (!) STRESS CONCERN      4/7/2024   Nutrition   Diet: Carbohydrate counting         4/7/2024   Exercise   Days per week of moderate/strenous exercise 0 days   Average minutes spent exercising at this level 0 min   (!) EXERCISE CONCERN      4/7/2024   Social Factors   Frequency of gathering with friends or relatives Once a week   Worry food won't last until get money to buy more No   Food not last or not have enough money for food? No   Do you have housing?  Yes   Are you worried about losing your housing? No   Lack of transportation? No   Unable to get utilities (heat,electricity)? No         4/7/2024   Fall Risk   Fallen 2 or more times in the past year? No   Trouble with walking or balance? No          4/7/2024   Activities of Daily Living- Home Safety   Needs help with the following daily activites None of the above   Safety concerns in the home None of the above         4/7/2024   Dental   Dentist two times every year? Yes         4/7/2024   Hearing Screening   Hearing concerns? (!) I FEEL THAT PEOPLE ARE MUMBLING OR NOT SPEAKING CLEARLY.    (!) I NEED TO ASK PEOPLE TO SPEAK UP OR REPEAT THEMSELVES.    (!) IT'S HARD TO FOLLOW A CONVERSATION IN A NOISY RESTAURANT OR CROWDED ROOM.    (!) TROUBLE UNDERSTANDING SOFT OR WHISPERED SPEECH.         4/7/2024   Driving Risk Screening   Patient/family members have concerns about driving No         4/7/2024   General Alertness/Fatigue Screening   Have you been more tired than usual  lately? (!) YES         4/7/2024   Urinary Incontinence Screening   Bothered by leaking urine in past 6 months Yes         4/7/2024   TB Screening   Were you born outside of the [place]? No       Today's PHQ-9 Score:       4/7/2024     3:34 PM   PHQ-9 SCORE   PHQ-9 Total Score MyChart 8 (Mild depression)   PHQ-9 Total Score 8         4/7/2024   Substance Use   Alcohol more than 3/day or more than 7/wk Yes   How often do you have a drink containing alcohol 4 or more times a week   How many alcohol drinks on typical day 1 or 2   How often do you have 5+ drinks at one occasion Less than monthly   Audit 2/3 Score 1   How often not able to stop drinking once started Never   How often failed to do what normally expected Never   How often needed first drink in am after a heavy drinking session Never   How often feeling of guilt or remorse after drinking Never   How often unable to remember what happened the night before Never   Have you or someone else been injured because of your drinking No   Has anyone been concerned or suggested you cut down on drinking Yes, during the last year   TOTAL SCORE - AUDIT 9   Do you have a current opioid prescription? No   How severe/bad is pain from 1 to 10? 3/10   Do you use any other substances recreationally? No     Social History     Tobacco Use    Smoking status: Never     Passive exposure: Past    Smokeless tobacco: Never   Vaping Use    Vaping Use: Never used   Substance Use Topics    Alcohol use: Yes     Alcohol/week: 14.0 - 21.0 standard drinks of alcohol     Types: 14 - 21 Standard drinks or equivalent per week     Comment: varies, a couple drinks a day    Drug use: No           4/7/2024   AAA Screening   Family history of Abdominal Aortic Aneurysm (AAA)? Unsure   Last PSA:   PSA   Date Value Ref Range Status   10/05/2020 0.89 0 - 4 ug/L Final     Comment:     Assay Method:  Chemiluminescence using Siemens Vista analyzer     Prostate Specific Antigen Screen   Date Value Ref Range  Status   06/09/2022 1.29 0.00 - 4.00 ug/L Final     ASCVD Risk   The 10-year ASCVD risk score (Deon BENZ, et al., 2019) is: 13.9%    Values used to calculate the score:      Age: 64 years      Sex: Male      Is Non- : No      Diabetic: No      Tobacco smoker: No      Systolic Blood Pressure: 131 mmHg      Is BP treated: No      HDL Cholesterol: 62 mg/dL      Total Cholesterol: 256 mg/dL            Reviewed and updated as needed this visit by Provider                      Current providers sharing in care for this patient include:  Patient Care Team:  Johnathon Diaz MD as PCP - General (Family Medicine)  Nael Sesay LICSW as Therapist ( - Clinical)  Johnathon Diaz MD as Assigned PCP  Danny Oleary DO as Assigned Musculoskeletal Provider  Janice Crawley MD as Assigned Heart and Vascular Provider  Danny Oleary DO as MD (Sports Medicine)  Filiberto Banuelos MD as MD (Gastroenterology)  Antonieta Colon AuD as Audiologist (Audiology)  Huy Perales MD as MD (Otolaryngology)  Jair Garcia OD as MD (Optometrist)  Primitivo Garcia MD as Resident  Breann Lu DO as Assigned Gastroenterology Provider  Jair Garcia OD as Assigned Surgical Provider  Imelda Ham RN as Lead Care Coordinator (Primary Care - CC)    The following health maintenance items are reviewed in Epic and correct as of today:  Health Maintenance   Topic Date Due    DEPRESSION ACTION PLAN  Never done    Pneumococcal Vaccine: 65+ Years (1 of 2 - PCV) Never done    RSV VACCINE (Pregnancy & 60+) (1 - 1-dose 60+ series) Never done    ANNUAL REVIEW OF HM ORDERS  07/24/2024    LIPID  08/01/2024    EYE EXAM  08/15/2024    DTAP/TDAP/TD IMMUNIZATION (2 - Td or Tdap) 10/01/2024    PHQ-9  10/08/2024    MEDICARE ANNUAL WELLNESS VISIT  04/08/2025    FALL RISK ASSESSMENT  04/08/2025    COLORECTAL CANCER SCREENING  07/27/2025    ADVANCE CARE PLANNING   "04/08/2026    GLUCOSE  08/01/2026    HEPATITIS C SCREENING  Completed    HIV SCREENING  Completed    INFLUENZA VACCINE  Completed    ZOSTER IMMUNIZATION  Completed    COVID-19 Vaccine  Completed    IPV IMMUNIZATION  Aged Out    HPV IMMUNIZATION  Aged Out    MENINGITIS IMMUNIZATION  Aged Out    RSV MONOCLONAL ANTIBODY  Aged Out            Objective    Exam  /77 (BP Location: Right arm, Patient Position: Chair, Cuff Size: Adult Large)   Pulse 50   Temp 97.7  F (36.5  C) (Oral)   Resp 16   Ht 1.727 m (5' 8\")   Wt 92.1 kg (203 lb)   SpO2 98%   BMI 30.87 kg/m     Estimated body mass index is 30.87 kg/m  as calculated from the following:    Height as of this encounter: 1.727 m (5' 8\").    Weight as of this encounter: 92.1 kg (203 lb).    Physical Exam  GENERAL: alert and no distress  NECK: no adenopathy, no asymmetry, masses, or scars  RESP: lungs clear to auscultation - no rales, rhonchi or wheezes  CV: regular rate and rhythm, normal S1 S2, no S3 or S4, no murmur, click or rub, no peripheral edema  ABDOMEN: soft, nontender, no hepatosplenomegaly, no masses and bowel sounds normal  MS: no gross musculoskeletal defects noted, no edema         4/8/2024   Mini Cog   Clock Draw Score 2 Normal   3 Item Recall 3 objects recalled   Mini Cog Total Score 5         Vision Screen  Patient wears corrective lenses (select all that apply): (!) NOT worn during vision screen  Vision Screen Results: Pass      Signed Electronically by: JEFFREY BERRY DO    Answers submitted by the patient for this visit:  Patient Health Questionnaire (Submitted on 4/7/2024)  If you checked off any problems, how difficult have these problems made it for you to do your work, take care of things at home, or get along with other people?: Somewhat difficult  PHQ9 TOTAL SCORE: 8    "

## 2024-04-08 NOTE — PATIENT INSTRUCTIONS
Preventive Care Advice   This is general advice given by our system to help you stay healthy. However, your care team may have specific advice just for you. Please talk to your care team about your preventive care needs.  Nutrition  Eat 5 or more servings of fruits and vegetables each day.  Try wheat bread, brown rice and whole grain pasta (instead of white bread, rice, and pasta).  Get enough calcium and vitamin D. Check the label on foods and aim for 100% of the RDA (recommended daily allowance).  Lifestyle  Exercise at least 150 minutes each week   (30 minutes a day, 5 days a week).  Do muscle strengthening activities 2 days a week. These help control your weight and prevent disease.  No smoking.  Wear sunscreen to prevent skin cancer.  Have a dental exam and cleaning every 6 months.  Yearly exams  See your health care team every year to talk about:  Any changes in your health.  Any medicines your care team has prescribed.  Preventive care, family planning, and ways to prevent chronic diseases.  Shots (vaccines)   HPV shots (up to age 26), if you've never had them before.  Hepatitis B shots (up to age 59), if you've never had them before.  COVID-19 shot: Get this shot when it's due.  Flu shot: Get a flu shot every year.  Tetanus shot: Get a tetanus shot every 10 years.  Pneumococcal, hepatitis A, and RSV shots: Ask your care team if you need these based on your risk.  Shingles shot (for age 50 and up).  General health tests  Diabetes screening:  Starting at age 35, Get screened for diabetes at least every 3 years.  If you are younger than age 35, ask your care team if you should be screened for diabetes.  Cholesterol test: At age 39, start having a cholesterol test every 5 years, or more often if advised.  Bone density scan (DEXA): At age 50, ask your care team if you should have this scan for osteoporosis (brittle bones).  Hepatitis C: Get tested at least once in your life.  STIs (sexually transmitted  infections)  Before age 24: Ask your care team if you should be screened for STIs.  After age 24: Get screened for STIs if you're at risk. You are at risk for STIs (including HIV) if:  You are sexually active with more than one person.  You don't use condoms every time.  You or a partner was diagnosed with a sexually transmitted infection.  If you are at risk for HIV, ask about PrEP medicine to prevent HIV.  Get tested for HIV at least once in your life, whether you are at risk for HIV or not.  Cancer screening tests  Cervical cancer screening: If you have a cervix, begin getting regular cervical cancer screening tests at age 21. Most people who have regular screenings with normal results can stop after age 65. Talk about this with your provider.  Breast cancer scan (mammogram): If you've ever had breasts, begin having regular mammograms starting at age 40. This is a scan to check for breast cancer.  Colon cancer screening: It is important to start screening for colon cancer at age 45.  Have a colonoscopy test every 10 years (or more often if you're at risk) Or, ask your provider about stool tests like a FIT test every year or Cologuard test every 3 years.  To learn more about your testing options, visit: [email or web address].  For help making a decision, visit: https://bit.ly/vu57305.  Prostate cancer screening test: If you have a prostate and are age 55 to 69, ask your provider if you would benefit from a yearly prostate cancer screening test.  Lung cancer screening: If you are a current or former smoker age 50 to 80, ask your care team if ongoing lung cancer screenings are right for you.  For informational purposes only. Not to replace the advice of your health care provider. Copyright   2023 [employer]. All rights reserved. Clinically reviewed by the [facility]itions Program. [employer] [id number] - REV 01/24.    Hearing Loss: Care Instructions  Overview     Hearing loss is a  sudden or slow decrease in how well you hear. It can range from slight to profound. Permanent hearing loss can occur with aging. It also can happen when you are exposed long-term to loud noise. Examples include listening to loud music, riding motorcycles, or being around other loud machines.  Hearing loss can affect your work and home life. It can make you feel lonely or depressed. You may feel that you have lost your independence. But hearing aids and other devices can help you hear better and feel connected to others.  Follow-up care is a key part of your treatment and safety. Be sure to make and go to all appointments, and call your doctor if you are having problems. It's also a good idea to know your test results and keep a list of the medicines you take.  How can you care for yourself at home?  Avoid loud noises whenever possible. This helps keep your hearing from getting worse.  Always wear hearing protection around loud noises.  Wear a hearing aid as directed.  A professional can help you pick a hearing aid that will work best for you.  You can also get hearing aids over the counter for mild to moderate hearing loss.  Have hearing tests as your doctor suggests. They can show whether your hearing has changed. Your hearing aid may need to be adjusted.  Use other devices as needed. These may include:  Telephone amplifiers and hearing aids that can connect to a television, stereo, radio, or microphone.  Devices that use lights or vibrations. These alert you to the doorbell, a ringing telephone, or a baby monitor.  Television closed-captioning. This shows the words at the bottom of the screen. Most new TVs can do this.  TTY (text telephone). This lets you type messages back and forth on the telephone instead of talking or listening. These devices are also called TDD. When messages are typed on the keyboard, they are sent over the phone line to a receiving TTY. The message is shown on a monitor.  Use text  "messaging, social media, and email if it is hard for you to communicate by telephone.  Try to learn a listening technique called speechreading. It is not lipreading. You pay attention to people's gestures, expressions, posture, and tone of voice. These clues can help you understand what a person is saying. Face the person you are talking to, and have them face you. Make sure the lighting is good. You need to see the other person's face clearly.  Think about counseling if you need help to adjust to your hearing loss.  When should you call for help?  Watch closely for changes in your health, and be sure to contact your doctor if:    You think your hearing is getting worse.     You have new symptoms, such as dizziness or nausea.   Where can you learn more?  Go to https://www.Fangxinmei.net/patiented  Enter R798 in the search box to learn more about \"Hearing Loss: Care Instructions.\"  Current as of: September 27, 2023               Content Version: 14.0    5965-2294 [employer].   Care instructions adapted under license by your healthcare professional. If you have questions about a medical condition or this instruction, always ask your healthcare professional. [employer] disclaims any warranty or liability for your use of this information.      Learning About Stress  What is stress?     Stress is your body's response to a hard situation. Your body can have a physical, emotional, or mental response. Stress is a fact of life for most people, and it affects everyone differently. What causes stress for you may not be stressful for someone else.  A lot of things can cause stress. You may feel stress when you go on a job interview, take a test, or run a race. This kind of short-term stress is normal and even useful. It can help you if you need to work hard or react quickly. For example, stress can help you finish an important job on time.  Long-term stress is caused by ongoing stressful situations " or events. Examples of long-term stress include long-term health problems, ongoing problems at work, or conflicts in your family. Long-term stress can harm your health.  How does stress affect your health?  When you are stressed, your body responds as though you are in danger. It makes hormones that speed up your heart, make you breathe faster, and give you a burst of energy. This is called the fight-or-flight stress response. If the stress is over quickly, your body goes back to normal and no harm is done.  But if stress happens too often or lasts too long, it can have bad effects. Long-term stress can make you more likely to get sick, and it can make symptoms of some diseases worse. If you tense up when you are stressed, you may develop neck, shoulder, or low back pain. Stress is linked to high blood pressure and heart disease.  Stress also harms your emotional health. It can make you fay, tense, or depressed. Your relationships may suffer, and you may not do well at work or school.  What can you do to manage stress?  You can try these things to help manage stress:   Do something active. Exercise or activity can help reduce stress. Walking is a great way to get started. Even everyday activities such as housecleaning or yard work can help.  Try yoga or darell chi. These techniques combine exercise and meditation. You may need some training at first to learn them.  Do something you enjoy. For example, listen to music or go to a movie. Practice your hobby or do volunteer work.  Meditate. This can help you relax, because you are not worrying about what happened before or what may happen in the future.  Do guided imagery. Imagine yourself in any setting that helps you feel calm. You can use online videos, books, or a teacher to guide you.  Do breathing exercises. For example:  From a standing position, bend forward from the waist with your knees slightly bent. Let your arms dangle close to the floor.  Breathe in slowly  "and deeply as you return to a standing position. Roll up slowly and lift your head last.  Hold your breath for just a few seconds in the standing position.  Breathe out slowly and bend forward from the waist.  Let your feelings out. Talk, laugh, cry, and express anger when you need to. Talking with supportive friends or family, a counselor, or a baldo leader about your feelings is a healthy way to relieve stress. Avoid discussing your feelings with people who make you feel worse.  Write. It may help to write about things that are bothering you. This helps you find out how much stress you feel and what is causing it. When you know this, you can find better ways to cope.  What can you do to prevent stress?  You might try some of these things to help prevent stress:  Manage your time. This helps you find time to do the things you want and need to do.  Get enough sleep. Your body recovers from the stresses of the day while you are sleeping.  Get support. Your family, friends, and community can make a difference in how you experience stress.  Limit your news feed. Avoid or limit time on social media or news that may make you feel stressed.  Do something active. Exercise or activity can help reduce stress. Walking is a great way to get started.  Where can you learn more?  Go to https://www.[employer].net/patiented  Enter N032 in the search box to learn more about \"Learning About Stress.\"  Current as of: October 24, 2023               Content Version: 14.0    5226-5117 [employer].   Care instructions adapted under license by your healthcare professional. If you have questions about a medical condition or this instruction, always ask your healthcare professional. [employer] disclaims any warranty or liability for your use of this information.      Learning About Sleeping Well  What does sleeping well mean?     Sleeping well means getting enough sleep to feel good and stay healthy. How much sleep " is enough varies among people.  The number of hours you sleep and how you feel when you wake up are both important. If you do not feel refreshed, you probably need more sleep. Another sign of not getting enough sleep is feeling tired during the day.  Experts recommend that adults get at least 7 or more hours of sleep per day. Children and older adults need more sleep.  Why is getting enough sleep important?  Getting enough quality sleep is a basic part of good health. When your sleep suffers, your physical health, mood, and your thoughts can suffer too. You may find yourself feeling more grumpy or stressed. Not getting enough sleep also can lead to serious problems, including injury, accidents, anxiety, and depression.  What might cause poor sleeping?  Many things can cause sleep problems, including:  Changes to your sleep schedule.  Stress. Stress can be caused by fear about a single event, such as giving a speech. Or you may have ongoing stress, such as worry about work or school.  Depression, anxiety, and other mental or emotional conditions.  Changes in your sleep habits or surroundings. This includes changes that happen where you sleep, such as noise, light, or sleeping in a different bed. It also includes changes in your sleep pattern, such as having jet lag or working a late shift.  Health problems, such as pain, breathing problems, and restless legs syndrome.  Lack of regular exercise.  Using alcohol, nicotine, or caffeine before bed.  How can you help yourself?  Here are some tips that may help you sleep more soundly and wake up feeling more refreshed.  Your sleeping area   Use your bedroom only for sleeping and sex. A bit of light reading may help you fall asleep. But if it doesn't, do your reading elsewhere in the house. Try not to use your TV, computer, smartphone, or tablet while you are in bed.  Be sure your bed is big enough to stretch out comfortably, especially if you have a sleep partner.  Keep  "your bedroom quiet, dark, and cool. Use curtains, blinds, or a sleep mask to block out light. To block out noise, use earplugs, soothing music, or a \"white noise\" machine.  Your evening and bedtime routine   Create a relaxing bedtime routine. You might want to take a warm shower or bath, or listen to soothing music.  Go to bed at the same time every night. And get up at the same time every morning, even if you feel tired.  What to avoid   Limit caffeine (coffee, tea, caffeinated sodas) during the day, and don't have any for at least 6 hours before bedtime.  Avoid drinking alcohol before bedtime. Alcohol can cause you to wake up more often during the night.  Try not to smoke or use tobacco, especially in the evening. Nicotine can keep you awake.  Limit naps during the day, especially close to bedtime.  Avoid lying in bed awake for too long. If you can't fall asleep or if you wake up in the middle of the night and can't get back to sleep within about 20 minutes, get out of bed and go to another room until you feel sleepy.  Avoid taking medicine right before bed that may keep you awake or make you feel hyper or energized. Your doctor can tell you if your medicine may do this and if you can take it earlier in the day.  If you can't sleep   Imagine yourself in a peaceful, pleasant scene. Focus on the details and feelings of being in a place that is relaxing.  Get up and do a quiet or boring activity until you feel sleepy.  Avoid drinking any liquids before going to bed to help prevent waking up often to use the bathroom.  Where can you learn more?  Go to https://www.AgentBridge.net/patiented  Enter J942 in the search box to learn more about \"Learning About Sleeping Well.\"  Current as of: July 10, 2023               Content Version: 14.0    3541-0563 Healthwise[employer].   Care instructions adapted under license by your healthcare professional. If you have questions about a medical condition or this instruction, " always ask your healthcare professional. Healthwise, [employer] disclaims any warranty or liability for your use of this information.      Bladder Training: Care Instructions  Your Care Instructions     Bladder training is used to treat urge incontinence and stress incontinence. Urge incontinence means that the need to urinate comes on so fast that you can't get to a toilet in time. Stress incontinence means that you leak urine because of pressure on your bladder. For example, it may happen when you laugh, cough, or lift something heavy.  Bladder training can increase how long you can wait before you have to urinate. It can also help your bladder hold more urine. And it can give you better control over the urge to urinate.  It is important to remember that bladder training takes a few weeks to a few months to make a difference. You may not see results right away, but don't give up.  Follow-up care is a key part of your treatment and safety. Be sure to make and go to all appointments, and call your doctor if you are having problems. It's also a good idea to know your test results and keep a list of the medicines you take.  How can you care for yourself at home?  Work with your doctor to come up with a bladder training program that is right for you. You may use one or more of the following methods.  Delayed urination  In the beginning, try to keep from urinating for 5 minutes after you first feel the need to go.  While you wait, take deep, slow breaths to relax. Kegel exercises can also help you delay the need to go to the bathroom.  After some practice, when you can easily wait 5 minutes to urinate, try to wait 10 minutes before you urinate.  Slowly increase the waiting period until you are able to control when you have to urinate.  Scheduled urination  Empty your bladder when you first wake up in the morning.  Schedule times throughout the day when you will urinate.  Start by going to the bathroom every hour, even  "if you don't need to go.  Slowly increase the time between trips to the bathroom.  When you have found a schedule that works well for you, keep doing it.  If you wake up during the night and have to urinate, do it. Apply your schedule to waking hours only.  Kegel exercises  These tighten and strengthen pelvic muscles, which can help you control the flow of urine. (If doing these exercises causes pain, stop doing them and talk with your doctor.) To do Kegel exercises:  Squeeze your muscles as if you were trying not to pass gas. Or squeeze your muscles as if you were stopping the flow of urine. Your belly, legs, and buttocks shouldn't move.  Hold the squeeze for 3 seconds, then relax for 5 to 10 seconds.  Start with 3 seconds, then add 1 second each week until you are able to squeeze for 10 seconds.  Repeat the exercise 10 times a session. Do 3 to 8 sessions a day.  When should you call for help?  Watch closely for changes in your health, and be sure to contact your doctor if:    Your incontinence is getting worse.     You do not get better as expected.   Where can you learn more?  Go to https://www.[employer].net/patiented  Enter V684 in the search box to learn more about \"Bladder Training: Care Instructions.\"  Current as of: November 15, 2023               Content Version: 14.0    5519-1626 [employer]it.   Care instructions adapted under license by your healthcare professional. If you have questions about a medical condition or this instruction, always ask your healthcare professional. [employer] disclaims any warranty or liability for your use of this information.      Learning About Depression Screening  What is depression screening?  Depression screening is a way to see if you have depression symptoms. It may be done by a doctor or counselor. It's often part of a routine checkup. That's because your mental health is just as important as your physical health.  Depression is a mental " "health condition that affects how you feel, think, and act. You may:  Have less energy.  Lose interest in your daily activities.  Feel sad and grouchy for a long time.  Depression is very common. It affects people of all ages.  Many things can lead to depression. Some people become depressed after they have a stroke or find out they have a major illness like cancer or heart disease. The death of a loved one or a breakup may lead to depression. It can run in families. Most experts believe that a combination of inherited genes and stressful life events can cause it.  What happens during screening?  You may be asked to fill out a form about your depression symptoms. You and the doctor will discuss your answers. The doctor may ask you more questions to learn more about how you think, act, and feel.  What happens after screening?  If you have symptoms of depression, your doctor will talk to you about your options.  Doctors usually treat depression with medicines or counseling. Often, combining the two works best. Many people don't get help because they think that they'll get over the depression on their own. But people with depression may not get better unless they get treatment.  The cause of depression is not well understood. There may be many factors involved. But if you have depression, it's not your fault.  A serious symptom of depression is thinking about death or suicide. If you or someone you care about talks about this or about feeling hopeless, get help right away.  It's important to know that depression can be treated. Medicine, counseling, and self-care may help.  Where can you learn more?  Go to https://www.ParQnow.net/patiented  Enter T185 in the search box to learn more about \"Learning About Depression Screening.\"  Current as of: June 24, 2023               Content Version: 14.0    8217-4693 Healthwise, Incorporated.   Care instructions adapted under license by your healthcare professional. If you have " questions about a medical condition or this instruction, always ask your healthcare professional. Healthwise, [facility] disclaims any warranty or liability for your use of this information.      Learning About Alcohol Use Disorder  What is alcohol use disorder?  Alcohol use disorder means that a person drinks alcohol even though it causes harm to themselves or others. It can range from mild to severe. The more symptoms of this disorder you have, the more severe it may be. People who have it may find it hard to control their use of alcohol.  People who have this disorder may argue with others about how much they're drinking. Their job may be affected because of drinking. They may drink when it's dangerous or illegal, such as when they drive. They also may have a strong need, or craving, to drink. They may feel like they must drink just to get by. Their drinking may increase their risk of getting hurt or being in a car crash.  Over time, drinking too much alcohol may cause health problems. These may include high blood pressure, liver problems, or problems with digestion.  What are the symptoms?  Maybe you've wondered about your alcohol habits or how to tell if your drinking is becoming a problem.  Here are some of the symptoms of alcohol use disorder. You may have it if you have two or more of the following symptoms:  You drink larger amounts of alcohol than you ever meant to. Or you've been drinking for a longer time than you ever meant to.  You can't cut down or control your use. Or you constantly wish you could cut down.  You spend a lot of time getting or drinking alcohol or recovering from its effects.  You have strong cravings for alcohol.  You can no longer do your main jobs at work, at school, or at home.  You keep drinking alcohol, even though your use hurts your relationships.  You have stopped doing important activities because of your alcohol use.  You drink alcohol in situations where doing so is  dangerous.  You keep drinking alcohol even though you know it's causing health problems.  You need more and more alcohol to get the same effect, or you get less effect from the same amount over time. This is called tolerance.  You have uncomfortable symptoms when you stop drinking alcohol or use less. This is called withdrawal.  Alcohol use disorder can range from mild to severe. The more symptoms you have, the more severe the disorder may be.  You might not realize that your drinking is a problem. You might not drink large amounts when you drink. Or you might go for days or weeks between drinking episodes. But even if you don't drink very often, your drinking could still be harmful and put you at risk.  How is alcohol use disorder treated?  Getting help is up to you. But you don't have to do it alone. There are many people and kinds of treatments that can help.  Treatment for alcohol use disorder can include:  Group therapy, one or more types of counseling, and alcohol education.  Medicines that help to:  Reduce withdrawal symptoms and help you safely stop drinking.  Reduce cravings for alcohol.  Support groups. These groups include Alcoholics Anonymous and [employer] (Self-Management and Recovery Training).  Some people are able to stop or cut back on drinking with help from a counselor. People who have moderate to severe alcohol use disorder may need medical treatment. They may need to stay in a hospital or treatment center.  You may have a treatment team to help you. This team may include a psychologist or psychiatrist, counselors, doctors, social workers, nurses, and a . A  helps plan and manage your treatment.  Follow-up care is a key part of your treatment and safety. Be sure to make and go to all appointments, and call your doctor if you are having problems. It's also a good idea to know your test results and keep a list of the medicines you take.  Where can you learn more?  Go to  "https://www.[employer].net/patiented  Enter H758 in the search box to learn more about \"Learning About Alcohol Use Disorder.\"  Current as of: November 15, 2023               Content Version: 14.0    3629-6230 [employer].   Care instructions adapted under license by your healthcare professional. If you have questions about a medical condition or this instruction, always ask your healthcare professional. [employer] disclaims any warranty or liability for your use of this information.      "

## 2024-04-09 ENCOUNTER — PATIENT OUTREACH (OUTPATIENT)
Dept: GASTROENTEROLOGY | Facility: CLINIC | Age: 64
End: 2024-04-09

## 2024-04-10 LAB — BACTERIA UR CULT: NO GROWTH

## 2024-04-11 ENCOUNTER — LAB (OUTPATIENT)
Dept: LAB | Facility: CLINIC | Age: 64
End: 2024-04-11
Payer: COMMERCIAL

## 2024-04-11 DIAGNOSIS — Z79.899 ENCOUNTER FOR MONITORING FLECAINIDE THERAPY: ICD-10-CM

## 2024-04-11 DIAGNOSIS — N18.2 CKD (CHRONIC KIDNEY DISEASE) STAGE 2, GFR 60-89 ML/MIN: ICD-10-CM

## 2024-04-11 DIAGNOSIS — R00.2 PALPITATIONS: ICD-10-CM

## 2024-04-11 DIAGNOSIS — I48.0 PAF (PAROXYSMAL ATRIAL FIBRILLATION) (H): ICD-10-CM

## 2024-04-11 DIAGNOSIS — N18.2 CKD (CHRONIC KIDNEY DISEASE) STAGE 2, GFR 60-89 ML/MIN: Primary | ICD-10-CM

## 2024-04-11 DIAGNOSIS — R53.83 FATIGUE, UNSPECIFIED TYPE: ICD-10-CM

## 2024-04-11 DIAGNOSIS — Z51.81 ENCOUNTER FOR MONITORING FLECAINIDE THERAPY: ICD-10-CM

## 2024-04-11 LAB
ALBUMIN SERPL BCG-MCNC: 5.2 G/DL (ref 3.5–5.2)
ALP SERPL-CCNC: 83 U/L (ref 40–150)
ALT SERPL W P-5'-P-CCNC: 21 U/L (ref 0–70)
ANION GAP SERPL CALCULATED.3IONS-SCNC: 13 MMOL/L (ref 7–15)
AST SERPL W P-5'-P-CCNC: 29 U/L (ref 0–45)
BILIRUB SERPL-MCNC: 0.7 MG/DL
BUN SERPL-MCNC: 24.5 MG/DL (ref 8–23)
CALCIUM SERPL-MCNC: 9.6 MG/DL (ref 8.8–10.2)
CHLORIDE SERPL-SCNC: 104 MMOL/L (ref 98–107)
CREAT SERPL-MCNC: 1.37 MG/DL (ref 0.67–1.17)
DEPRECATED HCO3 PLAS-SCNC: 21 MMOL/L (ref 22–29)
EGFRCR SERPLBLD CKD-EPI 2021: 57 ML/MIN/1.73M2
ERYTHROCYTE [DISTWIDTH] IN BLOOD BY AUTOMATED COUNT: 12.6 % (ref 10–15)
GLUCOSE SERPL-MCNC: 86 MG/DL (ref 70–99)
HCT VFR BLD AUTO: 46.7 % (ref 40–53)
HGB BLD-MCNC: 15.8 G/DL (ref 13.3–17.7)
MCH RBC QN AUTO: 31.8 PG (ref 26.5–33)
MCHC RBC AUTO-ENTMCNC: 33.8 G/DL (ref 31.5–36.5)
MCV RBC AUTO: 94 FL (ref 78–100)
PLATELET # BLD AUTO: 430 10E3/UL (ref 150–450)
POTASSIUM SERPL-SCNC: 4.9 MMOL/L (ref 3.4–5.3)
PROT SERPL-MCNC: 8.3 G/DL (ref 6.4–8.3)
RBC # BLD AUTO: 4.97 10E6/UL (ref 4.4–5.9)
SODIUM SERPL-SCNC: 138 MMOL/L (ref 135–145)
TSH SERPL DL<=0.005 MIU/L-ACNC: 0.57 UIU/ML (ref 0.3–4.2)
WBC # BLD AUTO: 10.9 10E3/UL (ref 4–11)

## 2024-04-11 PROCEDURE — 80053 COMPREHEN METABOLIC PANEL: CPT

## 2024-04-11 PROCEDURE — 84443 ASSAY THYROID STIM HORMONE: CPT

## 2024-04-11 PROCEDURE — 85027 COMPLETE CBC AUTOMATED: CPT

## 2024-04-11 PROCEDURE — 36415 COLL VENOUS BLD VENIPUNCTURE: CPT

## 2024-04-16 ENCOUNTER — TELEPHONE (OUTPATIENT)
Dept: CARDIOLOGY | Facility: CLINIC | Age: 64
End: 2024-04-16
Payer: COMMERCIAL

## 2024-04-16 ENCOUNTER — MYC MEDICAL ADVICE (OUTPATIENT)
Dept: CARDIOLOGY | Facility: CLINIC | Age: 64
End: 2024-04-16
Payer: COMMERCIAL

## 2024-04-16 NOTE — TELEPHONE ENCOUNTER
[facility] Call Center    Phone Message    May a detailed message be left on voicemail: yes     Reason for Call: Medication Question or concern regarding medication   Prescription Clarification  Name of Medication: metoprolol succinate ER (TOPROL XL) 100 MG 24 hr tablet   Prescribing Provider: Can   Pharmacy:    [facility] – [place], [place] - [address] [place] [address].   What on the order needs clarification? Patient called requesting to speak with  in regards to this medication. Patient refused to specify or clarify what he would like to discuss.    Action Taken: Message routed to:  Other: Cardiologt    Travel Screening: Not Applicable    Thank you!  [facility]

## 2024-04-17 ENCOUNTER — PRE VISIT (OUTPATIENT)
Dept: CARDIOLOGY | Facility: CLINIC | Age: 64
End: 2024-04-17
Payer: COMMERCIAL

## 2024-04-17 NOTE — TELEPHONE ENCOUNTER
See mychart encounter for further follow up.    Yaz Garrison, RN, BSN  Cardiology RN Care Coordinator   [place]   Phone: [phone]  Fax: [phone] ([place]) [phone] ([place])

## 2024-04-17 NOTE — TELEPHONE ENCOUNTER
See telephone encounter. Attempted to call patient. Phone rang but no answer. Plan to offer appointment with Dr. Crawley on 5/6/24 at 11:30am as patient was not seen on 4/11/24. Linkage message sent to patient.     Yaz Garrison, RN, BSN  Cardiology RN Care Coordinator   [place]   Phone: [phone]  Fax: [phone] ([place]) [phone] ([place])

## 2024-04-18 ENCOUNTER — OFFICE VISIT (OUTPATIENT)
Dept: CARDIOLOGY | Facility: CLINIC | Age: 64
End: 2024-04-18
Payer: COMMERCIAL

## 2024-04-18 VITALS
DIASTOLIC BLOOD PRESSURE: 78 MMHG | BODY MASS INDEX: 30.46 KG/M2 | WEIGHT: 201 LBS | SYSTOLIC BLOOD PRESSURE: 120 MMHG | OXYGEN SATURATION: 98 % | HEIGHT: 68 IN | HEART RATE: 59 BPM

## 2024-04-18 DIAGNOSIS — I47.10 SVT (SUPRAVENTRICULAR TACHYCARDIA) (H): ICD-10-CM

## 2024-04-18 PROCEDURE — 99214 OFFICE O/P EST MOD 30 MIN: CPT | Performed by: INTERNAL MEDICINE

## 2024-04-18 RX ORDER — METOPROLOL SUCCINATE 100 MG/1
50 TABLET, EXTENDED RELEASE ORAL DAILY
Status: SHIPPED
Start: 2024-04-18 | End: 2024-06-18

## 2024-04-18 NOTE — NURSING NOTE
"Chief Complaint   Patient presents with    Follow Up       Initial /78   Pulse 59   Ht 1.727 m (5' 8\")   Wt 91.2 kg (201 lb)   SpO2 98%   BMI 30.56 kg/m   Estimated body mass index is 30.56 kg/m  as calculated from the following:    Height as of this encounter: 1.727 m (5' 8\").    Weight as of this encounter: 91.2 kg (201 lb)..  BP completed using cuff size: gaye Valdez CMA (AAMA)    "

## 2024-04-18 NOTE — TELEPHONE ENCOUNTER
Patient saw [employer] message. No response at this time. Patient scheduled for appointment today with Dr. Pappas.    Yaz Garrison, RN, BSN  Cardiology RN Care Coordinator   [place]   Phone: [phone]  Fax: [phone] ([place]) [phone] ([place])

## 2024-04-18 NOTE — PATIENT INSTRUCTIONS
Thank you for coming to the [facility] [place]; please note the following instructions:    1. Decrease metoprolol to 50 mg daily    2.  Follow-up with Dr. Crawley        If you have any questions regarding your visit, please contact your care team:     CARDIOLOGY  TELEPHONE NUMBER   Abida BYRNE., Registered Nurse  Yaz SALDIVAR, Registered Nurse  Alena LITTLE, Registered Nurse  Britta BUCHANAN, Registered Medical Assistant  Joselyn BONE, Certified Medical Assistant  Irene NYE, Clinic Assistant [phone] (select option 1)    *After hours: [phone]   For Scheduling Appts:     [phone] (select option 1)    *After hours: [phone]   For the Device Clinic (Pacemakers and ICD's)  Aline CHAPIN, Registered Nurse   During business hours: [phone]    *After business hours:  [phone] (select option 4)      Normal test result notifications will be released via [employer] or mailed within 7 business days.  All other test results, will be communicated via telephone once reviewed by your cardiologist.    If you need a medication refill, please contact your pharmacy.  Please allow 3 business days for your refill to be completed.    As always, thank you for trusting us with your health care needs!

## 2024-04-18 NOTE — LETTER
4/18/2024      RE: Adan Oliver  [address]  [place] [address]       Dear Colleague,    Thank you for the opportunity to participate in the care of your patient, Adan Oliver, at the [facility]. Please see a copy of my visit note below.       SUBJECTIVE:  Adan Oliver is a 64 year old male who presents for medication question.  Patient with recent diagnosis of atrial fibrillation.  He remains in sinus rhythm on flecainide.  Initially patient was also on Toprol-XL 50 mg daily and this was increased to 100 mg daily.  Patient is complaining that he is having extreme fatigue and unable to function.  He was suggested an alternative is calcium channel blocker.  He had to discuss this.  Patient with sleep apnea and he hate CPAP.  Another sleep study is arranged.  Also admits to few drinks each day.  Currently patient has no cardiac symptoms.    Patient Active Problem List    Diagnosis Date Noted     Atrial fibrillation (H) 04/08/2024     Priority: Medium     Major depressive disorder, single episode, moderate (H) 04/08/2024     Priority: Medium     Benign prostatic hyperplasia with urinary frequency 06/09/2022     Priority: Medium     Hip pain, bilateral 11/19/2021     Priority: Medium     LUCIAN (obstructive sleep apnea)- 'moderate' (AHI 6) 10/06/2017     Priority: Medium     Study Date: 10/2/2017- (200.0 lbs) Scored using 3% rules. It was difficult to discern between PLMS and hyponeas. Apnea/hypopnea index was 28.0 events per hour.  The REM AHI was 29.3 events per hour.  The supine AHI was 32.7 events per hour.  RDI was 28.0 events per hour. Lowest oxygen saturation was 84.0%.  Time spent less than or equal to 88% was 0.3 minutes. PLM index was 32.3 movements per hour.  The PLM Arousal Index was 15.8 per hour.       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  Current Outpatient Medications   Medication Sig Dispense Refill     acetaminophen (TYLENOL) 325 MG tablet Take 325-650 mg by mouth every 6 hours as needed for mild pain       esomeprazole (NEXIUM) 40 MG DR capsule TAKE ONE CAPSULE BY MOUTH EVERY MORNING 30-60 MINUTES BEFORE BREAKFAST 90 capsule 3     flecainide (TAMBOCOR) 100 MG tablet Take 1 tablet (100 mg) by mouth 2 times daily 180 tablet 3     metoprolol succinate ER (TOPROL XL) 100 MG 24 hr tablet Take 0.5 tablets (50 mg) by mouth daily       rosuvastatin (CRESTOR) 20 MG tablet Take 1 tablet (20 mg) by mouth daily 90 tablet 3     Current Facility-Administered Medications   Medication Dose Route Frequency Provider Last Rate Last Admin     0.5 mL ropivacaine (NAROPIN) injection 5 mg/mL  0.5 mL   Danny Oleary DO   0.5 mL at 12/13/23 1356     1 mL ropivacaine (NAROPIN) injection 5 mg/mL  1 mL   Danny Oleary DO   1 mL at 12/13/23 1356     3 mL ropivacaine (NAROPIN) injection 5 mg/mL  3 mL   Danny Oleary DO   3 mL at 12/20/23 1347     lidocaine (PF) (XYLOCAINE) 1 % injection 1 mL  1 mL   Danny Oleary DO   1 mL at 07/26/23 1626     ropivacaine (NAROPIN) injection 3 mL  3 mL   Danny Oleary DO   3 mL at 02/01/23 1400     triamcinolone (KENALOG-40) injection 20 mg  20 mg   Danny Oleary DO   20 mg at 12/13/23 1356      triamcinolone (KENALOG-40) injection 40 mg  40 mg   RepDanny singhopher, DO   40 mg at 12/20/23 1347     triamcinolone (KENALOG-40) injection 40 mg  40 mg   RepaDannyer, DO   40 mg at 12/13/23 1356     Past Medical History:   Diagnosis Date     Anxiety 12/07/2015     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 It was difficult to discern between PLMS and hyponeas. Apnea/hypopnea index was 28.0 events per hour.  The REM AHI was 29.3 events per hour.  The supine AHI was 32.7 events per hour.  RDI was 28.0 events per hour. Lowest oxygen saturation was 84.0%.  Time spent less than or equal to 88% was 0.3 minutes. PLM index was 32.3 movements per hour     Past Surgical History:   Procedure Laterality Date     APPENDECTOMY  2000s     BLEPHAROPLASTY BILATERAL Bilateral 10/3/2018    Procedure: BLEPHAROPLASTY BILATERAL;;  Surgeon: Dany Berrios MD;  Location: [facility]     CARPAL TUNNEL RELEASE RT/LT  1980s     COLONOSCOPY N/A 7/27/2022    Procedure: COLONOSCOPY, FLEXIBLE, WITH LESION REMOVAL USING SNARE;  Surgeon: Filiberto Banuelos MD;  Location: [facility]     COLONOSCOPY WITH CO2 INSUFFLATION N/A 7/27/2022    Procedure: COLONOSCOPY, WITH CO2 INSUFFLATION;  Surgeon: Filiberto Banuelos MD;  Location: [facility]     COMBINED ESOPHAGOSCOPY, GASTROSCOPY, DUODENOSCOPY (EGD) WITH CO2 INSUFFLATION N/A 9/20/2019    Procedure: ESOPHAGOGASTRODUODENOSCOPY, WITH CO2 INSUFFLATION;  Surgeon: Filiberto Banuelos MD;  Location: [facility]     COMBINED ESOPHAGOSCOPY, GASTROSCOPY, DUODENOSCOPY (EGD) WITH CO2 INSUFFLATION N/A 7/27/2022    Procedure: ESOPHAGOGASTRODUODENOSCOPY, WITH CO2 INSUFFLATION;  Surgeon:  Filiberto Banuelos MD;  Location: [facility]     ESOPHAGOSCOPY, GASTROSCOPY, DUODENOSCOPY (EGD), COMBINED N/A 9/20/2019    Procedure: Esophagogastroduodenoscopy, With Biopsy;  Surgeon: Filiberto Banueols MD;  Location: [facility]     ESOPHAGOSCOPY, GASTROSCOPY, DUODENOSCOPY (EGD), COMBINED N/A 7/27/2022    Procedure: ESOPHAGOGASTRODUODENOSCOPY, WITH BIOPSY;  Surgeon: Filiberto Banuelos MD;  Location: [facility]     REPAIR PTOSIS BILATERAL Bilateral 10/3/2018    Procedure: REPAIR PTOSIS BILATERAL;;  Surgeon: Dany Berrios MD;  Location: [facility]     REPAIR PTOSIS BROW BILATERAL Bilateral 10/3/2018    Procedure: REPAIR PTOSIS BROW BILATERAL;;  Surgeon: Dany Berrios MD;  Location: [facility]     SIGMOIDOSCOPY FLEXIBLE N/A 9/20/2019    Procedure: SIGMOIDOSCOPY, FLEXIBLE;  Surgeon: Filiberto Banuelos MD;  Location: [facility]     TONSILLECTOMY  1980s     No Known Allergies  Social History     Socioeconomic History     Marital status: Single     Spouse name: Not on file     Number of children: Not on file     Years of education: Not on file     Highest education level: Not on file   Occupational History     Occupation: /advertising design   Tobacco Use     Smoking status: Never     Passive exposure: Past     Smokeless tobacco: Never   Vaping Use     Vaping status: Never Used   Substance and Sexual Activity     Alcohol use: Yes     Alcohol/week: 14.0 - 21.0 standard drinks of alcohol     Types: 14 - 21 Standard drinks or equivalent per week     Comment: varies, a couple drinks a day     Drug use: No     Sexual activity: Not on file   Other Topics Concern     Parent/sibling w/ CABG, MI or angioplasty before 65F 55M? No   Social History Narrative     Not on file     Social Determinants of Health     Financial Resource Strain: Low Risk  (4/7/2024)    Financial Resource Strain      Within the past 12 months, have you or your family members you live with been unable to get utilities (heat,  electricity) when it was really needed?: No   Food Insecurity: Low Risk  (4/7/2024)    Food Insecurity      Within the past 12 months, did you worry that your food would run out before you got money to buy more?: No      Within the past 12 months, did the food you bought just not last and you didn t have money to get more?: No   Transportation Needs: Low Risk  (4/7/2024)    Transportation Needs      Within the past 12 months, has lack of transportation kept you from medical appointments, getting your medicines, non-medical meetings or appointments, work, or from getting things that you need?: No   Physical Activity: Inactive (4/7/2024)    Exercise Vital Sign      Days of Exercise per Week: 0 days      Minutes of Exercise per Session: 0 min   Stress: Stress Concern Present (4/7/2024)    [employer] - Occupational Stress Questionnaire      Feeling of Stress : To some extent   Social Connections: Unknown (4/7/2024)    Social Connection and Isolation Panel [NHANES]      Frequency of Communication with Friends and Family: Not on file      Frequency of Social Gatherings with Friends and Family: Once a week      Attends [employer] Services: Not on file      Active Member of Clubs or Organizations: Not on file      Attends Club or Organization Meetings: Not on file      Marital Status: Not on file   Interpersonal Safety: Low Risk  (4/8/2024)    Interpersonal Safety      Do you feel physically and emotionally safe where you currently live?: Yes      Within the past 12 months, have you been hit, slapped, kicked or otherwise physically hurt by someone?: No      Within the past 12 months, have you been humiliated or emotionally abused in other ways by your partner or ex-partner?: No   Housing Stability: Low Risk  (4/7/2024)    Housing Stability      Do you have housing? : Yes      Are you worried about losing your housing?: No     Family History   Problem Relation Age of Onset     Hypertension Mother  "     Alzheimer Disease Mother      Diabetes Brother      Glaucoma No family hx of      Macular Degeneration No family hx of      Coronary Artery Disease No family hx of      Colon Cancer No family hx of      Retinal detachment No family hx of           REVIEW OF SYSTEMS:  General: negative, fever, chills, night sweats  Skin: negative, rash, scaling, and itching  Eyes: negative, double vision, eye pain, and photophobia  Ears/Nose/Throat: negative, nasal congestion, and purulent rhinorrhea  Respiratory: No dyspnea on exertion, No cough, No hemoptysis, and negative  Cardiovascular: negative, palpitations, tachycardia, irregular heart beat, chest pain, exertional chest pain or pressure, paroxysmal nocturnal dyspnea, dyspnea on exertion, and orthopnea         OBJECTIVE:  Blood pressure 120/78, pulse 59, height 1.727 m (5' 8\"), weight 91.2 kg (201 lb), SpO2 98%.  General Appearance: healthy, alert, active, and no distress  Head: Normocephalic. No masses, lesions, tenderness or abnormalities  Eyes: conjuctiva clear, PERRL, EOM intact  Ears: External ears normal. Canals clear. TM's normal.  Nose: Nares normal  Mouth: normal  Neck: Supple, no cervical adenopathy, no thyromegaly  Lungs: clear to auscultation  Cardiac: regular rate and rhythm, normal S1 and S2, no murmur       ASSESSMENT/PLAN:  Patient here for discussion about side effect of medication.  Patient with recent diagnosis of atrial fibrillation.  On flecainide 100 mg twice a day he remained in sinus rhythm.  Today's heart rate was 59 bpm.  Patient is complaining of extreme fatigue on Toprol- mg that he is taking now.  Previously he was on 50 mg daily and this was increased to 100 mg.  According the patient he is unable to function due to extreme fatigue.  He is a professional photograph and .  He was told about alternative calcium channel blocker.  Here to discuss the neck step.  As many people have extreme fatigue beta-blocker advised " patient to cut down the dose to 50 mg daily.  As he is in sinus rhythm with sinus bradycardia he did not tolerate and reduction of dose.  Patient will continue at 50 mg daily along with flecainide 100 mg twice a day for 2 weeks.  If his fatigue does not improve then we will change to calcium channel blocker.  Patient had a CT calcium score.  Total score was 0.  Had a monitor which showed A-fib with RVR lasting up to 1.5 hours.  Patient is not on anticoagulation as currently his stroke risk score is 1 series 65 years.  He will be following up with his primary cardiologist Dr. Crawley.  Discussed the association between alcohol, sleep apnea and atrial fibrillation.  Suggested repeat sleep study and use of CPAP machine as well as controlling alcohol consumption.  Total visit duration 30 minutes.  This included face-to-face interview, physical exam, chart review, review of prior EKG, echocardiogram, monitor result stress echo, coronary artery calcium score and documentation.      Please do not hesitate to contact me if you have any questions/concerns.     Sincerely,     ASHISH Palmer MD

## 2024-04-18 NOTE — PROGRESS NOTES
SUBJECTIVE:  Adan Oliver is a 64 year old male who presents for medication question.  Patient with recent diagnosis of atrial fibrillation.  He remains in sinus rhythm on flecainide.  Initially patient was also on Toprol-XL 50 mg daily and this was increased to 100 mg daily.  Patient is complaining that he is having extreme fatigue and unable to function.  He was suggested an alternative is calcium channel blocker.  He had to discuss this.  Patient with sleep apnea and he hate CPAP.  Another sleep study is arranged.  Also admits to few drinks each day.  Currently patient has no cardiac symptoms.    Patient Active Problem List    Diagnosis Date Noted    Atrial fibrillation (H) 04/08/2024     Priority: Medium    Major depressive disorder, single episode, moderate (H) 04/08/2024     Priority: Medium    Benign prostatic hyperplasia with urinary frequency 06/09/2022     Priority: Medium    Hip pain, bilateral 11/19/2021     Priority: Medium    LUCIAN (obstructive sleep apnea)- 'moderate' (AHI 6) 10/06/2017     Priority: Medium     Study Date: 10/2/2017- (200.0 lbs) Scored using 3% rules. It was difficult to discern between PLMS and hyponeas. Apnea/hypopnea index was 28.0 events per hour.  The REM AHI was 29.3 events per hour.  The supine AHI was 32.7 events per hour.  RDI was 28.0 events per hour. Lowest oxygen saturation was 84.0%.  Time spent less than or equal to 88% was 0.3 minutes. PLM index was 32.3 movements per hour.  The PLM Arousal Index was 15.8 per hour.      Alcohol use 09/26/2016     Priority: Medium    Lumbar degenerative disc disease 05/17/2016     Priority: Medium    Protrusion of lumbar intervertebral disc 05/17/2016     Priority: Medium    Lumbar spinal stenosis 05/17/2016     Priority: Medium    Anxiety 12/07/2015     Priority: Medium    Family history of Alzheimer's disease 10/01/2015     Priority: Medium    Insomnia, psychophysiological 08/12/2015     Priority: Medium    Memory changes 11/24/2014      Priority: Medium    Reid esophagus 10/30/2014     Priority: Medium    Esophageal reflux 10/01/2014     Priority: Medium    Hyperlipidemia with target LDL less than 130 10/01/2014     Priority: Medium    .  Current Outpatient Medications   Medication Sig Dispense Refill    acetaminophen (TYLENOL) 325 MG tablet Take 325-650 mg by mouth every 6 hours as needed for mild pain      esomeprazole (NEXIUM) 40 MG DR capsule TAKE ONE CAPSULE BY MOUTH EVERY MORNING 30-60 MINUTES BEFORE BREAKFAST 90 capsule 3    flecainide (TAMBOCOR) 100 MG tablet Take 1 tablet (100 mg) by mouth 2 times daily 180 tablet 3    metoprolol succinate ER (TOPROL XL) 100 MG 24 hr tablet Take 0.5 tablets (50 mg) by mouth daily      rosuvastatin (CRESTOR) 20 MG tablet Take 1 tablet (20 mg) by mouth daily 90 tablet 3     Current Facility-Administered Medications   Medication Dose Route Frequency Provider Last Rate Last Admin    0.5 mL ropivacaine (NAROPIN) injection 5 mg/mL  0.5 mL   Danny Oleary, DO   0.5 mL at 12/13/23 1356    1 mL ropivacaine (NAROPIN) injection 5 mg/mL  1 mL   Danny Oleary, DO   1 mL at 12/13/23 1356    3 mL ropivacaine (NAROPIN) injection 5 mg/mL  3 mL   Danny Oleary DO   3 mL at 12/20/23 1347    lidocaine (PF) (XYLOCAINE) 1 % injection 1 mL  1 mL   Danny Oleary, DO   1 mL at 07/26/23 1626    ropivacaine (NAROPIN) injection 3 mL  3 mL   Danny Oleary DO   3 mL at 02/01/23 1400    triamcinolone (KENALOG-40) injection 20 mg  20 mg   Danny Oleary DO   20 mg at 12/13/23 1356    triamcinolone (KENALOG-40) injection 40 mg  40 mg   Danny Oleary DO   40 mg at 12/20/23 1347    triamcinolone (KENALOG-40) injection 40 mg  40 mg   Danny Oleayr DO   40 mg at 12/13/23 1356     Past Medical History:   Diagnosis Date    Anxiety 12/07/2015    Reid esophagus 10/30/2014    Esophageal reflux 10/01/2014    History of shingles 2012    Hyperlipidemia with  target LDL less than 130 10/01/2014    Lumbar degenerative disc disease 05/17/2016    Nephrolithiasis 2009    LUCIAN (obstructive sleep apnea)- 'moderate' (AHI 6) 10/06/2017    Study Date: 10/2/2017- (200.0 lbs) Scored using 3% rules. It was difficult to discern between PLMS and hyponeas. Apnea/hypopnea index was 28.0 events per hour.  The REM AHI was 29.3 events per hour.  The supine AHI was 32.7 events per hour.  RDI was 28.0 events per hour. Lowest oxygen saturation was 84.0%.  Time spent less than or equal to 88% was 0.3 minutes. PLM index was 32.3 movements per hour     Past Surgical History:   Procedure Laterality Date    APPENDECTOMY  2000s    BLEPHAROPLASTY BILATERAL Bilateral 10/3/2018    Procedure: BLEPHAROPLASTY BILATERAL;;  Surgeon: Dany Berrios MD;  Location: [facility]    CARPAL TUNNEL RELEASE RT/LT  1980s    COLONOSCOPY N/A 7/27/2022    Procedure: COLONOSCOPY, FLEXIBLE, WITH LESION REMOVAL USING SNARE;  Surgeon: Filiberto Banuelos MD;  [facility]: [facility]    COLONOSCOPY WITH CO2 INSUFFLATION N/A 7/27/2022    Procedure: COLONOSCOPY, WITH CO2 INSUFFLATION;  Surgeon: Filiberto Banuelos MD;  [facility]    COMBINED ESOPHAGOSCOPY, GASTROSCOPY, DUODENOSCOPY (EGD) WITH CO2 INSUFFLATION N/A 9/20/2019    Procedure: ESOPHAGOGASTRODUODENOSCOPY, WITH CO2 INSUFFLATION;  Surgeon: Filiberto Banuelos MD;  [facility]    COMBINED ESOPHAGOSCOPY, GASTROSCOPY, DUODENOSCOPY (EGD) WITH CO2 INSUFFLATION N/A 7/27/2022    Procedure: ESOPHAGOGASTRODUODENOSCOPY, WITH CO2 INSUFFLATION;  Surgeon: Filiberto Banuelos MD;  [facility]    ESOPHAGOSCOPY, GASTROSCOPY, DUODENOSCOPY (EGD), COMBINED N/A 9/20/2019    Procedure: Esophagogastroduodenoscopy, With Biopsy;  Surgeon: Filiberto Banuelos MD;  [facility]    ESOPHAGOSCOPY, GASTROSCOPY, DUODENOSCOPY (EGD), COMBINED N/A 7/27/2022    Procedure: ESOPHAGOGASTRODUODENOSCOPY, WITH BIOPSY;  Surgeon: Filiberto Banuelos MD;   [facility]    REPAIR PTOSIS BILATERAL Bilateral 10/3/2018    Procedure: REPAIR PTOSIS BILATERAL;;  Surgeon: Dany Berrios MD;  [facility]    REPAIR PTOSIS BROW BILATERAL Bilateral 10/3/2018    Procedure: REPAIR PTOSIS BROW BILATERAL;;  Surgeon: Dany Berrios MD;  [facility]    SIGMOIDOSCOPY FLEXIBLE N/A 9/20/2019    Procedure: SIGMOIDOSCOPY, FLEXIBLE;  Surgeon: Filiberto Banuelos MD;  [facility]    TONSILLECTOMY  1980s     No Known Allergies  Social History     Socioeconomic History    Marital status: Single     Spouse name: Not on file    Number of children: Not on file    Years of education: Not on file    Highest education level: Not on file   Occupational History    Occupation: /advertising design   Tobacco Use    Smoking status: Never     Passive exposure: Past    Smokeless tobacco: Never   Vaping Use    Vaping status: Never Used   Substance and Sexual Activity    Alcohol use: Yes     Alcohol/week: 14.0 - 21.0 standard drinks of alcohol     Types: 14 - 21 Standard drinks or equivalent per week     Comment: varies, a couple drinks a day    Drug use: No    Sexual activity: Not on file   Other Topics Concern    Parent/sibling w/ CABG, MI or angioplasty before 65F 55M? No   Social History Narrative    Not on file     Social Determinants of Health     Financial Resource Strain: Low Risk  (4/7/2024)    Financial Resource Strain     Within the past 12 months, have you or your family members you live with been unable to get utilities (heat, electricity) when it was really needed?: No   Food Insecurity: Low Risk  (4/7/2024)    Food Insecurity     Within the past 12 months, did you worry that your food would run out before you got money to buy more?: No     Within the past 12 months, did the food you bought just not last and you didn t have money to get more?: No   Transportation Needs: Low Risk  (4/7/2024)    Transportation Needs     Within the past 12 months, has lack  of transportation kept you from medical appointments, getting your medicines, non-medical meetings or appointments, work, or from getting things that you need?: No   Physical Activity: Inactive (4/7/2024)    Exercise Vital Sign     Days of Exercise per Week: 0 days     Minutes of Exercise per Session: 0 min   Stress: Stress Concern Present (4/7/2024)    [employer] [place] [employer] - Occupational Stress Questionnaire     Feeling of Stress : To some extent   Social Connections: Unknown (4/7/2024)    Social Connection and Isolation Panel [NHANES]     Frequency of Communication with Friends and Family: Not on file     Frequency of Social Gatherings with Friends and Family: Once a week     Attends [employer] Services: Not on file     Active Member of Clubs or Organizations: Not on file     Attends Club or Organization Meetings: Not on file     Marital Status: Not on file   Interpersonal Safety: Low Risk  (4/8/2024)    Interpersonal Safety     Do you feel physically and emotionally safe where you currently live?: Yes     Within the past 12 months, have you been hit, slapped, kicked or otherwise physically hurt by someone?: No     Within the past 12 months, have you been humiliated or emotionally abused in other ways by your partner or ex-partner?: No   Housing Stability: Low Risk  (4/7/2024)    Housing Stability     Do you have housing? : Yes     Are you worried about losing your housing?: No     Family History   Problem Relation Age of Onset    Hypertension Mother     Alzheimer Disease Mother     Diabetes Brother     Glaucoma No family hx of     Macular Degeneration No family hx of     Coronary Artery Disease No family hx of     Colon Cancer No family hx of     Retinal detachment No family hx of           REVIEW OF SYSTEMS:  General: negative, fever, chills, night sweats  Skin: negative, rash, scaling, and itching  Eyes: negative, double vision, eye pain, and photophobia  Ears/Nose/Throat: negative, nasal  "congestion, and purulent rhinorrhea  Respiratory: No dyspnea on exertion, No cough, No hemoptysis, and negative  Cardiovascular: negative, palpitations, tachycardia, irregular heart beat, chest pain, exertional chest pain or pressure, paroxysmal nocturnal dyspnea, dyspnea on exertion, and orthopnea         OBJECTIVE:  Blood pressure 120/78, pulse 59, height 1.727 m (5' 8\"), weight 91.2 kg (201 lb), SpO2 98%.  General Appearance: healthy, alert, active, and no distress  Head: Normocephalic. No masses, lesions, tenderness or abnormalities  Eyes: conjuctiva clear, PERRL, EOM intact  Ears: External ears normal. Canals clear. TM's normal.  Nose: Nares normal  Mouth: normal  Neck: Supple, no cervical adenopathy, no thyromegaly  Lungs: clear to auscultation  Cardiac: regular rate and rhythm, normal S1 and S2, no murmur       ASSESSMENT/PLAN:  Patient here for discussion about side effect of medication.  Patient with recent diagnosis of atrial fibrillation.  On flecainide 100 mg twice a day he remained in sinus rhythm.  Today's heart rate was 59 bpm.  Patient is complaining of extreme fatigue on Toprol- mg that he is taking now.  Previously he was on 50 mg daily and this was increased to 100 mg.  According the patient he is unable to function due to extreme fatigue.  He is a professional photograph and .  He was told about alternative calcium channel blocker.  Here to discuss the neck step.  As many people have extreme fatigue beta-blocker advised patient to cut down the dose to 50 mg daily.  As he is in sinus rhythm with sinus bradycardia he did not tolerate and reduction of dose.  Patient will continue at 50 mg daily along with flecainide 100 mg twice a day for 2 weeks.  If his fatigue does not improve then we will change to calcium channel blocker.  Patient had a CT calcium score.  Total score was 0.  Had a monitor which showed A-fib with RVR lasting up to 1.5 hours.  Patient is not on " anticoagulation as currently his stroke risk score is 1 series 65 years.  He will be following up with his primary cardiologist Dr. Crawley.  Discussed the association between alcohol, sleep apnea and atrial fibrillation.  Suggested repeat sleep study and use of CPAP machine as well as controlling alcohol consumption.  Total visit duration 30 minutes.  This included face-to-face interview, physical exam, chart review, review of prior EKG, echocardiogram, monitor result stress echo, coronary artery calcium score and documentation.

## 2024-04-24 ENCOUNTER — MYC MEDICAL ADVICE (OUTPATIENT)
Dept: FAMILY MEDICINE | Facility: CLINIC | Age: 64
End: 2024-04-24
Payer: COMMERCIAL

## 2024-04-24 DIAGNOSIS — R35.0 FREQUENT URINATION: Primary | ICD-10-CM

## 2024-05-14 ENCOUNTER — TELEPHONE (OUTPATIENT)
Dept: CARDIOLOGY | Facility: CLINIC | Age: 64
End: 2024-05-14
Payer: COMMERCIAL

## 2024-05-14 NOTE — TELEPHONE ENCOUNTER
5/16/2024 10:38AM Anita Ben  Patient confirmed rescheduled appointment:  Date: 8/21/2024  Time: 12:45PM  Visit type: New EP  Provider: Dr. Cheung  [place] [facility] [place] [address] [place] [address], 3rd floor, [place] [address]  Testing/imaging: NA  Additional notes: 5/16 Called to reschedule New EP w/ Dr. Cheung. Pt can only see Dr. Cheung since he is specifically referred by Dr. Crawley. Pt has been rescheduled multiple times. [place]. Cheung [place] [facility] [place] Pt requested that Pt Relations reach out to him specifically, declined me providing number for him to call, says he already had the number and wants Pt Relations to reach out to him. Let pt know I would talk w/ nurse about getting him seen sooner and that he is scheduled for next available and is on waitlist. Escalated to supervisor. GISSELLE Contreras 5/16/2024 10:38AM        5/14/2024 5:28PM Anita Contreras  Left Voicemail (1st Attempt) and Sent Mychart (1st Attempt) for the patient to call back and reschedule the following:    Appointment type: New EP  Provider: Any EP MD  Return date: Next available  Specialty phone number: [phone] option 1  Additional appointment(s) needed: NA  Additonal Notes: 5/14 LVM and MYC for pt to reschedule New EP w/ any EP MD. GISSELLE Contreras 5/14/2024 5:28PM

## 2024-05-23 ENCOUNTER — TELEPHONE (OUTPATIENT)
Dept: CARDIOLOGY | Facility: CLINIC | Age: 64
End: 2024-05-23
Payer: COMMERCIAL

## 2024-05-23 NOTE — TELEPHONE ENCOUNTER
5/23/2024 10:19AM Anita Contreras  Called waitlist patient and offered sooner New EP appointment with Dr. Cheung on this date 5/24/2024 & time 1:15PM at this location [facility] [place], [address] [place] [address], 3rd floor, [place] [address].    5/23 Called to offer pt sooner appt w/ Dr. Cheung 5/24 at 1:15PM. Pt declined. Let him know we would call if something else opened up. MJ      Please note there is no guarantee this appointment will be available as it is first come first serve.   Anita Contreras 5/23/2024 10:19AM

## 2024-05-28 ENCOUNTER — TELEPHONE (OUTPATIENT)
Dept: CARDIOLOGY | Facility: CLINIC | Age: 64
End: 2024-05-28
Payer: COMMERCIAL

## 2024-05-28 ENCOUNTER — MYC MEDICAL ADVICE (OUTPATIENT)
Dept: OTOLARYNGOLOGY | Facility: CLINIC | Age: 64
End: 2024-05-28
Payer: COMMERCIAL

## 2024-05-28 DIAGNOSIS — J34.2 NASAL SEPTAL DEVIATION: ICD-10-CM

## 2024-05-28 DIAGNOSIS — J34.89 NASAL OBSTRUCTION: Primary | ICD-10-CM

## 2024-05-28 DIAGNOSIS — J34.3 NASAL TURBINATE HYPERTROPHY: ICD-10-CM

## 2024-05-28 NOTE — TELEPHONE ENCOUNTER
5/28/2024 3:59PM Anita Contreras  Called waitlist patient and offered a New EP appointment with Dr. Cheung on this date 5/29/2024 & time 2:15PM at this location [facility] [place], [address] [place] [address], 3rd floor, [place] [address]    5/28 Offered 2:15PM New EP w/ Dr. Cheung 5/29- pt declined, but would like to remain on the waitlist. MJ      Please note there is no guarantee this appointment will be available as it is first come first serve.   Anita Contreras 5/28/2024 3:59PM

## 2024-05-31 NOTE — CONFIDENTIAL NOTE
Called pt and informed him to keep the appt for the CT scan and that Dr Perales will reach out to him when he get the results to let him know how he wants to proceed. Pt agreed with this plan.  Raisa Smith CMA 5/31/2024 12:29 PM

## 2024-06-05 ENCOUNTER — TELEPHONE (OUTPATIENT)
Dept: ORTHOPEDICS | Facility: CLINIC | Age: 64
End: 2024-06-05
Payer: COMMERCIAL

## 2024-06-05 NOTE — TELEPHONE ENCOUNTER
Spoke to patient discussed his right great toe pain & relation of repeat injection regards to any potential surgical options/timing.  Patient would like to hold on repeat injection until after consulting with Podiatry - Dr Chong, patient was scheduled for tentative repeat injection on 6/19/4 with Dr Tomas.    Ralph Narayanan ATC

## 2024-06-05 NOTE — TELEPHONE ENCOUNTER
Other: Adan called he would like Ralph Narayanan to give him a call concerning possibly getting an injection in his right big toe because he is in a lot of pain prior to seeing podiatrist on 6/11/24 or should he wait until after he sees the podiatrist. Please call to discuss     Could we send this information to you in [facility] or would you prefer to receive a phone call?:   Patient would prefer a phone call   Okay to leave a detailed message?: Yes at Cell number on file:    Telephone Information:   Mobile [phone]

## 2024-06-10 ENCOUNTER — ANCILLARY PROCEDURE (OUTPATIENT)
Dept: CT IMAGING | Facility: CLINIC | Age: 64
End: 2024-06-10
Attending: OTOLARYNGOLOGY
Payer: COMMERCIAL

## 2024-06-10 DIAGNOSIS — J34.89 NASAL OBSTRUCTION: ICD-10-CM

## 2024-06-10 DIAGNOSIS — J34.2 NASAL SEPTAL DEVIATION: ICD-10-CM

## 2024-06-10 DIAGNOSIS — J34.3 NASAL TURBINATE HYPERTROPHY: ICD-10-CM

## 2024-06-10 PROCEDURE — 70486 CT MAXILLOFACIAL W/O DYE: CPT | Mod: TC | Performed by: RADIOLOGY

## 2024-06-11 ENCOUNTER — TELEPHONE (OUTPATIENT)
Dept: OTOLARYNGOLOGY | Facility: CLINIC | Age: 64
End: 2024-06-11

## 2024-06-11 ENCOUNTER — OFFICE VISIT (OUTPATIENT)
Dept: PODIATRY | Facility: CLINIC | Age: 64
End: 2024-06-11
Payer: COMMERCIAL

## 2024-06-11 VITALS
HEART RATE: 86 BPM | WEIGHT: 192 LBS | BODY MASS INDEX: 29.19 KG/M2 | SYSTOLIC BLOOD PRESSURE: 112 MMHG | DIASTOLIC BLOOD PRESSURE: 73 MMHG

## 2024-06-11 DIAGNOSIS — J34.3 NASAL TURBINATE HYPERTROPHY: ICD-10-CM

## 2024-06-11 DIAGNOSIS — M20.5X1 HALLUX LIMITUS OF RIGHT FOOT: Primary | ICD-10-CM

## 2024-06-11 DIAGNOSIS — J34.89 NASAL OBSTRUCTION: Primary | ICD-10-CM

## 2024-06-11 DIAGNOSIS — J34.2 NASAL SEPTAL DEVIATION: ICD-10-CM

## 2024-06-11 PROCEDURE — 99204 OFFICE O/P NEW MOD 45 MIN: CPT | Performed by: PODIATRIST

## 2024-06-11 NOTE — PROGRESS NOTES
Subjective:    Pt is seen today as a new pt referral with the c/c of chronic right foot pain.  This has been symptomatic for years and slowly getting worse.  Pain aggravated by activity and relieved by rest.  Has had relief with steroid injections.  Has tried to buy stiff shoes.  Notices this hallux is going more lateral.  Has tried a brace for this but painful.  He is inquiring about surgical options today.  Does not smoke.  History of alcohol use.    ROS:  A 10-point review of systems was performed and is positive for that noted in the HPI and as seen above.  All other areas are negative.        No Known Allergies    Current Outpatient Medications   Medication Sig Dispense Refill    acetaminophen (TYLENOL) 325 MG tablet Take 325-650 mg by mouth every 6 hours as needed for mild pain      esomeprazole (NEXIUM) 40 MG DR capsule TAKE ONE CAPSULE BY MOUTH EVERY MORNING 30-60 MINUTES BEFORE BREAKFAST 90 capsule 3    flecainide (TAMBOCOR) 100 MG tablet Take 1 tablet (100 mg) by mouth 2 times daily 180 tablet 3    metoprolol succinate ER (TOPROL XL) 100 MG 24 hr tablet Take 0.5 tablets (50 mg) by mouth daily      rosuvastatin (CRESTOR) 20 MG tablet Take 1 tablet (20 mg) by mouth daily 90 tablet 3       Patient Active Problem List   Diagnosis    Esophageal reflux    Hyperlipidemia with target LDL less than 130    Reid esophagus    Memory changes    Insomnia, psychophysiological    Family history of Alzheimer's disease    Anxiety    Lumbar degenerative disc disease    Protrusion of lumbar intervertebral disc    Lumbar spinal stenosis    Alcohol use    LUCIAN (obstructive sleep apnea)- 'moderate' (AHI 6)    Hip pain, bilateral    Benign prostatic hyperplasia with urinary frequency    Atrial fibrillation (H)    Major depressive disorder, single episode, moderate (H)       Past Medical History:   Diagnosis Date    Anxiety 12/07/2015    Reid esophagus 10/30/2014    Esophageal reflux 10/01/2014    History of shingles 2012     Hyperlipidemia with target LDL less than 130 10/01/2014    Lumbar degenerative disc disease 05/17/2016    Nephrolithiasis 2009    LUCIAN (obstructive sleep apnea)- 'moderate' (AHI 6) 10/06/2017    Study Date: 10/2/2017- (200.0 lbs) Scored using 3% rules. It was difficult to discern between PLMS and hyponeas. Apnea/hypopnea index was 28.0 events per hour.  The REM AHI was 29.3 events per hour.  The supine AHI was 32.7 events per hour.  RDI was 28.0 events per hour. Lowest oxygen saturation was 84.0%.  Time spent less than or equal to 88% was 0.3 minutes. PLM index was 32.3 movements per hour       Past Surgical History:   Procedure Laterality Date    APPENDECTOMY  2000s    BLEPHAROPLASTY BILATERAL Bilateral 10/3/2018    Procedure: BLEPHAROPLASTY BILATERAL;;  Surgeon: Dany Berrios MD;  [facility]: [facility]    CARPAL TUNNEL RELEASE RT/LT  1980s    COLONOSCOPY N/A 7/27/2022    Procedure: COLONOSCOPY, FLEXIBLE, WITH LESION REMOVAL USING SNARE;  Surgeon: Filiberto Banuelos MD;  [facility]    COLONOSCOPY WITH CO2 INSUFFLATION N/A 7/27/2022    Procedure: COLONOSCOPY, WITH CO2 INSUFFLATION;  Surgeon: Filiberto Banuelos MD;  [facility]    COMBINED ESOPHAGOSCOPY, GASTROSCOPY, DUODENOSCOPY (EGD) WITH CO2 INSUFFLATION N/A 9/20/2019    Procedure: ESOPHAGOGASTRODUODENOSCOPY, WITH CO2 INSUFFLATION;  Surgeon: Filiberto Banuelos MD;  [facility]    COMBINED ESOPHAGOSCOPY, GASTROSCOPY, DUODENOSCOPY (EGD) WITH CO2 INSUFFLATION N/A 7/27/2022    Procedure: ESOPHAGOGASTRODUODENOSCOPY, WITH CO2 INSUFFLATION;  Surgeon: Filiberto Banuelos MD;  [facility]    ESOPHAGOSCOPY, GASTROSCOPY, DUODENOSCOPY (EGD), COMBINED N/A 9/20/2019    Procedure: Esophagogastroduodenoscopy, With Biopsy;  Surgeon: Filiberto Banuelos MD;  [facility]    ESOPHAGOSCOPY, GASTROSCOPY, DUODENOSCOPY (EGD), COMBINED N/A 7/27/2022    Procedure: ESOPHAGOGASTRODUODENOSCOPY, WITH BIOPSY;  Surgeon: Lakisha  Filiberto Wong MD;  [facility]    REPAIR PTOSIS BILATERAL Bilateral 10/3/2018    Procedure: REPAIR PTOSIS BILATERAL;;  Surgeon: Dany Berrios MD;  [facility]    REPAIR PTOSIS BROW BILATERAL Bilateral 10/3/2018    Procedure: REPAIR PTOSIS BROW BILATERAL;;  Surgeon: Dany Berrios MD;  [facility]    SIGMOIDOSCOPY FLEXIBLE N/A 9/20/2019    Procedure: SIGMOIDOSCOPY, FLEXIBLE;  Surgeon: Filiberto Banuelos MD;  [facility]: [facility]    TONSILLECTOMY  1980s       Family History   Problem Relation Age of Onset    Hypertension Mother     Alzheimer Disease Mother     Diabetes Brother     Glaucoma No family hx of     Macular Degeneration No family hx of     Coronary Artery Disease No family hx of     Colon Cancer No family hx of     Retinal detachment No family hx of        Social History     Tobacco Use    Smoking status: Never     Passive exposure: Past    Smokeless tobacco: Never   Substance Use Topics    Alcohol use: Yes     Alcohol/week: 14.0 - 21.0 standard drinks of alcohol     Types: 14 - 21 Standard drinks or equivalent per week     Comment: varies, a couple drinks a day         Exam:    Vitals: /73   Pulse 86   Wt 87.1 kg (192 lb)   BMI 29.19 kg/m    BMI: Body mass index is 29.19 kg/m .  Height: Data Unavailable    Constitutional/ general:  Pt is in no apparent distress, appears well-nourished.  Cooperative with history and physical exam.     Psych:  The patient answered questions appropriately.  Normal affect.  Seems to have reasonable expectations, in terms of treatment.     Lungs:  Non labored breathing, non labored speech. No cough.  No audible wheezing. Even, quiet breathing.       Vascular:  positive pedal pulses bilaterally for both the DP and PT arteries.  CFT < 3 sec.  negative ankle edema.  positive pedal hair growth.    Neuro:  Alert and oriented x 3. Coordinated gait.  Light touch sensation is intact  Derm: Normal texture and turgor.  No erythema, ecchymosis, or  cyanosis.      Musculoskeletal:    Lower extremity muscle strength is normal.  Patient is ambulatory without an assistive device or brace.  No gross deformities.  Normal arch.    Normal ROM all forefoot and rearfoot joints except [id number] MTPJ.  Patient has pain with range of motion.  Stephane proliferation dorsally.  No echymosis, edema, signs of infection or trauma. No open lesions, increased warmth or ascending cellulitis.  Moderate bunion noted I can barely reduce.    Narrative & Impression   XR FOOT RIGHT G/E 3 VIEWS 2/1/2023 1:40 PM      HISTORY: Pain in joint involving right ankle and foot     COMPARISON: None.                                                                       IMPRESSION: Severe degenerative change of the first MTP joint. Right  foot negative for fracture..         Assessment:    Chronic right first MTPJ arthritis     Plan:  X-rays from past personally reviewed.  Explained arthritis chronic problem.  If conservative treatments not helping neck step fusion.  Same-day surgery under MAC anesthesia.  Would remodel joint and fused with plate.  Discussed there being no motion at this joint.  Patient could still walk and jog and wear majority of his shoes.  Complications include numbness pain nonunion infection.  Discussed recovery.  Nonweightbearing 4 to 6 weeks.  2 months before he can get his shoe on his foot.  Will consider this and call if he would like to set up surgery or return to see me.  RTC as needed.    Prince Chong DPM, FACFAS

## 2024-06-11 NOTE — TELEPHONE ENCOUNTER
I spoke with Adan on the phone given the CT results.  No significant sinusitis.  However he has a significant deviated nasal septum and diffuse turbinate hypertrophy.  He did not take the Zyrtec or Astelin from last fall as he went out of town and had concerns about potential drug interactions with his flecainide.  I did look this up but I am not concerned about Zyrtec or Astelin interacting with his flecainide but I did encourage him to speak with the pharmacist.  He will try his medications first.  He also try Neti pot.  He sleeps on a few pillows.  If this all fails we did discuss septoplasty and bilateral inferior turbinate reduction as an option.  He will call or MyChart if wants to discuss more.

## 2024-06-11 NOTE — PATIENT INSTRUCTIONS
We wish you continued good healing. If you have any questions or concerns, please do not hesitate to contact us at  [phone]    Snehtat (secure e-mail communication and access to your chart) to send a message or to make an appointment.    Please remember to call and schedule a follow up appointment if one was recommended at your earliest convenience.     PODIATRY CLINIC HOURS  TELEPHONE NUMBER    Dr. Prince MAYERPGAVIN FACFAS        Clinics:  [facility]   [place]  Tuesday 1PM-6PM  [place]  Wednesday 745AM-330PM  [place]  Monday 2nd,4th  830AM-4PM  Thursday/Friday 745AM-230PM     [place] APPOINTMENTS  [phone]    [place] APPOINTMENTS  [phone]          If you need a medication refill, please contact us you may need lab work and/or a follow up visit prior to your refill (i.e. Antifungal medications).  If MRI needed please call Imaging at [phone]   HOW DO I GET MY KNEE SCOOTER? Knee scooters can be picked up at ANY Medical Supply stores with your knee scooter Prescription.  OR  Bring your signed prescription to an [facility] Medical [facility] showroom.   Set up an appointment for your custom Orthotics. Call any Orthotics locations call [phone]

## 2024-06-11 NOTE — LETTER
6/11/2024      Adan Oliver  [address]  [place] [address]      Dear Colleague,    Thank you for referring your patient, Adan Oliver, to the [facility]. Please see a copy of my visit note below.     Subjective:    Pt is seen today as a new pt referral with the c/c of chronic right foot pain.  This has been symptomatic for years and slowly getting worse.  Pain aggravated by activity and relieved by rest.  Has had relief with steroid injections.  Has tried to buy stiff shoes.  Notices this hallux is going more lateral.  Has tried a brace for this but painful.  He is inquiring about surgical options today.  Does not smoke.  History of alcohol use.    ROS:  A 10-point review of systems was performed and is positive for that noted in the HPI and as seen above.  All other areas are negative.        No Known Allergies    Current Outpatient Medications   Medication Sig Dispense Refill     acetaminophen (TYLENOL) 325 MG tablet Take 325-650 mg by mouth every 6 hours as needed for mild pain       esomeprazole (NEXIUM) 40 MG DR capsule TAKE ONE CAPSULE BY MOUTH EVERY MORNING 30-60 MINUTES BEFORE BREAKFAST 90 capsule 3     flecainide (TAMBOCOR) 100 MG tablet Take 1 tablet (100 mg) by mouth 2 times daily 180 tablet 3     metoprolol succinate ER (TOPROL XL) 100 MG 24 hr tablet Take 0.5 tablets (50 mg) by mouth daily       rosuvastatin (CRESTOR) 20 MG tablet Take 1 tablet (20 mg) by mouth daily 90 tablet 3       Patient Active Problem List   Diagnosis     Esophageal reflux     Hyperlipidemia with target LDL less than 130     Reid esophagus     Memory changes     Insomnia, psychophysiological     Family history of Alzheimer's disease     Anxiety     Lumbar degenerative disc disease     Protrusion of lumbar intervertebral disc     Lumbar spinal stenosis     Alcohol use     LUCIAN (obstructive sleep apnea)- 'moderate' (AHI 6)     Hip pain, bilateral     Benign prostatic hyperplasia with  urinary frequency     Atrial fibrillation (H)     Major depressive disorder, single episode, moderate (H)       Past Medical History:   Diagnosis Date     Anxiety 12/07/2015     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 It was difficult to discern between PLMS and hyponeas. Apnea/hypopnea index was 28.0 events per hour.  The REM AHI was 29.3 events per hour.  The supine AHI was 32.7 events per hour.  RDI was 28.0 events per hour. Lowest oxygen saturation was 84.0%.  Time spent less than or equal to 88% was 0.3 minutes. PLM index was 32.3 movements per hour       Past Surgical History:   Procedure Laterality Date     APPENDECTOMY  2000s     BLEPHAROPLASTY BILATERAL Bilateral 10/3/2018    Procedure: BLEPHAROPLASTY BILATERAL;;  Surgeon: Dany Berrios MD;  [facility]: [facility]     CARPAL TUNNEL RELEASE RT/LT  1980s     COLONOSCOPY N/A 7/27/2022    Procedure: COLONOSCOPY, FLEXIBLE, WITH LESION REMOVAL USING SNARE;  Surgeon: Filiberto Banuelos MD;  [facility]: [facility]     COLONOSCOPY WITH CO2 INSUFFLATION N/A 7/27/2022    Procedure: COLONOSCOPY, WITH CO2 INSUFFLATION;  Surgeon: Filiberto Banuelos MD;  [facility]     COMBINED ESOPHAGOSCOPY, GASTROSCOPY, DUODENOSCOPY (EGD) WITH CO2 INSUFFLATION N/A 9/20/2019    Procedure: ESOPHAGOGASTRODUODENOSCOPY, WITH CO2 INSUFFLATION;  Surgeon: Filiberto Banuelos MD;  [facility]     COMBINED ESOPHAGOSCOPY, GASTROSCOPY, DUODENOSCOPY (EGD) WITH CO2 INSUFFLATION N/A 7/27/2022    Procedure: ESOPHAGOGASTRODUODENOSCOPY, WITH CO2 INSUFFLATION;  Surgeon: Filiberto Banuelos MD;  [facility]     ESOPHAGOSCOPY, GASTROSCOPY, DUODENOSCOPY (EGD),  COMBINED N/A 9/20/2019    Procedure: Esophagogastroduodenoscopy, With Biopsy;  Surgeon: Filiberto Banuelos MD;  [facility]     ESOPHAGOSCOPY, GASTROSCOPY, DUODENOSCOPY (EGD), COMBINED N/A 7/27/2022    Procedure: ESOPHAGOGASTRODUODENOSCOPY, WITH BIOPSY;  Surgeon: Filiberto Banuelos MD;  [facility]     REPAIR PTOSIS BILATERAL Bilateral 10/3/2018    Procedure: REPAIR PTOSIS BILATERAL;;  Surgeon: Dany Berrios MD;  [facility]     REPAIR PTOSIS BROW BILATERAL Bilateral 10/3/2018    Procedure: REPAIR PTOSIS BROW BILATERAL;;  Surgeon: Dany Berrios MD;  [facility]     SIGMOIDOSCOPY FLEXIBLE N/A 9/20/2019    Procedure: SIGMOIDOSCOPY, FLEXIBLE;  Surgeon: Filiberto Banuelos MD;  [facility]     TONSILLECTOMY  1980s       Family History   Problem Relation Age of Onset     Hypertension Mother      Alzheimer Disease Mother      Diabetes Brother      Glaucoma No family hx of      Macular Degeneration No family hx of      Coronary Artery Disease No family hx of      Colon Cancer No family hx of      Retinal detachment No family hx of        Social History     Tobacco Use     Smoking status: Never     Passive exposure: Past     Smokeless tobacco: Never   Substance Use Topics     Alcohol use: Yes     Alcohol/week: 14.0 - 21.0 standard drinks of alcohol     Types: 14 - 21 Standard drinks or equivalent per week     Comment: varies, a couple drinks a day         Exam:    Vitals: /73   Pulse 86   Wt 87.1 kg (192 lb)   BMI 29.19 kg/m    BMI: Body mass index is 29.19 kg/m .  Height: Data Unavailable    Constitutional/ general:  Pt is in no apparent distress, appears well-nourished.  Cooperative with history and physical exam.     Psych:  The patient answered questions appropriately.  Normal affect.  Seems to have reasonable expectations, in terms of treatment.     Lungs:  Non labored breathing, non labored speech. No cough.  No audible wheezing. Even, quiet breathing.        Vascular:  positive pedal pulses bilaterally for both the DP and PT arteries.  CFT < 3 sec.  negative ankle edema.  positive pedal hair growth.    Neuro:  Alert and oriented x 3. Coordinated gait.  Light touch sensation is intact  Derm: Normal texture and turgor.  No erythema, ecchymosis, or cyanosis.      Musculoskeletal:    Lower extremity muscle strength is normal.  Patient is ambulatory without an assistive device or brace.  No gross deformities.  Normal arch.    Normal ROM all forefoot and rearfoot joints except [id number] MTPJ.  Patient has pain with range of motion.  Stephane proliferation dorsally.  No echymosis, edema, signs of infection or trauma. No open lesions, increased warmth or ascending cellulitis.  Moderate bunion noted I can barely reduce.    Narrative & Impression   XR FOOT RIGHT G/E 3 VIEWS 2/1/2023 1:40 PM      HISTORY: Pain in joint involving right ankle and foot     COMPARISON: None.                                                                       IMPRESSION: Severe degenerative change of the first MTP joint. Right  foot negative for fracture..         Assessment:    Chronic right first MTPJ arthritis     Plan:  X-rays from past personally reviewed.  Explained arthritis chronic problem.  If conservative treatments not helping neck step fusion.  Same-day surgery under MAC anesthesia.  Would remodel joint and fused with plate.  Discussed there being no motion at this joint.  Patient could still walk and jog and wear majority of his shoes.  Complications include numbness pain nonunion infection.  Discussed recovery.  Nonweightbearing 4 to 6 weeks.  2 months before he can get his shoe on his foot.  Will consider this and call if he would like to set up surgery or return to see me.  RTC as needed.    Prince Chong DPM, FACFAS         Again, thank you for allowing me to participate in the care of your patient.        Sincerely,        Prince Chong DPM

## 2024-06-14 NOTE — PROGRESS NOTES
ASSESSMENT & PLAN    There are no diagnoses linked to this encounter.    64 year old male presents     No follow-ups on file.      Dr. Lenora Tomas DO, CAQ  [facility]  Sports Medicine     -----  No chief complaint on file.      SUBJECTIVE  Adan Oliver is a/an 64 year old ***-handed male who is seen {FSOC CONSULT:795059} for evaluation of ***.  Onset was ***. Pain is located ***. Symptoms are worsened by ***.  He has tried ***. Associated symptoms include *** {Assoc Sx:509118}.    The patient is seen {patient accompanied:154343}    Prior injury/Surgical history of affected joint: ***  Social History/Occupation: ***    REVIEW OF SYSTEMS:  Pertinent positives/negative: As stated above in HPI    OBJECTIVE:  There were no vitals taken for this visit.   General: Alert and in no distress  CV: Extremities appear well perfused   Resp: normal respiratory effort  MSK:  ***     RADIOLOGY:  Final results and radiologist's interpretation available in the [facility] health record.  Images below were personally reviewed and discussed with the patient in the office today.  My personal interpretation of the performed imaging: ***      {[facility] 2021 Documentation (Optional):611083}  {2021 E&M time (Optional):086540}  {Provider  Link to [facility] Help Grid :108376}       Disclaimer: This note consists of text and symbols derived from dictation and/or voice recognition software. As a result, there may be errors in the script that have gone undetected. Please consider this when interpreting information found in this chart.

## 2024-06-18 ENCOUNTER — OFFICE VISIT (OUTPATIENT)
Dept: UROLOGY | Facility: CLINIC | Age: 64
End: 2024-06-18
Payer: COMMERCIAL

## 2024-06-18 VITALS
BODY MASS INDEX: 29.16 KG/M2 | WEIGHT: 191.8 LBS | DIASTOLIC BLOOD PRESSURE: 80 MMHG | HEART RATE: 65 BPM | OXYGEN SATURATION: 98 % | SYSTOLIC BLOOD PRESSURE: 128 MMHG

## 2024-06-18 DIAGNOSIS — Z87.440 PERSONAL HISTORY OF URINARY TRACT INFECTION: Primary | ICD-10-CM

## 2024-06-18 DIAGNOSIS — N40.1 BENIGN PROSTATIC HYPERPLASIA WITH LOWER URINARY TRACT SYMPTOMS, SYMPTOM DETAILS UNSPECIFIED: ICD-10-CM

## 2024-06-18 DIAGNOSIS — N53.12 PAINFUL EJACULATION: ICD-10-CM

## 2024-06-18 DIAGNOSIS — R31.29 MICROSCOPIC HEMATURIA: ICD-10-CM

## 2024-06-18 PROCEDURE — 88112 CYTOPATH CELL ENHANCE TECH: CPT | Performed by: PATHOLOGY

## 2024-06-18 PROCEDURE — 99204 OFFICE O/P NEW MOD 45 MIN: CPT | Mod: 25 | Performed by: UROLOGY

## 2024-06-18 PROCEDURE — 51798 US URINE CAPACITY MEASURE: CPT | Performed by: UROLOGY

## 2024-06-18 PROCEDURE — 36415 COLL VENOUS BLD VENIPUNCTURE: CPT | Performed by: UROLOGY

## 2024-06-18 PROCEDURE — 84153 ASSAY OF PSA TOTAL: CPT | Performed by: UROLOGY

## 2024-06-18 RX ORDER — TAMSULOSIN HYDROCHLORIDE 0.4 MG/1
0.4 CAPSULE ORAL AT BEDTIME
Qty: 30 CAPSULE | Refills: 1 | Status: SHIPPED | OUTPATIENT
Start: 2024-06-18 | End: 2024-08-16

## 2024-06-18 RX ORDER — METOPROLOL SUCCINATE 50 MG/1
50 TABLET, EXTENDED RELEASE ORAL DAILY
COMMUNITY
End: 2024-06-26

## 2024-06-18 NOTE — PATIENT INSTRUCTIONS
Please call [facility] to schedule a CT scan. [phone] prior to your 7/9 appointment.  Please call our office if you have questions or need to report any information, [phone].     Please contact your insurance company to make sure the CT scan is covered under your insurance plan.

## 2024-06-18 NOTE — PROGRESS NOTES
S: Adan Oliver is a pleasant  64 year old male who was requested to be seen by  Jimbo Zheng for a consult with regard to patient's urinary complaints.  Patient complains of Hesitancy in starting urinary stream, Sensation of incomplete emptying, Strain to Urinate, Nocturia x 3, Frequency, Intermittency, Dysuria, and slow urinary stream.  He has no history of elevated PSA.  Symptoms have been on going for   many month(s).  Seems to be worsened over time.  His recent PSA was found to be   PSA   Date Value Ref Range Status   10/05/2020 0.89 0 - 4 ug/L Final     Comment:     Assay Method:  Chemiluminescence using Siemens Vista analyzer     Prostate Specific Antigen Screen   Date Value Ref Range Status   06/09/2022 1.29 0.00 - 4.00 ug/L Final   .  His AUA Symptom Score:  27.  He had a UTI last year which was treated.  Urine culture showed E coli.  UA showed microscopic hematuria with 2-5 rbc/hpf.  Last year renal ultrasound showed possible left renal stone.  He has no flank or gross hematuria.  He also has had some painful ejaculation.    Current Outpatient Medications   Medication Sig Dispense Refill    esomeprazole (NEXIUM) 40 MG DR capsule TAKE ONE CAPSULE BY MOUTH EVERY MORNING 30-60 MINUTES BEFORE BREAKFAST 90 capsule 3    flecainide (TAMBOCOR) 100 MG tablet Take 1 tablet (100 mg) by mouth 2 times daily 180 tablet 3    metoprolol succinate ER (TOPROL XL) 50 MG 24 hr tablet Take 50 mg by mouth daily      rosuvastatin (CRESTOR) 20 MG tablet Take 1 tablet (20 mg) by mouth daily 90 tablet 3    tamsulosin (FLOMAX) 0.4 MG capsule Take 1 capsule (0.4 mg) by mouth at bedtime 30 capsule 1    acetaminophen (TYLENOL) 325 MG tablet Take 325-650 mg by mouth every 6 hours as needed for mild pain (Patient not taking: Reported on 6/18/2024)       No Known Allergies  Past Medical History:   Diagnosis Date    Anxiety 12/07/2015    Reid esophagus 10/30/2014    Esophageal reflux 10/01/2014    History of shingles 2012     Hyperlipidemia with target LDL less than 130 10/01/2014    Lumbar degenerative disc disease 05/17/2016    Nephrolithiasis 2009    LUCIAN (obstructive sleep apnea)- 'moderate' (AHI 6) 10/06/2017    Study Date: 10/2/2017- (200.0 lbs) Scored using 3% rules. It was difficult to discern between PLMS and hyponeas. Apnea/hypopnea index was 28.0 events per hour.  The REM AHI was 29.3 events per hour.  The supine AHI was 32.7 events per hour.  RDI was 28.0 events per hour. Lowest oxygen saturation was 84.0%.  Time spent less than or equal to 88% was 0.3 minutes. PLM index was 32.3 movements per hour     Past Surgical History:   Procedure Laterality Date    APPENDECTOMY  2000s    BLEPHAROPLASTY BILATERAL Bilateral 10/3/2018    Procedure: BLEPHAROPLASTY BILATERAL;;  Surgeon: Dany Berrios MD;  [facility]    CARPAL TUNNEL RELEASE RT/LT  1980s    COLONOSCOPY N/A 7/27/2022    Procedure: COLONOSCOPY, FLEXIBLE, WITH LESION REMOVAL USING SNARE;  Surgeon: Filiberto Banuelos MD;  [facility]    COLONOSCOPY WITH CO2 INSUFFLATION N/A 7/27/2022    Procedure: COLONOSCOPY, WITH CO2 INSUFFLATION;  Surgeon: Filiberto Banuelos MD;  [facility]    COMBINED ESOPHAGOSCOPY, GASTROSCOPY, DUODENOSCOPY (EGD) WITH CO2 INSUFFLATION N/A 9/20/2019    Procedure: ESOPHAGOGASTRODUODENOSCOPY, WITH CO2 INSUFFLATION;  Surgeon: Filiberto Banuelos MD;  [facility]    COMBINED ESOPHAGOSCOPY, GASTROSCOPY, DUODENOSCOPY (EGD) WITH CO2 INSUFFLATION N/A 7/27/2022    Procedure: ESOPHAGOGASTRODUODENOSCOPY, WITH CO2 INSUFFLATION;  Surgeon: Filiberto Banuelos MD;  [facility]    ESOPHAGOSCOPY, GASTROSCOPY, DUODENOSCOPY (EGD), COMBINED N/A 9/20/2019    Procedure: Esophagogastroduodenoscopy, With Biopsy;  Surgeon: Filiberto Banuelos MD;  [facility]    ESOPHAGOSCOPY, GASTROSCOPY, DUODENOSCOPY (EGD), COMBINED N/A 7/27/2022    Procedure: ESOPHAGOGASTRODUODENOSCOPY, WITH BIOPSY;  Surgeon: Lakisha  Filiberto Wong MD;  [facility]    REPAIR PTOSIS BILATERAL Bilateral 10/3/2018    Procedure: REPAIR PTOSIS BILATERAL;;  Surgeon: Dany Berrios MD;  [facility]    REPAIR PTOSIS BROW BILATERAL Bilateral 10/3/2018    Procedure: REPAIR PTOSIS BROW BILATERAL;;  Surgeon: Dany Berrios MD;  [facility]    SIGMOIDOSCOPY FLEXIBLE N/A 9/20/2019    Procedure: SIGMOIDOSCOPY, FLEXIBLE;  Surgeon: Filiberto Banuelos MD;  [facility]    TONSILLECTOMY  1980s      Family History   Problem Relation Age of Onset    Hypertension Mother     Alzheimer Disease Mother     Diabetes Brother     Glaucoma No family hx of     Macular Degeneration No family hx of     Coronary Artery Disease No family hx of     Colon Cancer No family hx of     Retinal detachment No family hx of      He does not have a family history of prostate cancer.  Social History     Socioeconomic History    Marital status: Single     Spouse name: None    Number of children: None    Years of education: None    Highest education level: None   Occupational History    Occupation: /advertising design   Tobacco Use    Smoking status: Never     Passive exposure: Past    Smokeless tobacco: Never   Vaping Use    Vaping status: Never Used   Substance and Sexual Activity    Alcohol use: Yes     Alcohol/week: 14.0 - 21.0 standard drinks of alcohol     Types: 14 - 21 Standard drinks or equivalent per week     Comment: varies, a couple drinks a day    Drug use: No   Other Topics Concern    Parent/sibling w/ CABG, MI or angioplasty before 65F 55M? No     Social Determinants of Health     Financial Resource Strain: Low Risk  (4/7/2024)    Financial Resource Strain     Within the past 12 months, have you or your family members you live with been unable to get utilities (heat, electricity) when it was really needed?: No   Food Insecurity: Low Risk  (4/7/2024)    Food Insecurity     Within the past 12 months, did you worry that your food would  run out before you got money to buy more?: No     Within the past 12 months, did the food you bought just not last and you didn t have money to get more?: No   Transportation Needs: Low Risk  (4/7/2024)    Transportation Needs     Within the past 12 months, has lack of transportation kept you from medical appointments, getting your medicines, non-medical meetings or appointments, work, or from getting things that you need?: No   Physical Activity: Inactive (4/7/2024)    Exercise Vital Sign     Days of Exercise per Week: 0 days     Minutes of Exercise per Session: 0 min   Stress: Stress Concern Present (4/7/2024)    [employer] - Occupational Stress Questionnaire     Feeling of Stress : To some extent   Social Connections: Unknown (4/7/2024)    Social Connection and Isolation Panel [NHANES]     Frequency of Social Gatherings with Friends and Family: Once a week   Interpersonal Safety: Low Risk  (4/8/2024)    Interpersonal Safety     Do you feel physically and emotionally safe where you currently live?: Yes     Within the past 12 months, have you been hit, slapped, kicked or otherwise physically hurt by someone?: No     Within the past 12 months, have you been humiliated or emotionally abused in other ways by your partner or ex-partner?: No   Housing Stability: Low Risk  (4/7/2024)    Housing Stability     Do you have housing? : Yes     Are you worried about losing your housing?: No        REVIEW OF SYSTEMS  =================  C: NEGATIVE for fever, chills, change in weight  I: NEGATIVE for worrisome rashes, moles or lesions  E/M: NEGATIVE for ear, mouth and throat problems  R: NEGATIVE for significant cough or SHORTNESS OF BREATH  CV:  NEGATIVE for chest pain, palpitations or peripheral edema  GI: NEGATIVE for nausea, abdominal pain, heartburn, or change in bowel habits  NEURO: NEGATIVE numbness/weakness  : see HPI  PSYCH: NEGATIVE depression/anxiety  LYmph: no new enlarged lymph  nodes  Ortho: no new trauma/movements           O: Exam:/80   Pulse 65   Wt 87 kg (191 lb 12.8 oz)   SpO2 98%   BMI 29.16 kg/m     Constitutional: healthy, alert and no distress  Cardiovascular: negative, PMI normal.   Respiratory: negative, no evidence of respiratory distress  Gastrointestinal: Abdomen soft, non-tender. BS normal. No masses, organomegaly  : penis no discharge.  Testis no masses.  No scrotal skin lesion.  Prostate 30 gm smooth.  PVR 14 ml  Musculoskeletal: extremities normal- no gross deformities noted, gait normal and normal muscle tone  Skin: no suspicious lesions or rashes  Neurologic: Alert and oriented  Psychiatric: mentation appears normal. and affect normal/bright  Hematologic/Lymphatic/Immunologic: normal ant/post cervical, axillary, supraclavicular and inguinal nodes    Assessment/Plan:   (Z87.440) Personal history of urinary tract infection  (primary encounter diagnosis)  Comment:    Plan: MEASURE POST-VOID RESIDUAL URINE/BLADDER         CAPACITY, US NON-IMAGING (62841), CT Urogram wo        & w Contrast, Cytology non gyn [[id number]]             (N40.1) Benign prostatic hyperplasia with lower urinary tract symptoms, symptom details unspecified  Comment:    Plan: MEASURE POST-VOID RESIDUAL URINE/BLADDER         CAPACITY, US NON-IMAGING (38364), tamsulosin         (FLOMAX) 0.4 MG capsule, PSA tumor marker        Trial of flomax - side effects discussed.    (R31.29) Microscopic hematuria  Comment:    Plan: CT Urogram wo & w Contrast, Cytology non gyn         [[id number]]/cysto next             (N53.12) Painful ejaculation  Comment:    Plan: psa

## 2024-06-18 NOTE — PROGRESS NOTES
3-4 16 OZ SODA  2 WATER  2 Alcohol  Bladder scan performed 4ml detected in bladder. Skye Lay, CMA

## 2024-06-19 ENCOUNTER — OFFICE VISIT (OUTPATIENT)
Dept: ORTHOPEDICS | Facility: CLINIC | Age: 64
End: 2024-06-19
Payer: COMMERCIAL

## 2024-06-19 VITALS — HEIGHT: 68 IN | WEIGHT: 191 LBS | BODY MASS INDEX: 28.95 KG/M2

## 2024-06-19 DIAGNOSIS — M19.079 ARTHRITIS OF GREAT TOE AT METATARSOPHALANGEAL JOINT: Primary | ICD-10-CM

## 2024-06-19 DIAGNOSIS — M79.674 GREAT TOE PAIN, RIGHT: ICD-10-CM

## 2024-06-19 LAB — PSA SERPL DL<=0.01 NG/ML-MCNC: 1.24 NG/ML (ref 0–4.5)

## 2024-06-19 PROCEDURE — 20604 DRAIN/INJ JOINT/BURSA W/US: CPT | Mod: T5 | Performed by: STUDENT IN AN ORGANIZED HEALTH CARE EDUCATION/TRAINING PROGRAM

## 2024-06-19 PROCEDURE — 99207 PR NO BILLABLE SERVICE THIS VISIT: CPT | Mod: 25 | Performed by: STUDENT IN AN ORGANIZED HEALTH CARE EDUCATION/TRAINING PROGRAM

## 2024-06-19 RX ORDER — TRIAMCINOLONE ACETONIDE 40 MG/ML
20 INJECTION, SUSPENSION INTRA-ARTICULAR; INTRAMUSCULAR
Status: SHIPPED | OUTPATIENT
Start: 2024-06-19

## 2024-06-19 RX ORDER — ROPIVACAINE HYDROCHLORIDE 5 MG/ML
0.5 INJECTION, SOLUTION EPIDURAL; INFILTRATION; PERINEURAL
Status: SHIPPED | OUTPATIENT
Start: 2024-06-19

## 2024-06-19 RX ADMIN — ROPIVACAINE HYDROCHLORIDE 0.5 ML: 5 INJECTION, SOLUTION EPIDURAL; INFILTRATION; PERINEURAL at 13:21

## 2024-06-19 RX ADMIN — TRIAMCINOLONE ACETONIDE 20 MG: 40 INJECTION, SUSPENSION INTRA-ARTICULAR; INTRAMUSCULAR at 13:21

## 2024-06-19 NOTE — PATIENT INSTRUCTIONS
1. 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 Great toe pain, right        Plan:  -Right great toe injection performed in clinic today  - Continue to wear stiff soled shoes  - Topical Voltaren gel every 6 hours as needed over toe  - Work on toe range of motion  - Can follow with podiatry if surgical fusion is desired    If you have any questions or concerns after your appointment, please send my team a [employer] message or call the clinic at [phone]   Thank you for choosing [facility]!    Dr. Lenora Tomas, DO, [facility]  Sports Medicine and Orthopedics    Dr. Tomas's Clinic Locations:   [place] APPOINTMENTS: [phone]      [address] RADIOLOGY: [phone]   [place] [address] PHYSICAL THERAPY: [phone]    HAND THERAPY: [phone]   [place] BILLING QUESTIONS: [phone] [address] FAX: [phone]   [place] [address]     Post-Injection Discharge Instructions    You may shower, however avoid swimming, tub baths or hot tubs for 24 hours following your procedure  You may have a mild to moderate increase in pain for a few days following the injection.  The lidocaine (local numbing medicine) will wear off in several hours. It usually takes 3-5 days for the steroid medication to start working although it may take up to 14 days for full effect.   You may use ice packs for 10-15 minutes, 3 to 4 times a day at the injection site for comfort if needed  You may use extra strength Tylenol for pain control if necessary   If you were fasting, you may resume your normal diet and medications after the procedure  If you have diabetes, your blood sugar may be higher than normal for 10-14 days following a steroid injection. Contact your doctor who manages your diabetes if your blood sugar is significantly higher than usual    If you experience any of the following, call Sports Medicine @ [phone] or [phone]  -Fever over 100 degrees  F  -Swelling, bleeding, redness, drainage, warmth at the injection site  -New or significant worsening pain

## 2024-06-19 NOTE — LETTER
6/19/2024      Adan Oliver  [address]  [place] [address]      Dear Colleague,    Thank you for referring your patient, Adan Oliver, to the [facility]. Please see a copy of my visit note below.    Sports Medicine Procedure Note    Adan was seen today for procedure.    Diagnoses and all orders for this visit:    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    Great toe pain, right    Other orders  -     Small Joint Injection/Arthrocentesis: R great MTP        Adan Oliver is a/an 64 year old male who is seen for Corticosteroid injection of right first MTP joint.   Last injection: 7/26/23    Plan:  -Right great toe injection performed in clinic today  - Continue to wear stiff soled shoes  - Topical Voltaren gel every 6 hours as needed over toe  - Work on toe range of motion  - Can follow with podiatry if surgical fusion is desired  -Post-injection instructions were provided to the patient    Return if symptoms worsen or fail to improve.    Small Joint Injection/Arthrocentesis: R great MTP    Date/Time: 6/19/2024 1:21 PM    Performed by: Lenora Tomas DO  Authorized by: Lenora Tomas DO  Indications:  Pain  Needle Size:  25 G  Guidance: ultrasound     Approach:  Dorsal  Location:  Great toe    Site:  R great MTP                    Medications:  20 mg triamcinolone 40 MG/ML; 0.5 mL ROPivacaine 5 MG/ML        Outcome:  Tolerated well, no immediate complications  Procedure discussed: discussed risks, benefits, and alternatives    Consent Given by:  Patient  Timeout: timeout called immediately prior to procedure    Prep: patient was prepped and draped in usual sterile fashion       Ultrasound images of procedure were permanently stored.           Post-Injection Discharge Instructions    You may shower, however avoid swimming, tub baths or hot tubs for 24 hours following your procedure  You may have a mild to moderate increase in  pain for a few days following the injection.  The lidocaine (local numbing medicine) will wear off in several hours. It usually takes 3-5 days for the steroid medication to start working although it may take up to 14 days for full effect.   You may use ice packs for 10-15 minutes, 3 to 4 times a day at the injection site for comfort if needed  You may use extra strength Tylenol for pain control if necessary   If you were fasting, you may resume your normal diet and medications after the procedure  If you have diabetes, your blood sugar may be higher than normal for 10-14 days following a steroid injection. Contact your doctor who manages your diabetes if your blood sugar is significantly higher than usual    If you experience any of the following, call Sports Medicine @ [phone] or [phone]  -Fever over 100 degrees F  -Swelling, bleeding, redness, drainage, warmth at the injection site  -New or significant worsening pain        Dr. Lenora Tomas, DO  [facility]  Sports Medicine     -----        Again, thank you for allowing me to participate in the care of your patient.        Sincerely,        Lenora Tomas, DO

## 2024-06-19 NOTE — PROGRESS NOTES
Sports Medicine Procedure Note    Adan was seen today for procedure.    Diagnoses and all orders for this visit:    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  -     Small Joint Injection/Arthrocentesis: R great MTP  -     triamcinolone (KENALOG-40) injection 20 mg  -     0.5 mL ropivacaine (NAROPIN) injection 5 mg/mL    Great toe pain, right        Adan Oliver is a/an 64 year old male who is seen for Corticosteroid injection of right first MTP joint.   Last injection: 7/26/23    Plan:  -Right great toe injection performed in clinic today  - Continue to wear stiff soled shoes  - Topical Voltaren gel every 6 hours as needed over toe  - Work on toe range of motion  - Can follow with podiatry if surgical fusion is desired  -Post-injection instructions were provided to the patient    Return if symptoms worsen or fail to improve.    Small Joint Injection/Arthrocentesis: R great MTP    Date/Time: 6/19/2024 1:21 PM    Performed by: Lenora Tomas DO  Authorized by: Lenora Tomas DO  Indications:  Pain  Needle Size:  25 G  Guidance: ultrasound     Approach:  Dorsal  Location:  Great toe    Site:  R great MTP                    Medications:  20 mg triamcinolone 40 MG/ML; 0.5 mL ROPivacaine 5 MG/ML        Outcome:  Tolerated well, no immediate complications  Procedure discussed: discussed risks, benefits, and alternatives    Consent Given by:  Patient  Timeout: timeout called immediately prior to procedure    Prep: patient was prepped and draped in usual sterile fashion       Ultrasound images of procedure were permanently stored.   20cc's of Kenalog was wasted          Post-Injection Discharge Instructions    You may shower, however avoid swimming, tub baths or hot tubs for 24 hours following your procedure  You may have a mild to moderate increase in pain for a few days following the injection.  The lidocaine (local numbing medicine) will wear off in several hours. It usually takes 3-5 days for  the steroid medication to start working although it may take up to 14 days for full effect.   You may use ice packs for 10-15 minutes, 3 to 4 times a day at the injection site for comfort if needed  You may use extra strength Tylenol for pain control if necessary   If you were fasting, you may resume your normal diet and medications after the procedure  If you have diabetes, your blood sugar may be higher than normal for 10-14 days following a steroid injection. Contact your doctor who manages your diabetes if your blood sugar is significantly higher than usual    If you experience any of the following, call Sports Medicine @ [phone] or [phone]  -Fever over 100 degrees F  -Swelling, bleeding, redness, drainage, warmth at the injection site  -New or significant worsening pain        Dr. Lenora Tomas, DO  [facility]  Sports Medicine     -----

## 2024-06-20 LAB
PATH REPORT.COMMENTS IMP SPEC: NORMAL
PATH REPORT.FINAL DX SPEC: NORMAL
PATH REPORT.GROSS SPEC: NORMAL
PATH REPORT.MICROSCOPIC SPEC OTHER STN: NORMAL

## 2024-06-26 ENCOUNTER — OFFICE VISIT (OUTPATIENT)
Dept: CARDIOLOGY | Facility: CLINIC | Age: 64
End: 2024-06-26
Payer: COMMERCIAL

## 2024-06-26 VITALS
HEART RATE: 54 BPM | DIASTOLIC BLOOD PRESSURE: 76 MMHG | WEIGHT: 189.8 LBS | SYSTOLIC BLOOD PRESSURE: 118 MMHG | BODY MASS INDEX: 28.86 KG/M2 | OXYGEN SATURATION: 98 %

## 2024-06-26 DIAGNOSIS — Z51.81 ENCOUNTER FOR MONITORING FLECAINIDE THERAPY: ICD-10-CM

## 2024-06-26 DIAGNOSIS — I48.0 PAF (PAROXYSMAL ATRIAL FIBRILLATION) (H): Primary | ICD-10-CM

## 2024-06-26 DIAGNOSIS — Z79.899 ENCOUNTER FOR MONITORING FLECAINIDE THERAPY: ICD-10-CM

## 2024-06-26 PROCEDURE — 99215 OFFICE O/P EST HI 40 MIN: CPT | Performed by: INTERNAL MEDICINE

## 2024-06-26 RX ORDER — METOPROLOL SUCCINATE 25 MG/1
25 TABLET, EXTENDED RELEASE ORAL DAILY
Qty: 90 TABLET | Refills: 3 | Status: SHIPPED | OUTPATIENT
Start: 2024-06-26

## 2024-06-26 NOTE — PATIENT INSTRUCTIONS
Thank you for coming to the [facility] [place]; please note the following instructions:    1. Treadmill exercise stress test. (Hold Metoprolol for 3 days prior)    2. Decrease Metoprolol to 25mg a day. If having Afib symptoms with a heart rate of 120 or above, you may take an additional Metoprolol.     3.  Can follow up February 2025        If you have any questions regarding your visit, please contact your care team:     CARDIOLOGY  TELEPHONE NUMBER   Abida SALINAS, Registered Nurse  Yaz SALDIVAR, Registered Nurse  Britta BUCHANAN, Registered Medical Assistant  Joselyn BONE, Certified Medical Assistant  Irene NYE, Clinic Assistant [phone] (select option 1)    *After hours: [phone]   For Scheduling Appts:     [phone] (select option 1)    *After hours: [phone]   For the Device Clinic (Pacemakers and ICD's)  Aline CHAPIN, Registered Nurse   During business hours: [phone]    *After business hours:  [phone] (select option 4)      Normal test result notifications will be released via [employer] or mailed within 7 business days.  All other test results, will be communicated via telephone once reviewed by your cardiologist.    If you need a medication refill, please contact your pharmacy.  Please allow 3 business days for your refill to be completed.    As always, thank you for trusting us with your health care needs!

## 2024-06-26 NOTE — NURSING NOTE
"Chief Complaint   Patient presents with    Follow Up     Reason for visit: Return general cardiology for follow-up       Initial /76 (BP Location: Left arm, Patient Position: Sitting, Cuff Size: Adult Regular)   Pulse 54   Wt 86.1 kg (189 lb 12.8 oz)   SpO2 98%   BMI 28.86 kg/m   Estimated body mass index is 28.86 kg/m  as calculated from the following:    Height as of 6/19/24: 1.727 m (5' 8\").    Weight as of this encounter: 86.1 kg (189 lb 12.8 oz)..  BP completed using cuff size: regular    SOTERO Castillo    "

## 2024-06-26 NOTE — PROGRESS NOTES
[facility]      Referring provider:Johnathon Diaz MD    HPI: Mr. Adan Oliver is a 62 year old  male with PMH significant for    -Obstructive sleep apnea  -Alcohol use  -Reid's esophagus    Patient reports feeling palpitations over the last 1-1/2-month.  So far it has happened only at night.  He has woken up 6 or 7 times over the last 1-1/2 months between 3 AM to 7 AM with chest pressure and palpitations.  He was actually able to capture the heart rate on blood pressure machine which showed 150 bpm (showed me pictures of that from his cell phone).  No EKG documentation so far.  The heart pounding sensation lasted for about 30 minutes.  Over the last 2 weeks he has not had any of these.  He tells me he was under extreme stress when he was having frequent nightly palpitations. Denies chest pain.  No shortness of breath with activity.  No dizziness, syncope or lower extremity edema.    No prior history of cardiac disease.    Lifetime non-smoker.  No hypertension or diabetes.  Family history is unremarkable.  He has hyperlipidemia with elevated LDL at 178 mg/deciliter.  He has been on Crestor 10 mg over the last 3 weeks. Patient tells me that he has been on low-carb keto diet to lose weight.    Patient had exercise his echocardiogram in 11/2020 which showed normal biventricular function with no valve disease.  Stress test was normal.  I have reviewed patient's EKG from 4/11/2022 which shows sinus rhythm otherwise unremarkable.  Labs from 2021 showed normal CBC and BMP.  Medications, personal, family, and social history reviewed with patient and revised.    Interval history 9/26/2023:  Patient is being seen today for multiple concerns that he has over the last few months.  He tells me that he feels his heart beating fast several times a day.  He has shown me his wrist monitor results where his heart rate goes up to 158 bpm at the time of palpitations.  Can last up to 10 to 15 minutes.   When his heart is racing fast he feels discomfort in his chest.  No syncope.  Reports some shortness of breath with exertion.  Can feel discomfort (describes as feels weak in the chest and fuzzy feeling) in his chest with or without exertion.  Over the last few years he has been taking ibuprofen 200 mg daily for back pain.  He does not use CPAP.  He drinks alcohol at least 2 cocktails every night.  He has been drinking alcohol all his life.  He tells me he has a lot of stress in his life.  He has trouble sleeping.  He is recommended CBD lozenges.  No history of tobacco use.    Interval history 10/24/2023:  Patient returns for follow-up.  Patient's heart monitor showed paroxysmal atrial fibrillation corresponding to his symptoms.  This is a new diagnosis for the patient.  When we have received A-fib information I have started patient on metoprolol initially 50 then increased to 100 mg.  Today he tells me that he still has palpitation episodes mostly at night sometimes interfering with his sleep.  Not as fast as he used to be.  Continues to drink couple of alcoholic drinks almost every day though he tells me that he has cut back on this.  He is not sure if he would benefit from sleep testing.  He tells me that [place] of this year.  He is wondering if he needs to take flecainide.  Echocardiogram is unremarkable.  CT calcium score is 0.    Interval history 6/26/2024:  Patient is being seen today for follow-up.  As you know he has history of paroxysmal atrial fibrillation now well-controlled with flecainide 100 mg twice daily.  Patient did not feel well with metoprolol 100 mg.  Seen by my colleague 2 months ago and metoprolol dose was reduced to 50 mg daily.  Since then his energy levels went up.  Feeling better but he tells me he is not back to baseline.  Otherwise he feels dizziness occasionally which does affect his quality of life.  Denies palpitations, chest pain.  He had a lot of  complaints today about how hard it is to get into cardiology appointment.    PAST MEDICAL HISTORY:  Past Medical History:   Diagnosis Date    Anxiety 12/07/2015    Reid esophagus 10/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 It was difficult to discern between PLMS and hyponeas. Apnea/hypopnea index was 28.0 events per hour.  The REM AHI was 29.3 events per hour.  The supine AHI was 32.7 events per hour.  RDI was 28.0 events per hour. Lowest oxygen saturation was 84.0%.  Time spent less than or equal to 88% was 0.3 minutes. PLM index was 32.3 movements per hour       CURRENT MEDICATIONS:  Current Outpatient Medications   Medication Sig Dispense Refill    acetaminophen (TYLENOL) 325 MG tablet Take 325-650 mg by mouth every 6 hours as needed for mild pain (Patient not taking: Reported on 6/18/2024)      esomeprazole (NEXIUM) 40 MG DR capsule TAKE ONE CAPSULE BY MOUTH EVERY MORNING 30-60 MINUTES BEFORE BREAKFAST 90 capsule 3    flecainide (TAMBOCOR) 100 MG tablet Take 1 tablet (100 mg) by mouth 2 times daily 180 tablet 3    metoprolol succinate ER (TOPROL XL) 50 MG 24 hr tablet Take 50 mg by mouth daily      rosuvastatin (CRESTOR) 20 MG tablet Take 1 tablet (20 mg) by mouth daily 90 tablet 3    tamsulosin (FLOMAX) 0.4 MG capsule Take 1 capsule (0.4 mg) by mouth at bedtime 30 capsule 1       PAST SURGICAL HISTORY:  Past Surgical History:   Procedure Laterality Date    APPENDECTOMY  2000s    BLEPHAROPLASTY BILATERAL Bilateral 10/3/2018    Procedure: BLEPHAROPLASTY BILATERAL;;  Surgeon: Dany Berrios MD;  Location: [facility]    CARPAL TUNNEL RELEASE RT/LT  1980s    COLONOSCOPY N/A 7/27/2022    Procedure: COLONOSCOPY, FLEXIBLE, WITH LESION  REMOVAL USING SNARE;  Surgeon: Filiberto Banuelos MD;  Location: [facility]    COLONOSCOPY WITH CO2 INSUFFLATION N/A 7/27/2022    Procedure: COLONOSCOPY, WITH CO2 INSUFFLATION;  Surgeon: Filiberto Banuelos MD;  Location: [facility]    COMBINED ESOPHAGOSCOPY, GASTROSCOPY, DUODENOSCOPY (EGD) WITH CO2 INSUFFLATION N/A 9/20/2019    Procedure: ESOPHAGOGASTRODUODENOSCOPY, WITH CO2 INSUFFLATION;  Surgeon: Filiberto Banuelos MD;  Location: [facility]    COMBINED ESOPHAGOSCOPY, GASTROSCOPY, DUODENOSCOPY (EGD) WITH CO2 INSUFFLATION N/A 7/27/2022    Procedure: ESOPHAGOGASTRODUODENOSCOPY, WITH CO2 INSUFFLATION;  Surgeon: Filiberto Banuelos MD;  Location: [facility]    ESOPHAGOSCOPY, GASTROSCOPY, DUODENOSCOPY (EGD), COMBINED N/A 9/20/2019    Procedure: Esophagogastroduodenoscopy, With Biopsy;  Surgeon: Filiberto Banuelos MD;  Location: [facility]    ESOPHAGOSCOPY, GASTROSCOPY, DUODENOSCOPY (EGD), COMBINED N/A 7/27/2022    Procedure: ESOPHAGOGASTRODUODENOSCOPY, WITH BIOPSY;  Surgeon: Filiberto Banuelos MD;  Location: [facility]    REPAIR PTOSIS BILATERAL Bilateral 10/3/2018    Procedure: REPAIR PTOSIS BILATERAL;;  Surgeon: Dany Berrios MD;  Location: [facility]    REPAIR PTOSIS BROW BILATERAL Bilateral 10/3/2018    Procedure: REPAIR PTOSIS BROW BILATERAL;;  Surgeon: Dany Berrios MD;  Location: [facility]    SIGMOIDOSCOPY FLEXIBLE N/A 9/20/2019    Procedure: SIGMOIDOSCOPY, FLEXIBLE;  Surgeon: Filiberto Banuelos MD;  Location: [facility]    TONSILLECTOMY  1980s       ALLERGIES:   No Known Allergies    FAMILY HISTORY:  Family History   Problem Relation Age of Onset    Hypertension Mother     Alzheimer Disease Mother     Diabetes Brother     Glaucoma No family hx of     Macular Degeneration No family hx of     Coronary Artery Disease No family hx of     Colon Cancer No family hx of     Retinal detachment No family hx of          SOCIAL HISTORY:  Social History     Tobacco Use    Smoking status: Never      Passive exposure: Past    Smokeless tobacco: Never   Vaping Use    Vaping status: Never Used   Substance Use Topics    Alcohol use: Yes     Alcohol/week: 14.0 - 21.0 standard drinks of alcohol     Types: 14 - 21 Standard drinks or equivalent per week     Comment: varies, a couple drinks a day    Drug use: No       ROS:   Constitutional: No fever, chills, or sweats. Weight stable.   Cardiovascular: As per HPI.     Exam:  /76 (BP Location: Left arm, Patient Position: Sitting, Cuff Size: Adult Regular)   Pulse 54   Wt 86.1 kg (189 lb 12.8 oz)   SpO2 98%   BMI 28.86 kg/m    GENERAL APPEARANCE: alert and no distress  HEENT: no icterus, no central cyanosis  LYMPH/NECK: no adenopathy, no asymmetry, JVP not elevated  CARDIOVASCULAR: regular rhythm, normal S1, S2, no S3 or S4 and no murmur, click or rub, precordium quiet with normal PMI.  NEURO: alert, normal speech,and affect  SKIN: no ecchymoses, no rashes     I have reviewed the labs and personally reviewed the imaging below and made my comment in the assessment and plan.    Labs:  CBC RESULTS:   Lab Results   Component Value Date    WBC 10.9 04/11/2024    WBC 10.2 04/08/2021    RBC 4.97 04/11/2024    RBC 4.62 04/08/2021    HGB 15.8 04/11/2024    HGB 14.6 04/08/2021    HCT 46.7 04/11/2024    HCT 42.9 04/08/2021    MCV 94 04/11/2024    MCV 93 04/08/2021    MCH 31.8 04/11/2024    MCH 31.6 04/08/2021    MCHC 33.8 04/11/2024    MCHC 34.0 04/08/2021    RDW 12.6 04/11/2024    RDW 13.9 04/08/2021     04/11/2024     04/08/2021       BMP RESULTS:  Lab Results   Component Value Date     04/11/2024     04/08/2021    POTASSIUM 4.9 04/11/2024    POTASSIUM 3.8 11/04/2021    POTASSIUM 4.2 04/08/2021    CHLORIDE 104 04/11/2024    CHLORIDE 105 11/04/2021    CHLORIDE 104 04/08/2021    CO2 21 (L) 04/11/2024    CO2 22 11/04/2021    CO2 23 04/08/2021    ANIONGAP 13 04/11/2024    ANIONGAP 10 11/04/2021    ANIONGAP 7 04/08/2021    GLC 86 04/11/2024    GLC 96  11/04/2021    GLC 85 04/08/2021    BUN 24.5 (H) 04/11/2024    BUN 22 11/04/2021    BUN 21 04/08/2021    CR 1.37 (H) 04/11/2024    CR 1.12 04/08/2021    GFRESTIMATED 57 (L) 04/11/2024    GFRESTIMATED 70 04/08/2021    GFRESTBLACK 81 04/08/2021    GAYLA 9.6 04/11/2024    GAYLA 9.2 04/08/2021     Cardiac mobile telemetry for a month (10/4/2023) showed multiple atrial fibrillation episodes with RVR.  Lasting up to 3 hours.    CT CORONARY CALCIUM 10/04/2023  1.  No coronary calcifications.  2.  The total Agatston calcium score is 0 placing the patient in the  lowest percentile when compared to age and gender matched control  group.    TTE 10/17/2023  Global and regional left ventricular function is normal with an EF of 55-60%.  Global right ventricular function is normal.  Both atria appear normal.  No significant valvular abnormalities present.  IVC diameter <2.1 cm collapsing >50% with sniff suggests a normal RA pressure  of 3 mmHg.  No pericardial effusion is present.  There is no prior study for direct comparison.  Exercise stress echocardiogram 11/18/2020  Normal, low-risk exercise echocardiogram without evidence of ischemia at a  diagnostic workload (92% MPHR.)     The target heart rate was achieved. Normal heart rate and BP response to  exercise.  Normal biventricular size, thickness, and global systolic function at  baseline, LVEF=60-65%.  With exercise, LVEF increased to >70% and LV cavity size decreased  appropriately.  No regional wall motion abnormalities are present at rest or with exercise.  No angina was elicited.  No ECG evidence of ischemia. Occasional PVCs with exercise.  Functional capacity is normal for age.  No significant valvular abnormalities are noted on screening Doppler exam.  The aortic root and visualized ascending aorta are normal.    EKG 4/11/2022 shows sinus rhythm otherwise unremarkable.          Assessment and Plan:   Returns for follow-up.  Sinus rhythm in clinic today.  Wants to discuss  medications that he is on.  Reports feeling tired.  A-fib well-controlled with flecainide.    #Paroxysmal atrial fibrillation  -A-fib well-controlled with flecainide 100 mg twice daily.  -Had side effects from metoprolol.  Major side effect is tiredness.  Did not do well on 100 mg daily.  Now on 50 mg daily.  Energy level has improved.  -I have discussed with the patient that we can further reduce the dose of metoprolol to 25 mg daily as he has sinus bradycardia.  He is willing to try.  -Recommend to take extra metoprolol 25 mg if he runs into atrial fibrillation if his heart rate is over 120 bpm at rest.  So this will be as needed.  -Follow-up with EP in 2 months which he is already scheduled to discuss catheter ablation.  -He continues to drink alcohol 1-2 drinks per day.  Does not smoke.  -[id number] score is 1 from age.  Discussed pros and cons of oral anticoagulation.  Patient does not want to be on oral anticoagulation.  -Schedule stress EKG to monitor flecainide toxicity    #Hyperlipidemia  -On Crestor 20 mg.  -CT calcium screening showed 0 calcium.    #CKD stage II  -Stable.  Likely due to chronic ibuprofen use.  He is back on ibuprofen.  He knows risks.     Follow-up with me 8 months.    Total time spent today for this visit is 40 minutes including precharting, face-to-face clinic visit, review of labs/imaging and medical documentation.    Janice GRANT MD  [facility] Division of Cardiology  Pager [phone]

## 2024-06-26 NOTE — LETTER
6/26/2024      RE: Adan Oliver  [address]  [place] [address]       Dear Colleague,    Thank you for the opportunity to participate in the care of your patient, Adan Oliver, at the [facility]. Please see a copy of my visit note below.       [facility]      Referring provider:Johnathon Diaz MD    HPI: Mr. Adan Oliver is a 62 year old  male with PMH significant for    -Obstructive sleep apnea  -Alcohol use  -Reid's esophagus    Patient reports feeling palpitations over the last 1-1/2-month.  So far it has happened only at night.  He has woken up 6 or 7 times over the last 1-1/2 months between 3 AM to 7 AM with chest pressure and palpitations.  He was actually able to capture the heart rate on blood pressure machine which showed 150 bpm (showed me pictures of that from his cell phone).  No EKG documentation so far.  The heart pounding sensation lasted for about 30 minutes.  Over the last 2 weeks he has not had any of these.  He tells me he was under extreme stress when he was having frequent nightly palpitations. Denies chest pain.  No shortness of breath with activity.  No dizziness, syncope or lower extremity edema.    No prior history of cardiac disease.    Lifetime non-smoker.  No hypertension or diabetes.  Family history is unremarkable.  He has hyperlipidemia with elevated LDL at 178 mg/deciliter.  He has been on Crestor 10 mg over the last 3 weeks. Patient tells me that he has been on low-carb keto diet to lose weight.    Patient had exercise his echocardiogram in 11/2020 which showed normal biventricular function with no valve disease.  Stress test was normal.  I have reviewed patient's EKG from 4/11/2022 which shows sinus rhythm otherwise unremarkable.  Labs from 2021 showed normal CBC and BMP.  Medications, personal, family, and social history reviewed with patient  and revised.    Interval history 9/26/2023:  Patient is being seen today for multiple concerns that he has over the last few months.  He tells me that he feels his heart beating fast several times a day.  He has shown me his wrist monitor results where his heart rate goes up to 158 bpm at the time of palpitations.  Can last up to 10 to 15 minutes.  When his heart is racing fast he feels discomfort in his chest.  No syncope.  Reports some shortness of breath with exertion.  Can feel discomfort (describes as feels weak in the chest and fuzzy feeling) in his chest with or without exertion.  Over the last few years he has been taking ibuprofen 200 mg daily for back pain.  He does not use CPAP.  He drinks alcohol at least 2 cocktails every night.  He has been drinking alcohol all his life.  He tells me he has a lot of stress in his life.  He has trouble sleeping.  He is recommended CBD lozenges.  No history of tobacco use.    Interval history 10/24/2023:  Patient returns for follow-up.  Patient's heart monitor showed paroxysmal atrial fibrillation corresponding to his symptoms.  This is a new diagnosis for the patient.  When we have received A-fib information I have started patient on metoprolol initially 50 then increased to 100 mg.  Today he tells me that he still has palpitation episodes mostly at night sometimes interfering with his sleep.  Not as fast as he used to be.  Continues to drink couple of alcoholic drinks almost every day though he tells me that he has cut back on this.  He is not sure if he would benefit from sleep testing.  He tells me that [place] of this year.  He is wondering if he needs to take flecainide.  Echocardiogram is unremarkable.  CT calcium score is 0.    Interval history 6/26/2024:  Patient is being seen today for follow-up.  As you know he has history of paroxysmal atrial fibrillation now well-controlled with flecainide 100 mg twice daily.  Patient did  not feel well with metoprolol 100 mg.  Seen by my colleague 2 months ago and metoprolol dose was reduced to 50 mg daily.  Since then his energy levels went up.  Feeling better but he tells me he is not back to baseline.  Otherwise he feels dizziness occasionally which does affect his quality of life.  Denies palpitations, chest pain.  He had a lot of complaints today about how hard it is to get into cardiology appointment.    PAST MEDICAL HISTORY:  Past Medical History:   Diagnosis Date     Anxiety 12/07/2015     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 It was difficult to discern between PLMS and hyponeas. Apnea/hypopnea index was 28.0 events per hour.  The REM AHI was 29.3 events per hour.  The supine AHI was 32.7 events per hour.  RDI was 28.0 events per hour. Lowest oxygen saturation was 84.0%.  Time spent less than or equal to 88% was 0.3 minutes. PLM index was 32.3 movements per hour       CURRENT MEDICATIONS:  Current Outpatient Medications   Medication Sig Dispense Refill     acetaminophen (TYLENOL) 325 MG tablet Take 325-650 mg by mouth every 6 hours as needed for mild pain (Patient not taking: Reported on 6/18/2024)       esomeprazole (NEXIUM) 40 MG DR capsule TAKE ONE CAPSULE BY MOUTH EVERY MORNING 30-60 MINUTES BEFORE BREAKFAST 90 capsule 3     flecainide (TAMBOCOR) 100 MG tablet Take 1 tablet (100 mg) by mouth 2 times daily 180 tablet 3     metoprolol succinate ER (TOPROL XL) 50 MG 24 hr tablet Take 50 mg by mouth daily       rosuvastatin (CRESTOR) 20 MG tablet Take 1 tablet (20 mg) by mouth daily 90 tablet 3     tamsulosin (FLOMAX) 0.4 MG capsule Take 1 capsule (0.4 mg) by mouth at bedtime 30  capsule 1       PAST SURGICAL HISTORY:  Past Surgical History:   Procedure Laterality Date     APPENDECTOMY  2000s     BLEPHAROPLASTY BILATERAL Bilateral 10/3/2018    Procedure: BLEPHAROPLASTY BILATERAL;;  Surgeon: Dany Berrios MD;  Location: [facility] OR     CARPAL TUNNEL RELEASE RT/LT  1980s     COLONOSCOPY N/A 7/27/2022    Procedure: COLONOSCOPY, FLEXIBLE, WITH LESION REMOVAL USING SNARE;  Surgeon: Filiberto Banuelos MD;  Location: [facility] OR     COLONOSCOPY WITH CO2 INSUFFLATION N/A 7/27/2022    Procedure: COLONOSCOPY, WITH CO2 INSUFFLATION;  Surgeon: Filiberto Banuelos MD;  Location: [facility] OR     COMBINED ESOPHAGOSCOPY, GASTROSCOPY, DUODENOSCOPY (EGD) WITH CO2 INSUFFLATION N/A 9/20/2019    Procedure: ESOPHAGOGASTRODUODENOSCOPY, WITH CO2 INSUFFLATION;  Surgeon: Filiberto Banuelos MD;  Location: [facility]     COMBINED ESOPHAGOSCOPY, GASTROSCOPY, DUODENOSCOPY (EGD) WITH CO2 INSUFFLATION N/A 7/27/2022    Procedure: ESOPHAGOGASTRODUODENOSCOPY, WITH CO2 INSUFFLATION;  Surgeon: Filiberto Banuelos MD;  Location: [facility]     ESOPHAGOSCOPY, GASTROSCOPY, DUODENOSCOPY (EGD), COMBINED N/A 9/20/2019    Procedure: Esophagogastroduodenoscopy, With Biopsy;  Surgeon: Filiberto Banuelos MD;  Location: [facility]     ESOPHAGOSCOPY, GASTROSCOPY, DUODENOSCOPY (EGD), COMBINED N/A 7/27/2022    Procedure: ESOPHAGOGASTRODUODENOSCOPY, WITH BIOPSY;  Surgeon: Filiberto Banuelos MD;  Location: [facility]     REPAIR PTOSIS BILATERAL Bilateral 10/3/2018    Procedure: REPAIR PTOSIS BILATERAL;;  Surgeon: Dany Berrios MD;  [facility]: [facility]     REPAIR PTOSIS BROW BILATERAL Bilateral 10/3/2018    Procedure: REPAIR PTOSIS BROW BILATERAL;;  Surgeon: Dany Berrios MD;  [facility]: [facility]     SIGMOIDOSCOPY FLEXIBLE N/A 9/20/2019    Procedure: SIGMOIDOSCOPY, FLEXIBLE;  Surgeon: Filiberto Banuelos MD;  Location: [facility]     TONSILLECTOMY  1980s       ALLERGIES:   No Known Allergies    FAMILY HISTORY:  Family  History   Problem Relation Age of Onset     Hypertension Mother      Alzheimer Disease Mother      Diabetes Brother      Glaucoma No family hx of      Macular Degeneration No family hx of      Coronary Artery Disease No family hx of      Colon Cancer No family hx of      Retinal detachment No family hx of          SOCIAL HISTORY:  Social History     Tobacco Use     Smoking status: Never     Passive exposure: Past     Smokeless tobacco: Never   Vaping Use     Vaping status: Never Used   Substance Use Topics     Alcohol use: Yes     Alcohol/week: 14.0 - 21.0 standard drinks of alcohol     Types: 14 - 21 Standard drinks or equivalent per week     Comment: varies, a couple drinks a day     Drug use: No       ROS:   Constitutional: No fever, chills, or sweats. Weight stable.   Cardiovascular: As per HPI.     Exam:  /76 (BP Location: Left arm, Patient Position: Sitting, Cuff Size: Adult Regular)   Pulse 54   Wt 86.1 kg (189 lb 12.8 oz)   SpO2 98%   BMI 28.86 kg/m    GENERAL APPEARANCE: alert and no distress  HEENT: no icterus, no central cyanosis  LYMPH/NECK: no adenopathy, no asymmetry, JVP not elevated  CARDIOVASCULAR: regular rhythm, normal S1, S2, no S3 or S4 and no murmur, click or rub, precordium quiet with normal PMI.  NEURO: alert, normal speech,and affect  SKIN: no ecchymoses, no rashes     I have reviewed the labs and personally reviewed the imaging below and made my comment in the assessment and plan.    Labs:  CBC RESULTS:   Lab Results   Component Value Date    WBC 10.9 04/11/2024    WBC 10.2 04/08/2021    RBC 4.97 04/11/2024    RBC 4.62 04/08/2021    HGB 15.8 04/11/2024    HGB 14.6 04/08/2021    HCT 46.7 04/11/2024    HCT 42.9 04/08/2021    MCV 94 04/11/2024    MCV 93 04/08/2021    MCH 31.8 04/11/2024    MCH 31.6 04/08/2021    MCHC 33.8 04/11/2024    MCHC 34.0 04/08/2021    RDW 12.6 04/11/2024    RDW 13.9 04/08/2021     04/11/2024     04/08/2021       BMP RESULTS:  Lab Results    Component Value Date     04/11/2024     04/08/2021    POTASSIUM 4.9 04/11/2024    POTASSIUM 3.8 11/04/2021    POTASSIUM 4.2 04/08/2021    CHLORIDE 104 04/11/2024    CHLORIDE 105 11/04/2021    CHLORIDE 104 04/08/2021    CO2 21 (L) 04/11/2024    CO2 22 11/04/2021    CO2 23 04/08/2021    ANIONGAP 13 04/11/2024    ANIONGAP 10 11/04/2021    ANIONGAP 7 04/08/2021    GLC 86 04/11/2024    GLC 96 11/04/2021    GLC 85 04/08/2021    BUN 24.5 (H) 04/11/2024    BUN 22 11/04/2021    BUN 21 04/08/2021    CR 1.37 (H) 04/11/2024    CR 1.12 04/08/2021    GFRESTIMATED 57 (L) 04/11/2024    GFRESTIMATED 70 04/08/2021    GFRESTBLACK 81 04/08/2021    GAYLA 9.6 04/11/2024    GAYLA 9.2 04/08/2021     Cardiac mobile telemetry for a month (10/4/2023) showed multiple atrial fibrillation episodes with RVR.  Lasting up to 3 hours.    CT CORONARY CALCIUM 10/04/2023  1.  No coronary calcifications.  2.  The total Agatston calcium score is 0 placing the patient in the  lowest percentile when compared to age and gender matched control  group.    TTE 10/17/2023  Global and regional left ventricular function is normal with an EF of 55-60%.  Global right ventricular function is normal.  Both atria appear normal.  No significant valvular abnormalities present.  IVC diameter <2.1 cm collapsing >50% with sniff suggests a normal RA pressure  of 3 mmHg.  No pericardial effusion is present.  There is no prior study for direct comparison.  Exercise stress echocardiogram 11/18/2020  Normal, low-risk exercise echocardiogram without evidence of ischemia at a  diagnostic workload (92% MPHR.)     The target heart rate was achieved. Normal heart rate and BP response to  exercise.  Normal biventricular size, thickness, and global systolic function at  baseline, LVEF=60-65%.  With exercise, LVEF increased to >70% and LV cavity size decreased  appropriately.  No regional wall motion abnormalities are present at rest or with exercise.  No angina was  elicited.  No ECG evidence of ischemia. Occasional PVCs with exercise.  Functional capacity is normal for age.  No significant valvular abnormalities are noted on screening Doppler exam.  The aortic root and visualized ascending aorta are normal.    EKG 4/11/2022 shows sinus rhythm otherwise unremarkable.          Assessment and Plan:   Returns for follow-up.  Sinus rhythm in clinic today.  Wants to discuss medications that he is on.  Reports feeling tired.  A-fib well-controlled with flecainide.    #Paroxysmal atrial fibrillation  -A-fib well-controlled with flecainide 100 mg twice daily.  -Had side effects from metoprolol.  Major side effect is tiredness.  Did not do well on 100 mg daily.  Now on 50 mg daily.  Energy level has improved.  -I have discussed with the patient that we can further reduce the dose of metoprolol to 25 mg daily as he has sinus bradycardia.  He is willing to try.  -Recommend to take extra metoprolol 25 mg if he runs into atrial fibrillation if his heart rate is over 120 bpm at rest.  So this will be as needed.  -Follow-up with EP in 2 months which he is already scheduled to discuss catheter ablation.  -He continues to drink alcohol 1-2 drinks per day.  Does not smoke.  -[id number] score is 1 from age.  Discussed pros and cons of oral anticoagulation.  Patient does not want to be on oral anticoagulation.  -Schedule stress EKG to monitor flecainide toxicity    #Hyperlipidemia  -On Crestor 20 mg.  -CT calcium screening showed 0 calcium.    #CKD stage II  -Stable.  Likely due to chronic ibuprofen use.  He is back on ibuprofen.  He knows risks.     Follow-up with me 8 months.    Total time spent today for this visit is 40 minutes including precharting, face-to-face clinic visit, review of labs/imaging and medical documentation.    Janice GRANT MD  [facility] Division of Cardiology  Pager [phone]      Please do not hesitate to contact me if you have any questions/concerns.      Sincerely,     Janice Crawley MD

## 2024-07-01 ENCOUNTER — ANCILLARY PROCEDURE (OUTPATIENT)
Dept: CT IMAGING | Facility: CLINIC | Age: 64
End: 2024-07-01
Attending: UROLOGY
Payer: COMMERCIAL

## 2024-07-01 DIAGNOSIS — Z87.440 PERSONAL HISTORY OF URINARY TRACT INFECTION: ICD-10-CM

## 2024-07-01 DIAGNOSIS — R31.29 MICROSCOPIC HEMATURIA: ICD-10-CM

## 2024-07-01 PROCEDURE — 74178 CT ABD&PLV WO CNTR FLWD CNTR: CPT | Mod: TC | Performed by: RADIOLOGY

## 2024-07-01 RX ORDER — IOPAMIDOL 755 MG/ML
100 INJECTION, SOLUTION INTRAVASCULAR ONCE
Status: COMPLETED | OUTPATIENT
Start: 2024-07-01 | End: 2024-07-01

## 2024-07-01 RX ADMIN — IOPAMIDOL 100 ML: 755 INJECTION, SOLUTION INTRAVASCULAR at 13:07

## 2024-07-05 ENCOUNTER — PATIENT OUTREACH (OUTPATIENT)
Dept: GERIATRIC MEDICINE | Facility: CLINIC | Age: 64
End: 2024-07-05
Payer: COMMERCIAL

## 2024-07-05 NOTE — PROGRESS NOTES
[facility] Care Coordination Contact    Member changed health plan products from [facility] MSC+ to are MSHO effective 7/1/24. CC to complete items on Product Change/ Health Plan Change check list including MMIS entry. CMS mailed Welcome Letter, supporting documents, [place] and other required tasks.    Mela Blandon  Case Management Specialist   [facility]  [phone]

## 2024-07-05 NOTE — LETTER
July 5, 2024    ANA MARIA BURRELL  [address]  [place] [address]      Dear Ana Maria,    Welcome to [facility] health program. My name is Abby Ham RN. I am your [facility] care coordinator. You're eligible for care coordination through your [facility] Product.    As your care coordinator, we ll:  Meet to go over your care coordination benefits  Talk about your physical and mental health care needs   Review your preventative care needs  Create a plan that meets your needs with the services you choose    What happens next?  I ll call you soon to introduce myself and tell you more about my role. We ll then plan time to go over your health and safety needs. Our goal is to keep you as healthy and independent as possible.    [facility] combines the benefits you may already have receive from Medical Assistance, Medicare and the Prescription Drug Coverage Program. Soon you'll receive a new member identification (ID) card from [facility]. Use this card whenever you get health services.    The [facility] care coordination program is voluntary and offered to you at no cost. If you wish to stop being in the care coordination program or have questions, call me at [phone]. If you reach my voicemail, leave a message and your phone number. TTY users, call the [place] Relay at 176 or [phone] (yawtnp-sq-nofddl relay service).    Sincerely,    Abby Ham RN    E-mail: [email or web address]  Phone: [phone]    Care Manager  [facility]) is a health plan that contracts with both Medicare and the [place] Medical Assistance (Medicaid) program to provide benefits of both programs to enrollees. Enrollment in [facility] depends on contract renewal.    [id number] accepted   [id number]         [id number] (07/2022)

## 2024-07-08 ENCOUNTER — PATIENT OUTREACH (OUTPATIENT)
Dept: GERIATRIC MEDICINE | Facility: CLINIC | Age: 64
End: 2024-07-08
Payer: COMMERCIAL

## 2024-07-08 NOTE — PROGRESS NOTES
[facility] Health Plan or Product Change    CC received notification that member's health plan or health plan product changed from [employer] MSC+ to [employer] MSHO effective 7/1/24.  CC contacted member and discussed change and face-to-face visit was offered. Member declined need for home visit. CC reviewed previous Health Risk Assessment/LTCC and POC with member and no changes noted.    Called member and introduced self as member s new CC. Confirmed with member that the welcome letter with writer's name and contact information has been received.  Reviewed LTCC/Health Risk Assessment (HRA) and POC with member. No changes noted.  Transitional HRA completed. Care Plan Summary updated and reflects current services.  Required referral authorization information communicated to CMS: No  Writer reviewed the following with member:  ER visits: No  Hospitalizations: No  TCU stays: No  Significant health status changes: no  Falls/Injuries: No  ADL/IADL changes: No  Changes in services: No    Follow-Up Plan: Member informed of future contact, plan to f/u with member with at next regularly scheduled contact.  Contact information shared with member and family, encouraged member to call with any questions or concerns.  Imelda Ham RN PHN  [facility] Care Coordinator  [phone]

## 2024-07-09 ENCOUNTER — OFFICE VISIT (OUTPATIENT)
Dept: UROLOGY | Facility: CLINIC | Age: 64
End: 2024-07-09
Payer: COMMERCIAL

## 2024-07-09 VITALS
DIASTOLIC BLOOD PRESSURE: 78 MMHG | OXYGEN SATURATION: 100 % | BODY MASS INDEX: 29.89 KG/M2 | WEIGHT: 196.6 LBS | SYSTOLIC BLOOD PRESSURE: 122 MMHG | HEART RATE: 74 BPM

## 2024-07-09 DIAGNOSIS — R31.29 MICROSCOPIC HEMATURIA: Primary | ICD-10-CM

## 2024-07-09 PROCEDURE — 52000 CYSTOURETHROSCOPY: CPT | Performed by: UROLOGY

## 2024-07-09 NOTE — PROGRESS NOTES
S: Adan Oliver is a 64 year old male returns for hematuria.    Patient is draped and prepped.  Flexible cystoscopy placed under direct vision.      The anterior urethra is normal   The prostatic urethra showed bilateral lobe enlargement.     The length is 2cm,  the coaptation is 2 cm.  With high median ridge   In the bladder there is trabeculation grade 1-2.    Assessment/Plan:  (R31.38) Microscopic hematuria  (primary encounter diagnosis)  Comment: CT neg  Plan: CYSTOURETHROSCOPY (63313)          Neg cysto.  Prn.

## 2024-07-16 ENCOUNTER — TELEPHONE (OUTPATIENT)
Dept: CARDIOLOGY | Facility: CLINIC | Age: 64
End: 2024-07-16
Payer: COMMERCIAL

## 2024-07-16 ENCOUNTER — PATIENT OUTREACH (OUTPATIENT)
Dept: GERIATRIC MEDICINE | Facility: CLINIC | Age: 64
End: 2024-07-16
Payer: COMMERCIAL

## 2024-07-16 NOTE — TELEPHONE ENCOUNTER
Called and left a message for patient to call back to schedulers to schedule stress test.    FELICIANO Sorto

## 2024-07-16 NOTE — PROGRESS NOTES
CC received a call from Adan stating he got a bill from [employer] for over $900 for procedures done at [facility] 7/1/24.  He doesn't have [employer] anymore since someone switched him to this [facility] program.  Adan came onto the [facility] program in 3/24.  CC asked if he was sure it was a bill. He looked again and it doesn't look like a bill. There is no remittance payment date. It just sayd he owes his provider this amount for services done 7/1/24.  It looks like [facility] sent the bill to the wrong insurance. If he gets an actual bill he needs to let CC know.  Imelda Ham RN PHN  [facility] Care Coordinator  [phone]

## 2024-07-16 NOTE — TELEPHONE ENCOUNTER
----- Message -----  From: Yaz Garrison RN  Sent: 7/12/2024   2:00 PM CDT  To: [facility]    Patient was recommended to have an exercise stress test. Can we offer to schedule this for him?

## 2024-07-23 ENCOUNTER — PATIENT OUTREACH (OUTPATIENT)
Dept: GERIATRIC MEDICINE | Facility: CLINIC | Age: 64
End: 2024-07-23
Payer: COMMERCIAL

## 2024-07-23 NOTE — PROGRESS NOTES
CC received a text message from Adan asking if CC had any resources for him to make a Will.  CC sent him 3 resources:  [employer] has a sliding scale fee  [phone]  Volunteer  [facility]  [phone]     [place] [phone]  Imelda Ham RN PHN  [facility] Coordinator  [phone]

## 2024-07-24 NOTE — TELEPHONE ENCOUNTER
[employer] message sent to patient offering to help schedule exercise stress test.    SOTERO Castillo

## 2024-08-12 ENCOUNTER — TELEPHONE (OUTPATIENT)
Dept: GASTROENTEROLOGY | Facility: CLINIC | Age: 64
End: 2024-08-12
Payer: COMMERCIAL

## 2024-08-12 NOTE — TELEPHONE ENCOUNTER
[facility] Call Center    Phone Message    May a detailed message be left on voicemail: yes     Reason for Call: Other: Patient called to verify when his EGD and colonoscopy are due.  He received a [employer] message stating they are due October 2024; however, snapshot states colonoscopy is due July 2025.  Please follow up with patient - via [employer] -for due dates.     Action Taken: Message routed to:  [facility]    Travel Screening: Not Applicable

## 2024-08-12 NOTE — TELEPHONE ENCOUNTER
Per 7/27/22 surgical pathology note:      Filiberto Banuelos MD  7/29/2022  2:37 PM CDT Back to Top      Reid esophagus with no dysplasia noted.  Would recommend next upper endoscopy for surveillance in 3 years.     Colon polyps are adenomatous.  Recommend next colonoscopy in 3 years as well at same time as upper endoscopy.     Filiberto Banuelos MD LPN sent patient a [email or web address] message with clarification.     Jeannette Osman LPN

## 2024-08-16 ENCOUNTER — LAB (OUTPATIENT)
Dept: LAB | Facility: CLINIC | Age: 64
End: 2024-08-16
Payer: COMMERCIAL

## 2024-08-16 ENCOUNTER — NURSE TRIAGE (OUTPATIENT)
Dept: FAMILY MEDICINE | Facility: CLINIC | Age: 64
End: 2024-08-16

## 2024-08-16 ENCOUNTER — VIRTUAL VISIT (OUTPATIENT)
Dept: FAMILY MEDICINE | Facility: CLINIC | Age: 64
End: 2024-08-16
Payer: COMMERCIAL

## 2024-08-16 DIAGNOSIS — N18.31 STAGE 3A CHRONIC KIDNEY DISEASE (H): ICD-10-CM

## 2024-08-16 DIAGNOSIS — M79.10 MUSCLE ACHE: Primary | ICD-10-CM

## 2024-08-16 DIAGNOSIS — R30.0 DYSURIA: ICD-10-CM

## 2024-08-16 DIAGNOSIS — M79.10 MUSCLE ACHE: ICD-10-CM

## 2024-08-16 DIAGNOSIS — I48.0 PAROXYSMAL ATRIAL FIBRILLATION (H): ICD-10-CM

## 2024-08-16 DIAGNOSIS — R35.0 BENIGN PROSTATIC HYPERPLASIA WITH URINARY FREQUENCY: ICD-10-CM

## 2024-08-16 DIAGNOSIS — Z78.9 ALCOHOL USE: ICD-10-CM

## 2024-08-16 DIAGNOSIS — N40.1 BENIGN PROSTATIC HYPERPLASIA WITH URINARY FREQUENCY: ICD-10-CM

## 2024-08-16 LAB
ALBUMIN UR-MCNC: NEGATIVE MG/DL
APPEARANCE UR: CLEAR
BACTERIA #/AREA URNS HPF: ABNORMAL /HPF
BASOPHILS # BLD AUTO: 0.1 10E3/UL (ref 0–0.2)
BASOPHILS NFR BLD AUTO: 0 %
BILIRUB UR QL STRIP: NEGATIVE
COLOR UR AUTO: YELLOW
EOSINOPHIL # BLD AUTO: 0.5 10E3/UL (ref 0–0.7)
EOSINOPHIL NFR BLD AUTO: 3 %
ERYTHROCYTE [DISTWIDTH] IN BLOOD BY AUTOMATED COUNT: 12.6 % (ref 10–15)
GLUCOSE UR STRIP-MCNC: NEGATIVE MG/DL
HCT VFR BLD AUTO: 45.4 % (ref 40–53)
HGB BLD-MCNC: 15 G/DL (ref 13.3–17.7)
HGB UR QL STRIP: ABNORMAL
IMM GRANULOCYTES # BLD: 0 10E3/UL
IMM GRANULOCYTES NFR BLD: 0 %
KETONES UR STRIP-MCNC: NEGATIVE MG/DL
LEUKOCYTE ESTERASE UR QL STRIP: ABNORMAL
LYMPHOCYTES # BLD AUTO: 3.5 10E3/UL (ref 0.8–5.3)
LYMPHOCYTES NFR BLD AUTO: 21 %
MCH RBC QN AUTO: 31.6 PG (ref 26.5–33)
MCHC RBC AUTO-ENTMCNC: 33 G/DL (ref 31.5–36.5)
MCV RBC AUTO: 96 FL (ref 78–100)
MONOCYTES # BLD AUTO: 1.9 10E3/UL (ref 0–1.3)
MONOCYTES NFR BLD AUTO: 11 %
NEUTROPHILS # BLD AUTO: 10.9 10E3/UL (ref 1.6–8.3)
NEUTROPHILS NFR BLD AUTO: 65 %
NITRATE UR QL: NEGATIVE
PH UR STRIP: 6 [PH] (ref 5–7)
PLATELET # BLD AUTO: 379 10E3/UL (ref 150–450)
RBC # BLD AUTO: 4.75 10E6/UL (ref 4.4–5.9)
RBC #/AREA URNS AUTO: ABNORMAL /HPF
SP GR UR STRIP: <=1.005 (ref 1–1.03)
SQUAMOUS #/AREA URNS AUTO: ABNORMAL /LPF
UROBILINOGEN UR STRIP-ACNC: 0.2 E.U./DL
WBC # BLD AUTO: 16.8 10E3/UL (ref 4–11)
WBC #/AREA URNS AUTO: ABNORMAL /HPF

## 2024-08-16 PROCEDURE — 81001 URINALYSIS AUTO W/SCOPE: CPT

## 2024-08-16 PROCEDURE — 36415 COLL VENOUS BLD VENIPUNCTURE: CPT

## 2024-08-16 PROCEDURE — 87186 SC STD MICRODIL/AGAR DIL: CPT

## 2024-08-16 PROCEDURE — 82570 ASSAY OF URINE CREATININE: CPT

## 2024-08-16 PROCEDURE — 99214 OFFICE O/P EST MOD 30 MIN: CPT | Mod: 95 | Performed by: PHYSICIAN ASSISTANT

## 2024-08-16 PROCEDURE — 85025 COMPLETE CBC W/AUTO DIFF WBC: CPT

## 2024-08-16 PROCEDURE — 80053 COMPREHEN METABOLIC PANEL: CPT

## 2024-08-16 PROCEDURE — 82043 UR ALBUMIN QUANTITATIVE: CPT

## 2024-08-16 PROCEDURE — 87086 URINE CULTURE/COLONY COUNT: CPT

## 2024-08-16 ASSESSMENT — PATIENT HEALTH QUESTIONNAIRE - PHQ9
10. IF YOU CHECKED OFF ANY PROBLEMS, HOW DIFFICULT HAVE THESE PROBLEMS MADE IT FOR YOU TO DO YOUR WORK, TAKE CARE OF THINGS AT HOME, OR GET ALONG WITH OTHER PEOPLE: SOMEWHAT DIFFICULT
SUM OF ALL RESPONSES TO PHQ QUESTIONS 1-9: 7
SUM OF ALL RESPONSES TO PHQ QUESTIONS 1-9: 7

## 2024-08-16 NOTE — PROGRESS NOTES
"Adan is a 64 year old who is being evaluated via a billable video visit.    How would you like to obtain your AVS? MyChart  If the video visit is dropped, the invitation should be resent by: Text to cell phone: [phone]  Will anyone else be joining your video visit? No      Assessment & Plan     Muscle ache  UTI vs viral vs kidney damage.  Encouraged him to test for covid and then do labs.  If worsening over the weekend will need to be seen in UC or ER.  Pt aware of this.    - Comprehensive metabolic panel (BMP + Alb, Alk Phos, ALT, AST, Total. Bili, TP); Future  - CBC with platelets and differential; Future    Dysuria  As above  - UA Macroscopic with reflex to Microscopic and Culture - Lab Collect; Future    Alcohol use  Doesn't seem to be contributing at this time however may have contributed to dehydration that could cause fatigue and body aches.    Follow up when labs are back     Paroxysmal atrial fibrillation (H)  He states doesn't feel like afib.  Again would be a reason to be seen this weekend if not improving or worsening     Benign prostatic hyperplasia with urinary frequency  Might be contributing to the urinary frequency.  Follow up as needed when labs are back     CKD:  Kidney function has been declined for some time.  Could be contributing if worsened.      BMI  Estimated body mass index is 29.89 kg/m  as calculated from the following:    Height as of 6/19/24: 1.727 m (5' 8\").    Weight as of 7/9/24: 89.2 kg (196 lb 9.6 oz).   Weight management plan: not addressed           Subjective   Adan is a 64 year old, presenting for the following health issues:  Dizziness        8/16/2024    12:40 PM   Additional Questions   Roomed by Elena   Accompanied by self     Video Start Time: 12:56 PM    History of Present Illness       Reason for visit:  DIZZY    He eats 2-3 servings of fruits and vegetables daily.He consumes 1 sweetened beverage(s) daily.He exercises with enough effort to increase his heart rate 9 " or less minutes per day.  He exercises with enough effort to increase his heart rate 3 or less days per week.   He is taking medications regularly.       Acute Illness  Acute illness concerns: dizziness and muscle pain  Onset/Duration: 4 days   Symptoms:  Fever: No  Chills/Sweats: No  chills   Headache (location?): YES  Sinus Pressure: YES  Conjunctivitis:  No  Ear Pain: no  Rhinorrhea: No  Congestion: YES  Sore Throat: No  Cough: no  Wheeze: No  Decreased Appetite: No  Nausea: No  Vomiting: No  Diarrhea: No  Dysuria/Freq.: YES  Dysuria or Hematuria: No  Fatigue/Achiness: YES  Sick/Strep Exposure: No  Therapies tried and outcome: Ibuprofen     Feels he had something similar 2 years ago.  At that time was covid negative.    Always has some sinus issues.    Feels like he was hit by a truck.  Not sleeping well.  Partly due to frequent urination and partly due to sinus pressure.  Sinus symptoms have not worsened recently.    Frequent urination.  Also some pain with urination.  Is constipated too-started about the same time the rest of the symptoms started     Was more active the day before he started feeling sick.  Did have a few drinks that night too but does not feel he is having withdrawals  from alcohol.  .    Dizziness -feels like it would if you stood up too fast.  No palpitations.  Confusion is more like worried about this and not sure what to do next.    No rashes.    No blood in urine  No concern for STD.                    Objective           Vitals:  No vitals were obtained today due to virtual visit.    Physical Exam   GENERAL: alert and no distress  EYES: Eyes grossly normal to inspection.  No discharge or erythema, or obvious scleral/conjunctival abnormalities.  RESP: No audible wheeze, cough, or visible cyanosis.    SKIN: Visible skin clear. No significant rash, abnormal pigmentation or lesions.  NEURO: Cranial nerves grossly intact.  Mentation and speech appropriate for age.  PSYCH: Appropriate affect,  tone, and pace of words          Video-Visit Details    Type of service:  Video Visit   Video End Time:1:13 PM  Originating Location (pt. Location): Home    Distant Location (provider location):  On-site  Platform used for Video Visit: Janna  Signed Electronically by: Dahlia Rodriguez PA-C

## 2024-08-16 NOTE — TELEPHONE ENCOUNTER
Patient calling    He feels under the weather. He has some body aches and a little lightheaded. He is not able to work today cause he feels under the weather, but able to do ADLs. Hx of a-fib.     RN had him take BP and HR:  HR 90  BP 94/66    He would like to talk to a provider today, so RN scheduled virtual visit for patient.    RN advised when to call 911 or seek emergency medical attention. Patient verbalized understanding    Reason for Disposition   MILD dizziness (e.g., walking normally) and has NOT been evaluated by physician for this (Exception: Dizziness caused by heat exposure, sudden standing, or poor fluid intake.)    Additional Information   Negative: SEVERE difficulty breathing (e.g., struggling for each breath, speaks in single words)   Negative: Shock suspected (e.g., cold/pale/clammy skin, too weak to stand, low BP, rapid pulse)   Negative: Difficult to awaken or acting confused (e.g., disoriented, slurred speech)   Negative: Fainted, and still feels dizzy afterwards   Negative: Overdose (accidental or intentional) of medications   Negative: New neurologic deficit that is present now: * Weakness of the face, arm, or leg on one side of the body * Numbness of the face, arm, or leg on one side of the body * Loss of speech or garbled speech   Negative: Heart beating < 50 beats per minute OR > 140 beats per minute   Negative: Sounds like a life-threatening emergency to the triager   Negative: Chest pain   Negative: Rectal bleeding, bloody stool, or tarry-black stool   Negative: Vomiting is main symptom   Negative: Diarrhea is main symptom   Negative: Headache is main symptom   Negative: Heat exhaustion suspected (i.e., dehydration from heat exposure)   Negative: Patient states that they are having an anxiety or panic attack   Negative: Dizziness from low blood sugar (i.e., < 60 mg/dl or 3.5 mmol/l)   Negative: SEVERE dizziness (e.g., unable to stand, requires support to walk, feels like passing out  "now)   Negative: SEVERE headache or neck pain   Negative: Spinning or tilting sensation (vertigo) present now and one or more stroke risk factors (i.e., hypertension, diabetes mellitus, prior stroke/TIA, heart attack, age over 60) (Exception: Prior physician evaluation for this AND no different/worse than usual.)   Negative: Neurologic deficit that was brief (now gone), ANY of the following:* Weakness of the face, arm, or leg on one side of the body* Numbness of the face, arm, or leg on one side of the body* Loss of speech or garbled speech   Negative: Loss of vision or double vision  (Exception: Similar to previous migraines.)   Negative: Extra heartbeats, irregular heart beating, or heart is beating very fast (i.e., 'palpitations')   Negative: Difficulty breathing   Negative: Drinking very little and dehydration suspected (e.g., no urine > 12 hours, very dry mouth, very lightheaded)   Negative: Follows bleeding (e.g., stomach, rectum, vagina)  (Exception: Became dizzy from sight of small amount blood.)   Negative: Patient sounds very sick or weak to the triager   Negative: Lightheadedness (dizziness) present now, after 2 hours of rest and fluids   Negative: Spinning or tilting sensation (vertigo) present now   Negative: Fever > 103 F (39.4 C)   Negative: Fever > 100.0 F (37.8 C) and has diabetes mellitus or a weak immune system (e.g., HIV positive, cancer chemotherapy, organ transplant, splenectomy, chronic steroids)   Negative: MODERATE dizziness (e.g., interferes with normal activities)  (Exception: Dizziness caused by heat exposure, sudden standing, or poor fluid intake.)   Negative: Vomiting occurs with dizziness   Negative: Patient wants to be seen   Negative: Taking a medicine that could cause dizziness (e.g., blood pressure medications, diuretics)    Answer Assessment - Initial Assessment Questions  1. DESCRIPTION: \"Describe your dizziness.\"      Mild dizziness  2. LIGHTHEADED: \"Do you feel lightheaded?\" " "(e.g., somewhat faint, woozy, weak upon standing)      Able to walk normally, feels under the weather and unable to work today  3. VERTIGO: \"Do you feel like either you or the room is spinning or tilting?\" (i.e. vertigo)      no  4. SEVERITY: \"How bad is it?\"  \"Do you feel like you are going to faint?\" \"Can you stand and walk?\"    - MILD: Feels slightly dizzy, but walking normally.    - MODERATE: Feels unsteady when walking, but not falling; interferes with normal activities (e.g., school, work).    - SEVERE: Unable to walk without falling, or requires assistance to walk without falling; feels like passing out now.       mild  5. ONSET:  \"When did the dizziness begin?\"      4 days ago  6. AGGRAVATING FACTORS: \"Does anything make it worse?\" (e.g., standing, change in head position)      no  7. HEART RATE: \"Can you tell me your heart rate?\" \"How many beats in 15 seconds?\"  (Note: not all patients can do this)        90  8. CAUSE: \"What do you think is causing the dizziness?\"      Thinks he is getting sick  9. RECURRENT SYMPTOM: \"Have you had dizziness before?\" If Yes, ask: \"When was the last time?\" \"What happened that time?\"      He has felt this like last year when he was getting sick  10. OTHER SYMPTOMS: \"Do you have any other symptoms?\" (e.g., fever, chest pain, vomiting, diarrhea, bleeding)        no    Protocols used: Dizziness-A-OH  Josi Griffith RN    "

## 2024-08-17 LAB
ALBUMIN SERPL BCG-MCNC: 4.4 G/DL (ref 3.5–5.2)
ALP SERPL-CCNC: 93 U/L (ref 40–150)
ALT SERPL W P-5'-P-CCNC: 34 U/L (ref 0–70)
ANION GAP SERPL CALCULATED.3IONS-SCNC: 11 MMOL/L (ref 7–15)
AST SERPL W P-5'-P-CCNC: 27 U/L (ref 0–45)
BILIRUB SERPL-MCNC: 0.4 MG/DL
BUN SERPL-MCNC: 14.2 MG/DL (ref 8–23)
CALCIUM SERPL-MCNC: 9.1 MG/DL (ref 8.8–10.4)
CHLORIDE SERPL-SCNC: 101 MMOL/L (ref 98–107)
CREAT SERPL-MCNC: 1.32 MG/DL (ref 0.67–1.17)
CREAT UR-MCNC: 34.4 MG/DL
EGFRCR SERPLBLD CKD-EPI 2021: 60 ML/MIN/1.73M2
GLUCOSE SERPL-MCNC: 79 MG/DL (ref 70–99)
HCO3 SERPL-SCNC: 26 MMOL/L (ref 22–29)
MICROALBUMIN UR-MCNC: 43.7 MG/L
MICROALBUMIN/CREAT UR: 127.03 MG/G CR (ref 0–17)
POTASSIUM SERPL-SCNC: 4.5 MMOL/L (ref 3.4–5.3)
PROT SERPL-MCNC: 7.3 G/DL (ref 6.4–8.3)
SODIUM SERPL-SCNC: 138 MMOL/L (ref 135–145)

## 2024-08-18 ENCOUNTER — MYC MEDICAL ADVICE (OUTPATIENT)
Dept: FAMILY MEDICINE | Facility: CLINIC | Age: 64
End: 2024-08-18
Payer: COMMERCIAL

## 2024-08-18 DIAGNOSIS — N30.00 ACUTE CYSTITIS WITHOUT HEMATURIA: Primary | ICD-10-CM

## 2024-08-18 LAB — BACTERIA UR CULT: ABNORMAL

## 2024-08-18 NOTE — PROGRESS NOTES
ELECTROPHYSIOLOGY CLINIC VISIT    Assessment/Recommendations   Assessment/Plan:    Mr. Oliver is a 64 year old male who has a medical history significant for PAF (CHADSVASC 1), LUCIAN, Reid's esophagus, and ETOH use.     Paroxysmal Atrial Fibrillation:   I had a long discussion with the patient about AF, including the natural history of the disease, risk of embolic events, role of antithrombotic therapy, role of rate control therapy, the role of antiarrhythmic medications, and the role of an ablation. We discussed the AFFIRM trial.  1. Stroke Prophylaxis: CHADSVASC +age,  1, corresponding to a 1.3% annual stroke / systemic embolism event rate. Not currently requiring chronic oral anticoagulation.   2. Rate Control: On Metoprolol but had fatigue with higher doses.   3. Rhythm Control: Cardioversion, Antiarrhythmics and/or ablation are options for rhythm control. He first had PAF in 2022. He has been on Flecainide which has provided reasonable control of his AF. He is having some fogginess with the medications. We discussed could consider alternative AAT vs ablation. The procedural risk of EP study and ablation were discussed in detail. Risks discussed include: vascular complications, CVA, AVB, pericardial effusion and tamponade, esophageal fistula, PV stenosis. AF ablation has 70% success at 1 year, 50% success at 5 years, and 30% need another ablation.  Even if ablation is successful anticoagulation may be needed lifelong. He would like to think about these options and would let us know if he wishes to change his current managmenet.   4. Risk Factor Management: Statin, BP control, and LUCIAN evaluation as indicated.     Follow up with Dr. Crawley is already upcoming.        History of Present Illness/Subjective    Mr. Adan Oliver is a 64 year old male who comes in today for EP consultation of AF.    Mr. Oliver is a 64 year old male who has a medical history significant for PAF (CHADSVASC 1), LUCIAN, Reid's  esophagus, and ETOH use.     He started developing palpitations in 2022.  He was found to have PAF and was started on Metoprolol and Flecainide. He had a prior stress echo in 2020 that showed normal biventricular function, no significant valvular disease, and negative for ischemia. An echo from 10/2023 showed LVEF 55-60%, normal RV function, and no significant valvular abnormalities. He has had some  side effect with Metoprolol and dose was reduced. Dose further reduced due to bradycardia.    He presents today for EP consult for AF evaluation and management. He reports feeling really tired with higher doses of metoprolol and has some fogginess with Flecainide/Metoprolol. He feels AF has been better controlled on medications, but concerned about side effects. He denies chest discomfort, palpitations, abdominal fullness/bloating or peripheral edema, shortness of breath, paroxysmal nocturnal dyspnea, orthopnea, lightheadedness, dizziness, pre-syncope, or syncope. Presenting 12 lead ECG shows NSR Vent Rate 82 bpm,  ms, QRS 70 ms, QTc 425 ms. Current cardiac medications include: Toprol XL, Flecainide, and Crestor.     I have reviewed and updated the patient's Past Medical History, Social History, Family History and Medication List.     Cardiographics (Personally Reviewed) :   10/17/23 Echo:   Interpretation Summary  Global and regional left ventricular function is normal with an EF of 55-60%.  Global right ventricular function is normal.  Both atria appear normal.  No significant valvular abnormalities present.  IVC diameter <2.1 cm collapsing >50% with sniff suggests a normal RA pressure of 3 mmHg.  No pericardial effusion is present.  There is no prior study for direct comparison.    11/18/20 Stress Echo:  Interpretation Summary  Normal, low-risk exercise echocardiogram without evidence of ischemia at a diagnostic workload (92% MPHR.)  The target heart rate was achieved. Normal heart rate and BP response to  exercise.  Normal biventricular size, thickness, and global systolic function at baseline, LVEF=60-65%.  With exercise, LVEF increased to >70% and LV cavity size decreased appropriately.  No regional wall motion abnormalities are present at rest or with exercise.  No angina was elicited.  No ECG evidence of ischemia. Occasional PVCs with exercise.  Functional capacity is normal for age.  No significant valvular abnormalities are noted on screening Doppler exam.  The aortic root and visualized ascending aorta are normal.    10/4/23 CT Coronary Calcium:  IMPRESSIONS:  1.  No coronary calcifications.  2.  The total Agatston calcium score is 0 placing the patient in the  lowest percentile when compared to age and gender matched control  group.  3.  Mild aortic root calcification.  4.  Please review the separate Radiology report for incidental  noncardiac findings.          Physical Examination   /85 (BP Location: Right arm, Patient Position: Chair, Cuff Size: Adult Regular)   Pulse 84   Wt 86.5 kg (190 lb 9.6 oz)   SpO2 98%   BMI 28.98 kg/m    Wt Readings from Last 3 Encounters:   07/09/24 89.2 kg (196 lb 9.6 oz)   06/26/24 86.1 kg (189 lb 12.8 oz)   06/19/24 86.6 kg (191 lb)     General Appearance:   Alert, well-appearing and in no acute distress.   HEENT: Atraumatic, normocephalic. PERRL.  MMM.   Chest/Lungs:   Respirations unlabored.  Lungs are clear to auscultation.   Cardiovascular:   Regular rate and rhythm.  S1/S2. No murmur.    Abdomen:  Soft, nontender, nondistended.   Extremities: No cyanosis or clubbing. No edema.    Musculoskeletal: Moves all extremities.  Gait normal.   Skin: Warm, dry, intact.    Neurologic: Mood and affect are appropriate.  Alert and oriented to person, place, time, and situation.          Medications  Allergies   Current Outpatient Medications   Medication Sig Dispense Refill    acetaminophen (TYLENOL) 325 MG tablet Take 325-650 mg by mouth every 6 hours as needed for mild pain  (Patient not taking: Reported on 8/16/2024)      esomeprazole (NEXIUM) 40 MG DR capsule TAKE ONE CAPSULE BY MOUTH EVERY MORNING 30-60 MINUTES BEFORE BREAKFAST 90 capsule 3    flecainide (TAMBOCOR) 100 MG tablet Take 1 tablet (100 mg) by mouth 2 times daily 180 tablet 3    IBUPROFEN PO       metoprolol succinate ER (TOPROL XL) 25 MG 24 hr tablet Take 1 tablet (25 mg) by mouth daily 90 tablet 3    rosuvastatin (CRESTOR) 20 MG tablet Take 1 tablet (20 mg) by mouth daily 90 tablet 3    No Known Allergies      Lab Results (Personally Reviewed)    Chemistry/lipid CBC Cardiac Enzymes/BNP/TSH/INR   Lab Results   Component Value Date    BUN 14.2 08/16/2024     08/16/2024    CO2 26 08/16/2024     Creatinine   Date Value Ref Range Status   08/16/2024 1.32 (H) 0.67 - 1.17 mg/dL Final   04/08/2021 1.12 0.66 - 1.25 mg/dL Final       Lab Results   Component Value Date    CHOL 256 (H) 08/01/2023    HDL 62 08/01/2023     (H) 08/01/2023      Lab Results   Component Value Date    WBC 16.8 (H) 08/16/2024    HGB 15.0 08/16/2024    HCT 45.4 08/16/2024    MCV 96 08/16/2024     08/16/2024    Lab Results   Component Value Date    TSH 0.57 04/11/2024        The patient states understanding and is agreeable with the plan.   Iggy Cheung MD [facility]  Cardiology - Electrophysiology      Total time spent on patient visit, reviewing notes, imaging, labs, orders, and completing necessary documentation: 60 minutes.

## 2024-08-19 ENCOUNTER — TELEPHONE (OUTPATIENT)
Dept: CARDIOLOGY | Facility: CLINIC | Age: 64
End: 2024-08-19
Payer: COMMERCIAL

## 2024-08-19 DIAGNOSIS — N39.0 URINARY TRACT INFECTION WITHOUT HEMATURIA, SITE UNSPECIFIED: Primary | ICD-10-CM

## 2024-08-19 RX ORDER — NITROFURANTOIN 25; 75 MG/1; MG/1
100 CAPSULE ORAL 2 TIMES DAILY
Qty: 14 CAPSULE | Refills: 0 | Status: SHIPPED | OUTPATIENT
Start: 2024-08-19 | End: 2024-08-26

## 2024-08-19 NOTE — TELEPHONE ENCOUNTER
Spoke to patient, and discussed that there are no interactions with flecainide, metoprolol and microbid.  Patient verbalized understanding.      Abida Garcia, RN  Cardiology Care Coordinator  [facility]  [phone] option 1

## 2024-08-19 NOTE — TELEPHONE ENCOUNTER
[facility] Call Center    Phone Message    May a detailed message be left on voicemail: yes     Reason for Call: Other: Adan was recently diagnosed as having a bladder infection and was prescribed an antibiotic that doesn't interact well with his heart medication.  Please call him back to discuss, and possibly prescribe, the right antibiotic he should be taking.     Action Taken: Other: Cardiology    Travel Screening: Not Applicable     Thank you!  [facility]

## 2024-08-20 ENCOUNTER — TELEPHONE (OUTPATIENT)
Dept: UROLOGY | Facility: CLINIC | Age: 64
End: 2024-08-20
Payer: COMMERCIAL

## 2024-08-20 ENCOUNTER — MYC MEDICAL ADVICE (OUTPATIENT)
Dept: FAMILY MEDICINE | Facility: CLINIC | Age: 64
End: 2024-08-20
Payer: COMMERCIAL

## 2024-08-20 NOTE — TELEPHONE ENCOUNTER
[facility] Call Center    Phone Message    May a detailed message be left on voicemail: yes     Reason for Call: Other: Pt saw in his account that it states he has CKD. Pt is not aware of this. Pt is wanting to discuss this with care team. Please call to discuss further     Action Taken: Other: Uro     Travel Screening: Not Applicable     Date of Service:

## 2024-08-20 NOTE — TELEPHONE ENCOUNTER
"Returned call and had long convo about how urology doesn't handle CKD this is addressed by PMD and or nephrology. Explained that he has had a negative work up. This means that although he has hematuria there is not a known cause for it. He is wondering how to prevent ODILON state the MD thought it was r/t a stone. His CT urogram was negative for a stone so that is not the cause, asked him about his hygiene habits and got \"I shower twice a day\" and that he is watchful of hygiene in that area. States his mom had a lot of ODILON as she aged. Pt will go back to pmd to see how he wants to proceed with dx of CKD    ZULEIKA Fay RN 8/20/2024 2:05 PM    "

## 2024-08-21 ENCOUNTER — OFFICE VISIT (OUTPATIENT)
Dept: CARDIOLOGY | Facility: CLINIC | Age: 64
End: 2024-08-21
Attending: INTERNAL MEDICINE
Payer: COMMERCIAL

## 2024-08-21 ENCOUNTER — TELEPHONE (OUTPATIENT)
Dept: CARDIOLOGY | Facility: CLINIC | Age: 64
End: 2024-08-21
Payer: COMMERCIAL

## 2024-08-21 ENCOUNTER — TELEPHONE (OUTPATIENT)
Dept: FAMILY MEDICINE | Facility: CLINIC | Age: 64
End: 2024-08-21
Payer: COMMERCIAL

## 2024-08-21 VITALS
SYSTOLIC BLOOD PRESSURE: 122 MMHG | HEART RATE: 84 BPM | OXYGEN SATURATION: 98 % | BODY MASS INDEX: 28.98 KG/M2 | WEIGHT: 190.6 LBS | DIASTOLIC BLOOD PRESSURE: 85 MMHG

## 2024-08-21 DIAGNOSIS — I48.0 PAF (PAROXYSMAL ATRIAL FIBRILLATION) (H): Primary | ICD-10-CM

## 2024-08-21 PROCEDURE — 99215 OFFICE O/P EST HI 40 MIN: CPT | Performed by: INTERNAL MEDICINE

## 2024-08-21 PROCEDURE — 93010 ELECTROCARDIOGRAM REPORT: CPT | Performed by: INTERNAL MEDICINE

## 2024-08-21 PROCEDURE — 99417 PROLNG OP E/M EACH 15 MIN: CPT | Performed by: INTERNAL MEDICINE

## 2024-08-21 PROCEDURE — G0463 HOSPITAL OUTPT CLINIC VISIT: HCPCS | Performed by: INTERNAL MEDICINE

## 2024-08-21 PROCEDURE — 93005 ELECTROCARDIOGRAM TRACING: CPT

## 2024-08-21 ASSESSMENT — PAIN SCALES - GENERAL: PAINLEVEL: NO PAIN (0)

## 2024-08-21 NOTE — PATIENT INSTRUCTIONS
Plan:    Consider atrial fibrillation ablation and let us know if you'd like to proceed.      Your Care Team:  EP Cardiology   Telephone Number     Diane Sainz RN [phone]    After business hours: [phone], ask for cardiologist on-call   Non-procedure scheduling:    Jenny FONTAINE   [phone]   Procedure scheduling:    Adilene Calvo   [phone]   Device Clinic (Pacemakers, ICDs, Loop Recorders)    During business hours: [phone]  After business hours:   [phone]- select option 4 and ask for job code [id number].       Cardiovascular Clinic:   [address] [place] [address]. [place] [address]      As always, thank you for trusting us with your health care needs!

## 2024-08-21 NOTE — TELEPHONE ENCOUNTER
Kaiat sent.    Darron Baxter, RN  Triage Nurse  [facility]

## 2024-08-21 NOTE — TELEPHONE ENCOUNTER
PT needs to be seen regarding kidney problem PT wants to be seen sooner available appt insn't until sep PCP requesting call from PCP / care team regarding this

## 2024-08-21 NOTE — LETTER
8/21/2024      RE: Adan Oliver  [address]  [place] [address]       Dear Colleague,    Thank you for the opportunity to participate in the care of your patient, Adan Oliver, at the [facility] [place] [facility]. Please see a copy of my visit note below.        ELECTROPHYSIOLOGY CLINIC VISIT    Assessment/Recommendations   Assessment/Plan:    Mr. Oliver is a 64 year old male who has a medical history significant for PAF (CHADSVASC 1), LUCIAN, Reid's esophagus, and ETOH use.     Paroxysmal Atrial Fibrillation:   I had a long discussion with the patient about AF, including the natural history of the disease, risk of embolic events, role of antithrombotic therapy, role of rate control therapy, the role of antiarrhythmic medications, and the role of an ablation. We discussed the AFFIRM trial.  1. Stroke Prophylaxis: CHADSVASC +age,  1, corresponding to a 1.3% annual stroke / systemic embolism event rate. Not currently requiring chronic oral anticoagulation.   2. Rate Control: On Metoprolol but had fatigue with higher doses.   3. Rhythm Control: Cardioversion, Antiarrhythmics and/or ablation are options for rhythm control. He first had PAF in 2022. He has been on Flecainide which has provided reasonable control of his AF. He is having some fogginess with the medications. We discussed could consider alternative AAT vs ablation. The procedural risk of EP study and ablation were discussed in detail. Risks discussed include: vascular complications, CVA, AVB, pericardial effusion and tamponade, esophageal fistula, PV stenosis. AF ablation has 70% success at 1 year, 50% success at 5 years, and 30% need another ablation.  Even if ablation is successful anticoagulation may be needed lifelong. He would like to think about these options and would let us know if he wishes to change his current managmenet.   4. Risk Factor Management:  Statin, BP control, and LUCIAN evaluation as indicated.     Follow up with Dr. Crawley is already upcoming.        History of Present Illness/Subjective    Mr. Adan Oliver is a 64 year old male who comes in today for EP consultation of AF.    Mr. Oliver is a 64 year old male who has a medical history significant for PAF (CHADSVASC 1), LUCIAN, Reid's esophagus, and ETOH use.     He started developing palpitations in 2022.  He was found to have PAF and was started on Metoprolol and Flecainide. He had a prior stress echo in 2020 that showed normal biventricular function, no significant valvular disease, and negative for ischemia. An echo from 10/2023 showed LVEF 55-60%, normal RV function, and no significant valvular abnormalities. He has had some  side effect with Metoprolol and dose was reduced. Dose further reduced due to bradycardia.    He presents today for EP consult for AF evaluation and management. He reports feeling really tired with higher doses of metoprolol and has some fogginess with Flecainide/Metoprolol. He feels AF has been better controlled on medications, but concerned about side effects. He denies chest discomfort, palpitations, abdominal fullness/bloating or peripheral edema, shortness of breath, paroxysmal nocturnal dyspnea, orthopnea, lightheadedness, dizziness, pre-syncope, or syncope. Presenting 12 lead ECG shows NSR Vent Rate 82 bpm,  ms, QRS 70 ms, QTc 425 ms. Current cardiac medications include: Toprol XL, Flecainide, and Crestor.     I have reviewed and updated the patient's Past Medical History, Social History, Family History and Medication List.     Cardiographics (Personally Reviewed) :   10/17/23 Echo:   Interpretation Summary  Global and regional left ventricular function is normal with an EF of 55-60%.  Global right ventricular function is normal.  Both atria appear normal.  No significant valvular abnormalities present.  IVC diameter <2.1 cm collapsing >50% with sniff suggests a  normal RA pressure of 3 mmHg.  No pericardial effusion is present.  There is no prior study for direct comparison.    11/18/20 Stress Echo:  Interpretation Summary  Normal, low-risk exercise echocardiogram without evidence of ischemia at a diagnostic workload (92% MPHR.)  The target heart rate was achieved. Normal heart rate and BP response to exercise.  Normal biventricular size, thickness, and global systolic function at baseline, LVEF=60-65%.  With exercise, LVEF increased to >70% and LV cavity size decreased appropriately.  No regional wall motion abnormalities are present at rest or with exercise.  No angina was elicited.  No ECG evidence of ischemia. Occasional PVCs with exercise.  Functional capacity is normal for age.  No significant valvular abnormalities are noted on screening Doppler exam.  The aortic root and visualized ascending aorta are normal.    10/4/23 CT Coronary Calcium:  IMPRESSIONS:  1.  No coronary calcifications.  2.  The total Agatston calcium score is 0 placing the patient in the  lowest percentile when compared to age and gender matched control  group.  3.  Mild aortic root calcification.  4.  Please review the separate Radiology report for incidental  noncardiac findings.          Physical Examination   /85 (BP Location: Right arm, Patient Position: Chair, Cuff Size: Adult Regular)   Pulse 84   Wt 86.5 kg (190 lb 9.6 oz)   SpO2 98%   BMI 28.98 kg/m    Wt Readings from Last 3 Encounters:   07/09/24 89.2 kg (196 lb 9.6 oz)   06/26/24 86.1 kg (189 lb 12.8 oz)   06/19/24 86.6 kg (191 lb)     General Appearance:   Alert, well-appearing and in no acute distress.   HEENT: Atraumatic, normocephalic. PERRL.  MMM.   Chest/Lungs:   Respirations unlabored.  Lungs are clear to auscultation.   Cardiovascular:   Regular rate and rhythm.  S1/S2. No murmur.    Abdomen:  Soft, nontender, nondistended.   Extremities: No cyanosis or clubbing. No edema.    Musculoskeletal: Moves all extremities.   Gait normal.   Skin: Warm, dry, intact.    Neurologic: Mood and affect are appropriate.  Alert and oriented to person, place, time, and situation.          Medications  Allergies   Current Outpatient Medications   Medication Sig Dispense Refill     acetaminophen (TYLENOL) 325 MG tablet Take 325-650 mg by mouth every 6 hours as needed for mild pain (Patient not taking: Reported on 8/16/2024)       esomeprazole (NEXIUM) 40 MG DR capsule TAKE ONE CAPSULE BY MOUTH EVERY MORNING 30-60 MINUTES BEFORE BREAKFAST 90 capsule 3     flecainide (TAMBOCOR) 100 MG tablet Take 1 tablet (100 mg) by mouth 2 times daily 180 tablet 3     IBUPROFEN PO        metoprolol succinate ER (TOPROL XL) 25 MG 24 hr tablet Take 1 tablet (25 mg) by mouth daily 90 tablet 3     rosuvastatin (CRESTOR) 20 MG tablet Take 1 tablet (20 mg) by mouth daily 90 tablet 3    No Known Allergies      Lab Results (Personally Reviewed)    Chemistry/lipid CBC Cardiac Enzymes/BNP/TSH/INR   Lab Results   Component Value Date    BUN 14.2 08/16/2024     08/16/2024    CO2 26 08/16/2024     Creatinine   Date Value Ref Range Status   08/16/2024 1.32 (H) 0.67 - 1.17 mg/dL Final   04/08/2021 1.12 0.66 - 1.25 mg/dL Final       Lab Results   Component Value Date    CHOL 256 (H) 08/01/2023    HDL 62 08/01/2023     (H) 08/01/2023      Lab Results   Component Value Date    WBC 16.8 (H) 08/16/2024    HGB 15.0 08/16/2024    HCT 45.4 08/16/2024    MCV 96 08/16/2024     08/16/2024    Lab Results   Component Value Date    TSH 0.57 04/11/2024        The patient states understanding and is agreeable with the plan.   Iggy Cheung MD [facility]  [facility]iology - Electrophysiology      Total time spent on patient visit, reviewing notes, imaging, labs, orders, and completing necessary documentation: 60 minutes.                    Please do not hesitate to contact me if you have any questions/concerns.     Sincerely,     Iggy Cheung MD

## 2024-08-21 NOTE — TELEPHONE ENCOUNTER
8/21/2024 5:37PM Anita Contreras  Patient confirmed scheduled appointment:  Date: 12/4/2024  Time: 4:30PM  Visit type: Return Cardiology  Provider: Dr. Crawley  Location: [address], [place] [address]  Testing/imaging: Pt will need to schedule Exercise Stress Test prior. Said he is unable to schedule now and it is ok to call back in October to schedule  Additional notes: 8/21 Scheduled Return Cardiology w/ Dr. Crawley in [place] 12/4. Pt was due to be seen Feb 2025, but pt will be out of town when appt is due. Prefers early December. Pt declined scheduling stress test for now, but said we can call him in October to schedule. GISSELLE    Anita Ben 8/21/2024 5:37PM

## 2024-08-22 ENCOUNTER — TELEPHONE (OUTPATIENT)
Dept: UROLOGY | Facility: CLINIC | Age: 64
End: 2024-08-22
Payer: COMMERCIAL

## 2024-08-22 LAB
ATRIAL RATE - MUSE: 82 BPM
DIASTOLIC BLOOD PRESSURE - MUSE: NORMAL MMHG
INTERPRETATION ECG - MUSE: NORMAL
P AXIS - MUSE: 36 DEGREES
PR INTERVAL - MUSE: 174 MS
QRS DURATION - MUSE: 70 MS
QT - MUSE: 364 MS
QTC - MUSE: 425 MS
R AXIS - MUSE: 26 DEGREES
SYSTOLIC BLOOD PRESSURE - MUSE: NORMAL MMHG
T AXIS - MUSE: 25 DEGREES
VENTRICULAR RATE- MUSE: 82 BPM

## 2024-08-22 NOTE — TELEPHONE ENCOUNTER
[facility] Call Center    Phone Message    May a detailed message be left on voicemail: yes     Reason for Call: Other: pt calling and sure he has a UTI, was given a antibotic but has very bad side affects, its nitrofurantoin monohyd mac 100 caps, wants something different, bones hurt, foggie, can't function at all,      Action Taken: Other: urology    Travel Screening: Not Applicable     Date of Service:

## 2024-08-22 NOTE — TELEPHONE ENCOUNTER
I message the patient letting him know that I was able to change his appointment from 09/09/2024 to 08/26/2024 with Dr. Zheng at 2:00pm Arrival time.    Adelita Daniel CMA  [facility]

## 2024-08-23 NOTE — TELEPHONE ENCOUNTER
Patient is scheduled for Monday 08/26/2024 to come in for this issue.    Adelita Daniel [facility]

## 2024-08-23 NOTE — TELEPHONE ENCOUNTER
I have a 12:00pm or a 320pm available on 08/29/2024 if either of those work.    Adelita FONTAINE, CMA

## 2024-08-23 NOTE — TELEPHONE ENCOUNTER
Writing nurse spoke to patient at length regarding medication and concerns with his diagnosis of CKD. Patient reports that he did an e-visit for a UTI and was given medication Macrobid, that made him ill. Patient requested another medication and was given Amoxicillin. Patient reports that he has taken this prior without incident. Patient is following up with PCP for referral to Nephrology.   All questions answered.  Brittani Hidalgo RN on 8/23/2024 at 11:20 AM

## 2024-08-26 ENCOUNTER — OFFICE VISIT (OUTPATIENT)
Dept: FAMILY MEDICINE | Facility: CLINIC | Age: 64
End: 2024-08-26
Payer: COMMERCIAL

## 2024-08-26 VITALS
RESPIRATION RATE: 18 BRPM | BODY MASS INDEX: 27.43 KG/M2 | HEART RATE: 66 BPM | TEMPERATURE: 98.2 F | WEIGHT: 181 LBS | HEIGHT: 68 IN | OXYGEN SATURATION: 98 % | DIASTOLIC BLOOD PRESSURE: 81 MMHG | SYSTOLIC BLOOD PRESSURE: 116 MMHG

## 2024-08-26 DIAGNOSIS — K59.00 CONSTIPATION, UNSPECIFIED CONSTIPATION TYPE: ICD-10-CM

## 2024-08-26 DIAGNOSIS — R35.0 URINARY FREQUENCY: Primary | ICD-10-CM

## 2024-08-26 DIAGNOSIS — N18.31 STAGE 3A CHRONIC KIDNEY DISEASE (H): ICD-10-CM

## 2024-08-26 LAB
ALBUMIN UR-MCNC: NEGATIVE MG/DL
APPEARANCE UR: CLEAR
BACTERIA #/AREA URNS HPF: ABNORMAL /HPF
BILIRUB UR QL STRIP: NEGATIVE
COLOR UR AUTO: YELLOW
GLUCOSE UR STRIP-MCNC: NEGATIVE MG/DL
HGB UR QL STRIP: ABNORMAL
KETONES UR STRIP-MCNC: NEGATIVE MG/DL
LEUKOCYTE ESTERASE UR QL STRIP: NEGATIVE
NITRATE UR QL: NEGATIVE
PH UR STRIP: 6 [PH] (ref 5–7)
RBC #/AREA URNS AUTO: ABNORMAL /HPF
SP GR UR STRIP: 1.02 (ref 1–1.03)
UROBILINOGEN UR STRIP-ACNC: 0.2 E.U./DL
WBC #/AREA URNS AUTO: ABNORMAL /HPF

## 2024-08-26 PROCEDURE — 81001 URINALYSIS AUTO W/SCOPE: CPT | Performed by: FAMILY MEDICINE

## 2024-08-26 PROCEDURE — 99214 OFFICE O/P EST MOD 30 MIN: CPT | Performed by: FAMILY MEDICINE

## 2024-08-26 NOTE — PROGRESS NOTES
"  Assessment & Plan   Problem List Items Addressed This Visit       Stage 3a chronic kidney disease (H)     Other Visit Diagnoses       Urinary frequency    -  Primary    Relevant Orders    UA Macroscopic with reflex to Microscopic and Culture - Clinic Collect (Completed)    UA Microscopic with Reflex to Culture (Completed)    Constipation, unspecified constipation type               Pending LUCIAN evaluation, may be causal for brain microvascular issues  We discussed natural history of CKD3, and avoidance of NSAIDs  Miralax for constipation  Continue with urology for recurrent urinary pains   No change to amox    Subjective   Adan is a 64 year old, presenting for the following health issues:  UTI        8/26/2024     2:47 PM   Additional Questions   Roomed by Adelita FONTAINE CMA   Accompanied by self     History of Present Illness       Reason for visit:  Kidney disease diagnosis & a few other questions.  Symptom onset:  1-2 weeks ago   He is taking medications regularly.     Historically ibuprofen TID    He has questions about what chronic kidney disease is  He would like to know about his brain fog and MRI findings from last year showing microvascular disease  Says he is cutting down on alcohol use        Objective    /81 (BP Location: Left arm, Patient Position: Chair, Cuff Size: Adult Regular)   Pulse 66   Temp 98.2  F (36.8  C) (Temporal)   Resp 18   Ht 1.727 m (5' 8\")   Wt 82.1 kg (181 lb)   SpO2 98%   BMI 27.52 kg/m    Body mass index is 27.52 kg/m .  Physical Exam   Gen NAD  Normal mood/affect    Results for orders placed or performed in visit on 08/26/24   UA Macroscopic with reflex to Microscopic and Culture - Clinic Collect     Status: Abnormal    Specimen: Urine, Clean Catch   Result Value Ref Range    Color Urine Yellow Colorless, Straw, Light Yellow, Yellow    Appearance Urine Clear Clear    Glucose Urine Negative Negative mg/dL    Bilirubin Urine Negative Negative    Ketones Urine Negative " Negative mg/dL    Specific Gravity Urine 1.025 1.003 - 1.035    Blood Urine Trace (A) Negative    pH Urine 6.0 5.0 - 7.0    Protein Albumin Urine Negative Negative mg/dL    Urobilinogen Urine 0.2 0.2, 1.0 E.U./dL    Nitrite Urine Negative Negative    Leukocyte Esterase Urine Negative Negative   UA Microscopic with Reflex to Culture     Status: Abnormal   Result Value Ref Range    Bacteria Urine Few (A) None Seen /HPF    RBC Urine 0-2 0-2 /HPF /HPF    WBC Urine 0-5 0-5 /HPF /HPF    Narrative    Urine Culture not indicated           Signed Electronically by: JEFFREY BERRY DO

## 2024-08-27 ENCOUNTER — TELEPHONE (OUTPATIENT)
Dept: NEUROLOGY | Facility: CLINIC | Age: 64
End: 2024-08-27
Payer: COMMERCIAL

## 2024-08-27 NOTE — TELEPHONE ENCOUNTER
Dr. Torrison called pt and reviewed results with him.     MRI brain is normal for person pt's age, no abnormalities noted.     Isabelle BROWN RN, BSN  [facility] Neurology

## 2024-08-27 NOTE — TELEPHONE ENCOUNTER
[facility]    Phone Message    May a detailed message be left on voicemail: yes     Reason for Call: Requesting Results     Name/type of test: MRI   Date of test: 11/22/23  Was test done at a location other than [facility] (Please fill in the location if not [facility])?: No    Pt stated he was seen by his PCP and was given different information regarding this test.  Pt would not give writer additional information.     Pt stated he would like a call back from care team ASAP.     Pt can be reached at: [phone]    Action Taken: Other: Neurology     Travel Screening: Not Applicable     Date of Service:

## 2024-08-27 NOTE — TELEPHONE ENCOUNTER
"Pt saw his PCP Dr. Zheng yesterday.       During visit provider reviewed MRI results from 11/22/23 with pt, and was using working such as \"dying brain and dead brain\" and saying MRI shows that pt has been having \"mini strokes\"      This made pt very concerned, since Dr. Garcia's interpretation of results did not mention this, and said MRI looked okay.  Dr. Garcia had said his symptoms my be related to sleep issues.     Pt couldn't get in with sleep medicine until 10/22/24.    Pt does report decline since last visit:  more withdrawn/confused and less focused.   He is concerned something may have been missed and that he is just getting worse, and not getting the right treatment.      RN did review result note and reassured pt that providers usually review the imaging as well prior to commenting.     Pt voiced concern from PCP's comment and would like providers to review MRI again and discuss it in more detail with him.       Please review pt's concerns and advise.  Pt would prefer to speak to provider directly if able.     Isabelle BROWN RN, BSN  [facility] Neurology    "

## 2024-09-03 ENCOUNTER — PATIENT OUTREACH (OUTPATIENT)
Dept: GERIATRIC MEDICINE | Facility: CLINIC | Age: 64
End: 2024-09-03
Payer: COMMERCIAL

## 2024-09-03 NOTE — PROGRESS NOTES
[facility] Six-Month Telephone Assessment    6 month telephone assessment completed on 9/3/24.    ER visits: No  Hospitalizations: No  TCU stays: No  Significant health status changes: no  Falls/Injuries: No  ADL/IADL changes: No  Changes in services: No    Caregiver Assessment follow up:  na    Goals: See Support Plan for goal progress documentation.  ROV    Will see member in 6 months for an annual health risk assessment.   Encouraged member to call CC with any questions or concerns in the meantime.     Adan stated that he is no longer on [place]. CC checked [place] ITS and verified this.  He came off MA 8/31/24. He stated that the [facility] had asked him to send in information regarding his finances and bank information which he provided and then he received a letter from [facility] stating his MA ended 8/31/24 and he will stay enrolled with [facility] until 11/30/24.  It did not states why he came off MA. CC asked about his finances and if he has more than $3000 total in his bank accounts. He stated he has $94633 in savings. CC told him that is probably the reason why they took him off MA he no longer qualifies because he has over $3000 total in assets. He did not realize that but he states he wanted to go off MA anyway. He is not really sure how he ended up on MA. He had been on [employer] Blue shiled in the past and wants to go back to their Advantage Plan. CC suggested he call the Senior Linkage line and get assistance in what will be best for him. CC will [facility] to [facility] until that ends.   Imelda Ham RN PHN  [facility] Care Coordinator  [phone]

## 2024-10-03 ASSESSMENT — SLEEP AND FATIGUE QUESTIONNAIRES
HOW LIKELY ARE YOU TO NOD OFF OR FALL ASLEEP IN A CAR, WHILE STOPPED FOR A FEW MINUTES IN TRAFFIC: WOULD NEVER DOZE
HOW LIKELY ARE YOU TO NOD OFF OR FALL ASLEEP WHILE LYING DOWN TO REST IN THE AFTERNOON WHEN CIRCUMSTANCES PERMIT: SLIGHT CHANCE OF DOZING
HOW LIKELY ARE YOU TO NOD OFF OR FALL ASLEEP WHILE WATCHING TV: SLIGHT CHANCE OF DOZING
HOW LIKELY ARE YOU TO NOD OFF OR FALL ASLEEP WHILE SITTING QUIETLY AFTER LUNCH WITHOUT ALCOHOL: WOULD NEVER DOZE
HOW LIKELY ARE YOU TO NOD OFF OR FALL ASLEEP WHEN YOU ARE A PASSENGER IN A CAR FOR AN HOUR WITHOUT A BREAK: SLIGHT CHANCE OF DOZING
HOW LIKELY ARE YOU TO NOD OFF OR FALL ASLEEP WHILE SITTING INACTIVE IN A PUBLIC PLACE: WOULD NEVER DOZE
HOW LIKELY ARE YOU TO NOD OFF OR FALL ASLEEP WHILE SITTING AND READING: SLIGHT CHANCE OF DOZING
HOW LIKELY ARE YOU TO NOD OFF OR FALL ASLEEP WHILE SITTING AND TALKING TO SOMEONE: WOULD NEVER DOZE

## 2024-10-06 ENCOUNTER — THERAPY VISIT (OUTPATIENT)
Dept: SLEEP MEDICINE | Facility: CLINIC | Age: 64
End: 2024-10-06
Attending: NURSE PRACTITIONER
Payer: COMMERCIAL

## 2024-10-06 DIAGNOSIS — G47.33 OSA (OBSTRUCTIVE SLEEP APNEA): ICD-10-CM

## 2024-10-06 DIAGNOSIS — R41.3 MEMORY DIFFICULTY: ICD-10-CM

## 2024-10-06 DIAGNOSIS — R41.3 SHORT-TERM MEMORY LOSS: ICD-10-CM

## 2024-10-06 DIAGNOSIS — G47.00 INSOMNIA, UNSPECIFIED TYPE: ICD-10-CM

## 2024-10-06 DIAGNOSIS — R41.89 IMPAIRMENT OF COGNITIVE FUNCTION: ICD-10-CM

## 2024-10-06 PROCEDURE — 95810 POLYSOM 6/> YRS 4/> PARAM: CPT | Performed by: INTERNAL MEDICINE

## 2024-10-09 ENCOUNTER — MYC MEDICAL ADVICE (OUTPATIENT)
Dept: FAMILY MEDICINE | Facility: CLINIC | Age: 64
End: 2024-10-09
Payer: COMMERCIAL

## 2024-10-09 DIAGNOSIS — M79.10 MUSCLE ACHE: Primary | ICD-10-CM

## 2024-10-20 ASSESSMENT — SLEEP AND FATIGUE QUESTIONNAIRES
HOW LIKELY ARE YOU TO NOD OFF OR FALL ASLEEP WHILE SITTING INACTIVE IN A PUBLIC PLACE: WOULD NEVER DOZE
HOW LIKELY ARE YOU TO NOD OFF OR FALL ASLEEP WHILE SITTING AND READING: SLIGHT CHANCE OF DOZING
HOW LIKELY ARE YOU TO NOD OFF OR FALL ASLEEP WHILE SITTING QUIETLY AFTER LUNCH WITHOUT ALCOHOL: SLIGHT CHANCE OF DOZING
HOW LIKELY ARE YOU TO NOD OFF OR FALL ASLEEP WHILE SITTING AND TALKING TO SOMEONE: WOULD NEVER DOZE
HOW LIKELY ARE YOU TO NOD OFF OR FALL ASLEEP WHILE WATCHING TV: SLIGHT CHANCE OF DOZING
HOW LIKELY ARE YOU TO NOD OFF OR FALL ASLEEP WHEN YOU ARE A PASSENGER IN A CAR FOR AN HOUR WITHOUT A BREAK: SLIGHT CHANCE OF DOZING
HOW LIKELY ARE YOU TO NOD OFF OR FALL ASLEEP WHILE LYING DOWN TO REST IN THE AFTERNOON WHEN CIRCUMSTANCES PERMIT: SLIGHT CHANCE OF DOZING
HOW LIKELY ARE YOU TO NOD OFF OR FALL ASLEEP IN A CAR, WHILE STOPPED FOR A FEW MINUTES IN TRAFFIC: WOULD NEVER DOZE

## 2024-10-22 ENCOUNTER — VIRTUAL VISIT (OUTPATIENT)
Dept: SLEEP MEDICINE | Facility: CLINIC | Age: 64
End: 2024-10-22
Payer: COMMERCIAL

## 2024-10-22 VITALS — BODY MASS INDEX: 25.76 KG/M2 | WEIGHT: 170 LBS | HEIGHT: 68 IN

## 2024-10-22 DIAGNOSIS — G47.61 PERIODIC LIMB MOVEMENT DISORDER (PLMD): ICD-10-CM

## 2024-10-22 DIAGNOSIS — G47.33 OSA (OBSTRUCTIVE SLEEP APNEA): Primary | ICD-10-CM

## 2024-10-22 DIAGNOSIS — N18.30 STAGE 3 CHRONIC KIDNEY DISEASE, UNSPECIFIED WHETHER STAGE 3A OR 3B CKD (H): ICD-10-CM

## 2024-10-22 DIAGNOSIS — F33.9 EPISODE OF RECURRENT MAJOR DEPRESSIVE DISORDER, UNSPECIFIED DEPRESSION EPISODE SEVERITY (H): ICD-10-CM

## 2024-10-22 LAB — SLPCOMP: NORMAL

## 2024-10-22 PROCEDURE — 99215 OFFICE O/P EST HI 40 MIN: CPT | Mod: 95 | Performed by: NURSE PRACTITIONER

## 2024-10-22 PROCEDURE — G2211 COMPLEX E/M VISIT ADD ON: HCPCS | Mod: 95 | Performed by: NURSE PRACTITIONER

## 2024-10-22 RX ORDER — GABAPENTIN 100 MG/1
100-600 CAPSULE ORAL
Qty: 90 CAPSULE | Refills: 1 | Status: SHIPPED | OUTPATIENT
Start: 2024-10-22

## 2024-10-22 ASSESSMENT — PAIN SCALES - GENERAL: PAINLEVEL: NO PAIN (0)

## 2024-10-22 ASSESSMENT — PATIENT HEALTH QUESTIONNAIRE - PHQ9: SUM OF ALL RESPONSES TO PHQ QUESTIONS 1-9: 11

## 2024-10-22 NOTE — NURSING NOTE
Current patient location: [address] [place] [address]   [place] [address]    Is the patient currently in the state of [place]? YES    Visit mode:VIDEO    If the visit is dropped, the patient can be reconnected by: VIDEO VISIT: Send to e-mail at: [email or web address]    Will anyone else be joining the visit? NO  (If patient encounters technical issues they should call [phone]    Are changes needed to the allergy or medication list? Pt stated no changes to allergies and Pt stated no med changes    Are refills needed on medications prescribed by this physician? NO    Rooming Documentation:  Questionnaire(s) completed    Reason for visit: RECHECK  Depression Response    Patient completed the PHQ-9 assessment for depression and scored >9? Yes-11  Question 9 on the PHQ-9 was positive for suicidality? No  Does patient have current mental health provider? No    Is this a virtual visit? Yes   Does patient have suicidal ideation (positive question 9)? No - offer to place Mental Health Referral.  Referral order pended    I personally notified the following: visit provider           Nena JARRETT

## 2024-10-22 NOTE — PROGRESS NOTES
"Virtual Visit Details    Type of service:  Video Visit   Video Start Time: 2:33 PM  Video End Time:3:15 PM    Originating Location (pt. Location): Home    Distant Location (provider location):  Off-site  Platform used for Video Visit: [facility]      Sleep Study Follow-Up Visit:    Date on this visit: 10/22/2024    Ana Maria Oliver is a 64-year-old male with a PMH pertinent for newer diagnosis of atrial fibrillation, esophageal reflux, HLD, lumbar DDD, BPH, alcohol use, anxiety, psychophysiological insomnia, LUCIAN, and obesity who presents today for follow-up of his sleep study done on 10/06/2024 at the [facility] for possible sleep apnea.        Patient: ANA MARIA OLIVER  YOB: 1960  Study Date: 10/6/2024  MRN: [id number]  Referring Provider: Primitivo Garcia MD  Ordering Provider: Juan Vora APRN CNP    Indications for Polysomnography: The patient is a 64 year old Male who is 5' 8\" and weighs 200.0 lbs. His BMI is 30.7, [place] sleepiness scale 5 and neck circumference is 40 cm. Relevant medical history includes moderate obstructive sleep apnea and atrial fibrillation. A diagnostic polysomnogram was performed to evaluate for current severity of sleep apnea.    Polysomnogram Data: A full night polysomnogram recorded the standard physiologic parameters including EEG, EOG, EMG, ECG, nasal and oral airflow. Respiratory parameters of chest and abdominal movements were recorded with respiratory inductance plethysmography. Oxygen saturation was recorded by pulse oximetry. Hypopnea scoring rule used: 1B 4%.    Sleep Architecture: Severe sleep disruption largely  attributable to limb movements   The total recording time of the polysomnogram was 451.0 minutes. The total sleep time was 361.0 minutes. Sleep latency was normal at 7.7 minutes with the use of zolpidem.  REM latency was 255.5 minutes. Arousal index was increased at 90.7 arousals per hour. Sleep efficiency was decreased at 80.1%. " Wake after sleep onset was 82.5 minutes. The patient spent 16.5% of total sleep time in Stage N1, 50.6% in Stage N2, 23.3% in Stage N3, and 9.7% in REM. Time in REM supine was - minutes.    Respiration: Mild positional obstructive sleep apnea   Events ? The polysomnogram revealed a presence of 25 obstructive, 6 central, and 1 mixed apneas resulting in an apnea index of 5.3 events per hour. There were 45 obstructive hypopneas and - central hypopneas resulting in an obstructive hypopnea index of 7.5 and central hypopnea index of - events per hour. The combined apnea/hypopnea index was 12.8 events per hour (central apnea/hypopnea index was 1.0 events per hour). The REM AHI was - events per hour. The supine AHI was 65.1 events per hour and 5/hour nonsupine. The RERA index was 2.3 events per hour.  The RDI was 15.1 events per hour.  Snoring - was reported as mild/intermittent.  Respiratory rate and pattern - was notable for normal respiratory rate and pattern.  Sustained Sleep Associated Hypoventilation - Transcutaneous carbon dioxide monitoring was not used, however significant hypoventilation was not suggested by oximetry.  Sleep Associated Hypoxemia - (Greater than 5 minutes O2 sat at or below 88%) was not present. Baseline oxygen saturation was 94.3%. Lowest oxygen saturation was 77.0%. Time spent less than or equal to 88% was 2.5 minutes. Time spent less than or equal to 89% was 3.7 minutes.    Movement Activity: Abundant limb movements with arousals  Periodic Limb Activity - There were 459 PLMs during the entire study. The PLM index was 76.3 movements per hour. The PLM Arousal Index was 62.7 per hour.  REM EMG Activity - Excessive transient/sustained muscle activity was/was not present.  Nocturnal Behavior - Abnormal sleep related behaviors were/were not noted during/arising out of NREM / REM sleep. The behaviors appeared to be consistent with dream enactment. These events were characterized as -.  Bruxism - None  apparent.    Cardiac Summary: Sinus rhythm  The average pulse rate was 65.9 bpm. The minimum pulse rate was 49.0 bpm while the maximum pulse rate was 91.0 bpm.  Arrhythmias were not noted.      Assessment:     Sleep Architecture: Severe sleep disruption largely  attributable to limb movements   Respiration: Mild positional obstructive sleep apnea   Movement Activity: Abundant limb movements with arousals  Cardiac Summary: Sinus rhythm    Recommendations:  Consider evaluation and management of possible periodic limb movement disorder or restless legs syndrome.  Consider lateral sleep positioning device, mandibular advancement device or CPAP for mild sleep apnea based on patient preference and clinical setting  Suggest optimizing sleep schedule and avoiding sleep deprivation.    Diagnostic Codes:   Obstructive Sleep Apnea G47.33  Periodic Limb Movement Disorder G47.61  Repetitive Intrusions Into Sleep F51.8    10/6/2024 [facility] Sleep Study (200.0 lbs) - AHI 12.8, RDI 15.1, Supine AHI 65.1, REM AHI -, Low O2 77.0%, Time Spent ?88% 2.5 minutes / Time Spent ?89% 3.7 minutes.      _____________________________________   Electronically Signed By: Zack Wang MD 12:57 10/22/2024           These findings were reviewed with patient.     Past medical/surgical history, family history, social history, medications and allergies were reviewed.      Problem List:  Patient Active Problem List    Diagnosis Date Noted    Stage 3a chronic kidney disease (H) 08/16/2024     Priority: Medium    Atrial fibrillation (H) 04/08/2024     Priority: Medium    Major depressive disorder, single episode, moderate (H) 04/08/2024     Priority: Medium    Benign prostatic hyperplasia with urinary frequency 06/09/2022     Priority: Medium    Hip pain, bilateral 11/19/2021     Priority: Medium    LUCIAN (obstructive sleep apnea)- 'moderate' (AHI 6) 10/06/2017     Priority: Medium     Study Date: 10/2/2017- (200.0 lbs) Scored using 3% rules. It  was difficult to discern between PLMS and hyponeas. Apnea/hypopnea index was 28.0 events per hour.  The REM AHI was 29.3 events per hour.  The supine AHI was 32.7 events per hour.  RDI was 28.0 events per hour. Lowest oxygen saturation was 84.0%.  Time spent less than or equal to 88% was 0.3 minutes. PLM index was 32.3 movements per hour.  The PLM Arousal Index was 15.8 per hour.      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     Priority: Medium    Esophageal reflux 10/01/2014     Priority: Medium    Hyperlipidemia with target LDL less than 130 10/01/2014     Priority: Medium      Current Outpatient Medications   Medication Sig Dispense Refill    esomeprazole (NEXIUM) 40 MG DR capsule TAKE ONE CAPSULE BY MOUTH EVERY MORNING 30-60 MINUTES BEFORE BREAKFAST 90 capsule 3    flecainide (TAMBOCOR) 100 MG tablet Take 1 tablet (100 mg) by mouth 2 times daily 180 tablet 3    gabapentin (NEURONTIN) 100 MG capsule Take 1-6 capsules (100-600 mg) by mouth nightly as needed for other (PLMD). 90 capsule 1    metoprolol succinate ER (TOPROL XL) 25 MG 24 hr tablet Take 1 tablet (25 mg) by mouth daily 90 tablet 3    rosuvastatin (CRESTOR) 20 MG tablet Take 1 tablet (20 mg) by mouth daily 90 tablet 3     Current Facility-Administered Medications   Medication Dose Route Frequency Provider Last Rate Last Admin    0.5 mL ropivacaine (NAROPIN) injection 5 mg/mL  0.5 mL      0.5 mL at 06/19/24 1321    triamcinolone (KENALOG-40) injection 20 mg  20 mg      20 mg at 06/19/24 1321  "    Ht 1.727 m (5' 8\")   Wt 77.1 kg (170 lb)   BMI 25.85 kg/m      Impression/Plan:  Mild, positional, Obstructive Sleep Apnea.   - Sleep associated hypoxemia was not present.  Periodic limb movement disorder (PLMD)  - Iron and Iron Binding Capacity; Future  - Ferritin; Future  - gabapentin (NEURONTIN) 100 MG capsule; Take 1-6 capsules (100-600 mg) by mouth nightly as needed for other (PLMD).  Dispense: 90 capsule; Refill: 1  Episode of recurrent major depressive disorder, unspecified depression episode severity (H)  - PHQ-9 is elevated to 11 today and patient reports history of major depression, requesting referral.  - Adult Mental Health [facility] Referral; Future  Stage 3 chronic kidney disease, unspecified whether stage 3a or 3b CKD (H)  - Iron and Iron Binding Capacity; Future  - Ferritin; Future      Treatment options discussed today including  auto-CPAP at 5-15 cmH2O, oral appliance therapy, positional therapy, or medication management of PLMD .  The patient has declined PAP therapy, oral appliance therapy, positional therapy at this time.  He indicates that due to his problems with his back he avoids back/supine sleep and was only told to sleep on his back in the sleep study, which she does not typically do at home.  We also reviewed and discussed consideration for treatment for his frequent periodic limb movements.  He denies significant restless leg symptoms.    Elected treatment with trial of gabapentin for periodic limb movement disorder.  We discussed and reviewed recommendations for trial of gabapentin.  The patient indicates some sensitivity to medications and would like to start with low dosing to see how he is affected by that.  Additionally, he is on an antiarrhythmic medication (flecainide) for history of atrial fibrillation, and a medication interaction check was done prior to prescribing this medication.  As such, gabapentin 100-600 mg p.o. nightly with titration starting slowly of 100 mg at " bedtime the first 2 nights, then may increase dose to effect 1 additional capsule up to 600 mg nightly, if needed.  The patient is in agreement with this plan of care.  Side effects have been discussed.  Additionally, an order to check ferritin and iron lab work was also placed given his history of CKD stage III.    He will contact me via [employer] message with an update in approximately 2 weeks or so.    He will follow up with me in about 3 month(s).     Forty minutes spent with patient, all of which were spent face-to-face counseling, consulting, chart review/documentation, and coordinating plan of care on the date of the encounter.      RASHEL Vasquez CNP  Sleep Medicine      CC: Jimbo Zheng     This note was written with the assistance of the Dragon voice-dictation technology software. The final document, although reviewed, may contain errors. For corrections, please contact the office.

## 2024-10-23 ENCOUNTER — MYC MEDICAL ADVICE (OUTPATIENT)
Dept: SLEEP MEDICINE | Facility: CLINIC | Age: 64
End: 2024-10-23
Payer: COMMERCIAL

## 2024-10-23 ENCOUNTER — MYC MEDICAL ADVICE (OUTPATIENT)
Dept: CARDIOLOGY | Facility: CLINIC | Age: 64
End: 2024-10-23
Payer: COMMERCIAL

## 2024-10-23 DIAGNOSIS — I48.0 PAF (PAROXYSMAL ATRIAL FIBRILLATION) (H): ICD-10-CM

## 2024-10-23 DIAGNOSIS — E78.5 HYPERLIPIDEMIA LDL GOAL <100: Primary | ICD-10-CM

## 2024-10-23 DIAGNOSIS — E78.5 HYPERLIPIDEMIA WITH TARGET LDL LESS THAN 130: ICD-10-CM

## 2024-10-23 DIAGNOSIS — I47.10 SVT (SUPRAVENTRICULAR TACHYCARDIA) (H): ICD-10-CM

## 2024-10-23 NOTE — TELEPHONE ENCOUNTER
Chest lipids thank you.     Lipid panel placed. [employer] Messaging message sent to patient.    Yaz Garrison, RN, BSN  Cardiology RN Care Coordinator   [place]   Phone: [phone]  Fax: [phone] ([place]) [phone] ([place])

## 2024-10-24 NOTE — TELEPHONE ENCOUNTER
Patient saw [employer] message. Patient scheduled for stress test.    Yaz Garrison, RN, BSN  Cardiology RN Care Coordinator   [place]   Phone: [phone]  Fax: [phone] ([place]) [phone] ([place])

## 2024-10-28 NOTE — TELEPHONE ENCOUNTER
Patient saw [employer] message. No questions at this time.    Yaz Garrison, RN, BSN  Cardiology RN Care Coordinator   [place]   Phone: [phone]  Fax: [phone] ([place]) [phone] ([place])

## 2024-11-04 ENCOUNTER — OFFICE VISIT (OUTPATIENT)
Dept: OPTOMETRY | Facility: CLINIC | Age: 64
End: 2024-11-04
Payer: COMMERCIAL

## 2024-11-04 DIAGNOSIS — G43.109 OCULAR MIGRAINE: ICD-10-CM

## 2024-11-04 DIAGNOSIS — Z98.890 S/P BILATERAL BLEPHAROPLASTY: ICD-10-CM

## 2024-11-04 DIAGNOSIS — Z01.01 ENCOUNTER FOR EXAMINATION OF EYES AND VISION WITH ABNORMAL FINDINGS: Primary | ICD-10-CM

## 2024-11-04 DIAGNOSIS — H52.4 PRESBYOPIA: ICD-10-CM

## 2024-11-04 DIAGNOSIS — H52.223 REGULAR ASTIGMATISM OF BOTH EYES: ICD-10-CM

## 2024-11-04 DIAGNOSIS — H25.13 NUCLEAR AGE-RELATED CATARACT, BOTH EYES: ICD-10-CM

## 2024-11-04 DIAGNOSIS — H52.13 MYOPIA OF BOTH EYES: ICD-10-CM

## 2024-11-04 PROCEDURE — 92014 COMPRE OPH EXAM EST PT 1/>: CPT | Performed by: OPTOMETRIST

## 2024-11-04 PROCEDURE — 92015 DETERMINE REFRACTIVE STATE: CPT | Performed by: OPTOMETRIST

## 2024-11-04 ASSESSMENT — CONF VISUAL FIELD
OD_INFERIOR_NASAL_RESTRICTION: 0
OS_NORMAL: 1
OS_INFERIOR_NASAL_RESTRICTION: 0
OS_SUPERIOR_NASAL_RESTRICTION: 0
OD_NORMAL: 1
METHOD: COUNTING FINGERS
OD_SUPERIOR_TEMPORAL_RESTRICTION: 0
OS_SUPERIOR_TEMPORAL_RESTRICTION: 0
OD_SUPERIOR_NASAL_RESTRICTION: 0
OD_INFERIOR_TEMPORAL_RESTRICTION: 0
OS_INFERIOR_TEMPORAL_RESTRICTION: 0

## 2024-11-04 ASSESSMENT — REFRACTION_MANIFEST
OD_CYLINDER: +0.50
OS_ADD: +2.50
OD_SPHERE: -0.50
OD_AXIS: 150
OS_SPHERE: -0.75
OS_AXIS: 044
OD_ADD: +2.50
OS_CYLINDER: +0.25

## 2024-11-04 ASSESSMENT — VISUAL ACUITY
OS_SC: 20/25
CORRECTION_TYPE: GLASSES
OD_CC: 20/20-2
OS_SC: 20/80
OD_CC: 20/20
OS_CC: 20/20-1
OD_CC+: -2
OD_SC: 20/60+1
OD_SC: 20/25
METHOD: SNELLEN - LINEAR
OS_CC: 20/20

## 2024-11-04 ASSESSMENT — CUP TO DISC RATIO
OS_RATIO: 0.4
OD_RATIO: 0.4

## 2024-11-04 ASSESSMENT — REFRACTION_WEARINGRX
OD_ADD: +2.50
SPECS_TYPE: BIFOCAL
OD_CYLINDER: +0.25
OS_ADD: +2.50
OS_AXIS: 034
OD_AXIS: 160
OS_CYLINDER: +0.25
OS_SPHERE: -0.75
OD_SPHERE: -0.50

## 2024-11-04 ASSESSMENT — TONOMETRY
OD_IOP_MMHG: 11
IOP_METHOD: APPLANATION
OS_IOP_MMHG: 13

## 2024-11-04 ASSESSMENT — EXTERNAL EXAM - LEFT EYE: OS_EXAM: NORMAL

## 2024-11-04 ASSESSMENT — SLIT LAMP EXAM - LIDS
COMMENTS: NORMAL, S/P BLEPHAROPLASTY
COMMENTS: NORMAL, S/P BLEPHAROPLASTY

## 2024-11-04 NOTE — PATIENT INSTRUCTIONS
Updated glasses prescriptions provided today. Optional to fill.     OK to try preservative-free artificial tears.     Ocular migraines are painless, temporary visual disturbances that can affect one or both eyes. Though they can be frightening, ocular migraines typically are harmless and self-resolve without medication within 20 to 30 minutes.  Some people may find that taking a pain reliever such as tylenol or ibuprofen may lessen the length of the visual changes.  Ocular or eye migraines can be triggered by certain foods, caffeinated drinks, red wine, smoked meats and chocolate.       Dilation deferred today. Patient educated on the importance of dilation in order to fully assess the internal ocular health. Patient voiced understanding. Return to clinic to complete the dilated portion of the examination, if desired.       Jair Garcia, NERISSA  [address] [place]  [address]    [phone]

## 2024-11-04 NOTE — LETTER
11/4/2024      Adan Oliver  [address]  [place] [address]      Dear Colleague,    Thank you for referring your patient, Adan Oliver, to the [facility]. Please see a copy of my visit note below.    Chief Complaint   Patient presents with     Annual Eye Exam     Cataract     Ocular Migraine Evaluation      -Cataracts each eye     -Ocular migraines - intermittent - getting 2-3/week for the last few months (has HBP and high cholesterol but does not check bp at home - does have cardio appt 11/5/24)     Last Eye Exam: 8/15/2023 Dr. Garcia   Dilated Previously: Declined dilation today    What are you currently using to see?  Glasses - FT BF - wears only for phone use, also has SVL distance glasses he wears for driving (did not bring)        Distance Vision Acuity: Satisfied with vision    Near Vision Acuity: Satisfied with vision while reading and using computer with glasses    Eye Comfort: dry   Do you use eye drops? : Yes: Refresh PRN - feels like they make it worse   Occupation or Hobbies: Retired     Kelly Gay  Optometry Assistant       Medical, surgical and family histories reviewed and updated 11/4/2024.       OBJECTIVE: See Ophthalmology exam    ASSESSMENT:    ICD-10-CM    1. Encounter for examination of eyes and vision with abnormal findings  Z01.01       2. Nuclear age-related cataract, both eyes  H25.13       3. Ocular migraine  G43.109       4. S/p bilateral blepharoplasty  Z98.890       5. Myopia of both eyes  H52.13       6. Regular astigmatism of both eyes  H52.223       7. Presbyopia  H52.4           PLAN:     Patient Instructions   Updated glasses prescriptions provided today. Optional to fill.     OK to try preservative-free artificial tears.     Ocular migraines are painless, temporary visual disturbances that can affect one or both eyes. Though they can be frightening, ocular migraines typically are harmless and self-resolve without medication within  20 to 30 minutes.  Some people may find that taking a pain reliever such as tylenol or ibuprofen may lessen the length of the visual changes.  Ocular or eye migraines can be triggered by certain foods, caffeinated drinks, red wine, smoked meats and chocolate.       Dilation deferred today. Patient educated on the importance of dilation in order to fully assess the internal ocular health. Patient voiced understanding. Return to clinic to complete the dilated portion of the examination, if desired.       Jair Garcia OD  [address] [place]  [address]    [phone]        Again, thank you for allowing me to participate in the care of your patient.        Sincerely,        Jair Garcia OD

## 2024-11-04 NOTE — PROGRESS NOTES
Chief Complaint   Patient presents with    Annual Eye Exam    Cataract    Ocular Migraine Evaluation      -Cataracts each eye     -Ocular migraines - intermittent - getting 2-3/week for the last few months (has HBP and high cholesterol but does not check bp at home - does have cardio appt 11/5/24)     Last Eye Exam: 8/15/2023 Dr. Garcia   Dilated Previously: Declined dilation today    What are you currently using to see?  Glasses - FT BF - wears only for phone use, also has SVL distance glasses he wears for driving (did not bring)        Distance Vision Acuity: Satisfied with vision    Near Vision Acuity: Satisfied with vision while reading and using computer with glasses    Eye Comfort: dry   Do you use eye drops? : Yes: Refresh PRN - feels like they make it worse   Occupation or Hobbies: Retired     Kelly Gay  Optometry Assistant       Medical, surgical and family histories reviewed and updated 11/4/2024.       OBJECTIVE: See Ophthalmology exam    ASSESSMENT:    ICD-10-CM    1. Encounter for examination of eyes and vision with abnormal findings  Z01.01       2. Nuclear age-related cataract, both eyes  H25.13       3. Ocular migraine  G43.109       4. S/p bilateral blepharoplasty  Z98.890       5. Myopia of both eyes  H52.13       6. Regular astigmatism of both eyes  H52.223       7. Presbyopia  H52.4           PLAN:     Patient Instructions   Updated glasses prescriptions provided today. Optional to fill.     OK to try preservative-free artificial tears.     Ocular migraines are painless, temporary visual disturbances that can affect one or both eyes. Though they can be frightening, ocular migraines typically are harmless and self-resolve without medication within 20 to 30 minutes.  Some people may find that taking a pain reliever such as tylenol or ibuprofen may lessen the length of the visual changes.  Ocular or eye migraines can be triggered by certain foods, caffeinated drinks, red wine, smoked meats and  chocolate.       Dilation deferred today. Patient educated on the importance of dilation in order to fully assess the internal ocular health. Patient voiced understanding. Return to clinic to complete the dilated portion of the examination, if desired.       Jair Garcia [address]  [address]    [phone]

## 2024-11-05 ENCOUNTER — MYC MEDICAL ADVICE (OUTPATIENT)
Dept: CARDIOLOGY | Facility: CLINIC | Age: 64
End: 2024-11-05

## 2024-11-05 ENCOUNTER — ANCILLARY PROCEDURE (OUTPATIENT)
Dept: CARDIOLOGY | Facility: CLINIC | Age: 64
End: 2024-11-05
Attending: INTERNAL MEDICINE
Payer: COMMERCIAL

## 2024-11-05 DIAGNOSIS — Z51.81 ENCOUNTER FOR MONITORING FLECAINIDE THERAPY: ICD-10-CM

## 2024-11-05 DIAGNOSIS — Z79.899 ENCOUNTER FOR MONITORING FLECAINIDE THERAPY: ICD-10-CM

## 2024-11-05 DIAGNOSIS — F33.9 EPISODE OF RECURRENT MAJOR DEPRESSIVE DISORDER, UNSPECIFIED DEPRESSION EPISODE SEVERITY (H): ICD-10-CM

## 2024-11-05 PROCEDURE — 93015 CV STRESS TEST SUPVJ I&R: CPT | Performed by: STUDENT IN AN ORGANIZED HEALTH CARE EDUCATION/TRAINING PROGRAM

## 2024-11-08 NOTE — TELEPHONE ENCOUNTER
Patient saw [employer] message. No questions at this time.    Yaz Garrison, RN, BSN  Cardiology RN Care Coordinator   [place]   Phone: [phone]  Fax: [phone] ([place]) [phone] ([place])

## 2024-11-12 ENCOUNTER — LAB (OUTPATIENT)
Dept: LAB | Facility: CLINIC | Age: 64
End: 2024-11-12
Payer: COMMERCIAL

## 2024-11-12 DIAGNOSIS — I47.10 SVT (SUPRAVENTRICULAR TACHYCARDIA) (H): ICD-10-CM

## 2024-11-12 DIAGNOSIS — M79.10 MUSCLE ACHE: ICD-10-CM

## 2024-11-12 DIAGNOSIS — I48.0 PAF (PAROXYSMAL ATRIAL FIBRILLATION) (H): ICD-10-CM

## 2024-11-12 DIAGNOSIS — E78.5 HYPERLIPIDEMIA WITH TARGET LDL LESS THAN 130: ICD-10-CM

## 2024-11-12 DIAGNOSIS — G47.61 PERIODIC LIMB MOVEMENT DISORDER (PLMD): ICD-10-CM

## 2024-11-12 DIAGNOSIS — E78.5 HYPERLIPIDEMIA LDL GOAL <100: ICD-10-CM

## 2024-11-12 DIAGNOSIS — N18.30 STAGE 3 CHRONIC KIDNEY DISEASE, UNSPECIFIED WHETHER STAGE 3A OR 3B CKD (H): ICD-10-CM

## 2024-11-12 LAB
CHOLEST SERPL-MCNC: 182 MG/DL
FASTING STATUS PATIENT QL REPORTED: YES
FERRITIN SERPL-MCNC: 433 NG/ML (ref 31–409)
HDLC SERPL-MCNC: 49 MG/DL
IRON BINDING CAPACITY (ROCHE): 291 UG/DL (ref 240–430)
IRON SATN MFR SERPL: 25 % (ref 15–46)
IRON SERPL-MCNC: 73 UG/DL (ref 61–157)
LDLC SERPL CALC-MCNC: 105 MG/DL
NONHDLC SERPL-MCNC: 133 MG/DL
TRIGL SERPL-MCNC: 138 MG/DL

## 2024-11-12 PROCEDURE — 83540 ASSAY OF IRON: CPT

## 2024-11-12 PROCEDURE — 83550 IRON BINDING TEST: CPT

## 2024-11-12 PROCEDURE — 86769 SARS-COV-2 COVID-19 ANTIBODY: CPT | Mod: 90

## 2024-11-12 PROCEDURE — 36415 COLL VENOUS BLD VENIPUNCTURE: CPT

## 2024-11-12 PROCEDURE — 80061 LIPID PANEL: CPT

## 2024-11-12 PROCEDURE — 99000 SPECIMEN HANDLING OFFICE-LAB: CPT

## 2024-11-12 PROCEDURE — 82728 ASSAY OF FERRITIN: CPT

## 2024-11-12 NOTE — LETTER
November 13, 2024      Adan Oliver  [address]  [place] [address]        Dear ,    We are writing to inform you of your test results.    Cholesterol improved significantly.  This is good news     Resulted Orders   Lipid panel reflex to direct LDL Fasting   Result Value Ref Range    Cholesterol 182 <200 mg/dL    Triglycerides 138 <150 mg/dL    Direct Measure HDL 49 >=40 mg/dL    LDL Cholesterol Calculated 105 (H) <100 mg/dL    Non HDL Cholesterol 133 (H) <130 mg/dL    Patient Fasting > 8hrs? Yes     Narrative    Cholesterol  Desirable: < 200 mg/dL  Borderline High: 200 - 239 mg/dL  High: >= 240 mg/dL    Triglycerides  Normal: < 150 mg/dL  Borderline High: 150 - 199 mg/dL  High: 200-499 mg/dL  Very High: >= 500 mg/dL    Direct Measure HDL  Female: >= 50 mg/dL   Male: >= 40 mg/dL    LDL Cholesterol  Desirable: < 100 mg/dL  Above Desirable: 100 - 129 mg/dL   Borderline High: 130 - 159 mg/dL   High:  160 - 189 mg/dL   Very High: >= 190 mg/dL    Non HDL Cholesterol  Desirable: < 130 mg/dL  Above Desirable: 130 - 159 mg/dL  Borderline High: 160 - 189 mg/dL  High: 190 - 219 mg/dL  Very High: >= 220 mg/dL       If you have any questions or concerns, please call the clinic at the number listed above.       Sincerely,      Janice Crawley MD            
15-Remy-2024

## 2024-11-13 ENCOUNTER — MYC MEDICAL ADVICE (OUTPATIENT)
Dept: CARDIOLOGY | Facility: CLINIC | Age: 64
End: 2024-11-13
Payer: COMMERCIAL

## 2024-11-15 LAB — SARS-COV-2 IGG SERPL QL IA: NEGATIVE

## 2024-11-15 NOTE — TELEPHONE ENCOUNTER
Patient saw [employer] message. No response at this time.    Yaz Garrison, RN, BSN  Cardiology RN Care Coordinator   [place]   Phone: [phone]  Fax: [phone] ([place]) [phone] ([place])

## 2024-12-03 NOTE — PROGRESS NOTES
[facility]      Referring provider:Johnathon Diaz MD    HPI: Mr. Adan Oliver is a 62 year old  male with PMH significant for    -Obstructive sleep apnea  -Alcohol use  -Reid's esophagus    Patient reports feeling palpitations over the last 1-1/2-month.  So far it has happened only at night.  He has woken up 6 or 7 times over the last 1-1/2 months between 3 AM to 7 AM with chest pressure and palpitations.  He was actually able to capture the heart rate on blood pressure machine which showed 150 bpm (showed me pictures of that from his cell phone).  No EKG documentation so far.  The heart pounding sensation lasted for about 30 minutes.  Over the last 2 weeks he has not had any of these.  He tells me he was under extreme stress when he was having frequent nightly palpitations. Denies chest pain.  No shortness of breath with activity.  No dizziness, syncope or lower extremity edema.    No prior history of cardiac disease.    Lifetime non-smoker.  No hypertension or diabetes.  Family history is unremarkable.  He has hyperlipidemia with elevated LDL at 178 mg/deciliter.  He has been on Crestor 10 mg over the last 3 weeks. Patient tells me that he has been on low-carb keto diet to lose weight.    Patient had exercise his echocardiogram in 11/2020 which showed normal biventricular function with no valve disease.  Stress test was normal.  I have reviewed patient's EKG from 4/11/2022 which shows sinus rhythm otherwise unremarkable.  Labs from 2021 showed normal CBC and BMP.  Medications, personal, family, and social history reviewed with patient and revised.    Interval history 9/26/2023:  Patient is being seen today for multiple concerns that he has over the last few months.  He tells me that he feels his heart beating fast several times a day.  He has shown me his wrist monitor results where his heart rate goes up to 158 bpm at the time of palpitations.  Can last up to 10 to 15 minutes.   When his heart is racing fast he feels discomfort in his chest.  No syncope.  Reports some shortness of breath with exertion.  Can feel discomfort (describes as feels weak in the chest and fuzzy feeling) in his chest with or without exertion.  Over the last few years he has been taking ibuprofen 200 mg daily for back pain.  He does not use CPAP.  He drinks alcohol at least 2 cocktails every night.  He has been drinking alcohol all his life.  He tells me he has a lot of stress in his life.  He has trouble sleeping.  He is recommended CBD lozenges.  No history of tobacco use.    Interval history 10/24/2023:  Patient returns for follow-up.  Patient's heart monitor showed paroxysmal atrial fibrillation corresponding to his symptoms.  This is a new diagnosis for the patient.  When we have received A-fib information I have started patient on metoprolol initially 50 then increased to 100 mg.  Today he tells me that he still has palpitation episodes mostly at night sometimes interfering with his sleep.  Not as fast as he used to be.  Continues to drink couple of alcoholic drinks almost every day though he tells me that he has cut back on this.  He is not sure if he would benefit from sleep testing.  He tells me that [place] of this [place].  He is wondering if he needs to take flecainide.  Echocardiogram is unremarkable.  CT calcium score is 0.    Interval history 6/26/2024:  Patient is being seen today for follow-up.  As you know he has history of paroxysmal atrial fibrillation now well-controlled with flecainide 100 mg twice daily.  Patient did not feel well with metoprolol 100 mg.  Seen by my colleague 2 months ago and metoprolol dose was reduced to 50 mg daily.  Since then his energy levels went up.  Feeling better but he tells me he is not back to baseline.  Otherwise he feels dizziness occasionally which does affect his quality of life.  Denies palpitations, chest pain.  He had a lot of  complaints today about how hard it is to get into cardiology appointment.    Interval history 12/4/2024  Patient is being seen today for follow-up.  Reports feeling well from cardiac standpoint.  Continues to report tiredness at times.  No recurrent A-fib since being on flecainide.  Has seen EP and discussed catheter ablation.  He tells me that he is not wanting to undergo a catheter ablation at this time.    PAST MEDICAL HISTORY:  Past Medical History:   Diagnosis Date    Anxiety 12/07/2015    Reid esophagus 10/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 It was difficult to discern between PLMS and hyponeas. Apnea/hypopnea index was 28.0 events per hour.  The REM AHI was 29.3 events per hour.  The supine AHI was 32.7 events per hour.  RDI was 28.0 events per hour. Lowest oxygen saturation was 84.0%.  Time spent less than or equal to 88% was 0.3 minutes. PLM index was 32.3 movements per hour       CURRENT MEDICATIONS:  Current Outpatient Medications   Medication Sig Dispense Refill    esomeprazole (NEXIUM) 40 MG DR capsule TAKE ONE CAPSULE BY MOUTH EVERY MORNING 30-60 MINUTES BEFORE BREAKFAST 90 capsule 3    flecainide (TAMBOCOR) 100 MG tablet Take 1 tablet (100 mg) by mouth 2 times daily 180 tablet 3    gabapentin (NEURONTIN) 100 MG capsule Take 1-6 capsules (100-600 mg) by mouth nightly as needed for other (PLMD). 90 capsule 1    metoprolol succinate ER (TOPROL XL) 25 MG 24 hr tablet Take 1 tablet (25 mg) by mouth daily 90 tablet 3    rosuvastatin (CRESTOR) 20 MG tablet Take 1 tablet (20 mg) by mouth daily 90 tablet 3       PAST SURGICAL HISTORY:  Past Surgical History:   Procedure Laterality Date    APPENDECTOMY  2000s    BLEPHAROPLASTY  BILATERAL Bilateral 10/3/2018    Procedure: BLEPHAROPLASTY BILATERAL;;  Surgeon: Dany Berrios MD;  Location: [facility]    CARPAL TUNNEL RELEASE RT/LT  1980s    COLONOSCOPY N/A 7/27/2022    Procedure: COLONOSCOPY, FLEXIBLE, WITH LESION REMOVAL USING SNARE;  Surgeon: Filiberto Banuelos MD;  Location: [facility]    COLONOSCOPY WITH CO2 INSUFFLATION N/A 7/27/2022    Procedure: COLONOSCOPY, WITH CO2 INSUFFLATION;  Surgeon: Filiberto Banuelos MD;  Location: [facility]    COMBINED ESOPHAGOSCOPY, GASTROSCOPY, DUODENOSCOPY (EGD) WITH CO2 INSUFFLATION N/A 9/20/2019    Procedure: ESOPHAGOGASTRODUODENOSCOPY, WITH CO2 INSUFFLATION;  Surgeon: Filiberto Banuelos MD;  Location: [facility]    COMBINED ESOPHAGOSCOPY, GASTROSCOPY, DUODENOSCOPY (EGD) WITH CO2 INSUFFLATION N/A 7/27/2022    Procedure: ESOPHAGOGASTRODUODENOSCOPY, WITH CO2 INSUFFLATION;  Surgeon: Filiberto Banuelos MD;  Location: [facility]    ESOPHAGOSCOPY, GASTROSCOPY, DUODENOSCOPY (EGD), COMBINED N/A 9/20/2019    Procedure: Esophagogastroduodenoscopy, With Biopsy;  Surgeon: Filiberto Banuelos MD;  Location: [facility]    ESOPHAGOSCOPY, GASTROSCOPY, DUODENOSCOPY (EGD), COMBINED N/A 7/27/2022    Procedure: ESOPHAGOGASTRODUODENOSCOPY, WITH BIOPSY;  Surgeon: Filiberto Banuelos MD;  Location: [facility]    REPAIR PTOSIS BILATERAL Bilateral 10/3/2018    Procedure: REPAIR PTOSIS BILATERAL;;  Surgeon: Dany Berrios MD;  Location: [facility]    REPAIR PTOSIS BROW BILATERAL Bilateral 10/3/2018    Procedure: REPAIR PTOSIS BROW BILATERAL;;  Surgeon: Dany Berrios MD;  Location: [facility]    SIGMOIDOSCOPY FLEXIBLE N/A 9/20/2019    Procedure: SIGMOIDOSCOPY, FLEXIBLE;  Surgeon: Filiberto Banuelos MD;  Location: [facility]    TONSILLECTOMY  1980s       ALLERGIES:   No Known Allergies    FAMILY HISTORY:  Family History   Problem Relation Age of Onset    Hypertension Mother     Alzheimer Disease Mother     Diabetes Brother     Glaucoma No family hx of      Macular Degeneration No family hx of     Coronary Artery Disease No family hx of     Colon Cancer No family hx of     Retinal detachment No family hx of          SOCIAL HISTORY:  Social History     Tobacco Use    Smoking status: Never     Passive exposure: Never    Smokeless tobacco: Never   Vaping Use    Vaping status: Never Used   Substance Use Topics    Alcohol use: Yes     Alcohol/week: 14.0 - 21.0 standard drinks of alcohol     Types: 14 - 21 Standard drinks or equivalent per week     Comment: varies, a couple drinks a day    Drug use: No       ROS:   Constitutional: No fever, chills, or sweats. Weight stable.   Cardiovascular: As per HPI.     Exam:  /76 (BP Location: Right arm, Patient Position: Chair, Cuff Size: Adult Regular)   Pulse 66   Wt 85.3 kg (188 lb)   SpO2 98%   BMI 28.59 kg/m    GENERAL APPEARANCE: alert and no distress  HEENT: no icterus, no central cyanosis  LYMPH/NECK: no adenopathy, no asymmetry, JVP not elevated  CARDIOVASCULAR: regular rhythm, normal S1, S2, no S3 or S4 and no murmur, click or rub, precordium quiet with normal PMI.  NEURO: alert, normal speech,and affect  SKIN: no ecchymoses, no rashes     I have reviewed the labs and personally reviewed the imaging below and made my comment in the assessment and plan.    Labs:  CBC RESULTS:   Lab Results   Component Value Date    WBC 16.8 (H) 08/16/2024    WBC 10.2 04/08/2021    RBC 4.75 08/16/2024    RBC 4.62 04/08/2021    HGB 15.0 08/16/2024    HGB 14.6 04/08/2021    HCT 45.4 08/16/2024    HCT 42.9 04/08/2021    MCV 96 08/16/2024    MCV 93 04/08/2021    MCH 31.6 08/16/2024    MCH 31.6 04/08/2021    MCHC 33.0 08/16/2024    MCHC 34.0 04/08/2021    RDW 12.6 08/16/2024    RDW 13.9 04/08/2021     08/16/2024     04/08/2021       BMP RESULTS:  Lab Results   Component Value Date     08/16/2024     04/08/2021    POTASSIUM 4.5 08/16/2024    POTASSIUM 3.8 11/04/2021    POTASSIUM 4.2 04/08/2021    CHLORIDE 101  08/16/2024    CHLORIDE 105 11/04/2021    CHLORIDE 104 04/08/2021    CO2 26 08/16/2024    CO2 22 11/04/2021    CO2 23 04/08/2021    ANIONGAP 11 08/16/2024    ANIONGAP 10 11/04/2021    ANIONGAP 7 04/08/2021    GLC 79 08/16/2024    GLC 96 11/04/2021    GLC 85 04/08/2021    BUN 14.2 08/16/2024    BUN 22 11/04/2021    BUN 21 04/08/2021    CR 1.32 (H) 08/16/2024    CR 1.12 04/08/2021    GFRESTIMATED 60 (L) 08/16/2024    GFRESTIMATED 70 04/08/2021    GFRESTBLACK 81 04/08/2021    GAYLA 9.1 08/16/2024    GAYLA 9.2 04/08/2021     Cardiac mobile telemetry for a month (10/4/2023) showed multiple atrial fibrillation episodes with RVR.  Lasting up to 3 hours.    CT CORONARY CALCIUM 10/04/2023  1.  No coronary calcifications.  2.  The total Agatston calcium score is 0 placing the patient in the  lowest percentile when compared to age and gender matched control  group.    TTE 10/17/2023  Global and regional left ventricular function is normal with an EF of 55-60%.  Global right ventricular function is normal.  Both atria appear normal.  No significant valvular abnormalities present.  IVC diameter <2.1 cm collapsing >50% with sniff suggests a normal RA pressure  of 3 mmHg.  No pericardial effusion is present.  There is no prior study for direct comparison.  Exercise stress echocardiogram 11/18/2020  Normal, low-risk exercise echocardiogram without evidence of ischemia at a  diagnostic workload (92% MPHR.)     The target heart rate was achieved. Normal heart rate and BP response to  exercise.  Normal biventricular size, thickness, and global systolic function at  baseline, LVEF=60-65%.  With exercise, LVEF increased to >70% and LV cavity size decreased  appropriately.  No regional wall motion abnormalities are present at rest or with exercise.  No angina was elicited.  No ECG evidence of ischemia. Occasional PVCs with exercise.  Functional capacity is normal for age.  No significant valvular abnormalities are noted on screening Doppler  exam.  The aortic root and visualized ascending aorta are normal.    EKG 4/11/2022 shows sinus rhythm otherwise unremarkable.          Assessment and Plan:   Returns for follow-up. A-fib well-controlled with flecainide.  Not considering catheter ablation.  Discussed that option with EP recently.    #Paroxysmal atrial fibrillation  -A-fib well-controlled with flecainide 100 mg twice daily.  -Continue metoprolol 25 mg daily.  [id number] score 1.  -He has cut down on alcohol  -Recent stress test was slightly submaximal but without any concerning side effects from flecainide.  -Plan to repeat stress test next year.    #Hyperlipidemia  -On Crestor 20 mg.  -CT calcium screening showed 0 calcium.    #CKD stage II  -Stable.  Likely due to chronic ibuprofen use.      Return to clinic 1 year.  No medication changes today.    Total time spent today for this visit is 42 minutes including precharting, face-to-face clinic visit, review of labs/imaging and medical documentation.    Janice GRANT MD  [facility] Division of Cardiology  Pager [phone]

## 2024-12-04 ENCOUNTER — OFFICE VISIT (OUTPATIENT)
Dept: CARDIOLOGY | Facility: CLINIC | Age: 64
End: 2024-12-04
Payer: COMMERCIAL

## 2024-12-04 VITALS
DIASTOLIC BLOOD PRESSURE: 76 MMHG | OXYGEN SATURATION: 98 % | BODY MASS INDEX: 28.59 KG/M2 | HEART RATE: 66 BPM | SYSTOLIC BLOOD PRESSURE: 123 MMHG | WEIGHT: 188 LBS

## 2024-12-04 DIAGNOSIS — E78.5 HYPERLIPIDEMIA WITH TARGET LDL LESS THAN 130: Chronic | ICD-10-CM

## 2024-12-04 DIAGNOSIS — I48.0 PAF (PAROXYSMAL ATRIAL FIBRILLATION) (H): Primary | ICD-10-CM

## 2024-12-04 DIAGNOSIS — Z79.899 ENCOUNTER FOR MONITORING FLECAINIDE THERAPY: ICD-10-CM

## 2024-12-04 DIAGNOSIS — Z51.81 ENCOUNTER FOR MONITORING FLECAINIDE THERAPY: ICD-10-CM

## 2024-12-04 NOTE — NURSING NOTE
"Chief Complaint   Patient presents with    RECHECK     Return general cardiolgy    Dizziness       Initial /76 (BP Location: Right arm, Patient Position: Chair, Cuff Size: Adult Regular)   Pulse 66   Wt 85.3 kg (188 lb)   SpO2 98%   BMI 28.59 kg/m   Estimated body mass index is 28.59 kg/m  as calculated from the following:    Height as of 10/22/24: 1.727 m (5' 8\").    Weight as of this encounter: 85.3 kg (188 lb)..  BP completed using cuff size: regular    FELICIANO Sorto    "

## 2024-12-04 NOTE — PATIENT INSTRUCTIONS
Thank you for coming to the [facility] [place]; please note the following instructions:    1. No Changes today    2. Follow up with Dr. Crawley in 1 year        If you have any questions regarding your visit please contact your care team:     Cardiology  Telephone Number   Abida H., RN  Yaz SALDIVAR, RN  Alena LITTLE, RN  Jeannette RAMOS, RMA  Britta BUCHANAN, RMA  Irene NYE, Visit Facilitator  Joselyn BONE [facility] [phone] (option 1)   For scheduling appts:     [phone] (select option 1)       For the Device Clinic (Pacemakers and ICD's)  RN's :  Aline Carty   During business hours: [phone]    *After business hours:  [phone] (select option 4)      Normal test result notifications will be released via [facility] or mailed within 7 business days.  All other test results, will be communicated via telephone once reviewed by your cardiologist.    If you need a medication refill please contact your pharmacy.  Please allow 3 business days for your refill to be completed.    As always, thank you for trusting us with your health care needs!

## 2024-12-04 NOTE — LETTER
12/4/2024      RE: Adan Oliver  [address]  [place] [address]       Dear Colleague,    Thank you for the opportunity to participate in the care of your patient, Adan Oliver, at the [facility]. Please see a copy of my visit note below.       General Cardiology [facility]      Referring provider:Johnathon Diaz MD    HPI: Mr. Adan Oliver is a 62 year old  male with PMH significant for    -Obstructive sleep apnea  -Alcohol use  -Reid's esophagus    Patient reports feeling palpitations over the last 1-1/2-month.  So far it has happened only at night.  He has woken up 6 or 7 times over the last 1-1/2 months between 3 AM to 7 AM with chest pressure and palpitations.  He was actually able to capture the heart rate on blood pressure machine which showed 150 bpm (showed me pictures of that from his cell phone).  No EKG documentation so far.  The heart pounding sensation lasted for about 30 minutes.  Over the last 2 weeks he has not had any of these.  He tells me he was under extreme stress when he was having frequent nightly palpitations. Denies chest pain.  No shortness of breath with activity.  No dizziness, syncope or lower extremity edema.    No prior history of cardiac disease.    Lifetime non-smoker.  No hypertension or diabetes.  Family history is unremarkable.  He has hyperlipidemia with elevated LDL at 178 mg/deciliter.  He has been on Crestor 10 mg over the last 3 weeks. Patient tells me that he has been on low-carb keto diet to lose weight.    Patient had exercise his echocardiogram in 11/2020 which showed normal biventricular function with no valve disease.  Stress test was normal.  I have reviewed patient's EKG from 4/11/2022 which shows sinus rhythm otherwise unremarkable.  Labs from 2021 showed normal CBC and BMP.  Medications, personal, family, and social history reviewed with patient  and revised.    Interval history 9/26/2023:  Patient is being seen today for multiple concerns that he has over the last few months.  He tells me that he feels his heart beating fast several times a day.  He has shown me his wrist monitor results where his heart rate goes up to 158 bpm at the time of palpitations.  Can last up to 10 to 15 minutes.  When his heart is racing fast he feels discomfort in his chest.  No syncope.  Reports some shortness of breath with exertion.  Can feel discomfort (describes as feels weak in the chest and fuzzy feeling) in his chest with or without exertion.  Over the last few years he has been taking ibuprofen 200 mg daily for back pain.  He does not use CPAP.  He drinks alcohol at least 2 cocktails every night.  He has been drinking alcohol all his life.  He tells me he has a lot of stress in his life.  He has trouble sleeping.  He is recommended CBD lozenges.  No history of tobacco use.    Interval history 10/24/2023:  Patient returns for follow-up.  Patient's heart monitor showed paroxysmal atrial fibrillation corresponding to his symptoms.  This is a new diagnosis for the patient.  When we have received A-fib information I have started patient on metoprolol initially 50 then increased to 100 mg.  Today he tells me that he still has palpitation episodes mostly at night sometimes interfering with his sleep.  Not as fast as he used to be.  Continues to drink couple of alcoholic drinks almost every day though he tells me that he has cut back on this.  He is not sure if he would benefit from sleep testing.  He tells me that [place] of this year.  He is wondering if he needs to take flecainide.  Echocardiogram is unremarkable.  CT calcium score is 0.    Interval history 6/26/2024:  Patient is being seen today for follow-up.  As you know he has history of paroxysmal atrial fibrillation now well-controlled with flecainide 100 mg twice daily.  Patient did  not feel well with metoprolol 100 mg.  Seen by my colleague 2 months ago and metoprolol dose was reduced to 50 mg daily.  Since then his energy levels went up.  Feeling better but he tells me he is not back to baseline.  Otherwise he feels dizziness occasionally which does affect his quality of life.  Denies palpitations, chest pain.  He had a lot of complaints today about how hard it is to get into cardiology appointment.    Interval history 12/4/2024  Patient is being seen today for follow-up.  Reports feeling well from cardiac standpoint.  Continues to report tiredness at times.  No recurrent A-fib since being on flecainide.  Has seen EP and discussed catheter ablation.  He tells me that he is not wanting to undergo a catheter ablation at this time.    PAST MEDICAL HISTORY:  Past Medical History:   Diagnosis Date     Anxiety 12/07/2015     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 It was difficult to discern between PLMS and hyponeas. Apnea/hypopnea index was 28.0 events per hour.  The REM AHI was 29.3 events per hour.  The supine AHI was 32.7 events per hour.  RDI was 28.0 events per hour. Lowest oxygen saturation was 84.0%.  Time spent less than or equal to 88% was 0.3 minutes. PLM index was 32.3 movements per hour       CURRENT MEDICATIONS:  Current Outpatient Medications   Medication Sig Dispense Refill     esomeprazole (NEXIUM) 40 MG DR capsule TAKE ONE CAPSULE BY MOUTH EVERY MORNING 30-60 MINUTES BEFORE BREAKFAST 90 capsule 3     flecainide (TAMBOCOR) 100 MG tablet Take 1 tablet (100 mg) by mouth 2 times daily 180 tablet 3     gabapentin (NEURONTIN) 100 MG capsule Take 1-6 capsules (100-600 mg) by mouth  nightly as needed for other (PLMD). 90 capsule 1     metoprolol succinate ER (TOPROL XL) 25 MG 24 hr tablet Take 1 tablet (25 mg) by mouth daily 90 tablet 3     rosuvastatin (CRESTOR) 20 MG tablet Take 1 tablet (20 mg) by mouth daily 90 tablet 3       PAST SURGICAL HISTORY:  Past Surgical History:   Procedure Laterality Date     APPENDECTOMY  2000s     BLEPHAROPLASTY BILATERAL Bilateral 10/3/2018    Procedure: BLEPHAROPLASTY BILATERAL;;  Surgeon: Dany Berrios MD;  Location: [facility]     CARPAL TUNNEL RELEASE RT/LT  1980s     COLONOSCOPY N/A 7/27/2022    Procedure: COLONOSCOPY, FLEXIBLE, WITH LESION REMOVAL USING SNARE;  Surgeon: Filiberto Banuelos MD;  Location: [facility]     COLONOSCOPY WITH CO2 INSUFFLATION N/A 7/27/2022    Procedure: COLONOSCOPY, WITH CO2 INSUFFLATION;  Surgeon: Filiberto Banuelos MD;  Location: [facility]     COMBINED ESOPHAGOSCOPY, GASTROSCOPY, DUODENOSCOPY (EGD) WITH CO2 INSUFFLATION N/A 9/20/2019    Procedure: ESOPHAGOGASTRODUODENOSCOPY, WITH CO2 INSUFFLATION;  Surgeon: Filiberto Banuelos MD;  Location: [facility]     COMBINED ESOPHAGOSCOPY, GASTROSCOPY, DUODENOSCOPY (EGD) WITH CO2 INSUFFLATION N/A 7/27/2022    Procedure: ESOPHAGOGASTRODUODENOSCOPY, WITH CO2 INSUFFLATION;  Surgeon: Filiberto Banuelos MD;  Location: [facility]     ESOPHAGOSCOPY, GASTROSCOPY, DUODENOSCOPY (EGD), COMBINED N/A 9/20/2019    Procedure: Esophagogastroduodenoscopy, With Biopsy;  Surgeon: Filiberto Banuelos MD;  Location: [facility]     ESOPHAGOSCOPY, GASTROSCOPY, DUODENOSCOPY (EGD), COMBINED N/A 7/27/2022    Procedure: ESOPHAGOGASTRODUODENOSCOPY, WITH BIOPSY;  Surgeon: Filiberto Banuelos MD;  Location: [facility]     REPAIR PTOSIS BILATERAL Bilateral 10/3/2018    Procedure: REPAIR PTOSIS BILATERAL;;  Surgeon: Dany Berrios MD;  Location: [facility]     REPAIR PTOSIS BROW BILATERAL Bilateral 10/3/2018    Procedure: REPAIR PTOSIS BROW BILATERAL;;  Surgeon: Dany Berrios MD;  Location:  [facility]     SIGMOIDOSCOPY FLEXIBLE N/A 9/20/2019    Procedure: SIGMOIDOSCOPY, FLEXIBLE;  Surgeon: Filiberto Banuelos MD;  Location: [facility]     TONSILLECTOMY  1980s       ALLERGIES:   No Known Allergies    FAMILY HISTORY:  Family History   Problem Relation Age of Onset     Hypertension Mother      Alzheimer Disease Mother      Diabetes Brother      Glaucoma No family hx of      Macular Degeneration No family hx of      Coronary Artery Disease No family hx of      Colon Cancer No family hx of      Retinal detachment No family hx of          SOCIAL HISTORY:  Social History     Tobacco Use     Smoking status: Never     Passive exposure: Never     Smokeless tobacco: Never   Vaping Use     Vaping status: Never Used   Substance Use Topics     Alcohol use: Yes     Alcohol/week: 14.0 - 21.0 standard drinks of alcohol     Types: 14 - 21 Standard drinks or equivalent per week     Comment: varies, a couple drinks a day     Drug use: No       ROS:   Constitutional: No fever, chills, or sweats. Weight stable.   Cardiovascular: As per HPI.     Exam:  /76 (BP Location: Right arm, Patient Position: Chair, Cuff Size: Adult Regular)   Pulse 66   Wt 85.3 kg (188 lb)   SpO2 98%   BMI 28.59 kg/m    GENERAL APPEARANCE: alert and no distress  HEENT: no icterus, no central cyanosis  LYMPH/NECK: no adenopathy, no asymmetry, JVP not elevated  CARDIOVASCULAR: regular rhythm, normal S1, S2, no S3 or S4 and no murmur, click or rub, precordium quiet with normal PMI.  NEURO: alert, normal speech,and affect  SKIN: no ecchymoses, no rashes     I have reviewed the labs and personally reviewed the imaging below and made my comment in the assessment and plan.    Labs:  CBC RESULTS:   Lab Results   Component Value Date    WBC 16.8 (H) 08/16/2024    WBC 10.2 04/08/2021    RBC 4.75 08/16/2024    RBC 4.62 04/08/2021    HGB 15.0 08/16/2024    HGB 14.6 04/08/2021    HCT 45.4 08/16/2024    HCT 42.9 04/08/2021    MCV 96 08/16/2024    MCV 93  04/08/2021    MCH 31.6 08/16/2024    MCH 31.6 04/08/2021    MCHC 33.0 08/16/2024    MCHC 34.0 04/08/2021    RDW 12.6 08/16/2024    RDW 13.9 04/08/2021     08/16/2024     04/08/2021       BMP RESULTS:  Lab Results   Component Value Date     08/16/2024     04/08/2021    POTASSIUM 4.5 08/16/2024    POTASSIUM 3.8 11/04/2021    POTASSIUM 4.2 04/08/2021    CHLORIDE 101 08/16/2024    CHLORIDE 105 11/04/2021    CHLORIDE 104 04/08/2021    CO2 26 08/16/2024    CO2 22 11/04/2021    CO2 23 04/08/2021    ANIONGAP 11 08/16/2024    ANIONGAP 10 11/04/2021    ANIONGAP 7 04/08/2021    GLC 79 08/16/2024    GLC 96 11/04/2021    GLC 85 04/08/2021    BUN 14.2 08/16/2024    BUN 22 11/04/2021    BUN 21 04/08/2021    CR 1.32 (H) 08/16/2024    CR 1.12 04/08/2021    GFRESTIMATED 60 (L) 08/16/2024    GFRESTIMATED 70 04/08/2021    GFRESTBLACK 81 04/08/2021    GAYLA 9.1 08/16/2024    GAYLA 9.2 04/08/2021     Cardiac mobile telemetry for a month (10/4/2023) showed multiple atrial fibrillation episodes with RVR.  Lasting up to 3 hours.    CT CORONARY CALCIUM 10/04/2023  1.  No coronary calcifications.  2.  The total Agatston calcium score is 0 placing the patient in the  lowest percentile when compared to age and gender matched control  group.    TTE 10/17/2023  Global and regional left ventricular function is normal with an EF of 55-60%.  Global right ventricular function is normal.  Both atria appear normal.  No significant valvular abnormalities present.  IVC diameter <2.1 cm collapsing >50% with sniff suggests a normal RA pressure  of 3 mmHg.  No pericardial effusion is present.  There is no prior study for direct comparison.  Exercise stress echocardiogram 11/18/2020  Normal, low-risk exercise echocardiogram without evidence of ischemia at a  diagnostic workload (92% MPHR.)     The target heart rate was achieved. Normal heart rate and BP response to  exercise.  Normal biventricular size, thickness, and global systolic  function at  baseline, LVEF=60-65%.  With exercise, LVEF increased to >70% and LV cavity size decreased  appropriately.  No regional wall motion abnormalities are present at rest or with exercise.  No angina was elicited.  No ECG evidence of ischemia. Occasional PVCs with exercise.  Functional capacity is normal for age.  No significant valvular abnormalities are noted on screening Doppler exam.  The aortic root and visualized ascending aorta are normal.    EKG 4/11/2022 shows sinus rhythm otherwise unremarkable.          Assessment and Plan:   Returns for follow-up. A-fib well-controlled with flecainide.  Not considering catheter ablation.  Discussed that option with EP recently.    #Paroxysmal atrial fibrillation  -A-fib well-controlled with flecainide 100 mg twice daily.  -Continue metoprolol 25 mg daily.  [id number] score 1.  -He has cut down on alcohol  -Recent stress test was slightly submaximal but without any concerning side effects from flecainide.  -Plan to repeat stress test next year.    #Hyperlipidemia  -On Crestor 20 mg.  -CT calcium screening showed 0 calcium.    #CKD stage II  -Stable.  Likely due to chronic ibuprofen use.      Return to clinic 1 year.  No medication changes today.    Total time spent today for this visit is 42 minutes including precharting, face-to-face clinic visit, review of labs/imaging and medical documentation.    Janice GRANT MD  [facility] Division of Cardiology  Pager [phone]      Please do not hesitate to contact me if you have any questions/concerns.     Sincerely,     Janice Grant MD

## 2024-12-05 ENCOUNTER — PATIENT OUTREACH (OUTPATIENT)
Dept: GERIATRIC MEDICINE | Facility: CLINIC | Age: 64
End: 2024-12-05
Payer: COMMERCIAL

## 2024-12-05 NOTE — PROGRESS NOTES
No longer active with [facility] community case management effective 11/30/24.  Reason for community disenrollment: MA Term 8/31/24  UCARE term 11/30/24.  Imelda Ham RN PHN  [facility] Care Coordinator  [phone]

## 2024-12-06 ENCOUNTER — OFFICE VISIT (OUTPATIENT)
Dept: ORTHOPEDICS | Facility: CLINIC | Age: 64
End: 2024-12-06
Payer: COMMERCIAL

## 2024-12-06 VITALS — BODY MASS INDEX: 28.49 KG/M2 | WEIGHT: 188 LBS | HEIGHT: 68 IN

## 2024-12-06 DIAGNOSIS — M19.079 ARTHRITIS OF GREAT TOE AT METATARSOPHALANGEAL JOINT: Primary | ICD-10-CM

## 2024-12-06 PROCEDURE — 20604 DRAIN/INJ JOINT/BURSA W/US: CPT | Mod: 50 | Performed by: FAMILY MEDICINE

## 2024-12-06 RX ADMIN — ROPIVACAINE HYDROCHLORIDE 0.5 ML: 5 INJECTION, SOLUTION EPIDURAL; INFILTRATION; PERINEURAL at 13:55

## 2024-12-06 RX ADMIN — TRIAMCINOLONE ACETONIDE 20 MG: 40 INJECTION, SUSPENSION INTRA-ARTICULAR; INTRAMUSCULAR at 13:55

## 2024-12-06 NOTE — LETTER
2024      Adan Webbstephanie  [address]  [place] [address]      Dear Colleague,    Thank you for referring your patient, Adan Oliver, to the [facility] [place]. Please see a copy of my visit note below.    Adan Oliver  :  1960  DOS: 2024  MRN: [id number]    Sports Medicine Clinic Procedure    Ultrasound Guided Bilateral Great Toe MTP Joint Injection    Clinical History: Interim History - 2024  Since last visit on 2024 with Dr Tomas patient has moderate-severe bilateral great toe pain, right>>>left.  Right great toe MTP joint injection completed on 24 provided relief for 4+ months, left great toe injection last completed in 23 provided relief for 10+ months.  Patient would like repeat injections today.  No new injury in the interim.    Diagnosis:   1. 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        Small Joint Injection/Arthrocentesis: bilateral great MTP    Date/Time: 2024 1:55 PM    Performed by: Danny Oleary DO  Authorized by: Danny Oleary DO  Indications:  Pain  Needle Size:  25 G  Guidance: ultrasound     Approach:  Dorsal  Location:  Great toe  Laterality:  Bilateral  Site:  Bilateral great MTP    Medications (Right):  20 mg triamcinolone 40 MG/ML; 0.5 mL ROPivacaine 5 MG/ML        Medications (Left):  20 mg triamcinolone 40 MG/ML; 0.5 mL ROPivacaine 5 MG/ML                Outcome:  Tolerated well, no immediate complications  Procedure discussed: discussed risks, benefits, and alternatives    Consent Given by:  Patient  Timeout: timeout called immediately prior to procedure    Prep: patient was prepped and draped in usual sterile fashion       Ultrasound images of procedure were permanently stored.         Impression:  Successful bilateral US guided CSI of the 1st MTP joint    Plan:  Follow up as needed based on clinical progress, reviewed goal of minimizing CSI  where possible  Reviewed relative risks, goals and limitations of CSI  Footwear strategies reviewed  Expectations and goals of CSI reviewed  Often 2-3 days for steroid effect, and can take up to two weeks for maximum effect  We discussed modified progressive pain-free activity as tolerated  Do not overuse in first two weeks if feeling better due to concern for vulnerability while steroid is working  Supportive care reviewed  All questions were answered today  Contact us with additional questions or concerns  Signs and sx of concern reviewed      Danny Oleary DO, CAQ  Primary Care Sports Medicine  [facility]         Again, thank you for allowing me to participate in the care of your patient.        Sincerely,        Danny Oleary DO

## 2024-12-06 NOTE — PROGRESS NOTES
Adan IDANIA Oliver  :  1960  DOS: 2024  MRN: [id number]    Sports Medicine Clinic Procedure    Ultrasound Guided Bilateral Great Toe MTP Joint Injection    Clinical History: Interim History - 2024  Since last visit on 2024 with Dr Tomas patient has moderate-severe bilateral great toe pain, right>>>left.  Right great toe MTP joint injection completed on 24 provided relief for 4+ months, left great toe injection last completed in 23 provided relief for 10+ months.  Patient would like repeat injections today.  No new injury in the interim.    Diagnosis:   1. 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        Small Joint Injection/Arthrocentesis: bilateral great MTP    Date/Time: 2024 1:55 PM    Performed by: Danny Oleary DO  Authorized by: Danny Oleary DO  Indications:  Pain  Needle Size:  25 G  Guidance: ultrasound     Approach:  Dorsal  Location:  Great toe  Laterality:  Bilateral  Site:  Bilateral great MTP    Medications (Right):  20 mg triamcinolone 40 MG/ML; 0.5 mL ROPivacaine 5 MG/ML        Medications (Left):  20 mg triamcinolone 40 MG/ML; 0.5 mL ROPivacaine 5 MG/ML                Outcome:  Tolerated well, no immediate complications  Procedure discussed: discussed risks, benefits, and alternatives    Consent Given by:  Patient  Timeout: timeout called immediately prior to procedure    Prep: patient was prepped and draped in usual sterile fashion       Ultrasound images of procedure were permanently stored.         Impression:  Successful bilateral US guided CSI of the 1st MTP joint    Plan:  Follow up as needed based on clinical progress, reviewed goal of minimizing CSI where possible  Reviewed relative risks, goals and limitations of CSI  Footwear strategies reviewed  Expectations and goals of CSI reviewed  Often 2-3 days for steroid effect, and can take up to two weeks for maximum effect  We discussed modified progressive  pain-free activity as tolerated  Do not overuse in first two weeks if feeling better due to concern for vulnerability while steroid is working  Supportive care reviewed  All questions were answered today  Contact us with additional questions or concerns  Signs and sx of concern reviewed      aDnny Oleary DO, ARCENIO  Primary Care Sports Medicine  [facility]

## 2024-12-10 RX ORDER — ROPIVACAINE HYDROCHLORIDE 5 MG/ML
0.5 INJECTION, SOLUTION EPIDURAL; INFILTRATION; PERINEURAL
Status: COMPLETED | OUTPATIENT
Start: 2024-12-06 | End: 2024-12-06

## 2024-12-10 RX ORDER — TRIAMCINOLONE ACETONIDE 40 MG/ML
20 INJECTION, SUSPENSION INTRA-ARTICULAR; INTRAMUSCULAR
Status: COMPLETED | OUTPATIENT
Start: 2024-12-06 | End: 2024-12-06

## 2024-12-16 ENCOUNTER — MYC MEDICAL ADVICE (OUTPATIENT)
Dept: SLEEP MEDICINE | Facility: CLINIC | Age: 64
End: 2024-12-16
Payer: COMMERCIAL

## 2024-12-16 DIAGNOSIS — G47.61 PERIODIC LIMB MOVEMENT DISORDER (PLMD): ICD-10-CM

## 2024-12-18 NOTE — TELEPHONE ENCOUNTER
Karyn stating taking 2 capsules per night is working for him, requesting a refill for 90 day supply.  Pended refill for 180 capsules.      Pending Prescriptions:                       Disp   Refills    gabapentin (NEURONTIN) 100 MG capsule     180 ca*0            Sig: Take 1-6 capsules (100-600 mg) by mouth nightly           as needed for other (PLMD).         Last Written Prescription Date:  10/22/24  Last Fill Quantity: 90   # refills: 1  Last Office Visit: 10/22/24  Future Office visit:  4/8/25     Routing refill request to provider for review/approval because:  Drug not on the [facility], [facility] or [facility] refill protocol or controlled substance

## 2024-12-19 RX ORDER — GABAPENTIN 100 MG/1
100-600 CAPSULE ORAL
Qty: 180 CAPSULE | Refills: 0 | Status: SHIPPED | OUTPATIENT
Start: 2024-12-19

## 2025-01-13 ENCOUNTER — MYC MEDICAL ADVICE (OUTPATIENT)
Dept: CARDIOLOGY | Facility: CLINIC | Age: 65
End: 2025-01-13
Payer: COMMERCIAL

## 2025-01-15 NOTE — TELEPHONE ENCOUNTER
Patient saw iMapData message. No response at this time.    Yaz Garrison, RN, BSN  Cardiology RN Care Coordinator   [place]   Phone: [phone]  Fax: [phone] ([place]) [phone] ([place])

## 2025-01-29 ENCOUNTER — MYC MEDICAL ADVICE (OUTPATIENT)
Dept: CARDIOLOGY | Facility: CLINIC | Age: 65
End: 2025-01-29
Payer: COMMERCIAL

## 2025-01-30 NOTE — TELEPHONE ENCOUNTER
Dr. Crawley, can he decrease metoprolol to 12.5 mg daily to see if it helps with his lethargy?  Abida Garcia RN

## 2025-02-12 ENCOUNTER — MYC MEDICAL ADVICE (OUTPATIENT)
Dept: FAMILY MEDICINE | Facility: CLINIC | Age: 65
End: 2025-02-12
Payer: COMMERCIAL

## 2025-04-08 ENCOUNTER — TELEPHONE (OUTPATIENT)
Dept: SLEEP MEDICINE | Facility: CLINIC | Age: 65
End: 2025-04-08

## 2025-04-08 NOTE — TELEPHONE ENCOUNTER
Patient needs to be rescheduled for their virtual visit due to Reason for Reschedule: Out-of-State    Scheduling team, please refer to service line late cancellation/no-show policies and reach out to patient at a later date for rescheduling.    Appointment mode: Video  Provider: aramis

## 2025-04-10 ENCOUNTER — TELEPHONE (OUTPATIENT)
Dept: FAMILY MEDICINE | Facility: CLINIC | Age: 65
End: 2025-04-10
Payer: COMMERCIAL

## 2025-04-10 NOTE — LETTER
April 10, 2025      Adan Oliver  [address]  [place] [address]    Your team at [facility] cares about your health. We have reviewed your chart and based on our findings; we are making the following recommendations to better manage your health.     You are in particular need of attention regarding the following:     Schedule an office visit with your PRIMARY CARE PHYSICIAN for mental health follow-up.  PREVENTATIVE VISIT: Annual Medicare Wellness:Schedule an Annual Medicare Wellness Exam. Please call your [facility] clinic to set up your appointment.    If you have already completed these items, please contact the clinic via phone or   MyChart so your care team can review and update your records. Thank you for   choosing [facility] Clinics for your healthcare needs. For any questions,   concerns, or to schedule an appointment please contact our clinic.    Healthy Regards,      Your [facility] Care Team            Electronically signed

## 2025-04-10 NOTE — TELEPHONE ENCOUNTER
Patient Quality Outreach    Patient is due for the following:   Depression  -  PHQ-9 needed, Depression follow-up visit, and DAP  Physical Annual Wellness Visit      Topic Date Due    Pneumococcal Vaccine (1 of 2 - PCV) Never done    Diptheria Tetanus Pertussis (DTAP/TDAP/TD) Vaccine (2 - Td or Tdap) 10/01/2024    COVID-19 Vaccine (8 - 2024-25 season) 04/15/2025       Action(s) Taken:   Schedule a Annual Wellness Visit    Type of outreach:    Sent letter.    Questions for provider review:    None         Adelita Daniel CMA  Chart routed to Care Team.

## 2025-04-11 DIAGNOSIS — I48.0 PAF (PAROXYSMAL ATRIAL FIBRILLATION) (H): ICD-10-CM

## 2025-04-12 ENCOUNTER — HEALTH MAINTENANCE LETTER (OUTPATIENT)
Age: 65
End: 2025-04-12

## 2025-04-14 ENCOUNTER — CARE COORDINATION (OUTPATIENT)
Dept: SLEEP MEDICINE | Facility: CLINIC | Age: 65
End: 2025-04-14
Payer: COMMERCIAL

## 2025-04-14 ASSESSMENT — SLEEP AND FATIGUE QUESTIONNAIRES
HOW LIKELY ARE YOU TO NOD OFF OR FALL ASLEEP WHILE SITTING AND READING: SLIGHT CHANCE OF DOZING
HOW LIKELY ARE YOU TO NOD OFF OR FALL ASLEEP WHILE SITTING AND TALKING TO SOMEONE: WOULD NEVER DOZE
HOW LIKELY ARE YOU TO NOD OFF OR FALL ASLEEP IN A CAR, WHILE STOPPED FOR A FEW MINUTES IN TRAFFIC: WOULD NEVER DOZE
HOW LIKELY ARE YOU TO NOD OFF OR FALL ASLEEP WHILE SITTING QUIETLY AFTER LUNCH WITHOUT ALCOHOL: WOULD NEVER DOZE
HOW LIKELY ARE YOU TO NOD OFF OR FALL ASLEEP WHEN YOU ARE A PASSENGER IN A CAR FOR AN HOUR WITHOUT A BREAK: WOULD NEVER DOZE
HOW LIKELY ARE YOU TO NOD OFF OR FALL ASLEEP WHILE WATCHING TV: SLIGHT CHANCE OF DOZING
HOW LIKELY ARE YOU TO NOD OFF OR FALL ASLEEP WHILE SITTING INACTIVE IN A PUBLIC PLACE: WOULD NEVER DOZE
HOW LIKELY ARE YOU TO NOD OFF OR FALL ASLEEP WHILE LYING DOWN TO REST IN THE AFTERNOON WHEN CIRCUMSTANCES PERMIT: SLIGHT CHANCE OF DOZING

## 2025-04-15 ENCOUNTER — VIRTUAL VISIT (OUTPATIENT)
Dept: SLEEP MEDICINE | Facility: CLINIC | Age: 65
End: 2025-04-15
Payer: COMMERCIAL

## 2025-04-15 DIAGNOSIS — G47.33 OSA (OBSTRUCTIVE SLEEP APNEA): ICD-10-CM

## 2025-04-15 DIAGNOSIS — G47.61 PERIODIC LIMB MOVEMENT DISORDER (PLMD): Primary | ICD-10-CM

## 2025-04-15 RX ORDER — PREGABALIN 75 MG/1
CAPSULE ORAL
Qty: 60 CAPSULE | Refills: 0 | Status: SHIPPED | OUTPATIENT
Start: 2025-04-15

## 2025-04-15 RX ORDER — FLECAINIDE ACETATE 100 MG/1
100 TABLET ORAL 2 TIMES DAILY
Qty: 180 TABLET | Refills: 2 | Status: SHIPPED | OUTPATIENT
Start: 2025-04-15

## 2025-04-15 ASSESSMENT — PAIN SCALES - GENERAL: PAINLEVEL_OUTOF10: NO PAIN (0)

## 2025-04-15 NOTE — TELEPHONE ENCOUNTER
Name from pharmacy: FLECAINIDE ACETATE 100MG TABS         Will file in chart as: flecainide (TAMBOCOR) 100 MG tablet    Sig: TAKE ONE TABLET BY MOUTH TWICE A DAY    Disp: 180 tablet    Refills: 3    Start: 4/11/2025    Class: E-Prescribe    Non-formulary For: PAF (paroxysmal atrial fibrillation) (H)    Last ordered: 1 year ago (12/27/2023) by Janice Crawley MD    Last refill: 12/18/2024    Rx #: [id number]    Anti Arrhythmic Agents Protocol [id number]/11/2025 11:18 AM   Protocol Details Medication needs approval from authorizing provider    Lipid Panel on file in past year    CBC on file in past year    ALT on file in past year    Serum Creatinine on file in past year    Serum Sodium on file in past year    Serum Potassium on file in past year    Patient is 18 years of age or older    Recent (6 mo) or future (30 days) visit with authorizing provider's specialty      To be filled at: [address]     Patient saw Dr. Crawley on 12/4/24. Patient was to follow up in 1 year. Patient was to continue flecainide 100 mg BID. Patient had stress test for flecainide monitoring. Patient up to date on labs. Prescription sent in for patient.    Yaz Garrison, RN, BSN  Cardiology RN Care Coordinator   [place]   Phone: [phone]  Fax: [phone] ([place]) [phone] ([place])

## 2025-04-15 NOTE — NURSING NOTE
Current patient location: [address] [place] [address] [address]  [place] [address]    Is the patient currently in the state of [place]? YES    Visit mode: VIDEO    If the visit is dropped, the patient can be reconnected by:VIDEO VISIT: Text to cell phone:   Telephone Information:   Mobile [phone]       Will anyone else be joining the visit? NO  (If patient encounters technical issues they should call [phone]    Are changes needed to the allergy or medication list? No    Are refills needed on medications prescribed by this physician? NO    Rooming Documentation:  Questionnaire(s) completed    Pt declined PHQ2    Reason for visit: RECHECK    Vivi VENCESF

## 2025-04-15 NOTE — PROGRESS NOTES
Virtual Visit Details  Type of service: Video Visit   Video Start Time: 2:52 PM  Video End Time: 3:28 PM  Originating Location (pt. Location): Home  Distant Location (provider location): On-site  Platform used for Video Visit: Well    Sleep Apnea - Follow-up Visit:    Impression/Plan:  (G47.61) Periodic limb movement disorder (PLMD) (primary encounter diagnosis)  Comment: Adan Oliver presents for follow-up of his periodic limb movement disorder currently treated with gabapentin. His gabapentin was prescribed by RASHEL Woods CNP. He was instructed to take 100-600 mg 1-3 hours before bedtime. He has tried taking 100-300 mg around 10-11 PM. He goes to bed around 1 AM. He is still not waking feeling refreshed. 300 mg made him feel worse in the morning due to grogginess. He is also reporting some dizziness that may have started around the time of taking gabapentin. Adan does not have any subjective complaints of restless legs in the evening but both of his sleep studies have shown abundant limb movements associated with arousals. His most recent sleep study done in October 2024 also showed mild positional obstructive sleep apnea. His apnea was severe on his back but Adan reports sleeping on his sides. Interestingly enough, his polysomnogram in 2017 showed a more moderate degree of LUCIAN that was not positional and he weighed the same as he did during his 2024 study. He did have abundant limb movements during his 2017 study as well.      Plan: pregabalin (LYRICA) 75 MG capsule  Adan has tried ropinirole in the past for limb movements, and he did not like that medication or notice any benefit. We discussed switching to pregabalin to see if that is less sedating for him. He is open to trying pregabalin so I prescribed  mg to be taken 1-3 hours before bedtime. We reviewed some behavioral strategies to reduce limb movements including reducing alcohol intake. Informed Adan reducing alcohol could also improve his  sleep quality.      (G47.33) LUCIAN (obstructive sleep apnea)  Comment: Most recently diagnosed with mild positional sleep apnea. Adan tries to stay off of his back at night. When he was diagnosed with moderate LUCIAN back in 2017, he did try CPAP for one week but could not tolerate the mask. He has been more irritable lately and feels that his memory is slipping. He is open to treating apnea to see if his symptoms improve.    Plan: Comprehensive DME  We reviewed treatment options for obstructive sleep apnea including PAP therapy, mandibular advancement device, positional therapy, surgery, weight management, and hypoglossal nerve stimulator. He is interested in the oral appliance and CPAP therapy. Adan elected to try CPAP first. An order was placed for Auto-PAP 5-15 cmH2O. We reviewed compliance goals, mask exchange policy, and patient was enrolled in [facility]. It is possible his limb movements will improve with treating apnea. I suspect he may have more apnea laterally than was captured in this most recent study.      Adan Oliver will follow up in about 3 month(s) to document compliance with his CPAP machine.     Total time spent reviewing medical records, history and physical examination, review of previous testing and interpretation as well as documentation on this date: 45 minutes     CC: Jimbo Zheng DO    History of Present Illness:  Chief Complaint   Patient presents with    RECHECK     Adan Oliver presents for follow-up of his periodic limb movement disorder currently treated with gabapentin.    He has taken 100-300 mg of gabapentin around 10-11 PM. He goes to bed around 1 AM. He does not wake feeling refreshed. When he takes 300 mg he feels worse.      He reports dizziness.     He does not sleep on his back.     He uses Breathe Right nasal strips.     He wakes 3-4 times per night, usually in the beginning of the night.     He is waking earlier than he wants to wake in the night.     He gets into bed around  1 AM and sometimes he doesn't fall asleep until 2 AM. He reads or uses his phone. His alarm is set for 9-9:30 AM. He needs 8 hours of sleep.     He does not nap during the day.     He drinks decaf coffee and stops at 6 PM.     He does have a couple cocktails almost every night.     He does not notice restless legs in the evening.     He did try CPAP after his 2017 sleep study.    PMH pertinent for newer diagnosis of atrial fibrillation, esophageal reflux, HLD, lumbar DDD, BPH, alcohol use, anxiety, psychophysiological insomnia, LUCIAN, and obesity.    He was previously seen in 2014 and had a negative sleep study.      Sleep study 7/6/2015 at the [facility] [place] for snoring insomnia, poor quality sleep. Light sleeper. Weight 198#.   -Sleep latency 16.5 minutes without Ambien. REM latency 189.5 minutes.  Sleep efficiency 64.4%. Total sleep time 266.5 minutes.  -Sleep architecture: Stage 1, 11.8% (5%), stage 2, 63.0% (45-55%), stage 3, 11.4% (15-20%), stage REM, 13.7% (20-25%).    -Supine AHI was 6.3 (9.5 minutes), REM AHI was 16.4, total AHI was 4.1, lowest oxygen saturation was 85.7%.  RDI 15.8.  Periodic Limb Movement Index 71.4/hour. The PLM arousal index was 10.4 per hour.       He was seen again in 9/2016 after his PCP put him on ropinirole. He tried requip 1 pill for periodic limb movement without change in symptoms.    Sleep study  10/02/2017 [facility] Study (200.0 lbs) - AHI 28.0, RDI 28.0, Supine AHI 32.7 REM AHI 29.3 Low O2 84%, Time Spent <=88% 0.3 minutes/Time Spent =89% 1.2 minutes. PLMI: 32.3/hr. PLMAI: 15.8/hr.    10/6/2024 [facility] (200.0 lbs) - AHI 12.8, RDI 15.1, Supine AHI 65.1, REM AHI -, Low O2 77.0%, Time Spent <=88% 2.5 minutes/Time Spent <=89% 3.7 minutes.    Do you use a CPAP Machine at home: (Patient-Rptd) No    EPWORTH SLEEPINESS SCALE         4/14/2025     5:15 PM    [place] Sleepiness Scale Molly Luna  [id number]<br>ESS - [place]/English  - Final version - 21 Nov 07 - [facility] [place].)   Sitting and reading Slight chance of dozing   Watching TV Slight chance of dozing   Sitting, inactive in a public place (e.g. a theatre or a meeting) Would never doze   As a passenger in a car for an hour without a break Would never doze   Lying down to rest in the afternoon when circumstances permit Slight chance of dozing   Sitting and talking to someone Would never doze   Sitting quietly after a lunch without alcohol Would never doze   In a car, while stopped for a few minutes in traffic Would never doze   Rawlins Score (MC) 3   Rawlins Score (Sleep) 3        Patient-reported       INSOMNIA SEVERITY INDEX (KIM)          4/14/2025     5:13 PM   Insomnia Severity Index (KIM)   Difficulty falling asleep 2   Difficulty staying asleep 2   Problems waking up too early 2   How SATISFIED/DISSATISFIED are you with your CURRENT sleep pattern? 4   How NOTICEABLE to others do you think your sleep problem is in terms of impairing the quality of your life? 3   How WORRIED/DISTRESSED are you about your current sleep problem? 4   To what extent do you consider your sleep problem to INTERFERE with your daily functioning (e.g. daytime fatigue, mood, ability to function at work/daily chores, concentration, memory, mood, etc.) CURRENTLY? 4   KIM Total Score 21        Patient-reported       Guidelines for Scoring/Interpretation:  Total score categories:  0-7 = No clinically significant insomnia   8-14 = Subthreshold insomnia   15-21 = Clinical insomnia (moderate severity)  22-28 = Clinical insomnia (severe)  Used via courtesy of www.myhealth.[place].gov with permission from Adelso Hayes PhD., [facility]      Past medical/surgical history, family history, social history, medications and allergies were reviewed.        Problem List:  Patient Active Problem List    Diagnosis Date Noted    Stage 3a chronic kidney disease (H) 08/16/2024     Priority: Medium    Atrial  fibrillation (H) 04/08/2024     Priority: Medium    Major depressive disorder, single episode, moderate (H) 04/08/2024     Priority: Medium    Benign prostatic hyperplasia with urinary frequency 06/09/2022     Priority: Medium    Hip pain, bilateral 11/19/2021     Priority: Medium    LUCIAN (obstructive sleep apnea)- 'mild, positional' (AHI 12.8) 10/06/2017     Priority: Medium     10/6/2024 [facility] Study (200.0 lbs) - AHI 12.8, RDI 15.1, Supine AHI 65.1, REM AHI -, Low O2 77.0%, Time Spent <=88% 2.5 minutes / Time Spent <=89% 3.7 minutes.    Study Date: 10/2/2017- (200.0 lbs) Scored using 3% rules. It was difficult to discern between PLMS and hyponeas. Apnea/hypopnea index was 28.0 events per hour.  The REM AHI was 29.3 events per hour.  The supine AHI was 32.7 events per hour.  RDI was 28.0 events per hour. Lowest oxygen saturation was 84.0%.  Time spent less than or equal to 88% was 0.3 minutes. PLM index was 32.3 movements per hour.  The PLM Arousal Index was 15.8 per hour.      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     Priority: Medium    Esophageal reflux 10/01/2014     Priority: Medium    Hyperlipidemia with target LDL less than 130 10/01/2014     Priority: Medium        There were no vitals taken for this visit.    RASHEL Harrell CNP

## 2025-04-18 ENCOUNTER — OFFICE VISIT (OUTPATIENT)
Dept: ORTHOPEDICS | Facility: CLINIC | Age: 65
End: 2025-04-18
Payer: COMMERCIAL

## 2025-04-18 DIAGNOSIS — M79.674 GREAT TOE PAIN, RIGHT: Primary | ICD-10-CM

## 2025-04-18 DIAGNOSIS — M19.079 ARTHRITIS OF GREAT TOE AT METATARSOPHALANGEAL JOINT: ICD-10-CM

## 2025-04-18 DIAGNOSIS — M79.641 BILATERAL HAND PAIN: ICD-10-CM

## 2025-04-18 DIAGNOSIS — M18.0 OSTEOARTHRITIS OF CARPOMETACARPAL (CMC) JOINT OF BOTH THUMBS: ICD-10-CM

## 2025-04-18 DIAGNOSIS — M79.642 BILATERAL HAND PAIN: ICD-10-CM

## 2025-04-18 PROCEDURE — 20604 DRAIN/INJ JOINT/BURSA W/US: CPT | Mod: FA | Performed by: FAMILY MEDICINE

## 2025-04-18 PROCEDURE — 20604 DRAIN/INJ JOINT/BURSA W/US: CPT | Mod: 51 | Performed by: FAMILY MEDICINE

## 2025-04-18 PROCEDURE — 99213 OFFICE O/P EST LOW 20 MIN: CPT | Mod: 25 | Performed by: FAMILY MEDICINE

## 2025-04-18 RX ADMIN — TRIAMCINOLONE ACETONIDE 20 MG: 40 INJECTION, SUSPENSION INTRA-ARTICULAR; INTRAMUSCULAR at 14:43

## 2025-04-18 RX ADMIN — ROPIVACAINE HYDROCHLORIDE 0.5 ML: 5 INJECTION, SOLUTION EPIDURAL; INFILTRATION; PERINEURAL at 14:43

## 2025-04-18 NOTE — LETTER
2025      Adan Oliver  [address]  [place] [address]      Dear Colleague,    Thank you for referring your patient, Adan Oliver, to the [facility] [place]. Please see a copy of my visit note below.    Adan Oliver  :  1960  DOS: 2025  MRN: [id number]    Sports Medicine Clinic Visit    PCP: Jimbo Zheng    Adan Oliver is a 65 year old Right hand dominant male who is seen in follow-up presenting with left thumb and right great toe pain.    Interim History - 2025  Since last visit on 2024 patient has moderate-severe right great toe, MTP joint pain over the past 1+ months.  Bilateral great toe MTP joint injection completed on 2024 provided relief for ~ 3 months on right, still getting relief on left.  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  We nearly unable to use his hands or walk without a limp by the time the job ended.    Patient is also being seen in follow up for bilateral thumb CMC joint, left >>>right over the past 1 - 2 months that initially started after performing repetitive grasping activities.  Pain is worse with thumb opposition & grasping.  Left thumb CMC joint injection completed 2023 provided relief for 12+ months, no prior injections into right thumb.  No updated imaging since last visit.    Review of Systems  Musculoskeletal: as above  Remainder of review of systems is negative including constitutional, CV, pulmonary, GI, Skin and Neurologic except as noted in HPI or medical history.    Past Medical History:   Diagnosis Date     Anxiety 2015     Reid esophagus 10/30/2014     Esophageal reflux 10/01/2014     History of shingles 2012     Hyperlipidemia with target LDL less than 130 10/01/2014     Lumbar degenerative disc disease 2016     Nephrolithiasis 2009     LUCIAN (obstructive sleep apnea)-  'moderate' (AHI 6) 10/06/2017    Study Date: 10/2/2017- (200.0 lbs) Scored using 3% rules. It was difficult to discern between PLMS and hyponeas. Apnea/hypopnea index was 28.0 events per hour.  The REM AHI was 29.3 events per hour.  The supine AHI was 32.7 events per hour.  RDI was 28.0 events per hour. Lowest oxygen saturation was 84.0%.  Time spent less than or equal to 88% was 0.3 minutes. PLM index was 32.3 movements per hour     Past Surgical History:   Procedure Laterality Date     APPENDECTOMY  2000s     BLEPHAROPLASTY BILATERAL Bilateral 10/3/2018    Procedure: BLEPHAROPLASTY BILATERAL;;  Surgeon: Dany Berrios MD;  Location: [facility]     CARPAL TUNNEL RELEASE RT/LT  1980s     COLONOSCOPY N/A 7/27/2022    Procedure: COLONOSCOPY, FLEXIBLE, WITH LESION REMOVAL USING SNARE;  Surgeon: Filiberto Banuelos MD;  Location: [facility] OR     COLONOSCOPY WITH CO2 INSUFFLATION N/A 7/27/2022    Procedure: COLONOSCOPY, WITH CO2 INSUFFLATION;  Surgeon: Filiberto Banuelos MD;  Location: [facility]     COMBINED ESOPHAGOSCOPY, GASTROSCOPY, DUODENOSCOPY (EGD) WITH CO2 INSUFFLATION N/A 9/20/2019    Procedure: ESOPHAGOGASTRODUODENOSCOPY, WITH CO2 INSUFFLATION;  Surgeon: Filiberto Banuelos MD;  Location: [facility]     COMBINED ESOPHAGOSCOPY, GASTROSCOPY, DUODENOSCOPY (EGD) WITH CO2 INSUFFLATION N/A 7/27/2022    Procedure: ESOPHAGOGASTRODUODENOSCOPY, WITH CO2 INSUFFLATION;  Surgeon: Filiberto Banuelos MD;  Location: [facility]     ESOPHAGOSCOPY, GASTROSCOPY, DUODENOSCOPY (EGD), COMBINED N/A 9/20/2019    Procedure: Esophagogastroduodenoscopy, With Biopsy;  Surgeon: Filiberto Banuelos MD;  Location: [facility]     ESOPHAGOSCOPY, GASTROSCOPY, DUODENOSCOPY (EGD), COMBINED N/A 7/27/2022    Procedure: ESOPHAGOGASTRODUODENOSCOPY, WITH BIOPSY;  Surgeon: Filiberto Banuelos MD;  Location: [facility]     REPAIR PTOSIS BILATERAL Bilateral 10/3/2018    Procedure: REPAIR PTOSIS BILATERAL;;  Surgeon: Dany Berrios,  MD;  Location: [facility]     REPAIR PTOSIS BROW BILATERAL Bilateral 10/3/2018    Procedure: REPAIR PTOSIS BROW BILATERAL;;  Surgeon: Dany Berrios MD;  Location: [facility]     SIGMOIDOSCOPY FLEXIBLE N/A 9/20/2019    Procedure: SIGMOIDOSCOPY, FLEXIBLE;  Surgeon: Filiberto Banuelos MD;  Location: [facility]     TONSILLECTOMY  1980s     Family History   Problem Relation Age of Onset     Hypertension Mother      Alzheimer Disease Mother      Diabetes Brother      Glaucoma No family hx of      Macular Degeneration No family hx of      Coronary Artery Disease No family hx of      Colon Cancer No family hx of      Retinal detachment No family hx of        Objective    General: healthy, alert and in no distress    HEENT: no scleral icterus or conjunctival erythema   Skin: no suspicious lesions or rash. No jaundice.   CV: regular rhythm by palpation, 2+ distal pulses, no pedal edema    Resp: normal respiratory effort without conversational dyspnea   Psych: normal mood and affect    Gait: mildly antalgic, appropriate coordination and balance   Neuro: normal light touch sensory exam of the extremities. Motor strength as noted below     Bilateral Foot and Ankle Exam:     Inspection:       no visible ecchymosis       no visible effusion, mild edema at the first MTP joint left     Foot inspection:       no deformity noted     Tender:       Great toe MTP joint R>>L     Non-Tender:       remainder of ankle and foot      ROM:        Decreased great toe dorsiflexion and plantarflexion L>R currently     Strength:       ankle dorsiflexion 5/5 right       plantarflexion 5/5 right       inversion 5/5 right       eversion 5/5 right       great toe dorsiflexion 5/5 right       great toe plantarflexion 5/5 right     Gait:       normal     Neurovascular:       2+ peripheral pulses bilaterally and brisk capillary refill       sensation grossly intact     Bilateral hand exam  Full strength and ROM bilaterally  Painful opposition of the thumb  b/l, L>>R, localizing to 1st CMC joint  Moderately painful CMC grind on the left  Neg Tinel's and Phalen's, neg Finkelstein's    Radiology:  XR FOOT RIGHT G/E 3 VIEWS 2/1/2023 1:40 PM      HISTORY: Pain in joint involving right ankle and foot     COMPARISON: None.                                                            IMPRESSION: Severe degenerative change of the first MTP joint. Right  foot negative for fracture.    Narrative      LEFT FOOT THREE OR MORE VIEWS  11/10/2023 1:44 PM      HISTORY: Left foot pain.     COMPARISON: None.         Impression     IMPRESSION:  Mild to moderate arthrosis first MTP joint. No evidence  of an acute fracture. No Lisfranc subluxation.     HAND BILATERAL THREE OR MORE VIEWS   11/10/2023 1:44 PM      HISTORY: Bilateral hand pain.     COMPARISON: None.          Impression     IMPRESSION: Mild to moderate scattered arthritic changes throughout  the hands most pronounced in the IP joints of the fingers, bilateral  thumb CMC joints and right middle finger MCP joint. There is no  erosive change or evidence for an inflammatory arthropathy. Normal  bone mineralization. No acute fracture.       Hand / Upper Extremity Injection/Arthrocentesis: L thumb CMC    Date/Time: 4/18/2025 2:43 PM    Performed by: Danny Oleary DO  Authorized by: Danny Oleary DO    Indications:  Pain and therapeutic  Needle Size:  25 G  Guidance: ultrasound    Approach:  Radial  Condition: osteoarthritis    Location:  Thumb  Site:  L thumb CMC    Medications:  0.5 mL ROPivacaine 5 MG/ML; 20 mg triamcinolone 40 MG/ML  Outcome:  Tolerated well, no immediate complications  Procedure discussed: discussed risks, benefits, and alternatives    Consent Given by:  Patient  Timeout: timeout called immediately prior to procedure    Prep: patient was prepped and draped in usual sterile fashion     Ultrasound images of procedure were permanently stored.   Small Joint Injection/Arthrocentesis: R great  MTP    Date/Time: 4/18/2025 2:43 PM    Performed by: Danny Oleary DO  Authorized by: Danny Oleary DO  Indications:  Pain   Indications comment:  Osteoarthritis  Needle Size:  25 G  Guidance: ultrasound     Approach:  Dorsal  Location:  Great toe    Site:  R great MTP                    Medications:  20 mg triamcinolone 40 MG/ML; 0.5 mL ROPivacaine 5 MG/ML        Outcome:  Tolerated well, no immediate complications  Procedure discussed: discussed risks, benefits, and alternatives    Consent Given by:  Patient  Timeout: timeout called immediately prior to procedure    Prep: patient was prepped and draped in usual sterile fashion       Ultrasound images of procedure were permanently stored.         Assessment:  1. Great toe pain, right    2. 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 Bilateral hand pain    4. Osteoarthritis of carpometacarpal (CMC) joint of both thumbs        Plan:  Discussed the assessment with the patient.  Follow up: 3 weeks if not improved, or if addition joints need to be addressed  Several areas of current inflammation, worst are right 1st MTP joint and left 1st CMC, worsened by increased labor activity for work over the winter  Reviewed diet and exercise goals  XR images independently visualized and reviewed with patient today in clinic  Degenerative changes noted and reviewed from prior imaging  Oral Tylenol and topical Voltaren gel reviewed as safe OTC options, reviewed safe dosing strategies  RICE reviewed as well as footwear options/strategies, as well as assistive devices and bracing options  After review of the alternatives, relative risks, goals and limitations of corticosteroid injection, the patient elected to proceed with US guided CSI to the 1st CMC joint left and 1st MTP joint right for control of pain and inflammation from underlying DJD  Surgical consultation option reviewed  Expectations and goals of CSI reviewed  Often 2-3 days for steroid  effect, and can take up to two weeks for maximum effect  We discussed modified progressive pain-free activity as tolerated  Do not overuse in first two weeks if feeling better due to concern for vulnerability while steroid is working  Supportive care reviewed  All questions were answered today  Contact us with additional questions or concerns  Signs and sx of concern reviewed      Danny Oleary DO, CAQ  Sports Medicine Physician  [facility]          Disclaimer: This note consists of symbols derived from keyboarding, dictation and/or voice recognition software. As a result, there may be errors in the script that have gone undetected. Please consider this when interpreting information found in this chart.      Again, thank you for allowing me to participate in the care of your patient.        Sincerely,        Danny Oleary DO    Electronically signed

## 2025-04-18 NOTE — PROGRESS NOTES
Adan Oliver  :  1960  DOS: 2025  MRN: [id number]    Sports Medicine Clinic Visit    PCP: Jimbo Zheng    Adan Oliver is a 65 year old Right hand dominant male who is seen in follow-up presenting with left thumb and right great toe pain.    Interim History - 2025  Since last visit on 2024 patient has moderate-severe right great toe, MTP joint pain over the past 1+ months.  Bilateral great toe MTP joint injection completed on 2024 provided relief for ~ 3 months on right, still getting relief on left.  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  We nearly unable to use his hands or walk without a limp by the time the job ended.    Patient is also being seen in follow up for bilateral thumb CMC joint, left >>>right over the past 1 - 2 months that initially started after performing repetitive grasping activities.  Pain is worse with thumb opposition & grasping.  Left thumb CMC joint injection completed 2023 provided relief for 12+ months, no prior injections into right thumb.  No updated imaging since last visit.    Review of Systems  Musculoskeletal: as above  Remainder of review of systems is negative including constitutional, CV, pulmonary, GI, Skin and Neurologic except as noted in HPI or medical history.    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 It was difficult to discern between PLMS and hyponeas. Apnea/hypopnea index was 28.0 events per hour.  The REM AHI was 29.3 events per hour.  The supine AHI was 32.7 events per hour.   RDI was 28.0 events per hour. Lowest oxygen saturation was 84.0%.  Time spent less than or equal to 88% was 0.3 minutes. PLM index was 32.3 movements per hour     Past Surgical History:   Procedure Laterality Date    APPENDECTOMY  2000s    BLEPHAROPLASTY BILATERAL Bilateral 10/3/2018    Procedure: BLEPHAROPLASTY BILATERAL;;  Surgeon: Dany Berrios MD;  Location: [facility]    CARPAL TUNNEL RELEASE RT/LT  1980s    COLONOSCOPY N/A 7/27/2022    Procedure: COLONOSCOPY, FLEXIBLE, WITH LESION REMOVAL USING SNARE;  Surgeon: Filiberto Banuelos MD;  Location: [facility]    COLONOSCOPY WITH CO2 INSUFFLATION N/A 7/27/2022    Procedure: COLONOSCOPY, WITH CO2 INSUFFLATION;  Surgeon: Filiberto Banuelos MD;  Location: [facility]    COMBINED ESOPHAGOSCOPY, GASTROSCOPY, DUODENOSCOPY (EGD) WITH CO2 INSUFFLATION N/A 9/20/2019    Procedure: ESOPHAGOGASTRODUODENOSCOPY, WITH CO2 INSUFFLATION;  Surgeon: Filiberto Banuelos MD;  [facility]    COMBINED ESOPHAGOSCOPY, GASTROSCOPY, DUODENOSCOPY (EGD) WITH CO2 INSUFFLATION N/A 7/27/2022    Procedure: ESOPHAGOGASTRODUODENOSCOPY, WITH CO2 INSUFFLATION;  Surgeon: Filiberto Banuelos MD;  [facility]    ESOPHAGOSCOPY, GASTROSCOPY, DUODENOSCOPY (EGD), COMBINED N/A 9/20/2019    Procedure: Esophagogastroduodenoscopy, With Biopsy;  Surgeon: Filiberto Banuelos MD;  [facility]    ESOPHAGOSCOPY, GASTROSCOPY, DUODENOSCOPY (EGD), COMBINED N/A 7/27/2022    Procedure: ESOPHAGOGASTRODUODENOSCOPY, WITH BIOPSY;  Surgeon: Filiberto Banuelos MD;  [facility]    REPAIR PTOSIS BILATERAL Bilateral 10/3/2018    Procedure: REPAIR PTOSIS BILATERAL;;  Surgeon: Dany Berrios MD;  [facility]    REPAIR PTOSIS BROW BILATERAL Bilateral 10/3/2018    Procedure: REPAIR PTOSIS BROW BILATERAL;;  Surgeon: Dany Berrios MD;  [facility]    SIGMOIDOSCOPY FLEXIBLE N/A 9/20/2019    Procedure: SIGMOIDOSCOPY, FLEXIBLE;  Surgeon: Filiberto Banuelos,  MD;  Location: [facility]    TONSILLECTOMY  1980s     Family History   Problem Relation Age of Onset    Hypertension Mother     Alzheimer Disease Mother     Diabetes Brother     Glaucoma No family hx of     Macular Degeneration No family hx of     Coronary Artery Disease No family hx of     Colon Cancer No family hx of     Retinal detachment No family hx of        Objective    General: healthy, alert and in no distress    HEENT: no scleral icterus or conjunctival erythema   Skin: no suspicious lesions or rash. No jaundice.   CV: regular rhythm by palpation, 2+ distal pulses, no pedal edema    Resp: normal respiratory effort without conversational dyspnea   Psych: normal mood and affect    Gait: mildly antalgic, appropriate coordination and balance   Neuro: normal light touch sensory exam of the extremities. Motor strength as noted below     Bilateral Foot and Ankle Exam:     Inspection:       no visible ecchymosis       no visible effusion, mild edema at the first MTP joint left     Foot inspection:       no deformity noted     Tender:       Great toe MTP joint R>>L     Non-Tender:       remainder of ankle and foot      ROM:        Decreased great toe dorsiflexion and plantarflexion L>R currently     Strength:       ankle dorsiflexion 5/5 right       plantarflexion 5/5 right       inversion 5/5 right       eversion 5/5 right       great toe dorsiflexion 5/5 right       great toe plantarflexion 5/5 right     Gait:       normal     Neurovascular:       2+ peripheral pulses bilaterally and brisk capillary refill       sensation grossly intact     Bilateral hand exam  Full strength and ROM bilaterally  Painful opposition of the thumb b/l, L>>R, localizing to 1st CMC joint  Moderately painful CMC grind on the left  Neg Tinel's and Phalen's, neg Finkelstein's    Radiology:  XR FOOT RIGHT G/E 3 VIEWS 2/1/2023 1:40 PM      HISTORY: Pain in joint involving right ankle and foot     COMPARISON: None.                                                             IMPRESSION: Severe degenerative change of the first MTP joint. Right  foot negative for fracture.    Narrative      LEFT FOOT THREE OR MORE VIEWS  11/10/2023 1:44 PM      HISTORY: Left foot pain.     COMPARISON: None.         Impression     IMPRESSION:  Mild to moderate arthrosis first MTP joint. No evidence  of an acute fracture. No Lisfranc subluxation.     HAND BILATERAL THREE OR MORE VIEWS   11/10/2023 1:44 PM      HISTORY: Bilateral hand pain.     COMPARISON: None.          Impression     IMPRESSION: Mild to moderate scattered arthritic changes throughout  the hands most pronounced in the IP joints of the fingers, bilateral  thumb CMC joints and right middle finger MCP joint. There is no  erosive change or evidence for an inflammatory arthropathy. Normal  bone mineralization. No acute fracture.       Hand / Upper Extremity Injection/Arthrocentesis: L thumb CMC    Date/Time: 4/18/2025 2:43 PM    Performed by: Danny Oleary DO  Authorized by: Danny Oleary DO    Indications:  Pain and therapeutic  Needle Size:  25 G  Guidance: ultrasound    Approach:  Radial  Condition: osteoarthritis    Location:  Thumb  Site:  L thumb CMC    Medications:  0.5 mL ROPivacaine 5 MG/ML; 20 mg triamcinolone 40 MG/ML  Outcome:  Tolerated well, no immediate complications  Procedure discussed: discussed risks, benefits, and alternatives    Consent Given by:  Patient  Timeout: timeout called immediately prior to procedure    Prep: patient was prepped and draped in usual sterile fashion     Ultrasound images of procedure were permanently stored.   Small Joint Injection/Arthrocentesis: R great MTP    Date/Time: 4/18/2025 2:43 PM    Performed by: Danny Oleary DO  Authorized by: Danny Oleary DO  Indications:  Pain   Indications comment:  Osteoarthritis  Needle Size:  25 G  Guidance: ultrasound     Approach:  Dorsal  Location:  Great toe    Site:  R great MTP                     Medications:  20 mg triamcinolone 40 MG/ML; 0.5 mL ROPivacaine 5 MG/ML        Outcome:  Tolerated well, no immediate complications  Procedure discussed: discussed risks, benefits, and alternatives    Consent Given by:  Patient  Timeout: timeout called immediately prior to procedure    Prep: patient was prepped and draped in usual sterile fashion       Ultrasound images of procedure were permanently stored.         Assessment:  1. Great toe pain, right    2. 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 Bilateral hand pain    4. Osteoarthritis of carpometacarpal (CMC) joint of both thumbs        Plan:  Discussed the assessment with the patient.  Follow up: 3 weeks if not improved, or if addition joints need to be addressed  Several areas of current inflammation, worst are right 1st MTP joint and left 1st CMC, worsened by increased labor activity for work over the winter  Reviewed diet and exercise goals  XR images independently visualized and reviewed with patient today in clinic  Degenerative changes noted and reviewed from prior imaging  Oral Tylenol and topical Voltaren gel reviewed as safe OTC options, reviewed safe dosing strategies  RICE reviewed as well as footwear options/strategies, as well as assistive devices and bracing options  After review of the alternatives, relative risks, goals and limitations of corticosteroid injection, the patient elected to proceed with US guided CSI to the 1st CMC joint left and 1st MTP joint right for control of pain and inflammation from underlying DJD  Surgical consultation option reviewed  Expectations and goals of CSI reviewed  Often 2-3 days for steroid effect, and can take up to two weeks for maximum effect  We discussed modified progressive pain-free activity as tolerated  Do not overuse in first two weeks if feeling better due to concern for vulnerability while steroid is working  Supportive care reviewed  All questions were answered  today  Contact us with additional questions or concerns  Signs and sx of concern reviewed      Danny Oleary DO, ARCENIO  Sports Medicine Physician  [facility]          Disclaimer: This note consists of symbols derived from keyboarding, dictation and/or voice recognition software. As a result, there may be errors in the script that have gone undetected. Please consider this when interpreting information found in this chart.

## 2025-04-21 RX ORDER — ROPIVACAINE HYDROCHLORIDE 5 MG/ML
0.5 INJECTION, SOLUTION EPIDURAL; INFILTRATION; PERINEURAL
Status: COMPLETED | OUTPATIENT
Start: 2025-04-18 | End: 2025-04-18

## 2025-04-21 RX ORDER — TRIAMCINOLONE ACETONIDE 40 MG/ML
20 INJECTION, SUSPENSION INTRA-ARTICULAR; INTRAMUSCULAR
Status: COMPLETED | OUTPATIENT
Start: 2025-04-18 | End: 2025-04-18

## 2025-04-28 ENCOUNTER — PATIENT OUTREACH (OUTPATIENT)
Dept: GASTROENTEROLOGY | Facility: CLINIC | Age: 65
End: 2025-04-28
Payer: COMMERCIAL

## 2025-04-28 DIAGNOSIS — Z12.11 SCREEN FOR COLON CANCER: Primary | ICD-10-CM

## 2025-04-28 DIAGNOSIS — K22.70 BARRETT'S ESOPHAGUS WITHOUT DYSPLASIA: ICD-10-CM

## 2025-04-28 NOTE — PROGRESS NOTES
Patients last colonoscopy on file was on 7/27/22 and was recommended to repeat in 3 years.  Patient is also due for EGD, however [facility] RN team unable to place EGD orders. Will route to PCP to place orders for patient.

## 2025-04-29 ENCOUNTER — TELEPHONE (OUTPATIENT)
Dept: CARDIOLOGY | Facility: CLINIC | Age: 65
End: 2025-04-29
Payer: COMMERCIAL

## 2025-04-29 ENCOUNTER — NURSE TRIAGE (OUTPATIENT)
Dept: CARDIOLOGY | Facility: CLINIC | Age: 65
End: 2025-04-29
Payer: COMMERCIAL

## 2025-04-29 NOTE — TELEPHONE ENCOUNTER
Called and spoke with patient. Patient reports that his blood pressure at this time is 105/78. Pulse is 128. Patient reports weakness at his heart. Advised patient to go to ED. Patient took his morning flecainide 100 mg at 10:30 am. He also took an as needed dose of metoprolol 25 mg at approximately 12 pm. Reviewed with patient importance of going to ED. Patient declined going to ED. Patient states that he will check his blood pressure and pulse in approximately 1 hour and if heart rate is still elevated, he will consider going to ED.     Patient states that he felt he had an episode of chest weakness and palpitations last week but it passed.  Otherwise patient denied any other episodes. Informed patient that provider would be updated.    Yaz Garrison RN, BSN  Cardiology RN Care Coordinator   [place]   Phone: [phone]  Fax: [phone] ([place]) [phone] ([place])

## 2025-04-29 NOTE — TELEPHONE ENCOUNTER
No.      You  You; Can, MD Janice14 minutes ago (1:50 PM)     CK  I finally convinced him to go to ED. He is heading there. He did want to update that he was recently prescribed pregabalin. Any concerns?     Called and spoke with patient. Patient verbalized understanding. Patient is going to go to ED.    Yaz Garrison RN, BSN  Cardiology RN Care Coordinator   [place]   Phone: [phone]  Fax: [phone] ([place]) [phone] ([place])

## 2025-04-29 NOTE — TELEPHONE ENCOUNTER
Reason for Disposition    Feeling weak or lightheaded (e.g., woozy, feeling like they might faint)    Protocols used: Heart Rate and Heartbeat [id number]

## 2025-04-29 NOTE — TELEPHONE ENCOUNTER
[facility] Call Center    Phone Message    May a detailed message be left on voicemail: yes     Reason for Call: Other: FYI: Pt called in after being told by Triage to go to ER. He wanted to know what was the closest ER to him at the time he gave me his location and I did a google search but he didn't want to go to the one I found since it would be against traffic and he was also worried about making sure that where he would go was covered by insurance. He told me he was going to call his insurance to see where they would recommend. He called back right before 5:00 pm and said he was sent on a wild goose kavon by his insurance company because where they told him to go was a Urgent Care and not a ER and he again asked where he should go and what was closest. He had spent hours in his car, and also got a blood pressure reading at his local pharm and it was 144 so they also advised him to go to a ER but also didn't know what location to send him too. He made the comment about how someone was going to be responsible if he  in his car and I told him I would see if maybe a Triage nurse would be able to give him advice on what ER to go to I called over and didn't get a hold of anyone. I went to let pt know and he disconnected the call.       Action Taken: Other: Cardiology    Travel Screening: Not Applicable     Thank you!  [facility]

## 2025-04-29 NOTE — TELEPHONE ENCOUNTER
Go to ED      You  Can, MD Janice32 minutes ago (1:07 PM)     CK  Patient has been having chest weakness and palpitations. It started at 5:30 am. Heart rate got up to 158. It has now dropped down to 128. Last Blood Pressure was 105/78. He took his morning flecainide. He takes his metoprolol 25 mg daily at night. He took an as needed dose of metoprolol at 12pm. I did advise him to go to ER. He does not want to go ED. He is planning on checking his BP and HR in 1 hour and if HR is still elevated, he will consider going.    I was going to get him set up with Janel for an EP follow up. He states that he noticed a similar episode last week but that it resolved itself on its own. This is the second time it has happened. Let me know what you recommend.     Called and spoke with patient. Patient verbalized understanding and said he would go to ED. He did want Dr. Crawley to review that he was prescribed pregabalin recently. Informed patient that provider would be updated but still to go to ED. Patient agreeable.    Yaz Garrison, RN, BSN  Cardiology RN Care Coordinator   [place]   Phone: [phone]  Fax: [phone] ([place]) [phone] ([place])

## 2025-04-29 NOTE — CONFIDENTIAL NOTE
"Received call transferred to Triage for patient regarding Tachycardia with coinciding symptoms.    Patient reports he woke at 5:30am this morning with a feeling of pounding in his chest. He has a wrist monitor and took his heart rate which was 144 bpm. He tried to relax and go back to bed. A while later it was 135 bpm. Patient states his blood pressure this morning was 107/76  and last night was 91/67 . At present, while on this call, his blood pressure was 96/77 . He thinks his monitor is not perfectly accurate.  Patient says he felt a little dizzy and nauseous. He ate food and feels better now. He also states he has been feeling weak and his chest feels \"light.\"  Patient is on flecainide 100mg BID and metoprolol succinate 25mg/day. He has not yet taken an extra metoprolol prn for A-Fib HR > 120 per Dr. Crawley.   Patient questions if pregabalin interacts with his medications. *He would like to speak to Dr. Crawley's nurse regarding this.*  Patient was asked if he had a [employer] to confirm he was in A-Fib. Patient states he just uses his wrist monitor to provide blood pressure and heart rates.    Advised due to patient's report of current symptoms as well as reported high heart rates, that he present to an ED for more evaluation and care. Patient says he will try taking an extra metoprolol first and if symptoms still continue, then he will go.   Advised this will be sent to the nurses with Dr. Crawley to call and further review with patient.     1. DESCRIPTION: \"Please describe your heart rate or heartbeat that you are having\" (e.g., fast/slow, regular/irregular, skipped or extra beats, \"palpitations\") Tachycardia.  2. ONSET: \"When did it start?\" (e.g., minutes, hours, days) 5:30am.  3. DURATION: \"How long does it last\" (e.g., seconds, minutes, hours) Constant.  4. PATTERN \"Does it come and go, or has it been constant since it started?\" \"Does it get worse with exertion?\" \"Are you feeling it now?\" " "Current.  5. TAP: \"Using your hand, can you tap out what you are feeling on a chair or table in front of you, so that I can hear?\" Note: Not all patients can do this.  6. HEART RATE: \"Can you tell me your heart rate?\" \"How many beats in 15 seconds?\" Note: Not all patients can do this. 120 bpm.  7. RECURRENT SYMPTOM: \"Have you ever had this before?\" If Yes, ask: \"When was the last time?\" and \"What happened that time?\" H/O PAF.  8. CAUSE: \"What do you think is causing the palpitations?\" No report of palpitations.  9. CARDIAC HISTORY: \"Do you have any history of heart disease?\" (e.g., heart attack, angina, bypass surgery, angioplasty, arrhythmia) PAF.  10. OTHER SYMPTOMS: \"Do you have any other symptoms?\" (e.g., dizziness, chest pain, sweating, difficulty breathing) Dizzy, nausea, weak, chest feels \"light.\"  11. PREGNANCY: \"Is there any chance you are pregnant?\" \"When was your last menstrual period?\"      "

## 2025-04-30 ENCOUNTER — TELEPHONE (OUTPATIENT)
Dept: CARDIOLOGY | Facility: CLINIC | Age: 65
End: 2025-04-30
Payer: COMMERCIAL

## 2025-04-30 NOTE — TELEPHONE ENCOUNTER
Patient was advised to go to ED. Patient went to [facility]. Attempted to call patient. Left message for patient.    Yaz Garrison RN, BSN  Cardiology RN Care Coordinator   [place]   Phone: [phone]  Fax: [phone] ([place]) [phone] ([place])

## 2025-04-30 NOTE — TELEPHONE ENCOUNTER
Patient went to [facility] ED.    Yaz Garrison, RN, BSN  Cardiology RN Care Coordinator   [place]   Phone: [phone]  Fax: [phone] ([place]) [phone] ([place])

## 2025-05-01 NOTE — TELEPHONE ENCOUNTER
Situation/Post ER follow-up  Chart review indicates that patient presented to the ER  at [facility] with recurrent atrial fibrillation and underwent cardioversion (See ER notes). RN contacted the patient, who reported feeling well today with a steady heart rate in the 70s. No medication changes were made in the ER. Patient confirmed adherence to Flecainide 100 mg BID and Toprol-XL 25 mg daily. Patient states he was unaware that AFib could recur and expressed uncertainty about potential triggers or prevention strategies. RN offered a clinic follow-up to discuss AFib management in more detail; however, the patient declined at this time and prefers to manage the issue through provider messaging.    Message routed to provider to review:

## 2025-05-01 NOTE — TELEPHONE ENCOUNTER
Spoke to patient and addressed patients afib questions.  Also, discussed Dr. Townsend recommendation to monitor and follow-up in 6 months.  Patient is agreeable with the plan and will monitor and did not schedule 6 mo follow-up at this time due to cost.    Abida Garcia, RN  Cardiology Care Coordinator  [facility]  [phone] option 1

## 2025-05-05 ENCOUNTER — TELEPHONE (OUTPATIENT)
Dept: GASTROENTEROLOGY | Facility: CLINIC | Age: 65
End: 2025-05-05
Payer: COMMERCIAL

## 2025-05-06 SDOH — HEALTH STABILITY: PHYSICAL HEALTH: ON AVERAGE, HOW MANY MINUTES DO YOU ENGAGE IN EXERCISE AT THIS LEVEL?: 10 MIN

## 2025-05-06 SDOH — HEALTH STABILITY: PHYSICAL HEALTH: ON AVERAGE, HOW MANY DAYS PER WEEK DO YOU ENGAGE IN MODERATE TO STRENUOUS EXERCISE (LIKE A BRISK WALK)?: 1 DAY

## 2025-05-06 ASSESSMENT — SOCIAL DETERMINANTS OF HEALTH (SDOH): HOW OFTEN DO YOU GET TOGETHER WITH FRIENDS OR RELATIVES?: ONCE A WEEK

## 2025-05-08 ENCOUNTER — OFFICE VISIT (OUTPATIENT)
Dept: FAMILY MEDICINE | Facility: CLINIC | Age: 65
End: 2025-05-08
Attending: FAMILY MEDICINE
Payer: COMMERCIAL

## 2025-05-08 VITALS
OXYGEN SATURATION: 98 % | BODY MASS INDEX: 28.79 KG/M2 | HEIGHT: 68 IN | RESPIRATION RATE: 16 BRPM | TEMPERATURE: 97.8 F | WEIGHT: 190 LBS | HEART RATE: 68 BPM | SYSTOLIC BLOOD PRESSURE: 112 MMHG | DIASTOLIC BLOOD PRESSURE: 73 MMHG

## 2025-05-08 DIAGNOSIS — R39.16 BENIGN PROSTATIC HYPERPLASIA (BPH) WITH STRAINING ON URINATION: ICD-10-CM

## 2025-05-08 DIAGNOSIS — N40.1 BENIGN PROSTATIC HYPERPLASIA (BPH) WITH STRAINING ON URINATION: ICD-10-CM

## 2025-05-08 DIAGNOSIS — N18.31 STAGE 3A CHRONIC KIDNEY DISEASE (H): ICD-10-CM

## 2025-05-08 DIAGNOSIS — E78.5 HYPERLIPIDEMIA WITH TARGET LDL LESS THAN 130: Chronic | ICD-10-CM

## 2025-05-08 DIAGNOSIS — Z00.00 ENCOUNTER FOR MEDICARE ANNUAL WELLNESS EXAM: Primary | ICD-10-CM

## 2025-05-08 DIAGNOSIS — I48.0 PAROXYSMAL ATRIAL FIBRILLATION (H): ICD-10-CM

## 2025-05-08 DIAGNOSIS — F32.1 MODERATE MAJOR DEPRESSION (H): ICD-10-CM

## 2025-05-08 DIAGNOSIS — K22.70 BARRETT'S ESOPHAGUS WITHOUT DYSPLASIA: Chronic | ICD-10-CM

## 2025-05-08 DIAGNOSIS — Z23 NEED FOR VACCINATION: ICD-10-CM

## 2025-05-08 PROCEDURE — 3078F DIAST BP <80 MM HG: CPT | Performed by: FAMILY MEDICINE

## 2025-05-08 PROCEDURE — 99214 OFFICE O/P EST MOD 30 MIN: CPT | Mod: 25 | Performed by: FAMILY MEDICINE

## 2025-05-08 PROCEDURE — G0438 PPPS, INITIAL VISIT: HCPCS | Performed by: FAMILY MEDICINE

## 2025-05-08 PROCEDURE — 3074F SYST BP LT 130 MM HG: CPT | Performed by: FAMILY MEDICINE

## 2025-05-08 RX ORDER — ESOMEPRAZOLE MAGNESIUM 40 MG/1
CAPSULE, DELAYED RELEASE ORAL
Qty: 90 CAPSULE | Refills: 3 | Status: SHIPPED | OUTPATIENT
Start: 2025-05-08

## 2025-05-08 RX ORDER — ROSUVASTATIN CALCIUM 20 MG/1
20 TABLET, COATED ORAL DAILY
Qty: 90 TABLET | Refills: 3 | Status: SHIPPED | OUTPATIENT
Start: 2025-05-08

## 2025-05-08 RX ORDER — FINASTERIDE 5 MG/1
5 TABLET, FILM COATED ORAL DAILY
Qty: 90 TABLET | Refills: 3 | Status: SHIPPED | OUTPATIENT
Start: 2025-05-08

## 2025-05-08 ASSESSMENT — PATIENT HEALTH QUESTIONNAIRE - PHQ9
10. IF YOU CHECKED OFF ANY PROBLEMS, HOW DIFFICULT HAVE THESE PROBLEMS MADE IT FOR YOU TO DO YOUR WORK, TAKE CARE OF THINGS AT HOME, OR GET ALONG WITH OTHER PEOPLE: NOT DIFFICULT AT ALL
SUM OF ALL RESPONSES TO PHQ QUESTIONS 1-9: 0
SUM OF ALL RESPONSES TO PHQ QUESTIONS 1-9: 0

## 2025-05-08 NOTE — PATIENT INSTRUCTIONS
Patient Education   Preventive Care Advice   This is general advice given by our system to help you stay healthy. However, your care team may have specific advice just for you. Please talk to your care team about your preventive care needs.  Nutrition  Eat 5 or more servings of fruits and vegetables each day.  Try wheat bread, brown rice and whole grain pasta (instead of white bread, rice, and pasta).  Get enough calcium and vitamin D. Check the label on foods and aim for 100% of the RDA (recommended daily allowance).  Lifestyle  Exercise at least 150 minutes each week  (30 minutes a day, 5 days a week).  Do muscle strengthening activities 2 days a week. These help control your weight and prevent disease.  No smoking.  Wear sunscreen to prevent skin cancer.  Have a dental exam and cleaning every 6 months.  Yearly exams  See your health care team every year to talk about:  Any changes in your health.  Any medicines your care team has prescribed.  Preventive care, family planning, and ways to prevent chronic diseases.  Shots (vaccines)   HPV shots (up to age 26), if you've never had them before.  Hepatitis B shots (up to age 59), if you've never had them before.  COVID-19 shot: Get this shot when it's due.  Flu shot: Get a flu shot every year.  Tetanus shot: Get a tetanus shot every 10 years.  Pneumococcal, hepatitis A, and RSV shots: Ask your care team if you need these based on your risk.  Shingles shot (for age 50 and up)  General health tests  Diabetes screening:  Starting at age 35, Get screened for diabetes at least every 3 years.  If you are younger than age 35, ask your care team if you should be screened for diabetes.  Cholesterol test: At age 39, start having a cholesterol test every 5 years, or more often if advised.  Bone density scan (DEXA): At age 50, ask your care team if you should have this scan for osteoporosis (brittle bones).  Hepatitis C: Get tested at least once in your life.  STIs (sexually  transmitted infections)  Before age 24: Ask your care team if you should be screened for STIs.  After age 24: Get screened for STIs if you're at risk. You are at risk for STIs (including HIV) if:  You are sexually active with more than one person.  You don't use condoms every time.  You or a partner was diagnosed with a sexually transmitted infection.  If you are at risk for HIV, ask about PrEP medicine to prevent HIV.  Get tested for HIV at least once in your life, whether you are at risk for HIV or not.  Cancer screening tests  Cervical cancer screening: If you have a cervix, begin getting regular cervical cancer screening tests starting at age 21.  Breast cancer scan (mammogram): If you've ever had breasts, begin having regular mammograms starting at age 40. This is a scan to check for breast cancer.  Colon cancer screening: It is important to start screening for colon cancer at age 45.  Have a colonoscopy test every 10 years (or more often if you're at risk) Or, ask your provider about stool tests like a FIT test every year or Cologuard test every 3 years.  To learn more about your testing options, visit:   .  For help making a decision, visit:   https://bit.ly/ek30082.  Prostate cancer screening test: If you have a prostate, ask your care team if a prostate cancer screening test (PSA) at age 55 is right for you.  Lung cancer screening: If you are a current or former smoker ages 50 to 80, ask your care team if ongoing lung cancer screenings are right for you.  For informational purposes only. Not to replace the advice of your health care provider. Copyright   2023 [facility] [employer]. All rights reserved. Clinically reviewed by the [facility] Transitions Program. Bridge 021033 - REV 01/24.  Hearing Loss: Care Instructions  Overview     Hearing loss is a sudden or slow decrease in how well you hear. It can range from slight to profound. Permanent hearing loss can occur with aging. It also can  happen when you are exposed long-term to loud noise. Examples include listening to loud music, riding motorcycles, or being around other loud machines.  Hearing loss can affect your work and home life. It can make you feel lonely or depressed. You may feel that you have lost your independence. But hearing aids and other devices can help you hear better and feel connected to others.  Follow-up care is a key part of your treatment and safety. Be sure to make and go to all appointments, and call your doctor if you are having problems. It's also a good idea to know your test results and keep a list of the medicines you take.  How can you care for yourself at home?  Avoid loud noises whenever possible. This helps keep your hearing from getting worse.  Always wear hearing protection around loud noises.  Wear a hearing aid as directed.  A professional can help you pick a hearing aid that will work best for you.  You can also get hearing aids over the counter for mild to moderate hearing loss.  Have hearing tests as your doctor suggests. They can show whether your hearing has changed. Your hearing aid may need to be adjusted.  Use other devices as needed. These may include:  Telephone amplifiers and hearing aids that can connect to a television, stereo, radio, or microphone.  Devices that use lights or vibrations. These alert you to the doorbell, a ringing telephone, or a baby monitor.  Television closed-captioning. This shows the words at the bottom of the screen. Most new TVs can do this.  TTY (text telephone). This lets you type messages back and forth on the telephone instead of talking or listening. These devices are also called TDD. When messages are typed on the keyboard, they are sent over the phone line to a receiving TTY. The message is shown on a monitor.  Use text messaging, social media, and email if it is hard for you to communicate by telephone.  Try to learn a listening technique called speechreading. It is  "not lipreading. You pay attention to people's gestures, expressions, posture, and tone of voice. These clues can help you understand what a person is saying. Face the person you are talking to, and have them face you. Make sure the lighting is good. You need to see the other person's face clearly.  Think about counseling if you need help to adjust to your hearing loss.  When should you call for help?  Watch closely for changes in your health, and be sure to contact your doctor if:    You think your hearing is getting worse.     You have new symptoms, such as dizziness or nausea.   Where can you learn more?  Go to https://www.[employer]/patiented  Enter R798 in the search box to learn more about \"Hearing Loss: Care Instructions.\"  Current as of: October 27, 2024  Content Version: 14.4    9975-0886 [employer].   Care instructions adapted under license by your healthcare professional. If you have questions about a medical condition or this instruction, always ask your healthcare professional. [employer] disclaims any warranty or liability for your use of this information.    Learning About Stress  What is stress?     Stress is your body's response to a hard situation. Your body can have a physical, emotional, or mental response. Stress is a fact of life for most people, and it affects everyone differently. What causes stress for you may not be stressful for someone else.  A lot of things can cause stress. You may feel stress when you go on a job interview, take a test, or run a race. This kind of short-term stress is normal and even useful. It can help you if you need to work hard or react quickly. For example, stress can help you finish an important job on time.  Long-term stress is caused by ongoing stressful situations or events. Examples of long-term stress include long-term health problems, ongoing problems at work, or conflicts in your family. Long-term stress can harm your " health.  How does stress affect your health?  When you are stressed, your body responds as though you are in danger. It makes hormones that speed up your heart, make you breathe faster, and give you a burst of energy. This is called the fight-or-flight stress response. If the stress is over quickly, your body goes back to normal and no harm is done.  But if stress happens too often or lasts too long, it can have bad effects. Long-term stress can make you more likely to get sick, and it can make symptoms of some diseases worse. If you tense up when you are stressed, you may develop neck, shoulder, or low back pain. Stress is linked to high blood pressure and heart disease.  Stress also harms your emotional health. It can make you fay, tense, or depressed. Your relationships may suffer, and you may not do well at work or school.  What can you do to manage stress?  You can try these things to help manage stress:   Do something active. Exercise or activity can help reduce stress. Walking is a great way to get started. Even everyday activities such as housecleaning or yard work can help.  Try yoga or darell chi. These techniques combine exercise and meditation. You may need some training at first to learn them.  Do something you enjoy. For example, listen to music or go to a movie. Practice your hobby or do volunteer work.  Meditate. This can help you relax, because you are not worrying about what happened before or what may happen in the future.  Do guided imagery. Imagine yourself in any setting that helps you feel calm. You can use online videos, books, or a teacher to guide you.  Do breathing exercises. For example:  From a standing position, bend forward from the waist with your knees slightly bent. Let your arms dangle close to the floor.  Breathe in slowly and deeply as you return to a standing position. Roll up slowly and lift your head last.  Hold your breath for just a few seconds in the standing  "position.  Breathe out slowly and bend forward from the waist.  Let your feelings out. Talk, laugh, cry, and express anger when you need to. Talking with supportive friends or family, a counselor, or a baldo leader about your feelings is a healthy way to relieve stress. Avoid discussing your feelings with people who make you feel worse.  Write. It may help to write about things that are bothering you. This helps you find out how much stress you feel and what is causing it. When you know this, you can find better ways to cope.  What can you do to prevent stress?  You might try some of these things to help prevent stress:  Manage your time. This helps you find time to do the things you want and need to do.  Get enough sleep. Your body recovers from the stresses of the day while you are sleeping.  Get support. Your family, friends, and community can make a difference in how you experience stress.  Limit your news feed. Avoid or limit time on social media or news that may make you feel stressed.  Do something active. Exercise or activity can help reduce stress. Walking is a great way to get started.  Where can you learn more?  Go to https://www.[employer].net/patiented  Enter N032 in the search box to learn more about \"Learning About Stress.\"  Current as of: October 24, 2024  Content Version: 14.4 2024-2025 [employer].   Care instructions adapted under license by your healthcare professional. If you have questions about a medical condition or this instruction, always ask your healthcare professional. [employer] disclaims any warranty or liability for your use of this information.    Learning About Sleeping Well  What does sleeping well mean?     Sleeping well means getting enough sleep to feel good and stay healthy. How much sleep is enough varies among people.  The number of hours you sleep and how you feel when you wake up are both important. If you do not feel refreshed, you probably need " "more sleep. Another sign of not getting enough sleep is feeling tired during the day.  Experts recommend that adults get at least 7 or more hours of sleep per day. Children and older adults need more sleep.  Why is getting enough sleep important?  Getting enough quality sleep is a basic part of good health. When your sleep suffers, your physical health, mood, and your thoughts can suffer too. You may find yourself feeling more grumpy or stressed. Not getting enough sleep also can lead to serious problems, including injury, accidents, anxiety, and depression.  What might cause poor sleeping?  Many things can cause sleep problems, including:  Changes to your sleep schedule.  Stress. Stress can be caused by fear about a single event, such as giving a speech. Or you may have ongoing stress, such as worry about work or school.  Depression, anxiety, and other mental or emotional conditions.  Changes in your sleep habits or surroundings. This includes changes that happen where you sleep, such as noise, light, or sleeping in a different bed. It also includes changes in your sleep pattern, such as having jet lag or working a late shift.  Health problems, such as pain, breathing problems, and restless legs syndrome.  Lack of regular exercise.  Using alcohol, nicotine, or caffeine before bed.  How can you help yourself?  Here are some tips that may help you sleep more soundly and wake up feeling more refreshed.  Your sleeping area   Use your bedroom only for sleeping and sex. A bit of light reading may help you fall asleep. But if it doesn't, do your reading elsewhere in the house. Try not to use your TV, computer, smartphone, or tablet while you are in bed.  Be sure your bed is big enough to stretch out comfortably, especially if you have a sleep partner.  Keep your bedroom quiet, dark, and cool. Use curtains, blinds, or a sleep mask to block out light. To block out noise, use earplugs, soothing music, or a \"white noise\" " "machine.  Your evening and bedtime routine   Create a relaxing bedtime routine. You might want to take a warm shower or bath, or listen to soothing music.  Go to bed at the same time every night. And get up at the same time every morning, even if you feel tired.  What to avoid   Limit caffeine (coffee, tea, caffeinated sodas) during the day, and don't have any for at least 6 hours before bedtime.  Avoid drinking alcohol before bedtime. Alcohol can cause you to wake up more often during the night.  Try not to smoke or use tobacco, especially in the evening. Nicotine can keep you awake.  Limit naps during the day, especially close to bedtime.  Avoid lying in bed awake for too long. If you can't fall asleep or if you wake up in the middle of the night and can't get back to sleep within about 20 minutes, get out of bed and go to another room until you feel sleepy.  Avoid taking medicine right before bed that may keep you awake or make you feel hyper or energized. Your doctor can tell you if your medicine may do this and if you can take it earlier in the day.  If you can't sleep   Imagine yourself in a peaceful, pleasant scene. Focus on the details and feelings of being in a place that is relaxing.  Get up and do a quiet or boring activity until you feel sleepy.  Avoid drinking any liquids before going to bed to help prevent waking up often to use the bathroom.  Where can you learn more?  Go to https://www.[employer].net/patiented  Enter J942 in the search box to learn more about \"Learning About Sleeping Well.\"  Current as of: July 31, 2024  Content Version: 14.4    1509-3288 [employer].   Care instructions adapted under license by your healthcare professional. If you have questions about a medical condition or this instruction, always ask your healthcare professional. [employer] disclaims any warranty or liability for your use of this information.       "

## 2025-05-08 NOTE — PROGRESS NOTES
Preventive Care Visit  [facility]  JEFFREY BERRY DO, Family Medicine  May 8, 2025  {Provider  Link to SmartSet :812289}    {PROVIDER CHARTING PREFERENCE:243221}    Ruy Arellano is a 65 year old, presenting for the following:  Physical        5/8/2025    12:52 PM   Additional Questions   Roomed by Adelita FONTAINE CMA     {ROOMER if patient is in their first year of Medicare a vision screen is required click here to document the Vison screen and then refresh the note to pull in results  :316406}      HPI    Cardioversion 4/29/25 in the ED   +urinary difficulty  +hair loss        Advance Care Planning  {The storyboard will display whether the patient has ACP docs on file. Hover over the Code section in the storyboard to access the ACP documents. :208180}  Discussed advance care planning with patient; informed AVS has link to Honoring Choices.        5/6/2025   General Health   How would you rate your overall physical health? (!) POOR   Feel stress (tense, anxious, or unable to sleep) Very much   (!) STRESS CONCERN      5/6/2025   Nutrition   Diet: Carbohydrate counting         5/6/2025   Exercise   Days per week of moderate/strenous exercise 1 day   Average minutes spent exercising at this level 10 min   (!) EXERCISE CONCERN      5/6/2025   Social Factors   Frequency of gathering with friends or relatives Once a week   Worry food won't last until get money to buy more No   Food not last or not have enough money for food? No   Do you have housing? (Housing is defined as stable permanent housing and does not include staying outside in a car, in a tent, in an abandoned building, in an overnight shelter, or couch-surfing.) Yes   Are you worried about losing your housing? Yes   Lack of transportation? No   Unable to get utilities (heat,electricity)? No   Want help with housing or utility concern? No   (!) HOUSING CONCERN PRESENT      5/6/2025   Fall Risk   Fallen 2 or more times in the past  year? No   Trouble with walking or balance? No          5/6/2025   Activities of Daily Living- Home Safety   Needs help with the following daily activites None of the above   Safety concerns in the home None of the above         5/6/2025   Dental   Dentist two times every year? (!) NO         5/6/2025   Hearing Screening   Hearing concerns? (!) I NEED TO ASK PEOPLE TO SPEAK UP OR REPEAT THEMSELVES.    (!) IT'S HARD TO FOLLOW A CONVERSATION IN A NOISY RESTAURANT OR CROWDED ROOM.       Multiple values from one day are sorted in reverse-chronological order         5/6/2025   Driving Risk Screening   Patient/family members have concerns about driving No         5/6/2025   General Alertness/Fatigue Screening   Have you been more tired than usual lately? (!) YES         5/6/2025   Urinary Incontinence Screening   Bothered by leaking urine in past 6 months No       Today's PHQ-9 Score:       5/8/2025    12:52 PM   PHQ-9 SCORE   PHQ-9 Total Score MyChart 0   PHQ-9 Total Score 0        Patient-reported         5/6/2025   Substance Use   Alcohol more than 3/day or more than 7/wk No   Do you have a current opioid prescription? No   How severe/bad is pain from 1 to 10? 1/10   Do you use any other substances recreationally? No     Social History     Tobacco Use    Smoking status: Never     Passive exposure: Never    Smokeless tobacco: Never   Vaping Use    Vaping status: Never Used   Substance Use Topics    Alcohol use: Yes     Alcohol/week: 14.0 - 21.0 standard drinks of alcohol     Types: 14 - 21 Standard drinks or equivalent per week     Comment: varies, a couple drinks a day    Drug use: No     {Provider  If there are gaps in the social history shown above, please follow the link to update and then refresh the note Link to Social and Substance History :630784}      5/6/2025   AAA Screening   Family history of Abdominal Aortic Aneurysm (AAA)? Unsure   Last PSA:   PSA   Date Value Ref Range Status   10/05/2020 0.89 0 - 4 ug/L  Final     Comment:     Assay Method:  Chemiluminescence using Siemens Vista analyzer     Prostate Specific Antigen Screen   Date Value Ref Range Status   06/09/2022 1.29 0.00 - 4.00 ug/L Final     PSA Tumor Marker   Date Value Ref Range Status   06/18/2024 1.24 0.00 - 4.50 ng/mL Final     ASCVD Risk   The 10-year ASCVD risk score (Deon BENZ, et al., 2019) is: 10.4%    Values used to calculate the score:      Age: 65 years      Sex: Male      Is Non- : No      Diabetic: No      Tobacco smoker: No      Systolic Blood Pressure: 112 mmHg      Is BP treated: No      HDL Cholesterol: 49 mg/dL      Total Cholesterol: 182 mg/dL    {Link to Fracture Risk Assessment Tool (Optional):993009}    {Provider  REQUIRED FOR AWV Use the storyboard to review patient history, after sections have been marked as reviewed, refresh note to capture documentation:304286}  {Provider   REQUIRED AWV use this link to review and update sexual activity history  after section has been marked as reviewed, refresh note to capture documentation:520566}  Reviewed and updated as needed this visit by Provider                    {HISTORY OPTIONS (Optional):302690}  Current providers sharing in care for this patient include:  Patient Care Team:  Jimbo Zheng DO as PCP - General (Family Medicine)  Nael Sesay LICSW as Therapist ( - Clinical)  Danny Oleary DO as Assigned Musculoskeletal Provider  Janice Crawley MD as Assigned Heart and Vascular Provider  Danny Oleary DO as MD (Sports Medicine)  Filiberto Banuelos MD as MD (Gastroenterology)  Antonieta Colon AuD as Audiologist (Audiology)  Huy Perales MD as MD (Otolaryngology)  Jair Garcia OD as MD (Optometrist)  Primitivo Garcia MD as Resident  Juan Vora APRN CNP as Assigned Sleep Provider  Jimbo Zheng DO as Referring Physician (Family Medicine)  Shaun Santos MD as MD  "(Urology)  Lenora Tomas DO as Assigned Neuroscience Provider  Diane Sainz, RN as Specialty Care Coordinator (Cardiology)  Iggy Cheung MD as MD (Cardiovascular Disease)  Jair Garcia OD as MD (Optometrist)  Yaz Garrison, RN as Registered Nurse (Cardiology)  Jimbo Zheng DO as Assigned PCP  Shaun Santos MD as Assigned Surgical Provider    The following health maintenance items are reviewed in Epic and correct as of today:  Health Maintenance   Topic Date Due    DEPRESSION ACTION PLAN  Never done    Pneumococcal Vaccine: 50+ Years (1 of 2 - PCV) Never done    RSV VACCINE (1 - Risk 60-74 years 1-dose series) Never done    ANNUAL REVIEW OF HM ORDERS  07/24/2024    DTAP/TDAP/TD IMMUNIZATION (2 - Td or Tdap) 10/01/2024    MEDICARE ANNUAL WELLNESS VISIT  04/08/2025    COVID-19 Vaccine (8 - 2024-25 season) 04/15/2025    COLORECTAL CANCER SCREENING  07/27/2025    BMP  08/16/2025    MICROALBUMIN  08/16/2025    HEMOGLOBIN  08/16/2025    EYE EXAM  11/04/2025    PHQ-9  11/08/2025    LIPID  11/12/2025    FALL RISK ASSESSMENT  05/08/2026    DIABETES SCREENING  08/16/2027    ADVANCE CARE PLANNING  04/08/2029    HEPATITIS C SCREENING  Completed    INFLUENZA VACCINE  Completed    URINALYSIS  Completed    ZOSTER IMMUNIZATION  Completed    HPV IMMUNIZATION  Aged Out    MENINGITIS IMMUNIZATION  Aged Out       {ROS Picklists (Optional):055794}     Objective    Exam  /73 (BP Location: Right arm, Patient Position: Chair, Cuff Size: Adult Large)   Pulse 68   Temp 97.8  F (36.6  C) (Oral)   Resp 16   Ht 1.727 m (5' 8\")   Wt 86.2 kg (190 lb)   SpO2 98%   BMI 28.89 kg/m     Estimated body mass index is 28.89 kg/m  as calculated from the following:    Height as of this encounter: 1.727 m (5' 8\").    Weight as of this encounter: 86.2 kg (190 lb).    Physical Exam  {Exam Choices (Optional):784276}        5/8/2025   Mini Cog   Clock Draw Score 2 Normal   3 Item Recall 3 objects recalled   Mini Cog " Total Score 5     {A Mini-Cog total score of 0-2 suggests the possibility of dementia, score of 3-5 suggests no dementia:345258}        4/8/2024   Vision Screen   Patient wears corrective lenses (select all that apply) NOT worn during vision screen   Vision Screen Results Pass       Signed Electronically by: JEFFREY BERRY DO  {Email feedback regarding this note to [email or web address]   :500456}  .undefined[^^  Answers submitted by the patient for this visit:  Patient Health Questionnaire (Submitted on 5/8/2025)  If you checked off any problems, how difficult have these problems made it for you to do your work, take care of things at home, or get along with other people?: Not difficult at all  PHQ9 TOTAL SCORE: 0     0

## 2025-05-12 ENCOUNTER — TELEPHONE (OUTPATIENT)
Dept: GASTROENTEROLOGY | Facility: CLINIC | Age: 65
End: 2025-05-12
Payer: COMMERCIAL

## 2025-05-12 PROBLEM — F32.1 MAJOR DEPRESSIVE DISORDER, SINGLE EPISODE, MODERATE (H): Status: RESOLVED | Noted: 2024-04-08 | Resolved: 2025-05-12

## 2025-05-12 PROBLEM — N18.31 STAGE 3A CHRONIC KIDNEY DISEASE (H): Status: RESOLVED | Noted: 2024-08-16 | Resolved: 2025-05-12

## 2025-05-12 NOTE — TELEPHONE ENCOUNTER
Patient called in to pre-assessment nurse line stating he is trying to schedule his colonoscopy/EGD with Dr. Banuelos and he is being told it cannot be done at  due to his recent ER visit for A fib and cardioversion on 4/29/25. RN did review with patient that is correct unfortunately given that recent cardiac event he would not be a candidate for an ambulatory surgery center like  at this time but could possibly be seen at one of our hospital locations. Patient stated he will not go to [facility] or  or , he refuses. Patient stated he has been on the phone for over 2 hours trying to deal with getting this scheduled and wants to know how far out Dr. Banuelos is scheduling at other sites other than . RN reviewed that would have to be discussed with a  and patient immediately stated no he does not want to talk to one and does not want to be transferred. RN discussed that unfortunately RN cannot see the schedule/blocks and that is something that needs to be discussed with scheduling. Patient stated he will just call back another time when he has more patient to deal with this.     NOTE: order is for screening colonoscopy and hx of barretts.  Order was not placed by Dr. Banuelos, was placed by PCP on 5/8/25. Patient has not followed up with cardiology yet since ER visit. Protocol is to postpone for 6 months post cardiac event/procedure. Could be schedule after October 10/29/25. Would need to be sent to site specific anesthesia review to try and get approved to have procedure done earlier.     Camille Daniel RN  Endoscopy Procedure Pre Assessment   [phone] option 3    
No

## 2025-05-13 DIAGNOSIS — I48.0 PAF (PAROXYSMAL ATRIAL FIBRILLATION) (H): Primary | ICD-10-CM

## 2025-05-13 RX ORDER — SODIUM CHLORIDE 9 MG/ML
INJECTION, SOLUTION INTRAVENOUS CONTINUOUS
OUTPATIENT
Start: 2025-05-13

## 2025-05-13 RX ORDER — LIDOCAINE 40 MG/G
CREAM TOPICAL
OUTPATIENT
Start: 2025-05-13

## 2025-05-19 ENCOUNTER — MYC MEDICAL ADVICE (OUTPATIENT)
Dept: CARDIOLOGY | Facility: CLINIC | Age: 65
End: 2025-05-19

## 2025-05-19 ENCOUNTER — OFFICE VISIT (OUTPATIENT)
Dept: CARDIOLOGY | Facility: CLINIC | Age: 65
End: 2025-05-19
Payer: COMMERCIAL

## 2025-05-19 ENCOUNTER — TELEPHONE (OUTPATIENT)
Dept: CARDIOLOGY | Facility: CLINIC | Age: 65
End: 2025-05-19

## 2025-05-19 VITALS
WEIGHT: 192 LBS | OXYGEN SATURATION: 97 % | HEART RATE: 137 BPM | DIASTOLIC BLOOD PRESSURE: 71 MMHG | BODY MASS INDEX: 29.19 KG/M2 | SYSTOLIC BLOOD PRESSURE: 99 MMHG

## 2025-05-19 DIAGNOSIS — I48.92 ATRIAL FLUTTER BY ELECTROCARDIOGRAM (H): Primary | ICD-10-CM

## 2025-05-19 DIAGNOSIS — I48.0 PAF (PAROXYSMAL ATRIAL FIBRILLATION) (H): ICD-10-CM

## 2025-05-19 DIAGNOSIS — I48.0 PAF (PAROXYSMAL ATRIAL FIBRILLATION) (H): Primary | ICD-10-CM

## 2025-05-19 RX ORDER — METOPROLOL SUCCINATE 50 MG/1
50 TABLET, EXTENDED RELEASE ORAL DAILY
Qty: 90 TABLET | Refills: 3 | Status: SHIPPED | OUTPATIENT
Start: 2025-05-19

## 2025-05-19 NOTE — TELEPHONE ENCOUNTER
Spoke to the patient about scheduling the patient for his ablation. He was offered July 29th because Dr. Cheung's schedule is currently booked up. He states that he was told that he could be pushed up to sooner based on his symptoms.      Sending this communication back to the team to clarify what the plan is for this patient and to find out if there is a plan to bump another patient.     Adilene Reynoso  Peri Electrophysiology   [phone]

## 2025-05-19 NOTE — LETTER
5/19/2025      RE: Adan Oliver  [address]  [place] [address]       Dear Colleague,    Thank you for the opportunity to participate in the care of your patient, Adan Oliver, at the [facility]. Please see a copy of my visit note below.       General Cardiology [facility]      Referring provider:Johnathon Diaz MD    HPI: Mr. Adan Oliver is a 62 year old  male with PMH significant for    -Obstructive sleep apnea  -Alcohol use  -Reid's esophagus    Patient reports feeling palpitations over the last 1-1/2-month.  So far it has happened only at night.  He has woken up 6 or 7 times over the last 1-1/2 months between 3 AM to 7 AM with chest pressure and palpitations.  He was actually able to capture the heart rate on blood pressure machine which showed 150 bpm (showed me pictures of that from his cell phone).  No EKG documentation so far.  The heart pounding sensation lasted for about 30 minutes.  Over the last 2 weeks he has not had any of these.  He tells me he was under extreme stress when he was having frequent nightly palpitations. Denies chest pain.  No shortness of breath with activity.  No dizziness, syncope or lower extremity edema.    No prior history of cardiac disease.    Lifetime non-smoker.  No hypertension or diabetes.  Family history is unremarkable.  He has hyperlipidemia with elevated LDL at 178 mg/deciliter.  He has been on Crestor 10 mg over the last 3 weeks. Patient tells me that he has been on low-carb keto diet to lose weight.    Patient had exercise his echocardiogram in 11/2020 which showed normal biventricular function with no valve disease.  Stress test was normal.  I have reviewed patient's EKG from 4/11/2022 which shows sinus rhythm otherwise unremarkable.  Labs from 2021 showed normal CBC and BMP.  Medications, personal, family, and social history reviewed with patient  and revised.    Interval history 9/26/2023:  Patient is being seen today for multiple concerns that he has over the last few months.  He tells me that he feels his heart beating fast several times a day.  He has shown me his wrist monitor results where his heart rate goes up to 158 bpm at the time of palpitations.  Can last up to 10 to 15 minutes.  When his heart is racing fast he feels discomfort in his chest.  No syncope.  Reports some shortness of breath with exertion.  Can feel discomfort (describes as feels weak in the chest and fuzzy feeling) in his chest with or without exertion.  Over the last few years he has been taking ibuprofen 200 mg daily for back pain.  He does not use CPAP.  He drinks alcohol at least 2 cocktails every night.  He has been drinking alcohol all his life.  He tells me he has a lot of stress in his life.  He has trouble sleeping.  He is recommended CBD lozenges.  No history of tobacco use.    Interval history 10/24/2023:  Patient returns for follow-up.  Patient's heart monitor showed paroxysmal atrial fibrillation corresponding to his symptoms.  This is a new diagnosis for the patient.  When we have received A-fib information I have started patient on metoprolol initially 50 then increased to 100 mg.  Today he tells me that he still has palpitation episodes mostly at night sometimes interfering with his sleep.  Not as fast as he used to be.  Continues to drink couple of alcoholic drinks almost every day though he tells me that he has cut back on this.  He is not sure if he would benefit from sleep testing.  He tells me that [place] of this year.  He is wondering if he needs to take flecainide.  Echocardiogram is unremarkable.  CT calcium score is 0.    Interval history 6/26/2024:  Patient is being seen today for follow-up.  As you know he has history of paroxysmal atrial fibrillation now well-controlled with flecainide 100 mg twice daily.  Patient did  not feel well with metoprolol 100 mg.  Seen by my colleague 2 months ago and metoprolol dose was reduced to 50 mg daily.  Since then his energy levels went up.  Feeling better but he tells me he is not back to baseline.  Otherwise he feels dizziness occasionally which does affect his quality of life.  Denies palpitations, chest pain.  He had a lot of complaints today about how hard it is to get into cardiology appointment.    Interval history 12/4/2024  Patient is being seen today for follow-up.  Reports feeling well from cardiac standpoint.  Continues to report tiredness at times.  No recurrent A-fib since being on flecainide.  Has seen EP and discussed catheter ablation.  He tells me that he is not wanting to undergo a catheter ablation at this time.    Interval history 5/19/2025  Patient is being seen today for atrial flutter.  He woke up this morning and his heart rate was fast in the 130s.  We scheduled a same-day appointment.  I have reviewed EKG in clinic which shows atrial flutter.  Heart rate 137.  He was in the ER recently pretty frequently.  On 4/29 he was found to be in atrial flutter and had cardioversion at [facility].  He presented to ER again on 5/11 with A-fib with RVR.  Converted spontaneously to sinus rhythm.  He tells me that he feels a little weak, lightheaded and nauseous.    PAST MEDICAL HISTORY:  Past Medical History:   Diagnosis Date     Anxiety 12/07/2015     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 It was difficult to discern between PLMS and hyponeas. Apnea/hypopnea index was 28.0 events per hour.  The REM AHI was 29.3 events per hour.  The supine AHI was 32.7 events per hour.  RDI  was 28.0 events per hour. Lowest oxygen saturation was 84.0%.  Time spent less than or equal to 88% was 0.3 minutes. PLM index was 32.3 movements per hour       CURRENT MEDICATIONS:  Current Outpatient Medications   Medication Sig Dispense Refill     apixaban ANTICOAGULANT (ELIQUIS) 5 MG tablet Take 5 mg by mouth 2 times daily.       esomeprazole (NEXIUM) 40 MG DR capsule TAKE ONE CAPSULE BY MOUTH EVERY MORNING 30-60 MINUTES BEFORE BREAKFAST 90 capsule 3     finasteride (PROSCAR) 5 MG tablet Take 1 tablet (5 mg) by mouth daily. 90 tablet 3     flecainide (TAMBOCOR) 100 MG tablet TAKE ONE TABLET BY MOUTH TWICE A  tablet 2     metoprolol succinate ER (TOPROL XL) 25 MG 24 hr tablet Take 1 tablet (25 mg) by mouth daily 90 tablet 3     pregabalin (LYRICA) 75 MG capsule Take 1-2 capsules ( mg) 1-3 hours before bedtime. 60 capsule 0     rosuvastatin (CRESTOR) 20 MG tablet Take 1 tablet (20 mg) by mouth daily. 90 tablet 3       PAST SURGICAL HISTORY:  Past Surgical History:   Procedure Laterality Date     APPENDECTOMY  2000s     BLEPHAROPLASTY BILATERAL Bilateral 10/3/2018    Procedure: BLEPHAROPLASTY BILATERAL;;  Surgeon: Dany Berrios MD;  Location: [facility]     CARPAL TUNNEL RELEASE RT/LT  1980s     COLONOSCOPY N/A 7/27/2022    Procedure: COLONOSCOPY, FLEXIBLE, WITH LESION REMOVAL USING SNARE;  Surgeon: Filiberto Banuelos MD;  Location: [facility]     COLONOSCOPY WITH CO2 INSUFFLATION N/A 7/27/2022    Procedure: COLONOSCOPY, WITH CO2 INSUFFLATION;  Surgeon: Filiberto Banuelos MD;  Location: [facility]     COMBINED ESOPHAGOSCOPY, GASTROSCOPY, DUODENOSCOPY (EGD) WITH CO2 INSUFFLATION N/A 9/20/2019    Procedure: ESOPHAGOGASTRODUODENOSCOPY, WITH CO2 INSUFFLATION;  Surgeon: Filiberto Banuelos MD;  Location: [facility]     COMBINED ESOPHAGOSCOPY, GASTROSCOPY, DUODENOSCOPY (EGD) WITH CO2 INSUFFLATION N/A 7/27/2022    Procedure: ESOPHAGOGASTRODUODENOSCOPY, WITH CO2 INSUFFLATION;  Surgeon: Lakisha  Filiberto Wong MD;  Location: [facility]     ESOPHAGOSCOPY, GASTROSCOPY, DUODENOSCOPY (EGD), COMBINED N/A 9/20/2019    Procedure: Esophagogastroduodenoscopy, With Biopsy;  Surgeon: Filiberto Banuelos MD;  Location: [facility]     ESOPHAGOSCOPY, GASTROSCOPY, DUODENOSCOPY (EGD), COMBINED N/A 7/27/2022    Procedure: ESOPHAGOGASTRODUODENOSCOPY, WITH BIOPSY;  Surgeon: Filiberto Banuelos MD;  Location: [facility]     REPAIR PTOSIS BILATERAL Bilateral 10/3/2018    Procedure: REPAIR PTOSIS BILATERAL;;  Surgeon: Dany Berrios MD;  Location: [facility]     REPAIR PTOSIS BROW BILATERAL Bilateral 10/3/2018    Procedure: REPAIR PTOSIS BROW BILATERAL;;  Surgeon: Dany Berrios MD;  Location: [facility]     SIGMOIDOSCOPY FLEXIBLE N/A 9/20/2019    Procedure: SIGMOIDOSCOPY, FLEXIBLE;  Surgeon: Filiberto Banuelos MD;  Location: [facility]     TONSILLECTOMY  1980s       ALLERGIES:     Allergies   Allergen Reactions     Penicillins Other (See Comments)     Stiff leg from shot of PCN       FAMILY HISTORY:  Family History   Problem Relation Age of Onset     Hypertension Mother      Alzheimer Disease Mother      Diabetes Brother      Glaucoma No family hx of      Macular Degeneration No family hx of      Coronary Artery Disease No family hx of      Colon Cancer No family hx of      Retinal detachment No family hx of          SOCIAL HISTORY:  Social History     Tobacco Use     Smoking status: Never     Passive exposure: Never     Smokeless tobacco: Never   Vaping Use     Vaping status: Never Used   Substance Use Topics     Alcohol use: Yes     Alcohol/week: 14.0 - 21.0 standard drinks of alcohol     Types: 14 - 21 Standard drinks or equivalent per week     Comment: varies, a couple drinks a day     Drug use: No       ROS:   Constitutional: No fever, chills, or sweats. Weight stable.   Cardiovascular: As per HPI.     Exam:  BP 99/71 (BP Location: Right arm, Patient Position: Chair, Cuff Size: Adult Regular)   Pulse (!) 137   Wt  87.1 kg (192 lb)   SpO2 97%   BMI 29.19 kg/m    GENERAL APPEARANCE: alert and no distress  HEENT: no icterus, no central cyanosis  LYMPH/NECK: no adenopathy, no asymmetry, JVP not elevated  CARDIOVASCULAR: regular rhythm, normal S1, S2, no S3 or S4 and no murmur, click or rub, precordium quiet with normal PMI.  NEURO: alert, normal speech,and affect  SKIN: no ecchymoses, no rashes     I have reviewed the labs and personally reviewed the imaging below and made my comment in the assessment and plan.    Labs:  CBC RESULTS:   Lab Results   Component Value Date    WBC 16.8 (H) 08/16/2024    WBC 10.2 04/08/2021    RBC 4.75 08/16/2024    RBC 4.62 04/08/2021    HGB 15.0 08/16/2024    HGB 14.6 04/08/2021    HCT 45.4 08/16/2024    HCT 42.9 04/08/2021    MCV 96 08/16/2024    MCV 93 04/08/2021    MCH 31.6 08/16/2024    MCH 31.6 04/08/2021    MCHC 33.0 08/16/2024    MCHC 34.0 04/08/2021    RDW 12.6 08/16/2024    RDW 13.9 04/08/2021     08/16/2024     04/08/2021       BMP RESULTS:  Lab Results   Component Value Date     08/16/2024     04/08/2021    POTASSIUM 4.5 08/16/2024    POTASSIUM 3.8 11/04/2021    POTASSIUM 4.2 04/08/2021    CHLORIDE 101 08/16/2024    CHLORIDE 105 11/04/2021    CHLORIDE 104 04/08/2021    CO2 26 08/16/2024    CO2 22 11/04/2021    CO2 23 04/08/2021    ANIONGAP 11 08/16/2024    ANIONGAP 10 11/04/2021    ANIONGAP 7 04/08/2021    GLC 79 08/16/2024    GLC 96 11/04/2021    GLC 85 04/08/2021    BUN 14.2 08/16/2024    BUN 22 11/04/2021    BUN 21 04/08/2021    CR 1.32 (H) 08/16/2024    CR 1.12 04/08/2021    GFRESTIMATED 60 (L) 08/16/2024    GFRESTIMATED 70 04/08/2021    GFRESTBLACK 81 04/08/2021    GAYLA 9.1 08/16/2024    GAYLA 9.2 04/08/2021     Cardiac mobile telemetry for a month (10/4/2023) showed multiple atrial fibrillation episodes with RVR.  Lasting up to 3 hours.    CT CORONARY CALCIUM 10/04/2023  1.  No coronary calcifications.  2.  The total Agatston calcium score is 0 placing the  patient in the  lowest percentile when compared to age and gender matched control  group.    TTE 10/17/2023  Global and regional left ventricular function is normal with an EF of 55-60%.  Global right ventricular function is normal.  Both atria appear normal.  No significant valvular abnormalities present.  IVC diameter <2.1 cm collapsing >50% with sniff suggests a normal RA pressure  of 3 mmHg.  No pericardial effusion is present.  There is no prior study for direct comparison.  Exercise stress echocardiogram 11/18/2020  Normal, low-risk exercise echocardiogram without evidence of ischemia at a  diagnostic workload (92% MPHR.)     The target heart rate was achieved. Normal heart rate and BP response to  exercise.  Normal biventricular size, thickness, and global systolic function at  baseline, LVEF=60-65%.  With exercise, LVEF increased to >70% and LV cavity size decreased  appropriately.  No regional wall motion abnormalities are present at rest or with exercise.  No angina was elicited.  No ECG evidence of ischemia. Occasional PVCs with exercise.  Functional capacity is normal for age.  No significant valvular abnormalities are noted on screening Doppler exam.  The aortic root and visualized ascending aorta are normal.    EKG 4/11/2022 shows sinus rhythm otherwise unremarkable.          Assessment and Plan:   Recently had more A-fib and atrial flutter recurrences.  Underwent cardioversion 4/29.  He is back in flutter as of this morning he has been on Eliquis over the last 1 week.  He is in RVR.  He is not feeling well.    #Paroxysmal atrial fibrillation  # Typical atrial flutter  -A-fib was well-controlled with flecainide until few months ago.  He is having multiple episodes despite being on flecainide now.  He is back in flutter today.  He had cardioversion on 4/29.  He tells me he quit drinking.    Plan:  - Since he is having very frequent recurrences A-fib/flutter I recommended to increase metoprolol from 25  to 50 mg daily  - He is going to take extra 25 mg at the time of palpitations  - I recommend him to go to ER today for symptomatic atrial flutter for cardioversion  - Continue flecainide 100 mg twice daily.  I did discuss that we should switch him to amiodarone given multiple recurrences.  He is scheduled for A-fib ablation 7/29.  Patient told me that he is going to let me know tomorrow.    #Hyperlipidemia  -On Crestor 20 mg.  -CT calcium screening showed 0 calcium.    #CKD stage II  -Stable.  Likely due to chronic ibuprofen use.      Return to clinic 6 months.    Total time spent today for this visit is 61 minutes including precharting, face-to-face clinic visit, review of labs/imaging and medical documentation.    Janice GRANT MD  [facility] Division of Cardiology  Pager [phone]    Please do not hesitate to contact me if you have any questions/concerns.     Sincerely,     Janice Grant MD

## 2025-05-19 NOTE — PATIENT INSTRUCTIONS
Thank you for coming to the [facility]; please note the following instructions:    1. Increase Metoprolol to 50 mg daily.  You can take extra 25 mg of metoprolol mg if you feel palpitations faster than 110 bpm.    2. Recommendation to go to ER today for cardioversion    3. Please update clinic tomorrow on how you are doing as Dr. Crawley may make medication changes.  Let us know if you would like us to switch from flecainide to amiodarone.    4. We will reach out to EP team about moving your ablation up    5. Follow up with Dr. Crawley in 6 months        If you have any questions regarding your visit please contact your care team:     Cardiology  Telephone Number   Abida BYRNE., RN  Yaz SALDIVAR, RN  Alena LITTLE, RN  Jeannette RAMOS, RMLARS BUCHANAN, FELICIANO NYE, Clinic Facilitator  Kamila SALDIVAR, Clinic Facilitator [phone] (option 1)   For scheduling appts:     [phone] (select option 1)       For the Device Clinic (Pacemakers and ICD's)  RN's :  Aline Carty   During business hours: [phone]    *After business hours:  [phone] (select option 4)      Normal test result notifications will be released via GLO or mailed within 7 business days.  All other test results, will be communicated via telephone once reviewed by your cardiologist.    If you need a medication refill please contact your pharmacy.  Please allow 3 business days for your refill to be completed.    As always, thank you for trusting us with your health care needs!

## 2025-05-19 NOTE — TELEPHONE ENCOUNTER
[facility] Call Center    Phone Message    May a detailed message be left on voicemail: yes     Reason for Call: Symptoms or Concerns     If patient has red-flag symptoms, warm transfer to triage line    Current symptom or concern: heart rate at 133    Symptoms have been present for:  2 week(s)    Has patient previously been seen for this? Yes    By : [facility]     Date: last saturaday    Are there any new or worsening symptoms? No    Action Taken: Other: cardio    Travel Screening: Not Applicable     Date of Service:

## 2025-05-19 NOTE — TELEPHONE ENCOUNTER
Spoke to patient, he is back in afib. 's. He would like to know Dr. Townsend recommendation.  Writer discussed that if he feels really symptomatic to go to the ED otherwise can discuss options in clinic today with Dr. Crawley at 1:30 pm (cancellation).  Patient also reports that he did start Eliquis at last ER visit on 5/11 which they did get him ready for a cardioversion but he ended up converting back into NSR prior to the procedure so he did not require the cardioversion.  Also states that he has stopped drinking alcohol since the 5/11 ER visit.    Patient decided to see Dr. Crawley in clinic to discuss options and is aware that Dr. Crawley may advise him to go to the ED.  Also patient did report that he took the as needed metoprolol 25 mg tablet today and it has not helped.  He wanted to know if he should take another tablet in the meantime.  Writer advised him not to take another metoprolol without direction from an MD.  Writer will route this to Dr. Crawley as an fYI and if any recommendations in the mean time.    Abida Garcia, ALVINA  Cardiology Care Coordinator  [facility]  [phone] option 1

## 2025-05-19 NOTE — NURSING NOTE
"Chief Complaint   Patient presents with    New Patient    Palpitations       Initial BP 99/71 (BP Location: Right arm, Patient Position: Chair, Cuff Size: Adult Regular)   Pulse (!) 137   Wt 87.1 kg (192 lb)   SpO2 97%   BMI 29.19 kg/m   Estimated body mass index is 29.19 kg/m  as calculated from the following:    Height as of 5/8/25: 1.727 m (5' 8\").    Weight as of this encounter: 87.1 kg (192 lb)..  BP completed using cuff size: regular    Kamila Esparza, Visit Facilitator    "

## 2025-05-19 NOTE — PROGRESS NOTES
[facility]      Referring provider:Johnathon Diaz MD    HPI: Mr. Adan Oliver is a 62 year old  male with PMH significant for    -Obstructive sleep apnea  -Alcohol use  -Reid's esophagus    Patient reports feeling palpitations over the last 1-1/2-month.  So far it has happened only at night.  He has woken up 6 or 7 times over the last 1-1/2 months between 3 AM to 7 AM with chest pressure and palpitations.  He was actually able to capture the heart rate on blood pressure machine which showed 150 bpm (showed me pictures of that from his cell phone).  No EKG documentation so far.  The heart pounding sensation lasted for about 30 minutes.  Over the last 2 weeks he has not had any of these.  He tells me he was under extreme stress when he was having frequent nightly palpitations. Denies chest pain.  No shortness of breath with activity.  No dizziness, syncope or lower extremity edema.    No prior history of cardiac disease.    Lifetime non-smoker.  No hypertension or diabetes.  Family history is unremarkable.  He has hyperlipidemia with elevated LDL at 178 mg/deciliter.  He has been on Crestor 10 mg over the last 3 weeks. Patient tells me that he has been on low-carb keto diet to lose weight.    Patient had exercise his echocardiogram in 11/2020 which showed normal biventricular function with no valve disease.  Stress test was normal.  I have reviewed patient's EKG from 4/11/2022 which shows sinus rhythm otherwise unremarkable.  Labs from 2021 showed normal CBC and BMP.  Medications, personal, family, and social history reviewed with patient and revised.    Interval history 9/26/2023:  Patient is being seen today for multiple concerns that he has over the last few months.  He tells me that he feels his heart beating fast several times a day.  He has shown me his wrist monitor results where his heart rate goes up to 158 bpm at the time of palpitations.  Can last up to 10 to 15 minutes.   When his heart is racing fast he feels discomfort in his chest.  No syncope.  Reports some shortness of breath with exertion.  Can feel discomfort (describes as feels weak in the chest and fuzzy feeling) in his chest with or without exertion.  Over the last few years he has been taking ibuprofen 200 mg daily for back pain.  He does not use CPAP.  He drinks alcohol at least 2 cocktails every night.  He has been drinking alcohol all his life.  He tells me he has a lot of stress in his life.  He has trouble sleeping.  He is recommended CBD lozenges.  No history of tobacco use.    Interval history 10/24/2023:  Patient returns for follow-up.  Patient's heart monitor showed paroxysmal atrial fibrillation corresponding to his symptoms.  This is a new diagnosis for the patient.  When we have received A-fib information I have started patient on metoprolol initially 50 then increased to 100 mg.  Today he tells me that he still has palpitation episodes mostly at night sometimes interfering with his sleep.  Not as fast as he used to be.  Continues to drink couple of alcoholic drinks almost every day though he tells me that he has cut back on this.  He is not sure if he would benefit from sleep testing.  He tells me that [place] of this [place].  He is wondering if he needs to take flecainide.  Echocardiogram is unremarkable.  CT calcium score is 0.    Interval history 6/26/2024:  Patient is being seen today for follow-up.  As you know he has history of paroxysmal atrial fibrillation now well-controlled with flecainide 100 mg twice daily.  Patient did not feel well with metoprolol 100 mg.  Seen by my colleague 2 months ago and metoprolol dose was reduced to 50 mg daily.  Since then his energy levels went up.  Feeling better but he tells me he is not back to baseline.  Otherwise he feels dizziness occasionally which does affect his quality of life.  Denies palpitations, chest pain.  He had a lot of  complaints today about how hard it is to get into cardiology appointment.    Interval history 12/4/2024  Patient is being seen today for follow-up.  Reports feeling well from cardiac standpoint.  Continues to report tiredness at times.  No recurrent A-fib since being on flecainide.  Has seen EP and discussed catheter ablation.  He tells me that he is not wanting to undergo a catheter ablation at this time.    Interval history 5/19/2025  Patient is being seen today for atrial flutter.  He woke up this morning and his heart rate was fast in the 130s.  We scheduled a same-day appointment.  I have reviewed EKG in clinic which shows atrial flutter.  Heart rate 137.  He was in the ER recently pretty frequently.  On 4/29 he was found to be in atrial flutter and had cardioversion at [facility].  He presented to ER again on 5/11 with A-fib with RVR.  Converted spontaneously to sinus rhythm.  He tells me that he feels a little weak, lightheaded and nauseous.    PAST MEDICAL HISTORY:  Past Medical History:   Diagnosis Date    Anxiety 12/07/2015    Reid esophagus 10/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 It was difficult to discern between PLMS and hyponeas. Apnea/hypopnea index was 28.0 events per hour.  The REM AHI was 29.3 events per hour.  The supine AHI was 32.7 events per hour.  RDI was 28.0 events per hour. Lowest oxygen saturation was 84.0%.  Time spent less than or equal to 88% was 0.3 minutes. PLM index was 32.3 movements per hour       CURRENT MEDICATIONS:  Current Outpatient Medications   Medication Sig Dispense Refill    apixaban ANTICOAGULANT (ELIQUIS) 5 MG tablet Take 5 mg by mouth 2 times daily.      esomeprazole (NEXIUM) 40  MG DR capsule TAKE ONE CAPSULE BY MOUTH EVERY MORNING 30-60 MINUTES BEFORE BREAKFAST 90 capsule 3    finasteride (PROSCAR) 5 MG tablet Take 1 tablet (5 mg) by mouth daily. 90 tablet 3    flecainide (TAMBOCOR) 100 MG tablet TAKE ONE TABLET BY MOUTH TWICE A  tablet 2    metoprolol succinate ER (TOPROL XL) 25 MG 24 hr tablet Take 1 tablet (25 mg) by mouth daily 90 tablet 3    pregabalin (LYRICA) 75 MG capsule Take 1-2 capsules ( mg) 1-3 hours before bedtime. 60 capsule 0    rosuvastatin (CRESTOR) 20 MG tablet Take 1 tablet (20 mg) by mouth daily. 90 tablet 3       PAST SURGICAL HISTORY:  Past Surgical History:   Procedure Laterality Date    APPENDECTOMY  2000s    BLEPHAROPLASTY BILATERAL Bilateral 10/3/2018    Procedure: BLEPHAROPLASTY BILATERAL;;  Surgeon: Dany Berrios MD;  Location: [facility]    CARPAL TUNNEL RELEASE RT/LT  1980s    COLONOSCOPY N/A 7/27/2022    Procedure: COLONOSCOPY, FLEXIBLE, WITH LESION REMOVAL USING SNARE;  Surgeon: Filiberto Banuelos MD;  Location: [facility]    COLONOSCOPY WITH CO2 INSUFFLATION N/A 7/27/2022    Procedure: COLONOSCOPY, WITH CO2 INSUFFLATION;  Surgeon: Filiberto Banuelos MD;  Location: [facility]    COMBINED ESOPHAGOSCOPY, GASTROSCOPY, DUODENOSCOPY (EGD) WITH CO2 INSUFFLATION N/A 9/20/2019    Procedure: ESOPHAGOGASTRODUODENOSCOPY, WITH CO2 INSUFFLATION;  Surgeon: Filiberto Banuelos MD;  Location: [facility]    COMBINED ESOPHAGOSCOPY, GASTROSCOPY, DUODENOSCOPY (EGD) WITH CO2 INSUFFLATION N/A 7/27/2022    Procedure: ESOPHAGOGASTRODUODENOSCOPY, WITH CO2 INSUFFLATION;  Surgeon: Filiberto Banuelos MD;  Location: [facility]    ESOPHAGOSCOPY, GASTROSCOPY, DUODENOSCOPY (EGD), COMBINED N/A 9/20/2019    Procedure: Esophagogastroduodenoscopy, With Biopsy;  Surgeon: Filiberto Banuelos MD;  Location: [facility]    ESOPHAGOSCOPY, GASTROSCOPY, DUODENOSCOPY (EGD), COMBINED N/A 7/27/2022    Procedure: ESOPHAGOGASTRODUODENOSCOPY, WITH BIOPSY;  Surgeon: Lakisha  Filiberto Wong MD;  Location: [facility]    REPAIR PTOSIS BILATERAL Bilateral 10/3/2018    Procedure: REPAIR PTOSIS BILATERAL;;  Surgeon: Dany Berrios MD;  Location: [facility]    REPAIR PTOSIS BROW BILATERAL Bilateral 10/3/2018    Procedure: REPAIR PTOSIS BROW BILATERAL;;  Surgeon: Dany Berrios MD;  Location: [facility]    SIGMOIDOSCOPY FLEXIBLE N/A 9/20/2019    Procedure: SIGMOIDOSCOPY, FLEXIBLE;  Surgeon: Filiberto Banuelos MD;  Location: [facility]    TONSILLECTOMY  1980s       ALLERGIES:     Allergies   Allergen Reactions    Penicillins Other (See Comments)     Stiff leg from shot of PCN       FAMILY HISTORY:  Family History   Problem Relation Age of Onset    Hypertension Mother     Alzheimer Disease Mother     Diabetes Brother     Glaucoma No family hx of     Macular Degeneration No family hx of     Coronary Artery Disease No family hx of     Colon Cancer No family hx of     Retinal detachment No family hx of          SOCIAL HISTORY:  Social History     Tobacco Use    Smoking status: Never     Passive exposure: Never    Smokeless tobacco: Never   Vaping Use    Vaping status: Never Used   Substance Use Topics    Alcohol use: Yes     Alcohol/week: 14.0 - 21.0 standard drinks of alcohol     Types: 14 - 21 Standard drinks or equivalent per week     Comment: varies, a couple drinks a day    Drug use: No       ROS:   Constitutional: No fever, chills, or sweats. Weight stable.   Cardiovascular: As per HPI.     Exam:  BP 99/71 (BP Location: Right arm, Patient Position: Chair, Cuff Size: Adult Regular)   Pulse (!) 137   Wt 87.1 kg (192 lb)   SpO2 97%   BMI 29.19 kg/m    GENERAL APPEARANCE: alert and no distress  HEENT: no icterus, no central cyanosis  LYMPH/NECK: no adenopathy, no asymmetry, JVP not elevated  CARDIOVASCULAR: regular rhythm, normal S1, S2, no S3 or S4 and no murmur, click or rub, precordium quiet with normal PMI.  NEURO: alert, normal speech,and affect  SKIN: no ecchymoses, no rashes     I have  reviewed the labs and personally reviewed the imaging below and made my comment in the assessment and plan.    Labs:  CBC RESULTS:   Lab Results   Component Value Date    WBC 16.8 (H) 08/16/2024    WBC 10.2 04/08/2021    RBC 4.75 08/16/2024    RBC 4.62 04/08/2021    HGB 15.0 08/16/2024    HGB 14.6 04/08/2021    HCT 45.4 08/16/2024    HCT 42.9 04/08/2021    MCV 96 08/16/2024    MCV 93 04/08/2021    MCH 31.6 08/16/2024    MCH 31.6 04/08/2021    MCHC 33.0 08/16/2024    MCHC 34.0 04/08/2021    RDW 12.6 08/16/2024    RDW 13.9 04/08/2021     08/16/2024     04/08/2021       BMP RESULTS:  Lab Results   Component Value Date     08/16/2024     04/08/2021    POTASSIUM 4.5 08/16/2024    POTASSIUM 3.8 11/04/2021    POTASSIUM 4.2 04/08/2021    CHLORIDE 101 08/16/2024    CHLORIDE 105 11/04/2021    CHLORIDE 104 04/08/2021    CO2 26 08/16/2024    CO2 22 11/04/2021    CO2 23 04/08/2021    ANIONGAP 11 08/16/2024    ANIONGAP 10 11/04/2021    ANIONGAP 7 04/08/2021    GLC 79 08/16/2024    GLC 96 11/04/2021    GLC 85 04/08/2021    BUN 14.2 08/16/2024    BUN 22 11/04/2021    BUN 21 04/08/2021    CR 1.32 (H) 08/16/2024    CR 1.12 04/08/2021    GFRESTIMATED 60 (L) 08/16/2024    GFRESTIMATED 70 04/08/2021    GFRESTBLACK 81 04/08/2021    GAYLA 9.1 08/16/2024    GAYLA 9.2 04/08/2021     Cardiac mobile telemetry for a month (10/4/2023) showed multiple atrial fibrillation episodes with RVR.  Lasting up to 3 hours.    CT CORONARY CALCIUM 10/04/2023  1.  No coronary calcifications.  2.  The total Agatston calcium score is 0 placing the patient in the  lowest percentile when compared to age and gender matched control  group.    TTE 10/17/2023  Global and regional left ventricular function is normal with an EF of 55-60%.  Global right ventricular function is normal.  Both atria appear normal.  No significant valvular abnormalities present.  IVC diameter <2.1 cm collapsing >50% with sniff suggests a normal RA pressure  of 3  mmHg.  No pericardial effusion is present.  There is no prior study for direct comparison.  Exercise stress echocardiogram 11/18/2020  Normal, low-risk exercise echocardiogram without evidence of ischemia at a  diagnostic workload (92% MPHR.)     The target heart rate was achieved. Normal heart rate and BP response to  exercise.  Normal biventricular size, thickness, and global systolic function at  baseline, LVEF=60-65%.  With exercise, LVEF increased to >70% and LV cavity size decreased  appropriately.  No regional wall motion abnormalities are present at rest or with exercise.  No angina was elicited.  No ECG evidence of ischemia. Occasional PVCs with exercise.  Functional capacity is normal for age.  No significant valvular abnormalities are noted on screening Doppler exam.  The aortic root and visualized ascending aorta are normal.    EKG 4/11/2022 shows sinus rhythm otherwise unremarkable.          Assessment and Plan:   Recently had more A-fib and atrial flutter recurrences.  Underwent cardioversion 4/29.  He is back in flutter as of this morning he has been on Eliquis over the last 1 week.  He is in RVR.  He is not feeling well.    #Paroxysmal atrial fibrillation  # Typical atrial flutter  -A-fib was well-controlled with flecainide until few months ago.  He is having multiple episodes despite being on flecainide now.  He is back in flutter today.  He had cardioversion on 4/29.  He tells me he quit drinking.    Plan:  - Since he is having very frequent recurrences A-fib/flutter I recommended to increase metoprolol from 25 to 50 mg daily  - He is going to take extra 25 mg at the time of palpitations  - I recommend him to go to ER today for symptomatic atrial flutter for cardioversion  - Continue flecainide 100 mg twice daily.  I did discuss that we should switch him to amiodarone given multiple recurrences.  He is scheduled for A-fib ablation 7/29.  Patient told me that he is going to let me know  tomorrow.    #Hyperlipidemia  -On Crestor 20 mg.  -CT calcium screening showed 0 calcium.    #CKD stage II  -Stable.  Likely due to chronic ibuprofen use.      Return to clinic 6 months.    Total time spent today for this visit is 61 minutes including precharting, face-to-face clinic visit, review of labs/imaging and medical documentation.    Janice GRANT MD  [facility] Division of Cardiology  Pager [phone]

## 2025-05-19 NOTE — TELEPHONE ENCOUNTER
----- Message from Selma COLON sent at 5/13/2025  2:39 PM CDT -----  Regarding: Afib ablation (RFA)  Emmett Head,Can we please get this pt scheduled for RFA ablation with Dr. Cheung? CTA prior. Letter started and routed to you. Thank you,Farhan Bose, RN Cardiology Nurse Coordinator

## 2025-05-20 ENCOUNTER — MYC MEDICAL ADVICE (OUTPATIENT)
Dept: CARDIOLOGY | Facility: CLINIC | Age: 65
End: 2025-05-20
Payer: COMMERCIAL

## 2025-05-20 ENCOUNTER — TELEPHONE (OUTPATIENT)
Dept: CARDIOLOGY | Facility: CLINIC | Age: 65
End: 2025-05-20
Payer: COMMERCIAL

## 2025-05-20 DIAGNOSIS — I48.0 PAF (PAROXYSMAL ATRIAL FIBRILLATION) (H): Primary | ICD-10-CM

## 2025-05-20 DIAGNOSIS — R00.2 PALPITATIONS: ICD-10-CM

## 2025-05-20 DIAGNOSIS — I48.92 ATRIAL FLUTTER BY ELECTROCARDIOGRAM (H): ICD-10-CM

## 2025-05-20 RX ORDER — AMIODARONE HYDROCHLORIDE 200 MG/1
TABLET ORAL
Qty: 180 TABLET | Refills: 3 | Status: SHIPPED | OUTPATIENT
Start: 2025-05-20

## 2025-05-20 NOTE — TELEPHONE ENCOUNTER
[facility] Call Center    Phone Message    May a detailed message be left on voicemail: yes     Reason for Call: Other: Pt is calling Yaz back- please call back whenever possible, per pt.     Action Taken: Other: cardio    Travel Screening: Not Applicable     Date of Service:

## 2025-05-20 NOTE — TELEPHONE ENCOUNTER
See other [facility] encounter for follow up.    Yaz Garrison, RN, BSN  Cardiology RN Care Coordinator   [place]   Phone: [phone]  Fax: [phone] ([place]) [phone] ([place])

## 2025-05-20 NOTE — TELEPHONE ENCOUNTER
See mychart encounter for further follow up. Patient called and plan discussed with patient over the phone.    Yaz Garrison, RN, BSN  Cardiology RN Care Coordinator   [place]   Phone: [phone]  Fax: [phone] ([place]) [phone] ([place])

## 2025-05-20 NOTE — TELEPHONE ENCOUNTER
Called and spoke with patient. Reviewed plan and mediations. Patient scheduled for EKG and Labs next week. Patient states that he is due for a colonoscopy that needs to be completed at the hospital. Patient wondering if he should hold off on colonoscopy until ablation. Informed patient that provider would be updated to review.     Yaz Garrison, RN, BSN  Cardiology RN Care Coordinator   [place]   Phone: [phone]  Fax: [phone] ([place]) [phone] ([place])

## 2025-05-20 NOTE — TELEPHONE ENCOUNTER
Procedure Scheduled:   AF ABL    Provider: MEGAN    Date of procedure:  JULY 29    Time of Arrival:    6A    Confirmed Mychart with patient  yes    Team notified      Adilene Calvo  Periop Electrophysiology   [phone]

## 2025-05-20 NOTE — TELEPHONE ENCOUNTER
Hold off yes  Me to Janice Crawley MD  CK      5/20/25 12:22 PM  I was able to speak to Adan and reviewed his meds. He is stopping flecainide and metoprolol and will  and start amiodarone today. I got him scheduled for EKG and labs next Wednesday. He is okay with plan.      He did state he is due for a colonoscopy and is wondering if he should hold off on it until after his ablation. Let me know what you recommend and thank you    [employer] message sent to patient.    Yaz Garrison, RN, BSN  Cardiology RN Care Coordinator   [place]   Phone: [phone]  Fax: [phone] ([place]) [phone] ([place])

## 2025-05-20 NOTE — TELEPHONE ENCOUNTER
Patient saw [employer] message. Spoke with patient. See other [facility] encounter.    Yaz Garrison, RN, BSN  Cardiology RN Care Coordinator   [place]   Phone: [phone]  Fax: [phone] ([place]) [phone] ([place])

## 2025-05-20 NOTE — TELEPHONE ENCOUNTER
Called and spoke with pharmacy. Pharmacy confirmed that patient should be able to  amiodarone prescription today. Attempted to call patient. Left message for patient. Mychart message sent to patient. Amiodarone prescription already sent in for patient. EKG order to be completed in 1 week in place. Lab order already in place.     Yaz Garrison RN, BSN  Cardiology RN Care Coordinator   [place]   Phone: [phone]  Fax: [phone] ([place]) [phone] ([place])

## 2025-05-21 ENCOUNTER — TELEPHONE (OUTPATIENT)
Dept: CARDIOLOGY | Facility: CLINIC | Age: 65
End: 2025-05-21
Payer: COMMERCIAL

## 2025-05-21 ENCOUNTER — ANESTHESIA EVENT (OUTPATIENT)
Dept: CARDIOLOGY | Facility: CLINIC | Age: 65
End: 2025-05-21
Payer: COMMERCIAL

## 2025-05-21 ENCOUNTER — MYC MEDICAL ADVICE (OUTPATIENT)
Dept: CARDIOLOGY | Facility: CLINIC | Age: 65
End: 2025-05-21
Payer: COMMERCIAL

## 2025-05-21 DIAGNOSIS — I48.0 PAF (PAROXYSMAL ATRIAL FIBRILLATION) (H): Primary | ICD-10-CM

## 2025-05-21 RX ORDER — SODIUM CHLORIDE 9 MG/ML
INJECTION, SOLUTION INTRAVENOUS CONTINUOUS
OUTPATIENT
Start: 2025-05-21

## 2025-05-21 RX ORDER — LIDOCAINE 40 MG/G
CREAM TOPICAL
OUTPATIENT
Start: 2025-05-21

## 2025-05-21 NOTE — TELEPHONE ENCOUNTER
Received notification that patient's ablation was moved up to tomorrow. Dr. Crawley aware. Spoke with patient and confirmed that Dr. Crawley is aware and is okay with patient having ablation tomorrow. Patient already has instructions for ablation. Patient with no questions regarding instructions.    Yaz Garrison, RN, BSN  Cardiology RN Care Coordinator   [place]   Phone: [phone]  Fax: [phone] ([place]) [phone] ([place])

## 2025-05-21 NOTE — ANESTHESIA PREPROCEDURE EVALUATION
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 It was difficult to discern between PLMS and hyponeas. Apnea/hypopnea index was 28.0 events per hour.  The REM AHI was 29.3 events per hour.  The supine AHI was 32.7 events per hour.  RDI was 28.0 events per hour. Lowest oxygen saturation was 84.0%.  Time spent less than or equal to 88% was 0.3 minutes. PLM index was 32.3 movements per hour      Past Surgical History:   Procedure Laterality Date    APPENDECTOMY      BLEPHAROPLASTY BILATERAL Bilateral 10/3/2018    Procedure: BLEPHAROPLASTY BILATERAL;;  Surgeon: Dany Berrios MD;  Location:  OR    CARPAL TUNNEL RELEASE RT/LT  1980s    COLONOSCOPY N/A 2022    Procedure: COLONOSCOPY, FLEXIBLE, WITH LESION REMOVAL USING SNARE;  Surgeon: Filiberto Banuelos MD;  Location:  OR    COLONOSCOPY WITH CO2 INSUFFLATION N/A 2022    Procedure: COLONOSCOPY, WITH CO2 INSUFFLATION;  Surgeon: Filiberto Banuelos MD;  Location:  OR    COMBINED ESOPHAGOSCOPY, GASTROSCOPY, DUODENOSCOPY (EGD) WITH CO2 INSUFFLATION N/A 2019    Procedure: ESOPHAGOGASTRODUODENOSCOPY, WITH CO2 INSUFFLATION;  Surgeon: Filiberto Banuelos MD;  Location:  OR    COMBINED ESOPHAGOSCOPY, GASTROSCOPY, DUODENOSCOPY (EGD) WITH CO2 INSUFFLATION N/A 2022    Procedure: ESOPHAGOGASTRODUODENOSCOPY, WITH CO2 INSUFFLATION;  Surgeon: Filiberto Banuelos MD;  Location:  OR    ESOPHAGOSCOPY,  GASTROSCOPY, DUODENOSCOPY (EGD), COMBINED N/A 9/20/2019    Procedure: Esophagogastroduodenoscopy, With Biopsy;  Surgeon: Filiberto Banuelos MD;  Location: [facility]    ESOPHAGOSCOPY, GASTROSCOPY, DUODENOSCOPY (EGD), COMBINED N/A 7/27/2022    Procedure: ESOPHAGOGASTRODUODENOSCOPY, WITH BIOPSY;  Surgeon: Filiberto Banuelos MD;  Location: [facility] OR    REPAIR PTOSIS BILATERAL Bilateral 10/3/2018    Procedure: REPAIR PTOSIS BILATERAL;;  Surgeon: Dany Berrios MD;  Location: [facility]    REPAIR PTOSIS BROW BILATERAL Bilateral 10/3/2018    Procedure: REPAIR PTOSIS BROW BILATERAL;;  Surgeon: Dany Berrios MD;  Location: [facility]    SIGMOIDOSCOPY FLEXIBLE N/A 9/20/2019    Procedure: SIGMOIDOSCOPY, FLEXIBLE;  Surgeon: Filiberto Banuelos MD;  Location: [facility] OR    TONSILLECTOMY  1980s      Allergies   Allergen Reactions    Penicillins Other (See Comments)     Stiff leg from shot of PCN       Social History     Tobacco Use    Smoking status: Never     Passive exposure: Never    Smokeless tobacco: Never   Substance Use Topics    Alcohol use: Not Currently     Alcohol/week: 14.0 - 21.0 standard drinks of alcohol     Types: 14 - 21 Standard drinks or equivalent per week     Comment: varies, a couple drinks a day      Wt Readings from Last 1 Encounters:   05/19/25 87.1 kg (192 lb)        Anesthesia Evaluation   Pt has had prior anesthetic. Type: General.        ROS/MED HX  ENT/Pulmonary:     (+) sleep apnea,                                       Neurologic:       Cardiovascular: Comment: #Paroxysmal atrial fibrillation  # Typical atrial flutter    A-fib was well-controlled with flecainide until few months ago. Multiple episodes despite being on flecainide s/p cardioversion 4/29. Increasing Afib and atrial flutter recurrences w multiple ER visits, most recently on 5/19. Flecainide switched to amiodarone. On metoprolol and apixaban    (+)  - -   -  - -                                 Previous cardiac testing    Echo: Date: 10/2023 Results:  TTE 10/17/2023  Global and regional left ventricular function is normal with an EF of 55-60%.  Global right ventricular function is normal.  Both atria appear normal.  No significant valvular abnormalities present.  IVC diameter <2.1 cm collapsing >50% with sniff suggests a normal RA pressure  of 3 mmHg.  No pericardial effusion is present.  There is no prior study for direct comparison.    Stress Test:  Date: Results:    ECG Reviewed:  Date: Results:    Cath:  Date: Results:      METS/Exercise Tolerance: 4 - Raking leaves, gardening    Hematologic:       Musculoskeletal:       GI/Hepatic:     (+) GERD, Asymptomatic on medication, esophageal disease,                 Renal/Genitourinary: Comment: CKD2    (+) renal disease, type: CRI, Pt does not require dialysis,           Endo:       Psychiatric/Substance Use:     (+) psychiatric history anxiety alcohol abuse      Infectious Disease:       Malignancy:       Other:              Physical Exam  Airway  Mallampati: III  TM distance: <3 FB  Neck ROM: limited  Mouth opening: >= 4 cm    Cardiovascular  Comments: #Paroxysmal atrial fibrillation  # Typical atrial flutter    A-fib was well-controlled with flecainide until few months ago. Multiple episodes despite being on flecainide s/p cardioversion 4/29. Increasing Afib and atrial flutter recurrences w multiple ER visits, most recently on 5/19. Flecainide switched to amiodarone. On metoprolol and apixaban  Dental   (+) Modest Abnormalities - crowns, retainers, 1 or 2 missing teeth        Pulmonary Breath sounds clear to auscultation        Neurological   He appears awake, alert and oriented x3.    Other Findings       OUTSIDE LABS:  CBC:   Lab Results   Component Value Date    WBC 16.8 (H) 08/16/2024    WBC 10.9 04/11/2024    HGB 15.0 08/16/2024    HGB 15.8 04/11/2024    HCT 45.4 08/16/2024    HCT 46.7 04/11/2024     08/16/2024     04/11/2024     BMP:   Lab Results   Component  "Value Date     08/16/2024     04/11/2024    POTASSIUM 4.5 08/16/2024    POTASSIUM 4.9 04/11/2024    CHLORIDE 101 08/16/2024    CHLORIDE 104 04/11/2024    CO2 26 08/16/2024    CO2 21 (L) 04/11/2024    BUN 14.2 08/16/2024    BUN 24.5 (H) 04/11/2024    CR 1.32 (H) 08/16/2024    CR 1.37 (H) 04/11/2024    GLC 79 08/16/2024    GLC 86 04/11/2024     COAGS: No results found for: \"PTT\", \"INR\", \"FIBR\"  POC: No results found for: \"BGM\", \"HCG\", \"HCGS\"  HEPATIC:   Lab Results   Component Value Date    ALBUMIN 4.4 08/16/2024    PROTTOTAL 7.3 08/16/2024    ALT 34 08/16/2024    AST 27 08/16/2024    ALKPHOS 93 08/16/2024    BILITOTAL 0.4 08/16/2024     OTHER:   Lab Results   Component Value Date    A1C 5.1 10/01/2014    GAYLA 9.1 08/16/2024    TSH 0.57 04/11/2024    CRP <2.9 09/26/2019       Anesthesia Plan    ASA Status:  3      NPO Status: NPO Appropriate   Anesthesia Type: General.  Airway: oral.  Induction: intravenous.  Maintenance: Balanced.   Techniques and Equipment:     - Airway:  Planned airway equipment includes video laryngoscope.     - Monitoring Plan: standard ASA monitoring, train of four monitoring, processed EEG monitor     Consents    Anesthesia Plan(s) and associated risks, benefits, and realistic alternatives discussed. Questions answered and patient/representative(s) expressed understanding.     - Discussed: CRNA, proceduralist     - Discussed with:  Patient        - Pt is DNR/DNI Status: no DNR     Blood Consent:      - Discussed with: patient.     - Consented: consented to blood products     Postoperative Care    Pain management: plan for postoperative opioid use.     Comments:                   Femi Stark MD    I have reviewed the pertinent notes and labs in the chart from the past 30 days and (re)examined the patient.  Any updates or changes from those notes are reflected in this note.    Clinically Significant Risk Factors Present on Admission                             # Overweight: Estimated " "body mass index is 29.19 kg/m  as calculated from the following:    Height as of 5/8/25: 1.727 m (5' 8\").    Weight as of 5/19/25: 87.1 kg (192 lb).                    "

## 2025-05-21 NOTE — TELEPHONE ENCOUNTER
Update - per Dannielle Rodríguez, approved to be moved up earlier    Procedure Scheduled:   AF ABL     Provider: MEGAN     Date of procedure:  JULY 29  MOVED to MAY 22     Time of Arrival:    8:15     Confirmed Mychart with patient  yes     Team notified        Adilene Calvo  Periop Electrophysiology   [phone]

## 2025-05-21 NOTE — TELEPHONE ENCOUNTER
Patient saw [employer] message. No response at this time.    Yaz Garrison, RN, BSN  Cardiology RN Care Coordinator   [place]   Phone: [phone]  Fax: [phone] ([place]) [phone] ([place])

## 2025-05-21 NOTE — H&P
Electrophysiology Pre-Procedure History and Physical    Adan Oliver MRN# [id number]   Age: 65 year old YOB: 1960      Date of Procedure: 5/22/2025 [facility]      Date of Exam 5/22/2025 Facility (Same day)       HPI:  Mr. Oliver is a 65 year old male who has a medical history significant for PAF (CHADSVASC 1 on Eliquis) s/p DCCVs, LUCIAN (uses CPAP), Reid's esophagus, and ETOH use. He started developing palpitations in 2022. He was found to have PAF and was started on Metoprolol and Flecainide. He had a prior stress echo in 2020 that showed normal biventricular function, no significant valvular disease, and negative for ischemia. An echo from 10/2023 showed LVEF 55-60%, normal RV function, and no significant valvular abnormalities. He has had some side effect with Metoprolol and dose was reduced. Dose further reduced due to bradycardia. He has felt dizziness, fatigue, and fogginess with Flecainide. He as offered ablation vs alternative AAT in 2024, but he elected to defer and continue with his current treatment at that time. He then started having frequent recurrences and had DCCV for AFL on 4/29/25 with ER visit. He again returned to ER on 5/11/25 with AF and spontaneously converted. He again had recurrent AF in 5/19/25 and required another DCCV in ER. He was referred for more urgent ablation given frequent symptomatic recurrences with frequent ER admissions. He presents today for AF/AFL ablation.        Active problem list:     Patient Active Problem List    Diagnosis Date Noted    Atrial fibrillation (H) 04/08/2024     Priority: Medium    Benign prostatic hyperplasia with urinary frequency 06/09/2022     Priority: Medium    Hip pain, bilateral 11/19/2021     Priority: Medium    LUCIAN (obstructive sleep apnea)- 'mild, positional' (AHI 12.8) 10/06/2017     Priority: Medium     10/6/2024 [place] Diagnostic Sleep Study (200.0 lbs) - AHI 12.8, RDI  15.1, Supine AHI 65.1, REM AHI -, Low O2 77.0%, Time Spent <=88% 2.5 minutes / Time Spent <=89% 3.7 minutes.    Study Date: 10/2/2017- (200.0 lbs) Scored using 3% rules. It was difficult to discern between PLMS and hyponeas. Apnea/hypopnea index was 28.0 events per hour.  The REM AHI was 29.3 events per hour.  The supine AHI was 32.7 events per hour.  RDI was 28.0 events per hour. Lowest oxygen saturation was 84.0%.  Time spent less than or equal to 88% was 0.3 minutes. PLM index was 32.3 movements per hour.  The PLM Arousal Index was 15.8 per hour.      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     Current Outpatient Medications   Medication Sig Dispense Refill    amiodarone (PACERONE) 200 MG tablet TAKE 2 TABLET A DAY FOR 3 WEEKS, THEN REDUCE THE DOSE TO ONE TABLET A DAY. 180 tablet 3    apixaban ANTICOAGULANT (ELIQUIS) 5 MG tablet Take 5 mg by mouth 2 times daily.      esomeprazole (NEXIUM) 40 MG DR capsule TAKE ONE CAPSULE BY MOUTH EVERY MORNING 30-60 MINUTES BEFORE BREAKFAST 90 capsule 3    finasteride (PROSCAR) 5 MG tablet Take 1 tablet (5 mg) by mouth daily. 90 tablet 3    metoprolol succinate ER (TOPROL XL) 50 MG 24 hr tablet Take 1 tablet (50 mg) by mouth daily. 90 tablet 3     rosuvastatin (CRESTOR) 20 MG tablet Take 1 tablet (20 mg) by mouth daily. 90 tablet 3    pregabalin (LYRICA) 75 MG capsule Take 1-2 capsules ( mg) 1-3 hours before bedtime. 60 capsule 0           Medication List         Notice    Cannot display discharge medications because the patient has not yet been admitted.              Allergies:      Allergies   Allergen Reactions    Penicillins Other (See Comments)     Stiff leg from shot of PCN              Social History:     Social History     Tobacco Use    Smoking status: Never     Passive exposure: Never    Smokeless tobacco: Never   Substance Use Topics    Alcohol use: Not Currently     Alcohol/week: 14.0 - 21.0 standard drinks of alcohol     Types: 14 - 21 Standard drinks or equivalent per week     Comment: varies, a couple drinks a day            Physical Exam:   All vitals have been reviewed  No data found.  No intake/output data recorded.  Airway assessment:   Patient is able to open mouth wide  Patient is able to stick out tongue}      ENT:   Normocephalic, without obvious abnormality, atraumatic, sinuses nontender on palpation, external ears without lesions, oral pharynx with moist mucous membranes, tonsils without erythema or exudates, gums normal and good dentition.     Neck:   Supple, symmetrical, trachea midline, no adenopathy, thyroid symmetric, not enlarged and no tenderness, skin normal     Lungs:   No increased work of breathing, good air exchange, clear to auscultation bilaterally, no crackles or wheezing     Cardiovascular:   Normal apical impulse, regular rate and rhythm, normal S1 and S2, no S3 or S4, and no murmur noted             Lab / Radiology Results:     Lab Results   Component Value Date    WBC 16.8 08/16/2024    WBC 10.2 04/08/2021    RBC 4.75 08/16/2024    RBC 4.62 04/08/2021    HGB 15.0 08/16/2024    HGB 14.6 04/08/2021    HCT 45.4 08/16/2024    HCT 42.9 04/08/2021    MCV 96 08/16/2024    MCV 93 04/08/2021    RDW 12.6 08/16/2024     RDW 13.9 04/08/2021     08/16/2024     04/08/2021      Lab Results   Component Value Date    WBC 16.8 08/16/2024    WBC 10.2 04/08/2021     Lab Results   Component Value Date     08/16/2024     04/08/2021     Lab Results   Component Value Date    HGB 15.0 08/16/2024    HGB 14.6 04/08/2021    HCT 45.4 08/16/2024    HCT 42.9 04/08/2021     Lab Results   Component Value Date     08/16/2024     04/08/2021    CO2 26 08/16/2024    CO2 22 11/04/2021    CO2 23 04/08/2021    BUN 14.2 08/16/2024    BUN 22 11/04/2021    BUN 21 04/08/2021     Lab Results   Component Value Date     08/16/2024     04/08/2021    CO2 26 08/16/2024    CO2 22 11/04/2021    CO2 23 04/08/2021    BUN 14.2 08/16/2024    BUN 22 11/04/2021    BUN 21 04/08/2021     Lab Results   Component Value Date    TSH 0.57 04/11/2024    TSH 0.52 08/04/2016             Plan:   Patient's active problems diagnostically and therapeutically optimized for the planned procedure. Patient here for AF ablation. Procedure explained in detail to patient including indications, risks, and benefits. The procedural risk of EP study and ablation were discussed in detail. Risks discussed include: vascular complications, CVA, AVB, pericardial effusion and tamponade, esophageal fistula, PV stenosis. AF ablation has 70% success at 1 year, 50% success at 5 years, and 30% need another ablation.  Even if ablation is successful anticoagulation may be needed lifelong. Patient states understanding and wishes to procced.     RASHEL Epperson CNP  Electrophysiology Consult Service  Securely message with [employer]   Text page via [employer] Paging/Directory

## 2025-05-22 ENCOUNTER — ANESTHESIA (OUTPATIENT)
Dept: CARDIOLOGY | Facility: CLINIC | Age: 65
End: 2025-05-22
Payer: COMMERCIAL

## 2025-05-22 ENCOUNTER — HOSPITAL ENCOUNTER (OUTPATIENT)
Dept: CT IMAGING | Facility: CLINIC | Age: 65
Discharge: HOME | End: 2025-05-22
Attending: NURSE PRACTITIONER
Payer: COMMERCIAL

## 2025-05-22 ENCOUNTER — RESULTS FOLLOW-UP (OUTPATIENT)
Dept: CARDIOLOGY | Facility: CLINIC | Age: 65
End: 2025-05-22

## 2025-05-22 ENCOUNTER — HOSPITAL ENCOUNTER (OUTPATIENT)
Facility: CLINIC | Age: 65
Discharge: HOME | End: 2025-05-22
Attending: INTERNAL MEDICINE | Admitting: INTERNAL MEDICINE
Payer: COMMERCIAL

## 2025-05-22 VITALS
DIASTOLIC BLOOD PRESSURE: 83 MMHG | SYSTOLIC BLOOD PRESSURE: 123 MMHG | RESPIRATION RATE: 22 BRPM | WEIGHT: 195.99 LBS | HEART RATE: 89 BPM | BODY MASS INDEX: 29.7 KG/M2 | HEIGHT: 68 IN | OXYGEN SATURATION: 98 % | TEMPERATURE: 98.4 F

## 2025-05-22 DIAGNOSIS — I48.0 PAF (PAROXYSMAL ATRIAL FIBRILLATION) (H): ICD-10-CM

## 2025-05-22 LAB
ACT BLD: 146 SECONDS (ref 74–150)
ACT BLD: 170 SECONDS (ref 74–150)
ACT BLD: 276 SECONDS (ref 74–150)
ACT BLD: 337 SECONDS (ref 74–150)
ACT BLD: 357 SECONDS (ref 74–150)
ACT BLD: 515 SECONDS (ref 74–150)
ANION GAP SERPL CALCULATED.3IONS-SCNC: 13 MMOL/L (ref 7–15)
ATRIAL RATE - MUSE: 71 BPM
BUN SERPL-MCNC: 16 MG/DL (ref 8–23)
CALCIUM SERPL-MCNC: 9.3 MG/DL (ref 8.8–10.4)
CHLORIDE SERPL-SCNC: 105 MMOL/L (ref 98–107)
CREAT SERPL-MCNC: 1.19 MG/DL (ref 0.67–1.17)
DIASTOLIC BLOOD PRESSURE - MUSE: NORMAL MMHG
EGFRCR SERPLBLD CKD-EPI 2021: 67 ML/MIN/1.73M2
ERYTHROCYTE [DISTWIDTH] IN BLOOD BY AUTOMATED COUNT: 13 % (ref 10–15)
GLUCOSE SERPL-MCNC: 94 MG/DL (ref 70–99)
HCO3 SERPL-SCNC: 19 MMOL/L (ref 22–29)
HCT VFR BLD AUTO: 42.1 % (ref 40–53)
HGB BLD-MCNC: 14.2 G/DL (ref 13.3–17.7)
INTERPRETATION ECG - MUSE: NORMAL
MCH RBC QN AUTO: 31.1 PG (ref 26.5–33)
MCHC RBC AUTO-ENTMCNC: 33.7 G/DL (ref 31.5–36.5)
MCV RBC AUTO: 92 FL (ref 78–100)
P AXIS - MUSE: 22 DEGREES
PLATELET # BLD AUTO: 369 10E3/UL (ref 150–450)
POTASSIUM SERPL-SCNC: 3.8 MMOL/L (ref 3.4–5.3)
PR INTERVAL - MUSE: 158 MS
QRS DURATION - MUSE: 76 MS
QT - MUSE: 372 MS
QTC - MUSE: 404 MS
R AXIS - MUSE: 19 DEGREES
RBC # BLD AUTO: 4.57 10E6/UL (ref 4.4–5.9)
SODIUM SERPL-SCNC: 137 MMOL/L (ref 135–145)
SYSTOLIC BLOOD PRESSURE - MUSE: NORMAL MMHG
T AXIS - MUSE: 10 DEGREES
VENTRICULAR RATE- MUSE: 71 BPM
WBC # BLD AUTO: 9.6 10E3/UL (ref 4–11)

## 2025-05-22 PROCEDURE — 250N000011 HC RX IP 250 OP 636: Performed by: STUDENT IN AN ORGANIZED HEALTH CARE EDUCATION/TRAINING PROGRAM

## 2025-05-22 PROCEDURE — 84443 ASSAY THYROID STIM HORMONE: CPT | Performed by: INTERNAL MEDICINE

## 2025-05-22 PROCEDURE — 250N000011 HC RX IP 250 OP 636: Performed by: NURSE ANESTHETIST, CERTIFIED REGISTERED

## 2025-05-22 PROCEDURE — 250N000011 HC RX IP 250 OP 636: Performed by: INTERNAL MEDICINE

## 2025-05-22 PROCEDURE — C1733 CATH, EP, OTHR THAN COOL-TIP: HCPCS | Performed by: INTERNAL MEDICINE

## 2025-05-22 PROCEDURE — 250N000011 HC RX IP 250 OP 636: Mod: JZ | Performed by: NURSE ANESTHETIST, CERTIFIED REGISTERED

## 2025-05-22 PROCEDURE — C1732 CATH, EP, DIAG/ABL, 3D/VECT: HCPCS | Performed by: INTERNAL MEDICINE

## 2025-05-22 PROCEDURE — 85014 HEMATOCRIT: CPT | Performed by: INTERNAL MEDICINE

## 2025-05-22 PROCEDURE — 93655 ICAR CATH ABLTJ DSCRT ARRHYT: CPT | Performed by: INTERNAL MEDICINE

## 2025-05-22 PROCEDURE — 93656 COMPRE EP EVAL ABLTJ ATR FIB: CPT | Mod: GC | Performed by: INTERNAL MEDICINE

## 2025-05-22 PROCEDURE — 250N000009 HC RX 250: Performed by: NURSE ANESTHETIST, CERTIFIED REGISTERED

## 2025-05-22 PROCEDURE — 82374 ASSAY BLOOD CARBON DIOXIDE: CPT | Performed by: INTERNAL MEDICINE

## 2025-05-22 PROCEDURE — 250N000013 HC RX MED GY IP 250 OP 250 PS 637: Performed by: INTERNAL MEDICINE

## 2025-05-22 PROCEDURE — 370N000017 HC ANESTHESIA TECHNICAL FEE, PER MIN: Performed by: INTERNAL MEDICINE

## 2025-05-22 PROCEDURE — 93656 COMPRE EP EVAL ABLTJ ATR FIB: CPT | Performed by: INTERNAL MEDICINE

## 2025-05-22 PROCEDURE — C1759 CATH, INTRA ECHOCARDIOGRAPHY: HCPCS | Performed by: INTERNAL MEDICINE

## 2025-05-22 PROCEDURE — 258N000003 HC RX IP 258 OP 636: Performed by: NURSE ANESTHETIST, CERTIFIED REGISTERED

## 2025-05-22 PROCEDURE — 85347 COAGULATION TIME ACTIVATED: CPT

## 2025-05-22 PROCEDURE — 93005 ELECTROCARDIOGRAM TRACING: CPT

## 2025-05-22 PROCEDURE — C1894 INTRO/SHEATH, NON-LASER: HCPCS | Performed by: INTERNAL MEDICINE

## 2025-05-22 PROCEDURE — 272N000001 HC OR GENERAL SUPPLY STERILE: Performed by: INTERNAL MEDICINE

## 2025-05-22 PROCEDURE — 36415 COLL VENOUS BLD VENIPUNCTURE: CPT | Performed by: INTERNAL MEDICINE

## 2025-05-22 PROCEDURE — 999N000054 HC STATISTIC EKG NON-CHARGEABLE

## 2025-05-22 PROCEDURE — C1730 CATH, EP, 19 OR FEW ELECT: HCPCS | Performed by: INTERNAL MEDICINE

## 2025-05-22 PROCEDURE — 93655 ICAR CATH ABLTJ DSCRT ARRHYT: CPT | Mod: GC | Performed by: INTERNAL MEDICINE

## 2025-05-22 PROCEDURE — 75572 CT HRT W/3D IMAGE: CPT

## 2025-05-22 RX ORDER — SODIUM CHLORIDE, SODIUM LACTATE, POTASSIUM CHLORIDE, CALCIUM CHLORIDE 600; 310; 30; 20 MG/100ML; MG/100ML; MG/100ML; MG/100ML
INJECTION, SOLUTION INTRAVENOUS CONTINUOUS
Status: DISCONTINUED | OUTPATIENT
Start: 2025-05-22 | End: 2025-05-22 | Stop reason: HOSPADM

## 2025-05-22 RX ORDER — ONDANSETRON 4 MG/1
4 TABLET, ORALLY DISINTEGRATING ORAL EVERY 30 MIN PRN
Status: DISCONTINUED | OUTPATIENT
Start: 2025-05-22 | End: 2025-05-22 | Stop reason: HOSPADM

## 2025-05-22 RX ORDER — HEPARIN SODIUM 1000 [USP'U]/ML
INJECTION, SOLUTION INTRAVENOUS; SUBCUTANEOUS PRN
Status: DISCONTINUED | OUTPATIENT
Start: 2025-05-22 | End: 2025-05-22

## 2025-05-22 RX ORDER — FENTANYL CITRATE 50 UG/ML
25 INJECTION, SOLUTION INTRAMUSCULAR; INTRAVENOUS EVERY 5 MIN PRN
Status: DISCONTINUED | OUTPATIENT
Start: 2025-05-22 | End: 2025-05-22 | Stop reason: HOSPADM

## 2025-05-22 RX ORDER — SODIUM CHLORIDE, SODIUM LACTATE, POTASSIUM CHLORIDE, CALCIUM CHLORIDE 600; 310; 30; 20 MG/100ML; MG/100ML; MG/100ML; MG/100ML
INJECTION, SOLUTION INTRAVENOUS CONTINUOUS PRN
Status: DISCONTINUED | OUTPATIENT
Start: 2025-05-22 | End: 2025-05-22

## 2025-05-22 RX ORDER — NALOXONE HYDROCHLORIDE 0.4 MG/ML
0.1 INJECTION, SOLUTION INTRAMUSCULAR; INTRAVENOUS; SUBCUTANEOUS
Status: DISCONTINUED | OUTPATIENT
Start: 2025-05-22 | End: 2025-05-22 | Stop reason: HOSPADM

## 2025-05-22 RX ORDER — DEXAMETHASONE SODIUM PHOSPHATE 4 MG/ML
4 INJECTION, SOLUTION INTRA-ARTICULAR; INTRALESIONAL; INTRAMUSCULAR; INTRAVENOUS; SOFT TISSUE
Status: DISCONTINUED | OUTPATIENT
Start: 2025-05-22 | End: 2025-05-22 | Stop reason: HOSPADM

## 2025-05-22 RX ORDER — METHYLPREDNISOLONE SODIUM SUCCINATE 500 MG/8ML
INJECTION INTRAMUSCULAR; INTRAVENOUS PRN
Status: DISCONTINUED | OUTPATIENT
Start: 2025-05-22 | End: 2025-05-22

## 2025-05-22 RX ORDER — OXYCODONE HYDROCHLORIDE 10 MG/1
10 TABLET ORAL
Status: DISCONTINUED | OUTPATIENT
Start: 2025-05-22 | End: 2025-05-22 | Stop reason: HOSPADM

## 2025-05-22 RX ORDER — NALOXONE HYDROCHLORIDE 0.4 MG/ML
0.2 INJECTION, SOLUTION INTRAMUSCULAR; INTRAVENOUS; SUBCUTANEOUS
Status: DISCONTINUED | OUTPATIENT
Start: 2025-05-22 | End: 2025-05-22 | Stop reason: HOSPADM

## 2025-05-22 RX ORDER — SODIUM CHLORIDE 9 MG/ML
INJECTION, SOLUTION INTRAVENOUS CONTINUOUS
Status: DISCONTINUED | OUTPATIENT
Start: 2025-05-22 | End: 2025-05-22 | Stop reason: HOSPADM

## 2025-05-22 RX ORDER — LIDOCAINE 40 MG/G
CREAM TOPICAL
Status: DISCONTINUED | OUTPATIENT
Start: 2025-05-22 | End: 2025-05-22 | Stop reason: HOSPADM

## 2025-05-22 RX ORDER — FENTANYL CITRATE 50 UG/ML
INJECTION, SOLUTION INTRAMUSCULAR; INTRAVENOUS PRN
Status: DISCONTINUED | OUTPATIENT
Start: 2025-05-22 | End: 2025-05-22

## 2025-05-22 RX ORDER — HYDROMORPHONE HCL IN WATER/PF 6 MG/30 ML
0.4 PATIENT CONTROLLED ANALGESIA SYRINGE INTRAVENOUS EVERY 5 MIN PRN
Status: DISCONTINUED | OUTPATIENT
Start: 2025-05-22 | End: 2025-05-22 | Stop reason: HOSPADM

## 2025-05-22 RX ORDER — HYDROMORPHONE HCL IN WATER/PF 6 MG/30 ML
0.2 PATIENT CONTROLLED ANALGESIA SYRINGE INTRAVENOUS EVERY 5 MIN PRN
Status: DISCONTINUED | OUTPATIENT
Start: 2025-05-22 | End: 2025-05-22 | Stop reason: HOSPADM

## 2025-05-22 RX ORDER — OXYCODONE AND ACETAMINOPHEN 5; 325 MG/1; MG/1
1 TABLET ORAL EVERY 4 HOURS PRN
Status: DISCONTINUED | OUTPATIENT
Start: 2025-05-22 | End: 2025-05-22 | Stop reason: HOSPADM

## 2025-05-22 RX ORDER — FENTANYL CITRATE 50 UG/ML
50 INJECTION, SOLUTION INTRAMUSCULAR; INTRAVENOUS EVERY 5 MIN PRN
Status: DISCONTINUED | OUTPATIENT
Start: 2025-05-22 | End: 2025-05-22 | Stop reason: HOSPADM

## 2025-05-22 RX ORDER — NALOXONE HYDROCHLORIDE 0.4 MG/ML
0.4 INJECTION, SOLUTION INTRAMUSCULAR; INTRAVENOUS; SUBCUTANEOUS
Status: DISCONTINUED | OUTPATIENT
Start: 2025-05-22 | End: 2025-05-22 | Stop reason: HOSPADM

## 2025-05-22 RX ORDER — IOPAMIDOL 755 MG/ML
110 INJECTION, SOLUTION INTRAVASCULAR ONCE
Status: COMPLETED | OUTPATIENT
Start: 2025-05-22 | End: 2025-05-22

## 2025-05-22 RX ORDER — ONDANSETRON 2 MG/ML
INJECTION INTRAMUSCULAR; INTRAVENOUS PRN
Status: DISCONTINUED | OUTPATIENT
Start: 2025-05-22 | End: 2025-05-22

## 2025-05-22 RX ORDER — ONDANSETRON 2 MG/ML
4 INJECTION INTRAMUSCULAR; INTRAVENOUS EVERY 30 MIN PRN
Status: DISCONTINUED | OUTPATIENT
Start: 2025-05-22 | End: 2025-05-22 | Stop reason: HOSPADM

## 2025-05-22 RX ORDER — SODIUM CHLORIDE, SODIUM GLUCONATE, SODIUM ACETATE, POTASSIUM CHLORIDE AND MAGNESIUM CHLORIDE 526; 502; 368; 37; 30 MG/100ML; MG/100ML; MG/100ML; MG/100ML; MG/100ML
INJECTION, SOLUTION INTRAVENOUS CONTINUOUS PRN
Status: DISCONTINUED | OUTPATIENT
Start: 2025-05-22 | End: 2025-05-22

## 2025-05-22 RX ORDER — PROPOFOL 10 MG/ML
INJECTION, EMULSION INTRAVENOUS PRN
Status: DISCONTINUED | OUTPATIENT
Start: 2025-05-22 | End: 2025-05-22

## 2025-05-22 RX ORDER — PROTAMINE SULFATE 10 MG/ML
INJECTION, SOLUTION INTRAVENOUS PRN
Status: DISCONTINUED | OUTPATIENT
Start: 2025-05-22 | End: 2025-05-22

## 2025-05-22 RX ORDER — OXYCODONE HYDROCHLORIDE 5 MG/1
5 TABLET ORAL
Status: DISCONTINUED | OUTPATIENT
Start: 2025-05-22 | End: 2025-05-22 | Stop reason: HOSPADM

## 2025-05-22 RX ADMIN — Medication 50 MG: at 16:30

## 2025-05-22 RX ADMIN — ONDANSETRON 4 MG: 2 INJECTION, SOLUTION INTRAMUSCULAR; INTRAVENOUS at 17:45

## 2025-05-22 RX ADMIN — Medication 20 MG: at 13:10

## 2025-05-22 RX ADMIN — FENTANYL CITRATE 50 MCG: 50 INJECTION INTRAMUSCULAR; INTRAVENOUS at 15:44

## 2025-05-22 RX ADMIN — HEPARIN SODIUM 3000 UNITS: 1000 INJECTION, SOLUTION INTRAVENOUS; SUBCUTANEOUS at 15:34

## 2025-05-22 RX ADMIN — ONDANSETRON 4 MG: 2 INJECTION INTRAMUSCULAR; INTRAVENOUS at 16:29

## 2025-05-22 RX ADMIN — Medication 10 MG: at 15:22

## 2025-05-22 RX ADMIN — Medication 20 MG: at 14:14

## 2025-05-22 RX ADMIN — PROTAMINE SULFATE 50 MG: 10 INJECTION, SOLUTION INTRAVENOUS at 16:18

## 2025-05-22 RX ADMIN — MIDAZOLAM 2 MG: 1 INJECTION INTRAMUSCULAR; INTRAVENOUS at 12:15

## 2025-05-22 RX ADMIN — METHYLPREDNISOLONE SODIUM SUCCINATE 125 MG: 500 INJECTION, POWDER, FOR SOLUTION INTRAMUSCULAR; INTRAVENOUS at 16:21

## 2025-05-22 RX ADMIN — Medication 1 LOZENGE: at 17:11

## 2025-05-22 RX ADMIN — PROPOFOL 20 MG: 10 INJECTION, EMULSION INTRAVENOUS at 14:14

## 2025-05-22 RX ADMIN — HEPARIN SODIUM 12000 UNITS: 1000 INJECTION, SOLUTION INTRAVENOUS; SUBCUTANEOUS at 14:31

## 2025-05-22 RX ADMIN — FENTANYL CITRATE 50 MCG: 50 INJECTION INTRAMUSCULAR; INTRAVENOUS at 13:10

## 2025-05-22 RX ADMIN — HEPARIN SODIUM 2000 UNITS: 1000 INJECTION, SOLUTION INTRAVENOUS; SUBCUTANEOUS at 15:49

## 2025-05-22 RX ADMIN — PROPOFOL 200 MG: 10 INJECTION, EMULSION INTRAVENOUS at 12:32

## 2025-05-22 RX ADMIN — Medication 50 MG: at 12:32

## 2025-05-22 RX ADMIN — PHENYLEPHRINE HYDROCHLORIDE 100 MCG: 10 INJECTION INTRAVENOUS at 14:24

## 2025-05-22 RX ADMIN — SODIUM CHLORIDE, SODIUM GLUCONATE, SODIUM ACETATE, POTASSIUM CHLORIDE AND MAGNESIUM CHLORIDE: 526; 502; 368; 37; 30 INJECTION, SOLUTION INTRAVENOUS at 12:18

## 2025-05-22 RX ADMIN — PHENYLEPHRINE HYDROCHLORIDE 100 MCG: 10 INJECTION INTRAVENOUS at 15:28

## 2025-05-22 RX ADMIN — HEPARIN SODIUM 6000 UNITS: 1000 INJECTION, SOLUTION INTRAVENOUS; SUBCUTANEOUS at 14:45

## 2025-05-22 RX ADMIN — Medication 50 MG: at 16:01

## 2025-05-22 RX ADMIN — FENTANYL CITRATE 50 MCG: 50 INJECTION INTRAMUSCULAR; INTRAVENOUS at 13:04

## 2025-05-22 RX ADMIN — IOPAMIDOL 110 ML: 755 INJECTION, SOLUTION INTRAVENOUS at 07:54

## 2025-05-22 ASSESSMENT — ACTIVITIES OF DAILY LIVING (ADL)
ADLS_ACUITY_SCORE: 38

## 2025-05-22 NOTE — PROGRESS NOTES
Pt arrived to 2A room 5 after afib ablation. R groin site CDI with figure 8 stitch in place. VSS, having throat soreness, but otherwise no pain. Father's wife will be ride home. HR 80s, SR.

## 2025-05-22 NOTE — Clinical Note
Intracardiac Echocardiography Catheter inserted. On lipitor 40mg    -Will hold in setting of elevated LFTs

## 2025-05-22 NOTE — DISCHARGE INSTRUCTIONS
Post-Ablation Discharge Instructions    EP Follow Up: 10/22/25 at 1:00pm with Janel Brown PA-C    Care of groin site:        Remove the Band-Aid after 24 hours. If there is minor oozing, apply another Band-aid and remove it after 12 hours.         Do NOT take a bath, use a hot tub, pool, or submerse in water for at least 3 days You may shower.         It is normal to have a small bruise or lump at the site.        Do not scrub the site.        Do not use lotion or powder near the puncture site for 3 days.        For the first 2 days: Do not stoop or squat. When you cough, sneeze or move your bowels, hold your hand over the puncture site and press gently.        Do not lift more than 10 pounds or exertional activity for 10 days.      If you start bleeding from the site in your groin:  Lie down flat and press firmly on the site.  Call your physician immediately, or, come to the emergency room.    Call 911 right away if you have bleeding that is heavy or does not stop.     Call your doctor/provider if:        You have a large or growing hard lump around the site.        The site is red, swollen, hot or tender.        Blood or fluid is draining from the site.        You have chills or a fever greater than 101 F (38 C).        Your leg or arm turns bluish, feels numb or cool.        You have hives, a rash or unusual itching.     [facility] Clinic:   [address] [place] [address]. [place] [address]    Your Care Team:  EP Cardiology   Telephone Number     Nurses:   Diane ARELLANO [phone]    After business hours: [phone], select option 4, and ask for EP Fellow on-call to be paged.   Non-procedure scheduling:    Jenny FONTAINE   [phone]   Procedure scheduling:    Adilene Calvo   [phone]   Device Clinic (Pacemakers, ICDs, Loop Recorders)    During business hours: [phone]  After business hours:   [phone]- select option 4 and ask for job code [id number].       As always, Thank you for trusting  us with your health care needs

## 2025-05-22 NOTE — ANESTHESIA PROCEDURE NOTES
Airway         Procedure Start/Stop Times: 5/22/2025 12:37 PM  Staff -        CRNA: Barbara Ritchie APRN CRNA       Performed By: CRNAIndications and Patient Condition       Indications for airway management: jenae-procedural       Induction type:intravenous       Mask difficulty assessment: 1 - vent by mask    Final Airway Details       Final airway type: endotracheal airway       Successful airway: ETT - single  Endotracheal Airway Details        ETT size (mm): 7.5       Cuffed: yes       Successful intubation technique: video laryngoscopy       VL Blade Size: MAC 3       Grade View of Cords: 1       Adjucts: stylet       Position: Right    Post intubation assessment        Placement verified by: capnometry, equal breath sounds and chest rise        Number of attempts at approach: 2       Ease of procedure: easy       Dentition: Intact and Unchanged    Medication(s) Administered   Medication Administration Time: 5/22/2025 12:37 PM

## 2025-05-22 NOTE — ANESTHESIA POSTPROCEDURE EVALUATION
Patient: Adan Oliver    Procedure: Procedure(s):  EP Ablation Focal AFIB       Anesthesia Type:  General    Note:  Disposition: Inpatient; Admission   Postop Pain Control: Uneventful            Sign Out: Well controlled pain   PONV: No   Neuro/Psych: Uneventful            Sign Out: Acceptable/Baseline neuro status   Airway/Respiratory: Uneventful            Sign Out: Acceptable/Baseline resp. status   CV/Hemodynamics: Uneventful            Sign Out: Acceptable CV status; No obvious hypovolemia; No obvious fluid overload   Other NRE: NONE   DID A NON-ROUTINE EVENT OCCUR?            Last vitals:  Vitals Value Taken Time   /75 05/22/25 17:32   Temp 36.9  C (98.4  F) 05/22/25 16:50   Pulse 87 05/22/25 17:44   Resp 22 05/22/25 17:44   SpO2 99 % 05/22/25 17:44   Vitals shown include unfiled device data.    Electronically Signed By: Michel Park MD  May 22, 2025  5:44 PM

## 2025-05-22 NOTE — Clinical Note
Potential access sites were evaluated for patency using ultrasound.   The right femoral artery and right femoral vein was selected. Access was obtained under with Sonosite guidance using a micropuncture 21 gauge needle with direct visualization of needle entry.

## 2025-05-22 NOTE — ANESTHESIA CARE TRANSFER NOTE
Patient: Adan Oliver    Procedure: Procedure(s):  EP Ablation Focal AFIB       Diagnosis: Persistent Afib  Diagnosis Additional Information: No value filed.    Anesthesia Type:   General     Note:    Oropharynx: oropharynx clear of all foreign objects and spontaneously breathing  Level of Consciousness: drowsy and awake  Oxygen Supplementation: face mask  Level of Supplemental Oxygen (L/min / FiO2): 6  Independent Airway: airway patency satisfactory and stable  Dentition: dentition unchanged  Vital Signs Stable: post-procedure vital signs reviewed and stable  Report to RN Given: handoff report given  Patient transferred to: PACU    Handoff Report: Identifed the Patient, Identified the Reponsible Provider, Reviewed the pertinent medical history, Discussed the surgical course, Reviewed Intra-OP anesthesia mangement and issues during anesthesia, Set expectations for post-procedure period and Allowed opportunity for questions and acknowledgement of understanding    Vitals:  Vitals Value Taken Time   /72 05/22/25 16:47   Temp     Pulse 85 05/22/25 16:52   Resp 17 05/22/25 16:52   SpO2 100 % 05/22/25 16:52   Vitals shown include unfiled device data.    Electronically Signed By: RASHEL Alston CRNA  May 22, 2025  4:53 PM

## 2025-05-23 LAB — TSH SERPL DL<=0.005 MIU/L-ACNC: 1.45 UIU/ML (ref 0.3–4.2)

## 2025-05-23 NOTE — PROGRESS NOTES
Pt discharging after afib ablation. VSS, SR, denies pain except for a sore throat. PIVs intact upon removal. R groin site CDI with no bleeding or hematoma. Ambulating, voiding, eating and drinking without issue. Demonstrated understanding of discharge teaching. Briana Mendoza is ride home.

## 2025-05-28 LAB
ATRIAL RATE - MUSE: 74 BPM
DIASTOLIC BLOOD PRESSURE - MUSE: NORMAL MMHG
INTERPRETATION ECG - MUSE: NORMAL
P AXIS - MUSE: 51 DEGREES
PR INTERVAL - MUSE: 158 MS
QRS DURATION - MUSE: 70 MS
QT - MUSE: 362 MS
QTC - MUSE: 401 MS
R AXIS - MUSE: 57 DEGREES
SYSTOLIC BLOOD PRESSURE - MUSE: NORMAL MMHG
T AXIS - MUSE: 47 DEGREES
VENTRICULAR RATE- MUSE: 74 BPM

## 2025-06-09 DIAGNOSIS — I48.0 PAF (PAROXYSMAL ATRIAL FIBRILLATION) (H): Primary | ICD-10-CM

## 2025-06-09 NOTE — TELEPHONE ENCOUNTER
Last Written Prescription:  apixaban ANTICOAGULANT (ELIQUIS) 5 MG tablet -- -- 5/11/2025 -- --   Sig - Route: Take 5 mg by mouth 2 times daily. - Oral   Class: Historical     ----------------------  Last Visit Date:   5/19/2025  [facility]    Future Visit Date:   8/13/2025 2:20 PM (40 min)  [facility]    Arrive by:  2:05 PM   RETURN EP   [facility] ([facility])   Janel Brown PA-C     ----------------------      Refill decision: Medication unable to be refilled by RN due to: Medication - Last script is Reported/Historical/Transitional        Request from pharmacy:  Requested Prescriptions   Pending Prescriptions Disp Refills    ELIQUIS ANTICOAGULANT 5 MG tablet [Pharmacy Med Name: ELIQUIS 5MG TABS] 60 tablet 0     Sig: TAKE ONE TABLET BY MOUTH TWICE A DAY       Anticoagulant Agents Failed - 6/9/2025  4:13 PM        Failed - Medication is active on med list and the sig matches. RN to manually verify dose and sig if red X/fail.     If the protocol passes (green check), you do not need to verify med dose and sig.    A prescription matches if they are the same clinical intention.    For Example: once daily and every morning are the same.    The protocol can not identify upper and lower case letters as matching and will fail.     For Example: Take 1 tablet (50 mg) by mouth daily     TAKE 1 TABLET (50 MG) BY MOUTH DAILY    For all fails (red x), verify dose and sig.    If the refill does match what is on file, the RN can still proceed to approve the refill request.       If they do not match, route to the appropriate provider.             Failed - Medication indicated for associated diagnosis     Medication is associated with one or more of the following diagnoses:  Atrial fibrillation  Deep venous thrombosis  Heparin-induced thrombocytopenia  Pulmonary embolism  Venous thromboembolism  H/O: Pulmonary embolus  Atrial flutter  Coronary artery finding  Transient cerebral ischemia  Percutaneous  transluminal coronary angioplasty  Stable angina  Intermittent claudication  Arteriosclerotic vascular disease          Passed - Normal Platelets on file in past 12 months     Recent Labs   Lab Test 05/22/25  0846                  Passed - Creatinine Clearance greater than 50 ml/min on file in past 12 mos     No lab results found.          Passed - Weight is greater than 60 kg for the past year     Wt Readings from Last 3 Encounters:   05/22/25 88.9 kg (195 lb 15.8 oz)   05/19/25 87.1 kg (192 lb)   05/08/25 86.2 kg (190 lb)             Passed - GFR on file in the past 12 months and most recent GFR is normal        Passed - Recent (6 month) or future (90 days) visit with the authorizing provider's specialty (provided they have been seen in the past 9 months)        Passed - Patient is 18 years of age or older

## 2025-06-10 RX ORDER — APIXABAN 5 MG/1
5 TABLET, FILM COATED ORAL
Qty: 180 TABLET | Refills: 3 | Status: SHIPPED | OUTPATIENT
Start: 2025-06-10

## 2025-06-10 NOTE — TELEPHONE ENCOUNTER
Name from pharmacy: ELIQUIS 5MG TABS          Will file in chart as: apixaban ANTICOAGULANT (ELIQUIS ANTICOAGULANT) 5 MG tablet    Sig: TAKE ONE TABLET BY MOUTH TWICE A DAY    Disp: 60 tablet    Refills: Not specified (Pharmacy requested: 0)    Start: 6/9/2025    Class: E-Prescribe    Last ordered: 3 weeks ago (5/19/2025) by Patient Reported    Last refill: 5/12/2025    Rx #: [id number]    Anticoagulant Agents Xpyhtv4006/09/2025 04:14 PM   Protocol Details Medication is active on med list and the sig matches. RN to manually verify dose and sig if red X/fail.    Medication indicated for associated diagnosis    Normal Platelets on file in past 12 months    Creatinine Clearance greater than 50 ml/min on file in past 12 mos    Weight is greater than 60 kg for the past year    GFR on file in the past 12 months and most recent GFR is normal    Recent (6 month) or future (90 days) visit with the authorizing provider's specialty (provided they have been seen in the past 9 months)    Patient is 18 years of age or older      To be filled at: [address]     Patient saw Dr. Crawley on 5/15/25. No changes made to eliquis at time of appointment. Eliquis prescription sent to patient.     Yaz Garrison, RN, BSN  Cardiology RN Care Coordinator   [place]   Phone: [phone]  Fax: [phone] ([place]) [phone] ([place])

## 2025-07-07 ENCOUNTER — MYC MEDICAL ADVICE (OUTPATIENT)
Dept: CARDIOLOGY | Facility: CLINIC | Age: 65
End: 2025-07-07
Payer: COMMERCIAL

## 2025-07-07 DIAGNOSIS — R06.02 SHORTNESS OF BREATH: ICD-10-CM

## 2025-07-07 DIAGNOSIS — R07.89 OTHER CHEST PAIN: ICD-10-CM

## 2025-07-07 DIAGNOSIS — I48.0 PAF (PAROXYSMAL ATRIAL FIBRILLATION) (H): ICD-10-CM

## 2025-07-07 DIAGNOSIS — E78.5 HYPERLIPIDEMIA WITH TARGET LDL LESS THAN 130: ICD-10-CM

## 2025-07-07 DIAGNOSIS — Z86.79 S/P ABLATION OF ATRIAL FIBRILLATION: Primary | ICD-10-CM

## 2025-07-07 DIAGNOSIS — Z98.890 S/P ABLATION OF ATRIAL FIBRILLATION: Primary | ICD-10-CM

## 2025-07-08 ENCOUNTER — TELEPHONE (OUTPATIENT)
Dept: GASTROENTEROLOGY | Facility: CLINIC | Age: 65
End: 2025-07-08
Payer: COMMERCIAL

## 2025-07-08 NOTE — TELEPHONE ENCOUNTER
Breann Lu, DO to Me  (Selected Message)    7/8/25 11:47 AM  Next available is fine - can trial some OTC gaviscon/sodium alginate in addition.      Call to patient to offer an appt with Jude Powell PA-C on 7/10 at 10am, patient accepted this visit. Message sent to scheduling to schedule. Also updated patient on the above from provider, [employer] message sent as well with the name of medication for reference.    Anuradha Oden RN

## 2025-07-08 NOTE — TELEPHONE ENCOUNTER
"Endoscopy Scheduling Screen    Caller: patient    Have you had any respiratory illness or flu-like symptoms in the last 10 days?  No    Patient is ACTIVE on [employer].  Inform patient that all appointment instructions will be sent via [employer].    Review patient's insurance for any non participating payor.    Ordering/Referring Provider: JEFFREY BERRY    (If ordering provider performs procedure, schedule with ordering provider unless otherwise instructed. )    BMI: Estimated body mass index is 29.8 kg/m  as calculated from the following:    Height as of 5/22/25: 1.727 m (5' 8\").    Weight as of 5/22/25: 88.9 kg (195 lb 15.8 oz).     Sedation Ordered  moderate sedation.   If patient BMI > 50 do not schedule in [facility].    If patient BMI > 45 do not schedule at [facility].    Are you taking methadone or Suboxone?  NO, No RN review required.    Have you been diagnosed and are being treated for severe PTSD or severe anxiety?  NO, No RN review required.    Are you taking any prescription medications for pain 3 or more times per week?   NO, No RN review required.    Do you have a history of malignant hyperthermia?  No    (Females) Are you currently pregnant?   No     Have you been diagnosed or told you have pulmonary hypertension?   No    Do you have an LVAD?  No    Have you been told you have moderate to severe sleep apnea?  No.    Have you been told you have COPD, asthma, or any other lung disease?  No    Has your doctor ordered any cardiac tests like echo, angiogram, stress test, ablation, or EKG, that you have not completed yet?  No    Do you  have a history of any heart conditions?  No     Have you ever had or are you waiting for an organ transplant?  No. Continue scheduling, no site restrictions.    Have you had a stroke or transient ischemic attack (TIA aka \"mini stroke\") in the last 2 years?   No.    Have you been diagnosed with or been told you have cirrhosis of the liver?   No.    Are you currently on dialysis?   No    Do " "you need assistance transferring?   No    BMI: Estimated body mass index is 29.8 kg/m  as calculated from the following:    Height as of 5/22/25: 1.727 m (5' 8\").    Weight as of 5/22/25: 88.9 kg (195 lb 15.8 oz).     Is patients BMI > 40 and scheduling location [facility]?  No    Do you take an injectable or oral medication for weight loss or diabetes (excluding insulin)?  No    Do you take the medication Naltrexone?  No    Do you take blood thinners?  Yes, you must contact your prescribing provider for directions on holding or bridging with a different medication.       Prep   Are you currently on dialysis or do you have chronic kidney disease?  No    Do you have a diagnosis of diabetes?  No    Do you have a diagnosis of cystic fibrosis (CF)?  No    On a regular basis do you go 3 -5 days between bowel movements?  No    BMI > 40?  No    [place]    [facility] [place] [address]  [address] [facility]  [facility] [address]  Phone: [phone] Fax: [phone]        Final Scheduling Details     Procedure scheduled  Colonoscopy / Upper endoscopy (EGD)    Surgeon:  YUDI     Date of procedure:  8/22/25     Pre-OP / PAC:   No - Not required for this site.    Location  [facility] - Per RN assessment.    Sedation   Moderate Sedation - Per order.      Patient Reminders:   You will receive a call from a Nurse to review instructions and health history.  This assessment must be completed prior to your procedure.  Failure to complete the Nurse assessment may result in the procedure being cancelled.      On the day of your procedure, please designate an adult(s) who can drive you home stay with you for the next 24 hours. The medicines used in the exam will make you sleepy. You will not be able to drive.      You cannot take public transportation, ride share services, or non-medical taxi service without a responsible caregiver.  Medical transport services are allowed with the " requirement that a responsible caregiver will receive you at your destination.  We require that drivers and caregivers are confirmed prior to your procedure.

## 2025-07-08 NOTE — TELEPHONE ENCOUNTER
[facility] Call Center    Phone Message    May a detailed message be left on voicemail: yes     Reason for Call: Other: Pt is requesting a call back please to discuss his recent ED visit, Pt was told his issues were not heart related and to contact GI. Please reach out to discuss. Thank you!      Action Taken: Message routed to:  [facility] ([facility]): GI    Travel Screening: Not Applicable     Date of Service:

## 2025-07-08 NOTE — TELEPHONE ENCOUNTER
Call back to patient who was seen in the ER for chest pain, shortness of breath. He thought he was having a heart attack. Per ER notes this was not heart related and more than likely GI related. Patient was told to follow up with GI provider. He is feeling very nervous due to his history of Barretts. He is taking nexium 40mg daily. He is had recent cardiac ablations. He is still having symptoms and is feeling very anxious. Writer encouraged patient to call and schedule his EGD ordered by his PCP and advised to mention his recent cardiac interventions when scheduling. Writer will work with provider and get him an appointment to follow up as well.    Anuradha Oden RN

## 2025-07-09 NOTE — TELEPHONE ENCOUNTER
Patient saw [employer] message. No response at this time.    Yaz Garrison, RN, BSN  Cardiology RN Care Coordinator   [place]   Phone: [phone]  Fax: [phone] ([place]) [phone] ([place])

## 2025-07-10 ENCOUNTER — ANCILLARY PROCEDURE (OUTPATIENT)
Dept: CARDIOLOGY | Facility: CLINIC | Age: 65
End: 2025-07-10
Attending: INTERNAL MEDICINE
Payer: COMMERCIAL

## 2025-07-10 ENCOUNTER — TELEPHONE (OUTPATIENT)
Dept: GASTROENTEROLOGY | Facility: CLINIC | Age: 65
End: 2025-07-10

## 2025-07-10 ENCOUNTER — OFFICE VISIT (OUTPATIENT)
Dept: GASTROENTEROLOGY | Facility: CLINIC | Age: 65
End: 2025-07-10
Payer: COMMERCIAL

## 2025-07-10 VITALS
HEART RATE: 70 BPM | HEIGHT: 68 IN | DIASTOLIC BLOOD PRESSURE: 75 MMHG | SYSTOLIC BLOOD PRESSURE: 116 MMHG | OXYGEN SATURATION: 99 % | BODY MASS INDEX: 28.46 KG/M2 | WEIGHT: 187.8 LBS

## 2025-07-10 DIAGNOSIS — K21.9 GASTROESOPHAGEAL REFLUX DISEASE WITHOUT ESOPHAGITIS: ICD-10-CM

## 2025-07-10 DIAGNOSIS — R07.9 CHEST PAIN, UNSPECIFIED TYPE: ICD-10-CM

## 2025-07-10 DIAGNOSIS — E78.5 HYPERLIPIDEMIA WITH TARGET LDL LESS THAN 130: ICD-10-CM

## 2025-07-10 DIAGNOSIS — R06.02 SHORTNESS OF BREATH: ICD-10-CM

## 2025-07-10 DIAGNOSIS — I48.0 PAF (PAROXYSMAL ATRIAL FIBRILLATION) (H): ICD-10-CM

## 2025-07-10 DIAGNOSIS — K22.70 BARRETT'S ESOPHAGUS WITHOUT DYSPLASIA: Primary | ICD-10-CM

## 2025-07-10 LAB — LVEF ECHO: NORMAL

## 2025-07-10 PROCEDURE — 93306 TTE W/DOPPLER COMPLETE: CPT | Mod: GC | Performed by: INTERNAL MEDICINE

## 2025-07-10 RX ORDER — ESOMEPRAZOLE MAGNESIUM 40 MG/1
CAPSULE, DELAYED RELEASE ORAL
Qty: 60 CAPSULE | Refills: 0 | Status: SHIPPED | OUTPATIENT
Start: 2025-07-10

## 2025-07-10 RX ADMIN — Medication 3 ML (DILUTED): at 11:07

## 2025-07-10 ASSESSMENT — PAIN SCALES - GENERAL: PAINLEVEL_OUTOF10: SEVERE PAIN (8)

## 2025-07-10 NOTE — NURSING NOTE
"Chief Complaint   Patient presents with    Follow Up     Follow up post ED visit.  Hx of Reid's esophagus.     He requests these members of his care team be copied on today's visit information:  PCP: Jimbo Zheng    Vitals:    07/10/25 0952   BP: 116/75   BP Location: Left arm   Patient Position: Sitting   Cuff Size: Adult Regular   Pulse: 70   SpO2: 99%   Weight: 85.2 kg (187 lb 12.8 oz)   Height: 1.727 m (5' 8\")     Body mass index is 28.55 kg/m .    Medications were reconciled.    Does patient need any medication refills at today's visit? Yes, Kimberly Weston CMA        "

## 2025-07-10 NOTE — LETTER
7/10/2025      Adan Oliver  [address]  [place] [address]      Dear Colleague,    Thank you for referring your patient, Adan Oliver, to the [facility]. Please see a copy of my visit note below.    FOLLOW UP GI CLINIC VISIT    CC/REFERRING MD:  Referred Self  REASON FOR CONSULTATION:   Referred Self for   Chief Complaint   Patient presents with     Follow Up     Follow up post ED visit.  Hx of Reid's esophagus.       ASSESSMENT/PLAN: Adan Oliver is a 65 year old male that is seen for follow up in the GI clinic today for recent development of chest pain.  We reviewed his ER visit and laboratory and imaging findings.  His symptoms have not been typical for reflux, though the timing and association with body positioning/lying down can suggest possibility of reflux, given his known hiatal hernia.  I do agree with pursuing upper endoscopy to reassess, we will work on trying to get this scheduled sooner if able.  In the meantime, he will increase Nexium to 40 mg twice daily, can consider adding OTC Pepcid at night.  I advised to continue with dietary and lifestyle precautions for GERD as well.    1. Reid's esophagus without dysplasia (Primary)  - esomeprazole (NEXIUM) 40 MG DR capsule; TAKE ONE CAPSULE BY MOUTH EVERY MORNING 30-60 MINUTES BEFORE BREAKFASTTAKE ONE CAPSULE BY MOUTH EVERY MORNING 30-60 MINUTES BEFORE BREAKFAST  Dispense: 60 capsule; Refill: 0    2. Gastroesophageal reflux disease without esophagitis    3. Chest pain, unspecified type      Thank you for this consultation.  It was a pleasure to participate in the care of this patient; please contact us with any further questions.      30 minutes spent on the date of the encounter doing chart review, patient visit, and documentation    This note was created with voice recognition software, and while reviewed for accuracy, typos may remain.     Jude Powell PA-C  Division of Gastroenterology, Hepatology  "and Nutrition  [facility]  Adan Oliver is a 65 year old male that is seen for follow up in the GI clinic today.  His previously seen my partner, Dr. Lu, for a history of Reid's esophagus.  His last visit with us was not quite 2 years ago.  He was recently seen in the ER with some chest pain, felt like he was \"punched in the chest\", symptoms developed in the evening after laying down and improved with sitting up.  In the ER on 7/7/2025, he underwent laboratory testing which was notable for mildly elevated white blood cell count of 12.3, normal troponins, normal EKG, treated with Maalox and Pepcid without much relief, ultimately discharged and advised to follow-up with GI due to suspected esophagus source.  He had some improvement before having worsening symptoms again last night and feeling some of it today.  No dysphagia, odynophagia, nausea, or vomiting.  There is no pain in the abdomen.  He has continued on Nexium 40 mg daily for his history of Reid's esophagus.  He is not feeling any typical heartburn or sensation of reflux of acid.  Of note, he most recently had EGD and colonoscopy in 2022.  EGD notable for Reid's stage C2-M3 with small islands per Hollenberg criteria, biopsies without dysplasia.  2 cm hiatal hernia noted as well.  Colonoscopy with multiple subcentimeter TA's, 3-year follow-up recommended.  He was planning on scheduling those this past spring but had some trouble with symptomatic atrial fibrillation, ultimately undergoing ablation in late May.  Has continued on amiodarone and Eliquis but is going to be tapering off in the coming weeks.  He has been referred for echocardiogram, which is scheduled later today.  He did complete a CT angiogram in May and this was noted to be normal.  He underwent treadmill stress test this past fall which was considered normal.    His main concern is that this increase in chest pain is related to " his history of Reid's esophagus.  He is currently scheduled for EGD and colonoscopy in August but is hoping to get it done sooner.  He has had some chronic issues of feelings of shortness of breath and notes that currently, his chest pain does seem to be exacerbated when taking a deep breath.  It otherwise has been nonexertional.      ROS:    10 point ROS neg other than the symptoms noted above in the HPI.    PREVIOUS ENDOSCOPY:  Reviewed, see HPI    PERTINENT RELEVANT IMAGING OR LABS:  Reviewed    ALLERGIES:     Allergies   Allergen Reactions     Penicillins Other (See Comments)     Stiff leg from shot of PCN        PERTINENT MEDICATIONS:    Current Outpatient Medications:      amiodarone (PACERONE) 200 MG tablet, TAKE 2 TABLET A DAY FOR 3 WEEKS, THEN REDUCE THE DOSE TO ONE TABLET A DAY., Disp: 180 tablet, Rfl: 3     apixaban ANTICOAGULANT (ELIQUIS ANTICOAGULANT) 5 MG tablet, TAKE ONE TABLET BY MOUTH TWICE A DAY, Disp: 180 tablet, Rfl: 3     esomeprazole (NEXIUM) 40 MG DR capsule, TAKE ONE CAPSULE BY MOUTH EVERY MORNING 30-60 MINUTES BEFORE BREAKFASTTAKE ONE CAPSULE BY MOUTH EVERY MORNING 30-60 MINUTES BEFORE BREAKFAST, Disp: 60 capsule, Rfl: 0     finasteride (PROSCAR) 5 MG tablet, Take 1 tablet (5 mg) by mouth daily., Disp: 90 tablet, Rfl: 3     rosuvastatin (CRESTOR) 20 MG tablet, Take 1 tablet (20 mg) by mouth daily., Disp: 90 tablet, Rfl: 3  No current facility-administered medications for this visit.    PROBLEM LIST  Patient Active Problem List    Diagnosis Date Noted     Atrial fibrillation (H) 04/08/2024     Priority: Medium     Benign prostatic hyperplasia with urinary frequency 06/09/2022     Priority: Medium     Hip pain, bilateral 11/19/2021     Priority: Medium     LUCIAN (obstructive sleep apnea)- 'mild, positional' (AHI 12.8) 10/06/2017     Priority: Medium     10/6/2024 [facility] Sleep Study (200.0 lbs) - AHI 12.8, RDI 15.1, Supine AHI 65.1, REM AHI -, Low O2 77.0%, Time Spent <=88% 2.5  minutes / Time Spent <=89% 3.7 minutes.    Study Date: 10/2/2017- (200.0 lbs) Scored using 3% rules. It was difficult to discern between PLMS and hyponeas. Apnea/hypopnea index was 28.0 events per hour.  The REM AHI was 29.3 events per hour.  The supine AHI was 32.7 events per hour.  RDI was 28.0 events per hour. Lowest oxygen saturation was 84.0%.  Time spent less than or equal to 88% was 0.3 minutes. PLM index was 32.3 movements per hour.  The PLM Arousal Index was 15.8 per hour.       Alcohol use 09/26/2016     Priority: Medium     Lumbar degenerative disc disease 05/17/2016     Priority: Medium     Protrusion of lumbar intervertebral disc 05/17/2016     Priority: Medium     Lumbar spinal stenosis 05/17/2016     Priority: Medium     Anxiety 12/07/2015     Priority: Medium     Family history of Alzheimer's disease 10/01/2015     Priority: Medium     Insomnia, psychophysiological 08/12/2015     Priority: Medium     Memory changes 11/24/2014     Priority: Medium     Reid esophagus 10/30/2014     Priority: Medium     Esophageal reflux 10/01/2014     Priority: Medium     Hyperlipidemia with target LDL less than 130 10/01/2014     Priority: Medium       PERTINENT PAST MEDICAL HISTORY:  Past Medical History:   Diagnosis Date     Anxiety 12/07/2015     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 It was difficult to discern between PLMS and hyponeas. Apnea/hypopnea index was 28.0 events per hour.  The REM AHI was 29.3 events per hour.  The supine AHI was 32.7 events per hour.  RDI was 28.0 events per hour. Lowest oxygen saturation was 84.0%.  Time spent less than or equal to 88% was 0.3 minutes. PLM index was 32.3  movements per hour       PREVIOUS SURGERIES:  Past Surgical History:   Procedure Laterality Date     APPENDECTOMY  2000s     BLEPHAROPLASTY BILATERAL Bilateral 10/3/2018    Procedure: BLEPHAROPLASTY BILATERAL;;  Surgeon: Dany Berrios MD;  Location: [facility]     CARPAL TUNNEL RELEASE RT/LT  1980s     COLONOSCOPY N/A 7/27/2022    Procedure: COLONOSCOPY, FLEXIBLE, WITH LESION REMOVAL USING SNARE;  Surgeon: Filiberto Banuelos MD;  Location: [facility]     COLONOSCOPY WITH CO2 INSUFFLATION N/A 7/27/2022    Procedure: COLONOSCOPY, WITH CO2 INSUFFLATION;  Surgeon: Filiberto Banuelos MD;  Location: [facility]     COMBINED ESOPHAGOSCOPY, GASTROSCOPY, DUODENOSCOPY (EGD) WITH CO2 INSUFFLATION N/A 9/20/2019    Procedure: ESOPHAGOGASTRODUODENOSCOPY, WITH CO2 INSUFFLATION;  Surgeon: Filiberto Banuelos MD;  Location: [facility]     COMBINED ESOPHAGOSCOPY, GASTROSCOPY, DUODENOSCOPY (EGD) WITH CO2 INSUFFLATION N/A 7/27/2022    Procedure: ESOPHAGOGASTRODUODENOSCOPY, WITH CO2 INSUFFLATION;  Surgeon: Filiberto Banuelos MD;  Location: [facility]     EP ABLATION FOCAL AFIB N/A 5/22/2025    Procedure: EP Ablation Focal AFIB;  Surgeon: Iggy Cheung MD;  Location:  [facility]     ESOPHAGOSCOPY, GASTROSCOPY, DUODENOSCOPY (EGD), COMBINED N/A 9/20/2019    Procedure: Esophagogastroduodenoscopy, With Biopsy;  Surgeon: Filiberto Banuelos MD;  Location: [facility]     ESOPHAGOSCOPY, GASTROSCOPY, DUODENOSCOPY (EGD), COMBINED N/A 7/27/2022    Procedure: ESOPHAGOGASTRODUODENOSCOPY, WITH BIOPSY;  Surgeon: Filiberto Banuelos MD;  Location: [facility]     REPAIR PTOSIS BILATERAL Bilateral 10/3/2018    Procedure: REPAIR PTOSIS BILATERAL;;  Surgeon: Dany Berrios MD;  [facility]     REPAIR PTOSIS BROW BILATERAL Bilateral 10/3/2018    Procedure: REPAIR PTOSIS BROW BILATERAL;;  Surgeon: Dany Berrios MD;  Location: [facility]     SIGMOIDOSCOPY FLEXIBLE N/A 9/20/2019    Procedure: SIGMOIDOSCOPY, FLEXIBLE;   Surgeon: Filiberto Bnauelos MD;  Location: [facility]     TONSILLECTOMY  1980s       SOCIAL HISTORY:  Social History     Socioeconomic History     Marital status: Single     Spouse name: Not on file     Number of children: Not on file     Years of education: Not on file     Highest education level: Not on file   Occupational History     Occupation: /advertising design   Tobacco Use     Smoking status: Never     Passive exposure: Never     Smokeless tobacco: Never   Vaping Use     Vaping status: Never Used   Substance and Sexual Activity     Alcohol use: Not Currently     Alcohol/week: 14.0 - 21.0 standard drinks of alcohol     Types: 14 - 21 Standard drinks or equivalent per week     Comment: varies, a couple drinks a day     Drug use: No     Sexual activity: Not on file   Other Topics Concern     Parent/sibling w/ CABG, MI or angioplasty before 65F 55M? No   Social History Narrative     Not on file     Social Drivers of Health     Financial Resource Strain: Low Risk  (5/6/2025)    Financial Resource Strain      Within the past 12 months, have you or your family members you live with been unable to get utilities (heat, electricity) when it was really needed?: No   Food Insecurity: Low Risk  (5/6/2025)    Food Insecurity      Within the past 12 months, did you worry that your food would run out before you got money to buy more?: No      Within the past 12 months, did the food you bought just not last and you didn t have money to get more?: No   Transportation Needs: Low Risk  (5/6/2025)    Transportation Needs      Within the past 12 months, has lack of transportation kept you from medical appointments, getting your medicines, non-medical meetings or appointments, work, or from getting things that you need?: No   Physical Activity: Insufficiently Active (5/6/2025)    Exercise Vital Sign      Days of Exercise per Week: 1 day      Minutes of Exercise per Session: 10 min   Stress: Stress Concern Present  "(5/6/2025)    [employer] [place] [employer] - Occupational Stress Questionnaire      Feeling of Stress : Very much   Social Connections: Unknown (5/6/2025)    Social Connection and Isolation Panel [NHANES]      Frequency of Communication with Friends and Family: Not on file      Frequency of Social Gatherings with Friends and Family: Once a week      Attends Scientologist Services: Not on file      Active Member of Clubs or Organizations: Not on file      Attends Club or Organization Meetings: Not on file      Marital Status: Not on file   Interpersonal Safety: Not At Risk (7/7/2025)    [facility]     Humiliation, Afraid, Rape, and Kick questionnaire      Fear of Current or Ex-Partner: No      Emotionally Abused: No      Physically Abused: No      Sexually Abused: No   Housing Stability: High Risk (5/6/2025)    Housing Stability      Do you have housing? : Yes      Are you worried about losing your housing?: Yes       FAMILY HISTORY:  Family History   Problem Relation Age of Onset     Hypertension Mother      Alzheimer Disease Mother      Diabetes Brother      Glaucoma No family hx of      Macular Degeneration No family hx of      Coronary Artery Disease No family hx of      Colon Cancer No family hx of      Retinal detachment No family hx of        Past/family/social history reviewed and no changes    PHYSICAL EXAMINATION:  Constitutional: aaox3, cooperative, pleasant, not dyspneic/diaphoretic, no acute distress  Vitals reviewed: /75 (BP Location: Left arm, Patient Position: Sitting, Cuff Size: Adult Regular)   Pulse 70   Ht 1.727 m (5' 8\")   Wt 85.2 kg (187 lb 12.8 oz)   SpO2 99%   BMI 28.55 kg/m    Wt:   Wt Readings from Last 2 Encounters:   07/10/25 85.2 kg (187 lb 12.8 oz)   05/22/25 88.9 kg (195 lb 15.8 oz)      Eyes: Sclera anicteric/injected  CV: No edema  Respiratory: Unlabored breathing  Skin: warm, perfused, no jaundice  Psych: Normal affect  MSK: Normal " gait                              Again, thank you for allowing me to participate in the care of your patient.        Sincerely,        Jude Powell PA-C    Electronically signed

## 2025-07-10 NOTE — TELEPHONE ENCOUNTER
Caller: Adan Oliver      Reason for Reschedule/Cancellation (please be detailed, any staff messages or encounters to note?):   Care team requests earlier date    Did you cancel or rescheduled an EUS procedure? No.    Is screening questionnaire older than 3 months from the reschedule date.   If Yes, please complete screening questionnaire. No    Prior to reschedule please review:  Ordering Provider: JEFFREY BERRY   Sedation Determined: cs  Does patient have any ASC Exclusions, please identify?: y,  recent cardioversion    Notes on Cancelled Procedure:  Procedure: Upper and Lower Endoscopy [EGD and Colonoscopy]   Date: 8/22  Location: [facility]; [address] [facility], [address] [place] [address]   Surgeon: Hector    Rescheduled: Yes,   Procedure: Upper and Lower Endoscopy [EGD and Colonoscopy]    Date: 7/23   Location: [facility]; [address] [facility], [address] [place] [address]    Surgeon: Hector   Sedation Level Scheduled  cs ,  Reason for Sedation Level per order   Instructions updated and sent: y     Does patient need PAC or Pre -Op Rescheduled? : n

## 2025-07-10 NOTE — TELEPHONE ENCOUNTER
Rescheduled Colonoscopy/Upper endoscopy (EGD)  Due to: Care team requested a sooner appt.         Pre visit planning completed.      Procedure details:    Patient scheduled for Colonoscopy/Upper endoscopy (EGD) on 7/23/25.     Arrival time: 1215. Procedure time 1315    Facility location: [facility]; [address] [place] [address], [address] Floor, [place] [address]. Check in location: Main entrance at registration desk.  *Disclaimer: Drivers are to check in with patient and stay on campus during procedure.     Sedation type: Conscious sedation     Pre op exam needed? No.    Indication for procedure: Barretts/ Screen      Chart review:     Electronic implanted devices? No    Recent diagnosis of diverticulitis within the last 6 weeks? No      Medication review:    Diabetic? No    Anticoagulants? Yes Apixaban (Eliquis): Recommended HOLD 2 days before procedure.  Consult with your managing provider.    Weight loss medication/injectable? No GLP-1 medication per patient's medication list. Nursing to verify with pre-assessment call.    Other medication HOLDING recommendations:  N/A      Prep for procedure:     Bowel prep recommendation: Standard Due to: standard bowel prep    Procedure information and instructions sent via i[employer]         Abbey Zamudio RN  Endoscopy Procedure Pre Assessment   [phone] option 3

## 2025-07-10 NOTE — PROGRESS NOTES
FOLLOW UP GI CLINIC VISIT    CC/REFERRING MD:  Referred Self  REASON FOR CONSULTATION:   Referred Self for   Chief Complaint   Patient presents with    Follow Up     Follow up post ED visit.  Hx of Reid's esophagus.       ASSESSMENT/PLAN: Adan Oliver is a 65 year old male that is seen for follow up in the GI clinic today for recent development of chest pain.  We reviewed his ER visit and laboratory and imaging findings.  His symptoms have not been typical for reflux, though the timing and association with body positioning/lying down can suggest possibility of reflux, given his known hiatal hernia.  I do agree with pursuing upper endoscopy to reassess, we will work on trying to get this scheduled sooner if able.  In the meantime, he will increase Nexium to 40 mg twice daily, can consider adding OTC Pepcid at night.  I advised to continue with dietary and lifestyle precautions for GERD as well.    1. Reid's esophagus without dysplasia (Primary)  - esomeprazole (NEXIUM) 40 MG DR capsule; TAKE ONE CAPSULE BY MOUTH EVERY MORNING 30-60 MINUTES BEFORE BREAKFASTTAKE ONE CAPSULE BY MOUTH EVERY MORNING 30-60 MINUTES BEFORE BREAKFAST  Dispense: 60 capsule; Refill: 0    2. Gastroesophageal reflux disease without esophagitis    3. Chest pain, unspecified type      Thank you for this consultation.  It was a pleasure to participate in the care of this patient; please contact us with any further questions.      30 minutes spent on the date of the encounter doing chart review, patient visit, and documentation    This note was created with voice recognition software, and while reviewed for accuracy, typos may remain.     Jude Powell PA-C  Division of Gastroenterology, Hepatology and Nutrition  [facility]  Adan Oliver is a 65 year old male that is seen for follow up in the GI clinic today.  His previously seen my partner, Dr. Lu, for a history of Reid's  "esophagus.  His last visit with us was not quite 2 years ago.  He was recently seen in the ER with some chest pain, felt like he was \"punched in the chest\", symptoms developed in the evening after laying down and improved with sitting up.  In the ER on 7/7/2025, he underwent laboratory testing which was notable for mildly elevated white blood cell count of 12.3, normal troponins, normal EKG, treated with Maalox and Pepcid without much relief, ultimately discharged and advised to follow-up with GI due to suspected esophagus source.  He had some improvement before having worsening symptoms again last night and feeling some of it today.  No dysphagia, odynophagia, nausea, or vomiting.  There is no pain in the abdomen.  He has continued on Nexium 40 mg daily for his history of Reid's esophagus.  He is not feeling any typical heartburn or sensation of reflux of acid.  Of note, he most recently had EGD and colonoscopy in 2022.  EGD notable for Reid's stage C2-M3 with small islands per Sherwood criteria, biopsies without dysplasia.  2 cm hiatal hernia noted as well.  Colonoscopy with multiple subcentimeter TA's, 3-year follow-up recommended.  He was planning on scheduling those this past spring but had some trouble with symptomatic atrial fibrillation, ultimately undergoing ablation in late May.  Has continued on amiodarone and Eliquis but is going to be tapering off in the coming weeks.  He has been referred for echocardiogram, which is scheduled later today.  He did complete a CT angiogram in May and this was noted to be normal.  He underwent treadmill stress test this past fall which was considered normal.    His main concern is that this increase in chest pain is related to his history of Reid's esophagus.  He is currently scheduled for EGD and colonoscopy in August but is hoping to get it done sooner.  He has had some chronic issues of feelings of shortness of breath and notes that currently, his chest pain " does seem to be exacerbated when taking a deep breath.  It otherwise has been nonexertional.      ROS:    10 point ROS neg other than the symptoms noted above in the HPI.    PREVIOUS ENDOSCOPY:  Reviewed, see HPI    PERTINENT RELEVANT IMAGING OR LABS:  Reviewed    ALLERGIES:     Allergies   Allergen Reactions    Penicillins Other (See Comments)     Stiff leg from shot of PCN        PERTINENT MEDICATIONS:    Current Outpatient Medications:     amiodarone (PACERONE) 200 MG tablet, TAKE 2 TABLET A DAY FOR 3 WEEKS, THEN REDUCE THE DOSE TO ONE TABLET A DAY., Disp: 180 tablet, Rfl: 3    apixaban ANTICOAGULANT (ELIQUIS ANTICOAGULANT) 5 MG tablet, TAKE ONE TABLET BY MOUTH TWICE A DAY, Disp: 180 tablet, Rfl: 3    esomeprazole (NEXIUM) 40 MG DR capsule, TAKE ONE CAPSULE BY MOUTH EVERY MORNING 30-60 MINUTES BEFORE BREAKFASTTAKE ONE CAPSULE BY MOUTH EVERY MORNING 30-60 MINUTES BEFORE BREAKFAST, Disp: 60 capsule, Rfl: 0    finasteride (PROSCAR) 5 MG tablet, Take 1 tablet (5 mg) by mouth daily., Disp: 90 tablet, Rfl: 3    rosuvastatin (CRESTOR) 20 MG tablet, Take 1 tablet (20 mg) by mouth daily., Disp: 90 tablet, Rfl: 3  No current facility-administered medications for this visit.    PROBLEM LIST  Patient Active Problem List    Diagnosis Date Noted    Atrial fibrillation (H) 04/08/2024     Priority: Medium    Benign prostatic hyperplasia with urinary frequency 06/09/2022     Priority: Medium    Hip pain, bilateral 11/19/2021     Priority: Medium    LUCIAN (obstructive sleep apnea)- 'mild, positional' (AHI 12.8) 10/06/2017     Priority: Medium     10/6/2024 [facility] Sleep Study (200.0 lbs) - AHI 12.8, RDI 15.1, Supine AHI 65.1, REM AHI -, Low O2 77.0%, Time Spent <=88% 2.5 minutes / Time Spent <=89% 3.7 minutes.    Study Date: 10/2/2017- (200.0 lbs) Scored using 3% rules. It was difficult to discern between PLMS and hyponeas. Apnea/hypopnea index was 28.0 events per hour.  The REM AHI was 29.3 events per hour.  The supine  AHI was 32.7 events per hour.  RDI was 28.0 events per hour. Lowest oxygen saturation was 84.0%.  Time spent less than or equal to 88% was 0.3 minutes. PLM index was 32.3 movements per hour.  The PLM Arousal Index was 15.8 per hour.      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     Priority: Medium    Esophageal reflux 10/01/2014     Priority: Medium    Hyperlipidemia with target LDL less than 130 10/01/2014     Priority: Medium       PERTINENT PAST MEDICAL HISTORY:  Past Medical History:   Diagnosis Date    Anxiety 12/07/2015    Reid esophagus 10/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 It was difficult to discern between PLMS and hyponeas. Apnea/hypopnea index was 28.0 events per hour.  The REM AHI was 29.3 events per hour.  The supine AHI was 32.7 events per hour.  RDI was 28.0 events per hour. Lowest oxygen saturation was 84.0%.  Time spent less than or equal to 88% was 0.3 minutes. PLM index was 32.3 movements per hour       PREVIOUS SURGERIES:  Past Surgical History:   Procedure Laterality Date    APPENDECTOMY  2000s    BLEPHAROPLASTY BILATERAL Bilateral 10/3/2018    Procedure: BLEPHAROPLASTY BILATERAL;;  Surgeon: Dany Berrios MD;  Location:  OR    CARPAL TUNNEL  RELEASE RT/LT  1980s    COLONOSCOPY N/A 7/27/2022    Procedure: COLONOSCOPY, FLEXIBLE, WITH LESION REMOVAL USING SNARE;  Surgeon: Filiberto Banuelos MD;  Location: [facility] OR    COLONOSCOPY WITH CO2 INSUFFLATION N/A 7/27/2022    Procedure: COLONOSCOPY, WITH CO2 INSUFFLATION;  Surgeon: Filiberto Banuelos MD;  Location: [facility] OR    COMBINED ESOPHAGOSCOPY, GASTROSCOPY, DUODENOSCOPY (EGD) WITH CO2 INSUFFLATION N/A 9/20/2019    Procedure: ESOPHAGOGASTRODUODENOSCOPY, WITH CO2 INSUFFLATION;  Surgeon: Filiberto Banuelos MD;  Location: [facility] OR    COMBINED ESOPHAGOSCOPY, GASTROSCOPY, DUODENOSCOPY (EGD) WITH CO2 INSUFFLATION N/A 7/27/2022    Procedure: ESOPHAGOGASTRODUODENOSCOPY, WITH CO2 INSUFFLATION;  Surgeon: Filiberto Banuelos MD;  Location: [facility] OR    EP ABLATION FOCAL AFIB N/A 5/22/2025    Procedure: EP Ablation Focal AFIB;  Surgeon: Iggy Cheung MD;  Location: [facility] CARDIAC C[facility] LAB    ESOPHAGOSCOPY, GASTROSCOPY, DUODENOSCOPY (EGD), COMBINED N/A 9/20/2019    Procedure: Esophagogastroduodenoscopy, With Biopsy;  Surgeon: Filiberto Banuelos MD;  Location: [facility]    ESOPHAGOSCOPY, GASTROSCOPY, DUODENOSCOPY (EGD), COMBINED N/A 7/27/2022    Procedure: ESOPHAGOGASTRODUODENOSCOPY, WITH BIOPSY;  Surgeon: Filiberto Banuelos MD;  Location: [facility]    REPAIR PTOSIS BILATERAL Bilateral 10/3/2018    Procedure: REPAIR PTOSIS BILATERAL;;  Surgeon: Dany Berrios MD;  Location: [facility]    REPAIR PTOSIS BROW BILATERAL Bilateral 10/3/2018    Procedure: REPAIR PTOSIS BROW BILATERAL;;  Surgeon: Dany Berrios MD;  Location: [facility]    SIGMOIDOSCOPY FLEXIBLE N/A 9/20/2019    Procedure: SIGMOIDOSCOPY, FLEXIBLE;  Surgeon: Filiberto Banuelos MD;  Location: [facility] OR    TONSILLECTOMY  1980s       SOCIAL HISTORY:  Social History     Socioeconomic History    Marital status: Single     Spouse name: Not on file    Number of children: Not on file    Years of education: Not on file    Highest  education level: Not on file   Occupational History    Occupation: /advertising design   Tobacco Use    Smoking status: Never     Passive exposure: Never    Smokeless tobacco: Never   Vaping Use    Vaping status: Never Used   Substance and Sexual Activity    Alcohol use: Not Currently     Alcohol/week: 14.0 - 21.0 standard drinks of alcohol     Types: 14 - 21 Standard drinks or equivalent per week     Comment: varies, a couple drinks a day    Drug use: No    Sexual activity: Not on file   Other Topics Concern    Parent/sibling w/ CABG, MI or angioplasty before 65F 55M? No   Social History Narrative    Not on file     Social Drivers of Health     Financial Resource Strain: Low Risk  (5/6/2025)    Financial Resource Strain     Within the past 12 months, have you or your family members you live with been unable to get utilities (heat, electricity) when it was really needed?: No   Food Insecurity: Low Risk  (5/6/2025)    Food Insecurity     Within the past 12 months, did you worry that your food would run out before you got money to buy more?: No     Within the past 12 months, did the food you bought just not last and you didn t have money to get more?: No   Transportation Needs: Low Risk  (5/6/2025)    Transportation Needs     Within the past 12 months, has lack of transportation kept you from medical appointments, getting your medicines, non-medical meetings or appointments, work, or from getting things that you need?: No   Physical Activity: Insufficiently Active (5/6/2025)    Exercise Vital Sign     Days of Exercise per Week: 1 day     Minutes of Exercise per Session: 10 min   Stress: Stress Concern Present (5/6/2025)    [place] - Occupational Stress Questionnaire     Feeling of Stress : Very much   Social Connections: Unknown (5/6/2025)    Social Connection and Isolation Panel [NHANES]     Frequency of Communication with Friends and Family: Not on file     Frequency of  "Social Gatherings with Friends and Family: Once a week     Attends Jehovah's witness Services: Not on file     Active Member of Clubs or Organizations: Not on file     Attends Club or Organization Meetings: Not on file     Marital Status: Not on file   Interpersonal Safety: Not At Risk (7/7/2025)    Received from [employer]     Humiliation, Afraid, Rape, and Kick questionnaire     Fear of Current or Ex-Partner: No     Emotionally Abused: No     Physically Abused: No     Sexually Abused: No   Housing Stability: High Risk (5/6/2025)    Housing Stability     Do you have housing? : Yes     Are you worried about losing your housing?: Yes       FAMILY HISTORY:  Family History   Problem Relation Age of Onset    Hypertension Mother     Alzheimer Disease Mother     Diabetes Brother     Glaucoma No family hx of     Macular Degeneration No family hx of     Coronary Artery Disease No family hx of     Colon Cancer No family hx of     Retinal detachment No family hx of        Past/family/social history reviewed and no changes    PHYSICAL EXAMINATION:  Constitutional: aaox3, cooperative, pleasant, not dyspneic/diaphoretic, no acute distress  Vitals reviewed: /75 (BP Location: Left arm, Patient Position: Sitting, Cuff Size: Adult Regular)   Pulse 70   Ht 1.727 m (5' 8\")   Wt 85.2 kg (187 lb 12.8 oz)   SpO2 99%   BMI 28.55 kg/m    Wt:   Wt Readings from Last 2 Encounters:   07/10/25 85.2 kg (187 lb 12.8 oz)   05/22/25 88.9 kg (195 lb 15.8 oz)      Eyes: Sclera anicteric/injected  CV: No edema  Respiratory: Unlabored breathing  Skin: warm, perfused, no jaundice  Psych: Normal affect  MSK: Normal gait                            "

## 2025-07-11 NOTE — TELEPHONE ENCOUNTER
Attempted to contact patient in order to complete pre assessment questions.     No answer. Left message to return call to [phone] option 3.    Callback communication sent via [employer].    Mauro Stark RN

## 2025-07-14 NOTE — TELEPHONE ENCOUNTER
Patient saw [employer] message. No response at this time.    Yaz Garrison, RN, BSN  Cardiology RN Care Coordinator   [place]   Phone: [phone]  Fax: [phone] ([place]) [phone] ([place])

## 2025-07-15 ENCOUNTER — MYC MEDICAL ADVICE (OUTPATIENT)
Dept: CARDIOLOGY | Facility: CLINIC | Age: 65
End: 2025-07-15
Payer: COMMERCIAL

## 2025-07-15 ENCOUNTER — TELEPHONE (OUTPATIENT)
Dept: CARDIOLOGY | Facility: CLINIC | Age: 65
End: 2025-07-15
Payer: COMMERCIAL

## 2025-07-15 ENCOUNTER — MYC MEDICAL ADVICE (OUTPATIENT)
Dept: GASTROENTEROLOGY | Facility: CLINIC | Age: 65
End: 2025-07-15
Payer: COMMERCIAL

## 2025-07-15 DIAGNOSIS — K22.70 BARRETT'S ESOPHAGUS WITHOUT DYSPLASIA: ICD-10-CM

## 2025-07-15 DIAGNOSIS — R07.9 CHEST PAIN: Primary | ICD-10-CM

## 2025-07-15 RX ORDER — ESOMEPRAZOLE MAGNESIUM 40 MG/1
40 CAPSULE, DELAYED RELEASE ORAL 2 TIMES DAILY
Qty: 60 CAPSULE | Refills: 0 | Status: SHIPPED | OUTPATIENT
Start: 2025-07-15 | End: 2025-07-17

## 2025-07-15 NOTE — TELEPHONE ENCOUNTER
Called and left message to help reschedule CTA currently scheduled on 7/22/25 per Patient request.    Kaleb Crane   [facility]- Cardiology   [address] [place] [id number]

## 2025-07-15 NOTE — TELEPHONE ENCOUNTER
Called and offered 7/17 with an 8am arrival. Nurse has a patient scheduled in that spot that needs to cancel her CTA. Holding spot until I hear back from Magan Crane   [facility]- Cardiology   [address] [place] [id number]  Hours: 8:00am-4:30pm

## 2025-07-15 NOTE — TELEPHONE ENCOUNTER
Pre assessment completed for upcoming procedure.   (Please see previous telephone encounter notes for complete details)    Patient returned call.       Procedure details:    Procedure date 7/23/25, arrival time 1215 and facility location reviewed.    Pre op exam needed? No.    Designated  policy reviewed and that site requests drivers to check in and stay on campus. Instructed to have someone stay 6  hours post procedure.   *Disclaimer - please notify the [facility] Op Manager with any  issues/concerns.    Medication review:    Medications reviewed. Please see supporting documentation below. Holding recommendations discussed (if applicable).       Prep for procedure:    Procedure prep instructions reviewed.        Any additional information needed:  Patient had cardiac ablation 5/22/25, has been having chest pain and seen in ED recently. Patient states cardiac reasoning has been mostly ruled out and care team wants patient to be seen for Barretts issues.     Patient would like scoping provider to be aware that he had ablation recently and make sure that he will be ok to receive sedation.     RN will send message to scoping provider and message to cardiology to review.     PA was completed, RN will reach out to patient via [employer] after receiving response from providers.       Patient verbalized understanding and had no questions or concerns at this time.      Lauren Ceron RN  Endoscopy Procedure Pre Assessment   [phone] option 3

## 2025-07-15 NOTE — TELEPHONE ENCOUNTER
CT Angiogram Coronary Artery    I have ordered a coronary CTA for you. A coronary CTa (Computed Tomography Angiography) is a non-invasive imaging technique that uses x-rays and contrast dye to visualize the coronary arteries and assess for blockages or narrowing. This procedure is particularly useful for evaluating coronary artery disease (CAD) and other cardiovascular conditions.    Scan will take approximately 10 minutes but test in total will be about 1-1.5 hrs.    Prep for the procedure includes:    A IV will be placed to give contrast via.      To obtain optimal images of your vessels sometimes medications such as; Metoprolol a beta-blocker will be given, it slows your heart rate down -and can lower your blood pressure.     Nitroglycerin also may be given to dilate the blood vessels for better imaging.  Be aware that you may get a headache, feel dizzy or lightheaded when taking this medication.     A  is not required for this procedure, but you may want to bring someone with you if you are experiencing the above symptoms.    Encourage oral hydration before and day of exam a total of four to eight, 8 ounce glasses of water.     No caffeine 12 hours prior to the procedure.  You can have fluids before the exam but no solid foods 3 hours prior to the procedure.    Reviewed over the phone and sent in [employer] message per pt request.    Jane Smith RN  Care Coordinator for Cardiology

## 2025-07-17 ENCOUNTER — HOSPITAL ENCOUNTER (OUTPATIENT)
Dept: CT IMAGING | Facility: CLINIC | Age: 65
Discharge: HOME | End: 2025-07-17
Attending: INTERNAL MEDICINE
Payer: COMMERCIAL

## 2025-07-17 ENCOUNTER — OFFICE VISIT (OUTPATIENT)
Dept: FAMILY MEDICINE | Facility: CLINIC | Age: 65
End: 2025-07-17
Payer: COMMERCIAL

## 2025-07-17 VITALS
SYSTOLIC BLOOD PRESSURE: 115 MMHG | DIASTOLIC BLOOD PRESSURE: 69 MMHG | HEIGHT: 68 IN | OXYGEN SATURATION: 99 % | HEART RATE: 61 BPM | RESPIRATION RATE: 18 BRPM | TEMPERATURE: 98 F | BODY MASS INDEX: 28.19 KG/M2 | WEIGHT: 186 LBS

## 2025-07-17 DIAGNOSIS — R39.16 BENIGN PROSTATIC HYPERPLASIA (BPH) WITH STRAINING ON URINATION: ICD-10-CM

## 2025-07-17 DIAGNOSIS — K22.70 BARRETT'S ESOPHAGUS WITHOUT DYSPLASIA: ICD-10-CM

## 2025-07-17 DIAGNOSIS — R07.9 CHEST PAIN: ICD-10-CM

## 2025-07-17 DIAGNOSIS — R07.89 ATYPICAL CHEST PAIN: Primary | ICD-10-CM

## 2025-07-17 DIAGNOSIS — H93.13 TINNITUS, BILATERAL: ICD-10-CM

## 2025-07-17 DIAGNOSIS — Z23 NEED FOR VACCINATION: ICD-10-CM

## 2025-07-17 DIAGNOSIS — N40.1 BENIGN PROSTATIC HYPERPLASIA (BPH) WITH STRAINING ON URINATION: ICD-10-CM

## 2025-07-17 PROCEDURE — 250N000011 HC RX IP 250 OP 636: Performed by: INTERNAL MEDICINE

## 2025-07-17 PROCEDURE — 75572 CT HRT W/3D IMAGE: CPT

## 2025-07-17 RX ORDER — PANTOPRAZOLE SODIUM 40 MG/1
40 TABLET, DELAYED RELEASE ORAL DAILY
Qty: 90 TABLET | Refills: 3 | Status: SHIPPED | OUTPATIENT
Start: 2025-07-17

## 2025-07-17 RX ORDER — IOPAMIDOL 755 MG/ML
110 INJECTION, SOLUTION INTRAVASCULAR ONCE
Status: COMPLETED | OUTPATIENT
Start: 2025-07-17 | End: 2025-07-17

## 2025-07-17 RX ORDER — ESOMEPRAZOLE MAGNESIUM 40 MG/1
40 CAPSULE, DELAYED RELEASE ORAL 2 TIMES DAILY
Qty: 60 CAPSULE | Refills: 0 | Status: CANCELLED | OUTPATIENT
Start: 2025-07-17

## 2025-07-17 RX ADMIN — IOPAMIDOL 110 ML: 755 INJECTION, SOLUTION INTRAVENOUS at 10:15

## 2025-07-17 NOTE — PROGRESS NOTES
"  Assessment & Plan   Problem List Items Addressed This Visit       Reid esophagus (Chronic)    Relevant Medications    pantoprazole (PROTONIX) 40 MG EC tablet     Other Visit Diagnoses         Atypical chest pain    -  Primary      Need for vaccination          Tinnitus, bilateral          Benign prostatic hyperplasia (BPH) with straining on urination               Continue proscar 4 more months despite no improvement  Switch from nexium to protonix for formulary/cost  Lenire for tinnitus  Awaiting CTA Coronary vessels  Continue PPI, GI consultation  Continue cardiology consultation      40 minutes spent by me on the date of the encounter doing chart review, history and exam, documentation and further activities per the note    The longitudinal plan of care for the diagnosis(es)/condition(s) as documented were addressed during this visit. Due to the added complexity in care, I will continue to support Adan in the subsequent management and with ongoing continuity of care.  BMI  Estimated body mass index is 28.28 kg/m  as calculated from the following:    Height as of this encounter: 1.727 m (5' 8\").    Weight as of this encounter: 84.4 kg (186 lb).         Subjective   Adan is a 65 year old, presenting for the following health issues:  No chief complaint on file.        7/17/2025    12:53 PM   Additional Questions   Roomed by Adelita FONTAINE CMA     History of Present Illness       Reason for visit:  Pain in upper chest. Apparently not heart related. Upper GI scope scheduled for July 23. May be Tyron s related. Want to find out if Dr. Zheng has any input on what else it could be.   He is taking medications regularly.      Wants to review CTA, chest pain etiology  No improvement on proscar  Would like to cut PPI  Feels sluggish, wants off amiodarone      Objective    /69 (BP Location: Right arm, Patient Position: Chair, Cuff Size: Adult Regular)   Pulse 61   Temp 98  F (36.7  C) (Oral)   Resp 18   Ht " "1.727 m (5' 8\")   Wt 84.4 kg (186 lb)   SpO2 99%   BMI 28.28 kg/m    Body mass index is 28.28 kg/m .  Physical Exam   Gen NAD  Normal mood/affect    Ancillary Procedure on 07/10/2025   Component Date Value Ref Range Status    LVEF  07/10/2025 55-60%   Final           Signed Electronically by: JEFFREY EBRRY DO    "

## 2025-07-17 NOTE — TELEPHONE ENCOUNTER
Per cardiology: Janice Crawley MD Eisenschenk, Kristen, ALVINA; Iggy Cheung MD  We need to get the heart CT done first to make sure he has no fistula or sthg like that. He has chest pain. We are trying to get CT sooner.            Patient is now scheduled for CT on 7/17/25.    Message was sent back to provider to discuss reviewing results ASAP due to patients procedures that are scheduled for 7/23/25.     Patient is aware of cardiology response.     Lauren Ceron RN  Endoscopy Procedure Pre Assessment RN

## 2025-07-18 ENCOUNTER — TELEPHONE (OUTPATIENT)
Dept: GASTROENTEROLOGY | Facility: CLINIC | Age: 65
End: 2025-07-18
Payer: COMMERCIAL

## 2025-07-18 NOTE — TELEPHONE ENCOUNTER
Per Dr. Grant:   Janice Grant MD Eisenschenk, Lauren, RN  He is ok to get GI work up, CT showed esophagitis but no serious heart problem,    Thanks    DR GRANT     --------------------------------------------------------------------------  [employer] message sent to patient to advise him he has been cleared by Dr. Grant for endoscopy procedure.     Lauren Ceron, RN  Endoscopy Procedure Pre Assessment RN

## 2025-07-18 NOTE — TELEPHONE ENCOUNTER
Spoke to Adan, He states his PCP has changed his medication from Nexium to Protonix due to cost/insurance coverage per patient. His PCP cancelled the Nexium order.

## 2025-07-18 NOTE — TELEPHONE ENCOUNTER
Health Call Center    Phone Message    May a detailed message be left on voicemail: yes     Reason for Call: Other: Adan is calling in asking for a call back. Pt states that he had spoken with his pharmacy, and was told that both of his prescriptions for Nexium had been canceled by his care team. Pt would like to discuss this, as he is out of this medication. Please call back as soon as possible to discuss     Action Taken: Message routed to:  [facility] GI    Travel Screening: Not Applicable     Date of Service:

## 2025-07-21 NOTE — TELEPHONE ENCOUNTER
Incoming call from patient with questions about upcoming procedure.  Patient's  can not stay during procedure but is able to  when ready.  Staff message sent to [facility] charge nurse to make aware of  situation.  Writer answered patients questions.      Skye Reynoso LPN  Endoscopy Procedure Pre-Assessment LPN  [phone] opt 3

## 2025-07-21 NOTE — TELEPHONE ENCOUNTER
Noted.  Changes reasonable based on insurance coverage.  If we do need to switch back to original, would recommend cost plus drugs.    Jude Powell PA-C

## 2025-07-23 ENCOUNTER — HOSPITAL ENCOUNTER (OUTPATIENT)
Facility: CLINIC | Age: 65
Discharge: HOME | End: 2025-07-23
Attending: INTERNAL MEDICINE | Admitting: INTERNAL MEDICINE
Payer: COMMERCIAL

## 2025-07-23 VITALS
DIASTOLIC BLOOD PRESSURE: 65 MMHG | TEMPERATURE: 98.5 F | SYSTOLIC BLOOD PRESSURE: 110 MMHG | HEART RATE: 67 BPM | OXYGEN SATURATION: 98 % | RESPIRATION RATE: 16 BRPM

## 2025-07-23 LAB
COLONOSCOPY: NORMAL
UPPER GI ENDOSCOPY: NORMAL

## 2025-07-23 PROCEDURE — 43239 EGD BIOPSY SINGLE/MULTIPLE: CPT | Performed by: INTERNAL MEDICINE

## 2025-07-23 PROCEDURE — 88305 TISSUE EXAM BY PATHOLOGIST: CPT | Mod: 26 | Performed by: PATHOLOGY

## 2025-07-23 PROCEDURE — 45380 COLONOSCOPY AND BIOPSY: CPT | Mod: XS,PT | Performed by: INTERNAL MEDICINE

## 2025-07-23 PROCEDURE — 99153 MOD SED SAME PHYS/QHP EA: CPT | Performed by: INTERNAL MEDICINE

## 2025-07-23 PROCEDURE — G0500 MOD SEDAT ENDO SERVICE >5YRS: HCPCS | Performed by: INTERNAL MEDICINE

## 2025-07-23 PROCEDURE — 45385 COLONOSCOPY W/LESION REMOVAL: CPT | Mod: PT | Performed by: INTERNAL MEDICINE

## 2025-07-23 PROCEDURE — 88305 TISSUE EXAM BY PATHOLOGIST: CPT | Mod: TC | Performed by: INTERNAL MEDICINE

## 2025-07-23 PROCEDURE — 250N000011 HC RX IP 250 OP 636: Performed by: INTERNAL MEDICINE

## 2025-07-23 PROCEDURE — 250N000009 HC RX 250: Performed by: INTERNAL MEDICINE

## 2025-07-23 RX ORDER — NALOXONE HYDROCHLORIDE 0.4 MG/ML
0.2 INJECTION, SOLUTION INTRAMUSCULAR; INTRAVENOUS; SUBCUTANEOUS
Status: DISCONTINUED | OUTPATIENT
Start: 2025-07-23 | End: 2025-07-23 | Stop reason: HOSPADM

## 2025-07-23 RX ORDER — NALOXONE HYDROCHLORIDE 0.4 MG/ML
0.4 INJECTION, SOLUTION INTRAMUSCULAR; INTRAVENOUS; SUBCUTANEOUS
Status: DISCONTINUED | OUTPATIENT
Start: 2025-07-23 | End: 2025-07-23 | Stop reason: HOSPADM

## 2025-07-23 RX ORDER — FENTANYL CITRATE 50 UG/ML
INJECTION, SOLUTION INTRAMUSCULAR; INTRAVENOUS PRN
Status: DISCONTINUED | OUTPATIENT
Start: 2025-07-23 | End: 2025-07-23 | Stop reason: HOSPADM

## 2025-07-23 RX ORDER — LIDOCAINE 40 MG/G
CREAM TOPICAL
Status: DISCONTINUED | OUTPATIENT
Start: 2025-07-23 | End: 2025-07-23 | Stop reason: HOSPADM

## 2025-07-23 RX ORDER — ONDANSETRON 2 MG/ML
4 INJECTION INTRAMUSCULAR; INTRAVENOUS
Status: DISCONTINUED | OUTPATIENT
Start: 2025-07-23 | End: 2025-07-23 | Stop reason: HOSPADM

## 2025-07-23 RX ORDER — ONDANSETRON 2 MG/ML
4 INJECTION INTRAMUSCULAR; INTRAVENOUS EVERY 6 HOURS PRN
Status: DISCONTINUED | OUTPATIENT
Start: 2025-07-23 | End: 2025-07-23 | Stop reason: HOSPADM

## 2025-07-23 RX ORDER — FLUMAZENIL 0.1 MG/ML
0.2 INJECTION, SOLUTION INTRAVENOUS
Status: DISCONTINUED | OUTPATIENT
Start: 2025-07-23 | End: 2025-07-23 | Stop reason: HOSPADM

## 2025-07-23 RX ORDER — PROCHLORPERAZINE MALEATE 5 MG/1
5 TABLET ORAL EVERY 6 HOURS PRN
Status: DISCONTINUED | OUTPATIENT
Start: 2025-07-23 | End: 2025-07-23 | Stop reason: HOSPADM

## 2025-07-23 RX ORDER — ONDANSETRON 4 MG/1
4 TABLET, ORALLY DISINTEGRATING ORAL EVERY 6 HOURS PRN
Status: DISCONTINUED | OUTPATIENT
Start: 2025-07-23 | End: 2025-07-23 | Stop reason: HOSPADM

## 2025-07-23 ASSESSMENT — ACTIVITIES OF DAILY LIVING (ADL)
ADLS_ACUITY_SCORE: 41

## 2025-07-23 NOTE — H&P
Adan EMERSON [facility]  [id number]  male  65 year old      Reason for procedure/surgery: follow up rios's esophagus and polyp surveillance    Patient Active Problem List   Diagnosis    Esophageal reflux    Hyperlipidemia with target LDL less than 130    Rios esophagus    Memory changes    Insomnia, psychophysiological    Family history of Alzheimer's disease    Anxiety    Lumbar degenerative disc disease    Protrusion of lumbar intervertebral disc    Lumbar spinal stenosis    Alcohol use    LUCIAN (obstructive sleep apnea)- 'mild, positional' (AHI 12.8)    Hip pain, bilateral    Benign prostatic hyperplasia with urinary frequency    Atrial fibrillation (H)       Past Surgical History:    Past Surgical History:   Procedure Laterality Date    APPENDECTOMY  2000s    BLEPHAROPLASTY BILATERAL Bilateral 10/3/2018    Procedure: BLEPHAROPLASTY BILATERAL;;  Surgeon: Dany Berrios MD;  Location: [facility]    CARPAL TUNNEL RELEASE RT/LT  1980s    COLONOSCOPY N/A 7/27/2022    Procedure: COLONOSCOPY, FLEXIBLE, WITH LESION REMOVAL USING SNARE;  Surgeon: Filiberto Banuelos MD;  Location: [facility]    COLONOSCOPY WITH CO2 INSUFFLATION N/A 7/27/2022    Procedure: COLONOSCOPY, WITH CO2 INSUFFLATION;  Surgeon: Filiberto Banuelos MD;  Location: [facility]    COMBINED ESOPHAGOSCOPY, GASTROSCOPY, DUODENOSCOPY (EGD) WITH CO2 INSUFFLATION N/A 9/20/2019    Procedure: ESOPHAGOGASTRODUODENOSCOPY, WITH CO2 INSUFFLATION;  Surgeon: Filiberto Banuelos MD;  Location: [facility]    COMBINED ESOPHAGOSCOPY, GASTROSCOPY, DUODENOSCOPY (EGD) WITH CO2 INSUFFLATION N/A 7/27/2022    Procedure: ESOPHAGOGASTRODUODENOSCOPY, WITH CO2 INSUFFLATION;  Surgeon: Filiberto Banuelos MD;  Location: [facility]    EP ABLATION FOCAL AFIB N/A 5/22/2025    Procedure: EP Ablation Focal AFIB;  Surgeon: Iggy Cheung MD;  Location:  [facility]    ESOPHAGOSCOPY, GASTROSCOPY, DUODENOSCOPY (EGD), COMBINED N/A 9/20/2019    Procedure:  Esophagogastroduodenoscopy, With Biopsy;  Surgeon: Filiberto Banuelos MD;  Location: [facility]    ESOPHAGOSCOPY, GASTROSCOPY, DUODENOSCOPY (EGD), COMBINED N/A 7/27/2022    Procedure: ESOPHAGOGASTRODUODENOSCOPY, WITH BIOPSY;  Surgeon: Filiberto Banuelos MD;  Location: [facility]    REPAIR PTOSIS BILATERAL Bilateral 10/3/2018    Procedure: REPAIR PTOSIS BILATERAL;;  Surgeon: Dany Berrios MD;  Location: [facility]    REPAIR PTOSIS BROW BILATERAL Bilateral 10/3/2018    Procedure: REPAIR PTOSIS BROW BILATERAL;;  Surgeon: Dany Berrios MD;  Location: [facility]    SIGMOIDOSCOPY FLEXIBLE N/A 9/20/2019    Procedure: SIGMOIDOSCOPY, FLEXIBLE;  Surgeon: Filiberto Banuelos MD;  Location: [facility]    TONSILLECTOMY  1980s       Past Medical History:   Past Medical History:   Diagnosis Date    Anxiety 12/07/2015    Reid esophagus 10/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 It was difficult to discern between PLMS and hyponeas. Apnea/hypopnea index was 28.0 events per hour.  The REM AHI was 29.3 events per hour.  The supine AHI was 32.7 events per hour.  RDI was 28.0 events per hour. Lowest oxygen saturation was 84.0%.  Time spent less than or equal to 88% was 0.3 minutes. PLM index was 32.3 movements per hour       Social History:   Social History     Tobacco Use    Smoking status: Never     Passive exposure: Never    Smokeless tobacco: Never   Substance Use Topics    Alcohol use: Not Currently     Alcohol/week: 14.0 - 21.0 standard drinks of alcohol     Types: 14 - 21 Standard drinks or equivalent per week     Comment: varies, a couple drinks a day       Family History:   Family History   Problem Relation Age of Onset    Hypertension  Mother     Alzheimer Disease Mother     Diabetes Brother     Glaucoma No family hx of     Macular Degeneration No family hx of     Coronary Artery Disease No family hx of     Colon Cancer No family hx of     Retinal detachment No family hx of        Allergies:   Allergies   Allergen Reactions    Penicillins Other (See Comments)     Stiff leg from shot of PCN        Active Medications:   No current outpatient medications on file.       Systemic Review:   CONSTITUTIONAL: NEGATIVE for fever, chills, change in weight  ENT/MOUTH: NEGATIVE for ear, mouth and throat problems  RESP: NEGATIVE for significant cough or SOB  CV: NEGATIVE for chest pain, palpitations or peripheral edema    Physical Examination:   Vital Signs: /71 (BP Location: Left arm)   Pulse 68   Temp 98.5  F (36.9  C) (Oral)   Resp 18   GENERAL: healthy, alert and no distress  NECK: no adenopathy, no asymmetry, masses, or scars  RESP: lungs clear to auscultation - no rales, rhonchi or wheezes  CV: regular rate and rhythm, normal S1 S2, no S3 or S4, no murmur, click or rub, no peripheral edema and peripheral pulses strong  ABDOMEN: soft, nontender, no hepatosplenomegaly, no masses and bowel sounds normal  MS: no gross musculoskeletal defects noted, no edema    ASA Classification: (II)  Mild systemic disease  Airway Exam: Mallampati Score: Class II (Complete visualization of uvula)    Plan: Appropriate to proceed as scheduled.      Reggie Espinoza MD  7/23/2025    PCP:  Jimbo Zheng

## 2025-07-23 NOTE — OR NURSING
Pt underwent EGD with biopsies and colonoscopy with biopsies under conscious sedation. Specimens sent to lab. Pt tolerated well. Pt taken to recovery and report given to recovery RN.

## 2025-08-06 PROBLEM — D12.6 ADENOMATOUS POLYP OF COLON: Status: ACTIVE | Noted: 2025-08-06

## 2025-08-13 ENCOUNTER — OFFICE VISIT (OUTPATIENT)
Dept: CARDIOLOGY | Facility: CLINIC | Age: 65
End: 2025-08-13
Payer: COMMERCIAL

## 2025-08-13 VITALS
WEIGHT: 184.8 LBS | HEART RATE: 74 BPM | BODY MASS INDEX: 28.1 KG/M2 | SYSTOLIC BLOOD PRESSURE: 124 MMHG | OXYGEN SATURATION: 97 % | DIASTOLIC BLOOD PRESSURE: 85 MMHG

## 2025-08-13 DIAGNOSIS — I48.92 ATRIAL FLUTTER BY ELECTROCARDIOGRAM (H): ICD-10-CM

## 2025-08-13 DIAGNOSIS — Z51.81 ENCOUNTER FOR MONITORING FLECAINIDE THERAPY: ICD-10-CM

## 2025-08-13 DIAGNOSIS — I48.0 PAF (PAROXYSMAL ATRIAL FIBRILLATION) (H): ICD-10-CM

## 2025-08-13 DIAGNOSIS — E78.5 HYPERLIPIDEMIA WITH TARGET LDL LESS THAN 130: Chronic | ICD-10-CM

## 2025-08-13 DIAGNOSIS — Z79.899 ENCOUNTER FOR MONITORING FLECAINIDE THERAPY: ICD-10-CM

## 2025-08-13 LAB
ATRIAL RATE - MUSE: 71 BPM
DIASTOLIC BLOOD PRESSURE - MUSE: NORMAL MMHG
INTERPRETATION ECG - MUSE: NORMAL
P AXIS - MUSE: 45 DEGREES
PR INTERVAL - MUSE: 146 MS
QRS DURATION - MUSE: 64 MS
QT - MUSE: 382 MS
QTC - MUSE: 415 MS
R AXIS - MUSE: 46 DEGREES
SYSTOLIC BLOOD PRESSURE - MUSE: NORMAL MMHG
T AXIS - MUSE: 46 DEGREES
VENTRICULAR RATE- MUSE: 71 BPM

## 2025-09-02 ENCOUNTER — MYC MEDICAL ADVICE (OUTPATIENT)
Dept: CARDIOLOGY | Facility: CLINIC | Age: 65
End: 2025-09-02
Payer: COMMERCIAL

## 2025-09-02 ENCOUNTER — TELEPHONE (OUTPATIENT)
Dept: CARDIOLOGY | Facility: CLINIC | Age: 65
End: 2025-09-02
Payer: COMMERCIAL

## 2025-09-04 ENCOUNTER — ALLIED HEALTH/NURSE VISIT (OUTPATIENT)
Dept: CARDIOLOGY | Facility: CLINIC | Age: 65
End: 2025-09-04
Payer: COMMERCIAL

## 2025-09-04 ENCOUNTER — TELEPHONE (OUTPATIENT)
Dept: CARDIOLOGY | Facility: CLINIC | Age: 65
End: 2025-09-04

## 2025-09-04 VITALS — DIASTOLIC BLOOD PRESSURE: 93 MMHG | SYSTOLIC BLOOD PRESSURE: 134 MMHG

## 2025-09-04 DIAGNOSIS — I48.0 PAF (PAROXYSMAL ATRIAL FIBRILLATION) (H): Primary | ICD-10-CM

## (undated) DEVICE — INTRO CATH 12CM 8.5FR FST-CATH

## (undated) DEVICE — GLOVE PROTEXIS W/NEU-THERA 7.5  2D73TE75

## (undated) DEVICE — PACK MINOR EYE

## (undated) DEVICE — PATCH CARTO 3 EXTERNAL REFERENCE 3D MAPPING CREFP6

## (undated) DEVICE — ELECTRODE DEFIB CADENCE 22550R

## (undated) DEVICE — NDL 30GA 0.5" 305106

## (undated) DEVICE — PAD CHUX UNDERPAD 23X24" 7136

## (undated) DEVICE — NDL 30GA 1" 305128

## (undated) DEVICE — CATH SOUNDSTAR 8FRX90CM 10439011

## (undated) DEVICE — ESU EYE HIGH TEMP 65410-183

## (undated) DEVICE — INTRO SHEATH 4FRX10CM PINNACLE RSS402

## (undated) DEVICE — KIT MICROINTRODUCER VASCULAR VSI 4FRX0.018INX40CM 7195X

## (undated) DEVICE — INTRODUCER SHEATH FAST-CATH CATH-LOCK 7FRX12CM 406702

## (undated) DEVICE — INTRODUCER SHEATH FAST-CATH 9FRX12CM 406116

## (undated) DEVICE — NDL TRANSSEPTAL BRK XS 18GA 71CM BRK-1 CVD G407209

## (undated) DEVICE — SOL WATER IRRIG 1000ML BOTTLE 07139-09

## (undated) DEVICE — CATH NAV PENTARAY F CURVE

## (undated) DEVICE — MARKER SKIN DOUBLE TIP W/FLEXI-RULER W/LABELS

## (undated) DEVICE — REPRO BARD EP XT DECAPL STRBL DX EP CATH 6F

## (undated) DEVICE — SU SILK 6-0 G-1DA 18" 780G

## (undated) DEVICE — SU PLAIN 6-0 G-1DA 18" 770G

## (undated) DEVICE — SU SILK 4-0 P-3 8" 641G

## (undated) DEVICE — SU CHROMIC 5-0 P-3 18" 687G

## (undated) DEVICE — KIT VENOUS FLUSH 60210177

## (undated) DEVICE — KIT ENDO FIRST STEP DISINFECTANT 200ML W/POUCH EP-4

## (undated) DEVICE — SYR 03ML LL W/O NDL

## (undated) DEVICE — TUBE SET SMARKABLATE IRRIGATION

## (undated) DEVICE — PACK HEART LEFT CUSTOM PC15OT92B

## (undated) DEVICE — INTRODUCER SHEATH FAST-CATH SWARTZ 8.5FRX63CM SL1 CVD 406849

## (undated) DEVICE — CATH ABLATION 8FR 115CM D-F CURVE BI-DIR QDOT MICRO D139505

## (undated) RX ORDER — PROPOFOL 10 MG/ML
INJECTION, EMULSION INTRAVENOUS
Status: DISPENSED
Start: 2018-10-03

## (undated) RX ORDER — GABAPENTIN 300 MG/1
CAPSULE ORAL
Status: DISPENSED
Start: 2018-10-03

## (undated) RX ORDER — FENTANYL CITRATE 50 UG/ML
INJECTION, SOLUTION INTRAMUSCULAR; INTRAVENOUS
Status: DISPENSED
Start: 2022-07-27

## (undated) RX ORDER — ERYTHROMYCIN 5 MG/G
OINTMENT OPHTHALMIC
Status: DISPENSED
Start: 2018-10-03

## (undated) RX ORDER — LIDOCAINE HCL/EPINEPHRINE/PF 2%-1:200K
VIAL (ML) INJECTION
Status: DISPENSED
Start: 2018-10-03

## (undated) RX ORDER — FENTANYL CITRATE-0.9 % NACL/PF 10 MCG/ML
PLASTIC BAG, INJECTION (ML) INTRAVENOUS
Status: DISPENSED
Start: 2025-05-22

## (undated) RX ORDER — PROTAMINE SULFATE 10 MG/ML
INJECTION, SOLUTION INTRAVENOUS
Status: DISPENSED
Start: 2025-05-22

## (undated) RX ORDER — FENTANYL CITRATE 50 UG/ML
INJECTION, SOLUTION INTRAMUSCULAR; INTRAVENOUS
Status: DISPENSED
Start: 2018-10-03

## (undated) RX ORDER — FENTANYL CITRATE 50 UG/ML
INJECTION, SOLUTION INTRAMUSCULAR; INTRAVENOUS
Status: DISPENSED
Start: 2025-05-22

## (undated) RX ORDER — FENTANYL CITRATE 50 UG/ML
INJECTION, SOLUTION INTRAMUSCULAR; INTRAVENOUS
Status: DISPENSED
Start: 2025-07-23

## (undated) RX ORDER — FENTANYL CITRATE 50 UG/ML
INJECTION, SOLUTION INTRAMUSCULAR; INTRAVENOUS
Status: DISPENSED
Start: 2019-09-20

## (undated) RX ORDER — HEPARIN SODIUM 1000 [USP'U]/ML
INJECTION, SOLUTION INTRAVENOUS; SUBCUTANEOUS
Status: DISPENSED
Start: 2025-05-22

## (undated) RX ORDER — ONDANSETRON 2 MG/ML
INJECTION INTRAMUSCULAR; INTRAVENOUS
Status: DISPENSED
Start: 2025-05-22

## (undated) RX ORDER — LIDOCAINE HYDROCHLORIDE 10 MG/ML
INJECTION, SOLUTION EPIDURAL; INFILTRATION; INTRACAUDAL; PERINEURAL
Status: DISPENSED
Start: 2025-05-22

## (undated) RX ORDER — ACETAMINOPHEN 325 MG/1
TABLET ORAL
Status: DISPENSED
Start: 2018-10-03

## (undated) RX ORDER — METHYLPREDNISOLONE SODIUM SUCCINATE 125 MG/2ML
INJECTION INTRAMUSCULAR; INTRAVENOUS
Status: DISPENSED
Start: 2025-05-22

## (undated) RX ORDER — TETRACAINE HYDROCHLORIDE 5 MG/ML
SOLUTION OPHTHALMIC
Status: DISPENSED
Start: 2018-10-03

## (undated) RX ORDER — BUPIVACAINE HYDROCHLORIDE 2.5 MG/ML
INJECTION, SOLUTION EPIDURAL; INFILTRATION; INTRACAUDAL
Status: DISPENSED
Start: 2018-10-03

## (undated) RX ORDER — SIMETHICONE 40MG/0.6ML
SUSPENSION, DROPS(FINAL DOSAGE FORM)(ML) ORAL
Status: DISPENSED
Start: 2022-07-27